# Patient Record
Sex: FEMALE | Race: WHITE | Employment: OTHER | URBAN - METROPOLITAN AREA
[De-identification: names, ages, dates, MRNs, and addresses within clinical notes are randomized per-mention and may not be internally consistent; named-entity substitution may affect disease eponyms.]

---

## 2021-08-09 ENCOUNTER — HOSPITAL ENCOUNTER (OUTPATIENT)
Dept: WOUND CARE | Age: 76
Discharge: HOME | End: 2021-08-09
Payer: MEDICARE

## 2021-08-09 DIAGNOSIS — I87.2 VENOUS STASIS DERMATITIS OF BOTH LOWER EXTREMITIES: ICD-10-CM

## 2021-08-09 DIAGNOSIS — I89.0 LYMPHEDEMA OF BOTH LOWER EXTREMITIES: ICD-10-CM

## 2021-08-09 PROCEDURE — 99203 OFFICE O/P NEW LOW 30 MIN: CPT

## 2021-08-09 PROCEDURE — 99203 OFFICE O/P NEW LOW 30 MIN: CPT | Performed by: NURSE PRACTITIONER

## 2021-08-09 PROCEDURE — 11719 TRIM NAIL(S) ANY NUMBER: CPT

## 2021-08-09 PROCEDURE — 29581 APPL MULTLAYER CMPRN SYS LEG: CPT

## 2021-08-09 RX ORDER — OXYCODONE HYDROCHLORIDE 5 MG/1
5 TABLET ORAL 2 TIMES DAILY PRN
Status: ON HOLD | COMMUNITY
End: 2022-03-17 | Stop reason: HOSPADM

## 2021-08-09 RX ORDER — MORPHINE SULFATE 30 MG/1
30 TABLET ORAL 2 TIMES DAILY PRN
COMMUNITY
End: 2022-02-05

## 2021-08-09 RX ORDER — CLOBETASOL PROPIONATE 0.5 MG/G
OINTMENT TOPICAL
Qty: 60 G | Refills: 1 | Status: ON HOLD
Start: 2021-08-09 | End: 2022-03-17 | Stop reason: HOSPADM

## 2021-08-09 RX ORDER — LOSARTAN POTASSIUM 50 MG/1
50 TABLET ORAL DAILY
Status: ON HOLD | COMMUNITY
End: 2022-03-17 | Stop reason: HOSPADM

## 2021-08-09 NOTE — PROGRESS NOTES
Multilayer Compression Wrap   (Not Unna) Below the Knee    NAME:  Adilene Sánchez  YOB: 1945  MEDICAL RECORD NUMBER:  [id number]  DATE:  8/9/2021    Multilayer compression wrap: Removed old Multilayer wrap if indicated and wash leg with mild soap/water. Applied moisturizing agent to dry skin as needed. Applied primary and secondary dressing as ordered. Applied multilayered dressing below the knee to right lower leg. Applied multilayered dressing below the knee to left lower leg. Instructed patient/caregiver not to remove dressing and to keep it clean and dry. Instructed patient/caregiver on complications to report to provider, such as pain, numbness in toes, heavy drainage, and slippage of dressing. Instructed patient on purpose of compression dressing and on activity and exercise recommendations.            Electronically signed by Avery Coleman LPN on 3/9/6474 at 4:32 PM

## 2021-08-10 PROBLEM — I89.0 LYMPHEDEMA OF BOTH LOWER EXTREMITIES: Status: ACTIVE | Noted: 2021-08-10

## 2021-08-10 PROBLEM — I87.2 VENOUS STASIS DERMATITIS OF BOTH LOWER EXTREMITIES: Status: ACTIVE | Noted: 2021-08-10

## 2021-08-10 RX ORDER — GINSENG 100 MG
CAPSULE ORAL ONCE
Status: CANCELLED | OUTPATIENT
Start: 2021-08-10 | End: 2021-08-10

## 2021-08-10 RX ORDER — BETAMETHASONE DIPROPIONATE 0.05 %
OINTMENT (GRAM) TOPICAL ONCE
Status: CANCELLED | OUTPATIENT
Start: 2021-08-10 | End: 2021-08-10

## 2021-08-10 RX ORDER — BACITRACIN, NEOMYCIN, POLYMYXIN B 400; 3.5; 5 [USP'U]/G; MG/G; [USP'U]/G
OINTMENT TOPICAL ONCE
Status: CANCELLED | OUTPATIENT
Start: 2021-08-10 | End: 2021-08-10

## 2021-08-10 RX ORDER — CLOBETASOL PROPIONATE 0.5 MG/G
OINTMENT TOPICAL ONCE
Status: CANCELLED | OUTPATIENT
Start: 2021-08-10 | End: 2021-08-10

## 2021-08-10 RX ORDER — GENTAMICIN SULFATE 1 MG/G
OINTMENT TOPICAL ONCE
Status: CANCELLED | OUTPATIENT
Start: 2021-08-10 | End: 2021-08-10

## 2021-08-10 RX ORDER — LIDOCAINE 50 MG/G
OINTMENT TOPICAL ONCE
Status: CANCELLED | OUTPATIENT
Start: 2021-08-10 | End: 2021-08-10

## 2021-08-10 RX ORDER — LIDOCAINE 40 MG/G
CREAM TOPICAL ONCE
Status: CANCELLED | OUTPATIENT
Start: 2021-08-10 | End: 2021-08-10

## 2021-08-10 RX ORDER — LIDOCAINE HYDROCHLORIDE 40 MG/ML
SOLUTION TOPICAL ONCE
Status: CANCELLED | OUTPATIENT
Start: 2021-08-10 | End: 2021-08-10

## 2021-08-10 RX ORDER — BACITRACIN ZINC AND POLYMYXIN B SULFATE 500; 1000 [USP'U]/G; [USP'U]/G
OINTMENT TOPICAL ONCE
Status: CANCELLED | OUTPATIENT
Start: 2021-08-10 | End: 2021-08-10

## 2021-08-10 NOTE — PROGRESS NOTES
[facility] Initial Visit      Gustavo Vides  AGE: 76 y.o. GENDER: female  : 1945  EPISODE DATE:  2021   Referred by: Indiana Wasserman    Subjective:     CHIEF COMPLAINT VENOUS DERMATITIS AND LYMPHEDEMA     HISTORY of PRESENT ILLNESS      Gustavo Vides is a 76 y.o. female who presents to the [address] [place] [address] for an initial visit for evaluation and treatment of Chronic venous dermatits and lymphedema of bilateral lower extremity. The condition is of moderate severity. The patient has chronic venous dermatitis and lymphedema that she has had for the last 1-2 years in varying degrees of severity and open wound. The underlying cause is thought to be venous and lymphedema. The patients care to date has included several months of compression wraps per Home Health. The patient has significant underlying medical conditions as below. Wound Pain Timing/Severity: intermittent, mild  Quality of pain: aching, tender  Severity of pain:  2 / 10   Modifying Factors: edema, venous stasis and decreased mobility  Associated Signs/Symptoms: edema, drainage and pain    Diabetes: No  Smoking: No  Obesity: No  Anticoagulant therapy: No  Immunosuppression: No    Patient educated on the 6 essential components necessary for wound healing: Circulation, Debridements, Proper Dressings and Topical Wound Products, Infection Control, Edema Control and Offloading. Patient educated on those factors that negatively effect or impact wound healing: smoking, obesity, uncontrolled diabetes, anticoagulant and immunosuppressive regimens, inadequate nutrition, untreated arterial and venous disease if applicable and measures to manage edema. PAST MEDICAL HISTORY    History reviewed. No pertinent past medical history. PAST SURGICAL HISTORY    Past Surgical History:   Procedure Laterality Date    BACK SURGERY      FRACTURE SURGERY         FAMILY HISTORY    History reviewed. No pertinent family history.     SOCIAL Visual inspection of the periwound reveals the skin to be edematous  Wound exam: see wound description below in procedure note      Assessment:       Felisha Daugherty  appears to have venous dermatitis and lymphedema bilateral lower legs. The etiology of the wound is felt to be venous and lymphedema. There are multiple complicating factors including edema, venous stasis and decreased mobility. A comprehensive wound management program would be helpful to heal this wound. Assessments completed include fall risk and nutritional, functional,and psychological status. At this time appropriate care would include: periodic debridement and wound care as below. Problem List Items Addressed This Visit     WD-Venous stasis dermatitis of both lower extremities    WD-Lymphedema of both lower extremities        Discussed in length (greater than 30 minutes) venous disease and lymphedema along with edema management and treatment options. The patient and family feel it need to have air to heal. We discussed the need for compression and that [address] [place] [address] R[place] Herring will not to be able to come and wrap her legs long term and that she will need to use compression stockings once we get her legs compressed down. Discussed referral for venous evaluation to limit her symptoms. The patient and her daughter did not seem interested in further venous evaluation and she states she can't be seen weekly related to how difficult it is to get ot. She agreed to return in one month. Edema Control Instructions:  -Whenever you are resting, raise your legs up. Try to keep the swollen area higher than the level of your heart.  -Take breaks from standing or sitting in one position.  -Walk around to increase the blood flow in your lower legs. -Move your feet and ankles often while you stand, or tighten and relax your leg muscles.  -Wear support stockings. Put them on in the morning, before swelling gets worse.  -Eat a balanced diet.  Lose weight if you need to.  -Limit the amount of salt (sodium) in your diet. Salt holds fluid in the body and may increase swelling.  -Apply compression stocking(s) every morning as soon as you get up. Remove at bedtime unless instructed to wear day and night. Hand wash and line dry to prevent loss of elasticity. Replace every 3-4 months to ensure proper fit. The patient's toe nails were debrided in length and thickness,  totaling 10 nails using rongeurs. Toenail Brittleness, crumbling upon debridement, discoloration, elongation, fungus and thickening. Medical necessity includes advanced atrophic changes to include decrease hair growth lower legs, thickening, discolored toenails and rubor L60.2. hER primary care provider [facility] at [facility], she couldn't recall his name, seen within the last 1-2 months. Plan:     Discharge Instructions       PHYSICIAN ORDERS AND DISCHARGE INSTRUCTIONS    NOTE: Upon discharge from the [address], you will receive a patient experience survey. We would be grateful if you would take the time to fill this survey out. Wound care order history:     LOYDA's   Right       Left    Date:   Cultures:     Grafts:     Antibiotics:        Continuing wound care orders and information:                Residence:                Continue home health care with:    Your wound-care supplies will be provided by: Wound cleansing:     Do not scrub or use excessive force. Wash hands with soap and water before and after dressing changes. Prior to applying a clean dressing, cleanse wound with normal saline, wound cleanser, or mild soap and water. Ask the physician or nurse before getting the wound(s) wet in a shower    Daily Wound management:   Keep weight off wounds and reposition every 2 hours. Avoid standing for long periods of time. Apply wraps/stockings in AM and remove at bedtime.    If swelling is present, elevate legs to the level of the heart or above for 30 minutes 4-5 times a day and/or when sitting. When taking antibiotics take entire prescription as ordered by physician do not stop taking until medicine is all gone. Orders for this week:  08/09/21     Bilateral Lower Legs-- Clean with soap and water, pat dry. Apply A&D ointment to Lower legs. Apply Ca Alginate to Right Medial Ankle. Wrap with Coban 2 Lite. Home care to change on Monday and Thursday. Follow up with Molly RUDD  In 4 weeks in the wound care center  Call [phone] for any questions or concerns.   Date__________   Time____________        Treatment Note      Written Patient Dismissal Instructions Given          Electronically signed by SANDER Portillo CNP on 8/10/2021 at 11:06 AM

## 2021-09-13 ENCOUNTER — HOSPITAL ENCOUNTER (OUTPATIENT)
Dept: WOUND CARE | Age: 76
Discharge: HOME | End: 2021-09-13
Payer: MEDICARE

## 2021-09-13 VITALS
SYSTOLIC BLOOD PRESSURE: 137 MMHG | TEMPERATURE: 98.5 F | DIASTOLIC BLOOD PRESSURE: 63 MMHG | RESPIRATION RATE: 16 BRPM | HEART RATE: 72 BPM

## 2021-09-13 DIAGNOSIS — I83.022 VENOUS STASIS ULCER OF LEFT CALF WITH FAT LAYER EXPOSED WITH VARICOSE VEINS (HCC): ICD-10-CM

## 2021-09-13 DIAGNOSIS — L97.222 VENOUS STASIS ULCER OF LEFT CALF WITH FAT LAYER EXPOSED WITH VARICOSE VEINS (HCC): ICD-10-CM

## 2021-09-13 DIAGNOSIS — I89.0 LYMPHEDEMA OF BOTH LOWER EXTREMITIES: ICD-10-CM

## 2021-09-13 DIAGNOSIS — I87.2 VENOUS STASIS DERMATITIS OF BOTH LOWER EXTREMITIES: Primary | ICD-10-CM

## 2021-09-13 DIAGNOSIS — L97.212 VENOUS STASIS ULCER OF RIGHT CALF WITH FAT LAYER EXPOSED WITH VARICOSE VEINS (HCC): ICD-10-CM

## 2021-09-13 DIAGNOSIS — I83.012 VENOUS STASIS ULCER OF RIGHT CALF WITH FAT LAYER EXPOSED WITH VARICOSE VEINS (HCC): ICD-10-CM

## 2021-09-13 PROCEDURE — 87186 SC STD MICRODIL/AGAR DIL: CPT

## 2021-09-13 PROCEDURE — 11042 DBRDMT SUBQ TIS 1ST 20SQCM/<: CPT | Performed by: NURSE PRACTITIONER

## 2021-09-13 PROCEDURE — 11042 DBRDMT SUBQ TIS 1ST 20SQCM/<: CPT

## 2021-09-13 PROCEDURE — 87075 CULTR BACTERIA EXCEPT BLOOD: CPT

## 2021-09-13 PROCEDURE — 87077 CULTURE AEROBIC IDENTIFY: CPT

## 2021-09-13 PROCEDURE — 87070 CULTURE OTHR SPECIMN AEROBIC: CPT

## 2021-09-13 RX ORDER — GENTAMICIN SULFATE 1 MG/G
OINTMENT TOPICAL ONCE
Status: DISCONTINUED | OUTPATIENT
Start: 2021-09-13 | End: 2021-09-14 | Stop reason: HOSPADM

## 2021-09-13 RX ORDER — BETAMETHASONE DIPROPIONATE 0.05 %
OINTMENT (GRAM) TOPICAL ONCE
Status: CANCELLED | OUTPATIENT
Start: 2021-09-13 | End: 2021-09-13

## 2021-09-13 RX ORDER — BACITRACIN ZINC AND POLYMYXIN B SULFATE 500; 1000 [USP'U]/G; [USP'U]/G
OINTMENT TOPICAL ONCE
Status: CANCELLED | OUTPATIENT
Start: 2021-09-13 | End: 2021-09-13

## 2021-09-13 RX ORDER — LIDOCAINE HYDROCHLORIDE 40 MG/ML
SOLUTION TOPICAL ONCE
Status: DISCONTINUED | OUTPATIENT
Start: 2021-09-13 | End: 2021-09-14 | Stop reason: HOSPADM

## 2021-09-13 RX ORDER — CLOBETASOL PROPIONATE 0.5 MG/G
OINTMENT TOPICAL ONCE
Status: CANCELLED | OUTPATIENT
Start: 2021-09-13 | End: 2021-09-13

## 2021-09-13 RX ORDER — LIDOCAINE 50 MG/G
OINTMENT TOPICAL ONCE
Status: CANCELLED | OUTPATIENT
Start: 2021-09-13 | End: 2021-09-13

## 2021-09-13 RX ORDER — LIDOCAINE 40 MG/G
CREAM TOPICAL ONCE
Status: CANCELLED | OUTPATIENT
Start: 2021-09-13 | End: 2021-09-13

## 2021-09-13 RX ORDER — BACITRACIN, NEOMYCIN, POLYMYXIN B 400; 3.5; 5 [USP'U]/G; MG/G; [USP'U]/G
OINTMENT TOPICAL ONCE
Status: CANCELLED | OUTPATIENT
Start: 2021-09-13 | End: 2021-09-13

## 2021-09-13 RX ORDER — GENTAMICIN SULFATE 1 MG/G
OINTMENT TOPICAL
Qty: 30 G | Refills: 1 | Status: SHIPPED | OUTPATIENT
Start: 2021-09-13 | End: 2021-12-06 | Stop reason: SDUPTHER

## 2021-09-13 RX ORDER — GINSENG 100 MG
CAPSULE ORAL ONCE
Status: CANCELLED | OUTPATIENT
Start: 2021-09-13 | End: 2021-09-13

## 2021-09-13 RX ORDER — GENTAMICIN SULFATE 1 MG/G
OINTMENT TOPICAL ONCE
Status: CANCELLED | OUTPATIENT
Start: 2021-09-13 | End: 2021-09-13

## 2021-09-13 RX ORDER — LIDOCAINE HYDROCHLORIDE 40 MG/ML
SOLUTION TOPICAL ONCE
Status: CANCELLED | OUTPATIENT
Start: 2021-09-13 | End: 2021-09-13

## 2021-09-13 NOTE — PROGRESS NOTES
Multilayer Compression Wrap   (Not Unna) Below the Knee    NAME:  Leanna Willis  YOB: 1945  MEDICAL RECORD NUMBER:  [id number]  DATE:  9/13/2021    Multilayer compression wrap: Removed old Multilayer wrap if indicated and wash leg with mild soap/water. Applied moisturizing agent to dry skin as needed. Applied primary and secondary dressing as ordered. Applied multilayered dressing below the knee to right lower leg. Applied multilayered dressing below the knee to left lower leg. Instructed patient/caregiver not to remove dressing and to keep it clean and dry. Instructed patient/caregiver on complications to report to provider, such as pain, numbness in toes, heavy drainage, and slippage of dressing. Instructed patient on purpose of compression dressing and on activity and exercise recommendations.       Electronically signed by Kayce Deshpande LPN on 4/30/5296 at 1:14 PM

## 2021-09-13 NOTE — PROGRESS NOTES
Wound Care Center Progress Note With Procedure    Kala Durbin  AGE: 68 y.o. GENDER: female  : 1945  EPISODE DATE:  2021     Subjective:     Chief Complaint   Patient presents with    Wound Check     FLOWER LEGS         HISTORY of PRESENT ILLNESS      Kala Durbin is a 76 y.o. female who presents to the [address] [place] [address] for evaluation and treatment of Chronic venous dermatits and lymphedema of bilateral lower extremity. Today she has open areas of bilateral lower legs from blistering. The condition is of moderate severity. The patient has chronic venous dermatitis and lymphedema that she has had for the last 1-2 years in varying degrees of severity and open wound. The underlying cause is thought to be venous and lymphedema. The patients care to date has included several months of compression wraps per Home Health. The patient has significant underlying medical conditions as below.      Wound Pain Timing/Severity: intermittent, mild  Quality of pain: aching, tender  Severity of pain:  2 / 10   Modifying Factors: edema, venous stasis and decreased mobility  Associated Signs/Symptoms: edema, drainage and pain     Diabetes: No  Smoking: No  Obesity: No  Anticoagulant therapy: No  Immunosuppression: No     Patient educated on the 6 essential components necessary for wound healing: Circulation, Debridements, Proper Dressings and Topical Wound Products, Infection Control, Edema Control and Offloading.     Patient educated on those factors that negatively effect or impact wound healing: smoking, obesity, uncontrolled diabetes, anticoagulant and immunosuppressive regimens, inadequate nutrition, untreated arterial and venous disease if applicable and measures to manage edema. PAST MEDICAL HISTORY    History reviewed. No pertinent past medical history.     PAST SURGICAL HISTORY    Past Surgical History:   Procedure Laterality Date    BACK SURGERY      FRACTURE SURGERY         FAMILY HISTORY    History reviewed. No pertinent family history. SOCIAL HISTORY    Social History     Tobacco Use    Smoking status: Never Smoker    Smokeless tobacco: Never Used   Vaping Use    Vaping Use: Never assessed   Substance Use Topics    Alcohol use: Never    Drug use: Never       ALLERGIES    No Known Allergies    MEDICATIONS    Current Outpatient Medications on File Prior to Encounter   Medication Sig Dispense Refill    morphine (MSIR) 30 MG tablet Take 30 mg by mouth 2 times daily as needed for Pain.  losartan (COZAAR) 50 MG tablet Take 50 mg by mouth daily      oxyCODONE (ROXICODONE) 5 MG immediate release tablet Take 5 mg by mouth 2 times daily as needed for Pain.  clobetasol (TEMOVATE) 0.05 % ointment Apply topically 2 times weekly to lower legs with compression wraps. 60 g 1     No current facility-administered medications on file prior to encounter. REVIEW OF SYSTEMS    Pertinent items are noted in HPI. Constitutional: Negative for systemic symptoms including fever, chills and malaise. Objective:      /63   Pulse 72   Temp 98.5 °F (36.9 °C) (Temporal)   Resp 16     PHYSICAL EXAM    General: The patient is in no acute distress. Mental status:  Patient is appropriate, is  oriented to place and plan of care.   Dermatologic exam: Visual inspection of the periwound reveals the skin to be edematous  Wound exam: see wound description below in procedure note      Assessment:     Problem List Items Addressed This Visit     WD-Venous stasis dermatitis of both lower extremities - Primary    Relevant Medications    lidocaine (XYLOCAINE) 4 % external solution    gentamicin (GARAMYCIN) 0.1 % ointment    Other Relevant Orders    Initiate Outpatient Wound Care Protocol    WD-Lymphedema of both lower extremities    Relevant Medications    lidocaine (XYLOCAINE) 4 % external solution    gentamicin (GARAMYCIN) 0.1 % ointment    Other Relevant Orders    Initiate Outpatient Wound Care Protocol WD-Venous stasis ulcer of right calf with fat layer exposed with varicose veins (HCC)    WD-Venous stasis ulcer of left calf with fat layer exposed with varicose veins ([address]        Procedure Note    Indications:  Based on my examination of this patient's wound(s) today, sharp excision into necrotic subcutaneous tissue is required to promote healing and evaluate the extent of previous healing. Performed by: SANDER Castaneda CNP    Consent obtained: Yes    Time out taken:  Yes    Pain Control: Anesthetic  Anesthetic: 4% Lidocaine Liquid Topical        Debridement:Excisional Debridement    Using curette the wound(s) was/were sharply debrided down through and including the removal of subcutaneous tissue.         Devitalized Tissue Debrided:  slough and exudate    Pre Debridement Measurements:  Are located in the Wound Documentation Flow Sheet    All active wounds listed below with today's date are evaluated  Wound(s)    debrided this date include # : 1 and 2     Post  Debridement Measurements:  Wound 09/13/21 Pretibial Right;Proximal #1 (Active)   Wound Image   09/13/21 1437   Wound Etiology Venous 09/13/21 1504   Wound Cleansed Soap and water 09/13/21 1437   Wound Length (cm) 0.2 cm 09/13/21 1437   Wound Width (cm) 0.2 cm 09/13/21 1437   Wound Depth (cm) 0.1 cm 09/13/21 1437   Wound Surface Area (cm^2) 0.04 cm^2 09/13/21 1437   Wound Volume (cm^3) 0.004 cm^3 09/13/21 1437   Post-Procedure Length (cm) 0.2 cm 09/13/21 1504   Post-Procedure Width (cm) 0.2 cm 09/13/21 1504   Post-Procedure Depth (cm) 0.1 cm 09/13/21 1504   Post-Procedure Surface Area (cm^2) 0.04 cm^2 09/13/21 1504   Post-Procedure Volume (cm^3) 0.004 cm^3 09/13/21 1504   Distance Tunneling (cm) 0 cm 09/13/21 1437   Tunneling Position ___ O'Clock 0 09/13/21 1437   Undermining Starts ___ O'Clock 00 09/13/21 1437   Undermining Ends___ O'Clock 0 09/13/21 1437   Undermining Maxium Distance (cm) 0 09/13/21 1437   Wound Assessment Pink/red 09/13/21 4426 Drainage Amount Moderate 09/13/21 1437   Drainage Description Serosanguinous 09/13/21 1437   Odor None 09/13/21 1437   Lashawn-wound Assessment Intact 09/13/21 1437   Margins Defined edges 09/13/21 1437   Wound Thickness Description not for Pressure Injury Full thickness 09/13/21 1437   Number of days: 0       Wound 09/13/21 Pretibial Left;Lateral #2 (Active)   Wound Image   09/13/21 1437   Wound Etiology Venous 09/13/21 1504   Wound Cleansed Wound cleanser 09/13/21 1437   Wound Length (cm) 0.8 cm 09/13/21 1437   Wound Width (cm) 1.4 cm 09/13/21 1437   Wound Depth (cm) 0.1 cm 09/13/21 1437   Wound Surface Area (cm^2) 1.12 cm^2 09/13/21 1437   Wound Volume (cm^3) 0.112 cm^3 09/13/21 1437   Post-Procedure Length (cm) 0.8 cm 09/13/21 1504   Post-Procedure Width (cm) 1.4 cm 09/13/21 1504   Post-Procedure Depth (cm) 0.1 cm 09/13/21 1504   Post-Procedure Surface Area (cm^2) 1.12 cm^2 09/13/21 1504   Post-Procedure Volume (cm^3) 0.112 cm^3 09/13/21 1504   Distance Tunneling (cm) 0 cm 09/13/21 1437   Tunneling Position ___ O'Clock 0 09/13/21 1437   Undermining Starts ___ O'Clock 0 09/13/21 1437   Undermining Ends___ O'Clock 0 09/13/21 1437   Undermining Maxium Distance (cm) 0 09/13/21 1437   Wound Assessment Other (Comment) 09/13/21 1437   Drainage Amount None 09/13/21 1437   Odor None 09/13/21 1437   Lashawn-wound Assessment Intact 09/13/21 1437   Margins Attached edges 09/13/21 1437   Number of days: 0       Percent of Wound(s) Debrided: approximately 100%    Total  Area  Debrided:  1.16 sq cm     Bleeding:  Minimal    Hemostasis Achieved:  by pressure    Procedural Pain:  0  / 10     Post Procedural Pain:  0 / 10     Response to treatment:  Well tolerated by patient. Status of wound progress and description from last visit: The edema has improved with compression, she does have some mild scattered blistering.  The wound was cultured on the left lateral leg, will use Gent topically and stimulin powder to the wounds and order history:                 LOYDA's   Right       Left               Date:              Cultures:                Grafts:                Antibiotics:           Continuing wound care orders and information:                 Residence:  Home              Continue home health care with: [address]              Your wound-care supplies will be provided by: [facility]     Wound cleansing:               Do not scrub or use excessive force. Wash hands with soap and water before and after dressing changes. Prior to applying a clean dressing, cleanse wound with normal saline, wound cleanser, or mild soap and water. Ask the physician or nurse before getting the wound(s) wet in a shower     Daily Wound management:              Keep weight off wounds and reposition every 2 hours. Avoid standing for long periods of time. Apply wraps/stockings in AM and remove at bedtime. If swelling is present, elevate legs to the level of the heart or above for 30 minutes 4-5 times a day and/or when sitting. When taking antibiotics take entire prescription as ordered by physician do not stop taking until medicine is all gone.                                                           Orders for this week:  09/13/21                Bilateral Lower Legs-- Clean with soap and water, pat dry. Apply Gentamicin and Stimulin powder to wound beds Cover with Ca Alginate. Wrap with Coban 2 (Compression is built in, no need to stretch, just roll on.) from base of Toes, not including toes, to just below knees. Home care to change on Monday and Thursday.        Follow up with Molly RUDD  In 4 weeks in the wound care center  Call [phone] for any questions or concerns.   Date__________   Time____________        Treatment Note      Written Patient Dismissal Instructions Given            Electronically signed by Ventura Carrel, APRN - CNP on 9/13/2021 at 3:59 PM

## 2021-09-19 LAB
CULTURE: ABNORMAL
CULTURE: ABNORMAL
Lab: ABNORMAL
SPECIMEN: ABNORMAL

## 2021-10-11 ENCOUNTER — HOSPITAL ENCOUNTER (OUTPATIENT)
Dept: WOUND CARE | Age: 76
Discharge: HOME | End: 2021-10-11
Payer: MEDICARE

## 2021-10-11 VITALS
DIASTOLIC BLOOD PRESSURE: 58 MMHG | SYSTOLIC BLOOD PRESSURE: 111 MMHG | RESPIRATION RATE: 18 BRPM | HEART RATE: 74 BPM | TEMPERATURE: 98.6 F

## 2021-10-11 DIAGNOSIS — L97.222 VENOUS STASIS ULCER OF LEFT CALF WITH FAT LAYER EXPOSED WITH VARICOSE VEINS (HCC): ICD-10-CM

## 2021-10-11 DIAGNOSIS — I89.0 LYMPHEDEMA OF BOTH LOWER EXTREMITIES: ICD-10-CM

## 2021-10-11 DIAGNOSIS — I83.022 VENOUS STASIS ULCER OF LEFT CALF WITH FAT LAYER EXPOSED WITH VARICOSE VEINS (HCC): ICD-10-CM

## 2021-10-11 DIAGNOSIS — L97.212 VENOUS STASIS ULCER OF RIGHT CALF WITH FAT LAYER EXPOSED WITH VARICOSE VEINS (HCC): ICD-10-CM

## 2021-10-11 DIAGNOSIS — I87.2 VENOUS STASIS DERMATITIS OF BOTH LOWER EXTREMITIES: Primary | ICD-10-CM

## 2021-10-11 DIAGNOSIS — I83.012 VENOUS STASIS ULCER OF RIGHT CALF WITH FAT LAYER EXPOSED WITH VARICOSE VEINS (HCC): ICD-10-CM

## 2021-10-11 PROCEDURE — 11719 TRIM NAIL(S) ANY NUMBER: CPT | Performed by: NURSE PRACTITIONER

## 2021-10-11 PROCEDURE — 29581 APPL MULTLAYER CMPRN SYS LEG: CPT

## 2021-10-11 PROCEDURE — 99213 OFFICE O/P EST LOW 20 MIN: CPT | Performed by: NURSE PRACTITIONER

## 2021-10-11 PROCEDURE — 11719 TRIM NAIL(S) ANY NUMBER: CPT

## 2021-10-11 RX ORDER — BACITRACIN ZINC AND POLYMYXIN B SULFATE 500; 1000 [USP'U]/G; [USP'U]/G
OINTMENT TOPICAL ONCE
Status: CANCELLED | OUTPATIENT
Start: 2021-10-11 | End: 2021-10-11

## 2021-10-11 RX ORDER — LIDOCAINE 50 MG/G
OINTMENT TOPICAL ONCE
Status: CANCELLED | OUTPATIENT
Start: 2021-10-11 | End: 2021-10-11

## 2021-10-11 RX ORDER — BETAMETHASONE DIPROPIONATE 0.05 %
OINTMENT (GRAM) TOPICAL ONCE
Status: CANCELLED | OUTPATIENT
Start: 2021-10-11 | End: 2021-10-11

## 2021-10-11 RX ORDER — CLOBETASOL PROPIONATE 0.5 MG/G
OINTMENT TOPICAL ONCE
Status: CANCELLED | OUTPATIENT
Start: 2021-10-11 | End: 2021-10-11

## 2021-10-11 RX ORDER — LIDOCAINE HYDROCHLORIDE 40 MG/ML
SOLUTION TOPICAL ONCE
Status: CANCELLED | OUTPATIENT
Start: 2021-10-11 | End: 2021-10-11

## 2021-10-11 RX ORDER — BACITRACIN, NEOMYCIN, POLYMYXIN B 400; 3.5; 5 [USP'U]/G; MG/G; [USP'U]/G
OINTMENT TOPICAL ONCE
Status: CANCELLED | OUTPATIENT
Start: 2021-10-11 | End: 2021-10-11

## 2021-10-11 RX ORDER — GINSENG 100 MG
CAPSULE ORAL ONCE
Status: CANCELLED | OUTPATIENT
Start: 2021-10-11 | End: 2021-10-11

## 2021-10-11 RX ORDER — GENTAMICIN SULFATE 1 MG/G
OINTMENT TOPICAL ONCE
Status: CANCELLED | OUTPATIENT
Start: 2021-10-11 | End: 2021-10-11

## 2021-10-11 RX ORDER — LIDOCAINE 40 MG/G
CREAM TOPICAL ONCE
Status: CANCELLED | OUTPATIENT
Start: 2021-10-11 | End: 2021-10-11

## 2021-10-11 NOTE — PROGRESS NOTES
Wound Care Center Progress Note       Hazel Gardner  AGE: 68 y.o. GENDER: female  : 1945  TODAY'S DATE:  10/11/2021        Subjective:     Chief Complaint   Patient presents with    Wound Check     BLE         HISTORY of Nasrin Chowdhury Emily is a 76 y. o. female who presents to the Wound Clinic for evaluation and treatment of Chronic venous dermatits and lymphedema of bilateral lower extremity. Today she has open areas of bilateral lower legs from blistering. The condition is of moderate severity. The patient has chronic venous dermatitis and lymphedema that she has had for the last 1-2 years in varying degrees of severity and open wound.  The underlying cause is thought to be venous and lymphedema.  The patients care to date has included several months of compression wraps per Home Health. The patient has significant underlying medical conditions as below. Patient is here for 4 week follow up. Her wounds are mostly healed, but they are having some issues with home health following our orders. They did not place A&D ointment and have not been wrapping all the way to toes, and her right foot has increased edema. Most lesions are healed. PAST MEDICAL HISTORY        Diagnosis Date    MRSA (methicillin resistant staph aureus) culture positive 2021    Wound       PAST SURGICAL HISTORY    Past Surgical History:   Procedure Laterality Date    BACK SURGERY      FRACTURE SURGERY         FAMILY HISTORY    History reviewed. No pertinent family history.     SOCIAL HISTORY    Social History     Tobacco Use    Smoking status: Never Smoker    Smokeless tobacco: Never Used   Vaping Use    Vaping Use: Never assessed   Substance Use Topics    Alcohol use: Never    Drug use: Never       ALLERGIES    No Known Allergies    MEDICATIONS    Current Outpatient Medications on File Prior to Encounter   Medication Sig Dispense Refill    oxyCODONE (ROXICODONE) 5 MG immediate release tablet Take 5 mg by mouth 2 times daily as needed for Pain.  morphine (MSIR) 30 MG tablet Take 30 mg by mouth 2 times daily as needed for Pain.  losartan (COZAAR) 50 MG tablet Take 50 mg by mouth daily      clobetasol (TEMOVATE) 0.05 % ointment Apply topically 2 times weekly to lower legs with compression wraps. 60 g 1     No current facility-administered medications on file prior to encounter. REVIEW OF SYSTEMS    Pertinent items are noted in HPI. Constitutional: Negative for systemic symptoms including fever, chills and malaise. Objective:      BP (!) 111/58   Pulse 74   Temp 98.6 °F (37 °C) (Temporal)   Resp 18     PHYSICAL EXAM      General: The patient is in no acute distress. Mental status:  Patient is appropriate, is  oriented to place and plan of care. Dermatologic exam: Visual inspection of the periwound reveals the skin to be normal in turgor and texture, dry and edematous.   Wound exam:  see wound description below     All active wounds listed below with today's date are evaluated      Wound 09/13/21 Pretibial Right;Proximal #1 (Active)   Wound Image   10/11/21 1457   Wound Etiology Venous 09/13/21 1504   Dressing Status New dressing applied 09/13/21 1621   Wound Cleansed Soap and water 10/11/21 1457   Offloading for Diabetic Foot Ulcers No offloading required 09/13/21 1621   Wound Length (cm) 0 cm 10/11/21 1457   Wound Width (cm) 0 cm 10/11/21 1457   Wound Depth (cm) 0 cm 10/11/21 1457   Wound Surface Area (cm^2) 0 cm^2 10/11/21 1457   Change in Wound Size % (l*w) 100 10/11/21 1457   Wound Volume (cm^3) 0 cm^3 10/11/21 1457   Wound Healing % 100 10/11/21 1457   Post-Procedure Length (cm) 0.2 cm 09/13/21 1504   Post-Procedure Width (cm) 0.2 cm 09/13/21 1504   Post-Procedure Depth (cm) 0.1 cm 09/13/21 1504   Post-Procedure Surface Area (cm^2) 0.04 cm^2 09/13/21 1504   Post-Procedure Volume (cm^3) 0.004 cm^3 09/13/21 1504   Distance Tunneling (cm) 0 cm 09/13/21 1437   Tunneling Position ___ O'Clock 0 09/13/21 1437   Undermining Starts ___ O'Clock 00 09/13/21 1437   Undermining Ends___ O'Clock 0 09/13/21 1437   Undermining Maxium Distance (cm) 0 09/13/21 1437   Wound Assessment Pink/red 09/13/21 1437   Drainage Amount Moderate 09/13/21 1437   Drainage Description Serosanguinous 09/13/21 1437   Odor None 09/13/21 1437   Lashawn-wound Assessment Intact 09/13/21 1437   Margins Defined edges 09/13/21 1437   Wound Thickness Description not for Pressure Injury Full thickness 09/13/21 1437   Number of days: 28       Wound 09/13/21 Pretibial Left;Lateral #2 (Active)   Wound Image   10/11/21 1457   Wound Etiology Venous 09/13/21 1504   Dressing Status New dressing applied 09/13/21 1621   Wound Cleansed Soap and water 10/11/21 1457   Offloading for Diabetic Foot Ulcers No offloading required 09/13/21 1621   Wound Length (cm) 0 cm 10/11/21 1457   Wound Width (cm) 0 cm 10/11/21 1457   Wound Depth (cm) 0 cm 10/11/21 1457   Wound Surface Area (cm^2) 0 cm^2 10/11/21 1457   Change in Wound Size % (l*w) 100 10/11/21 1457   Wound Volume (cm^3) 0 cm^3 10/11/21 1457   Wound Healing % 100 10/11/21 1457   Post-Procedure Length (cm) 0.8 cm 09/13/21 1504   Post-Procedure Width (cm) 1.4 cm 09/13/21 1504   Post-Procedure Depth (cm) 0.1 cm 09/13/21 1504   Post-Procedure Surface Area (cm^2) 1.12 cm^2 09/13/21 1504   Post-Procedure Volume (cm^3) 0.112 cm^3 09/13/21 1504   Distance Tunneling (cm) 0 cm 09/13/21 1437   Tunneling Position ___ O'Clock 0 09/13/21 1437   Undermining Starts ___ O'Clock 0 09/13/21 1437   Undermining Ends___ O'Clock 0 09/13/21 1437   Undermining Maxium Distance (cm) 0 09/13/21 1437   Wound Assessment Other (Comment) 09/13/21 1437   Drainage Amount None 09/13/21 1437   Odor None 09/13/21 1437   Lashawn-wound Assessment Intact 09/13/21 1437   Margins Attached edges 09/13/21 1437   Number of days: 28       Assessment:       Problem List Items Addressed This Visit     WD-Venous stasis dermatitis of both lower extremities - Primary    Relevant Orders    Initiate Outpatient Wound Care Protocol    WD-Lymphedema of both lower extremities    Relevant Orders    Initiate Outpatient Wound Care Protocol    WD-Venous stasis ulcer of right calf with fat layer exposed with varicose veins (HCC)    Relevant Orders    Initiate Outpatient Wound Care Protocol    WD-Venous stasis ulcer of left calf with fat layer exposed with varicose veins (HCC)    Relevant Orders    Initiate Outpatient Wound Care Protocol          Status of wound progress and description from last visit:   Mostly healed. We will have her return in her normal 4 week follow up and hope to discharge at that time. Discussed need for compression wraps when out of our direct care. Modified orders to remove gent. Stimulin nad A&D to legs along with wraps changed twice a week. Toenails Brittleness, crumbling upon debridement, discoloration, elongation, fungus and thickening. The patient's toe nails were trimmed in length, totaling 10 nails using rongeurs. Medical necessity includes advanced atrophic changes to include decrease hair growth lower legs, thickening, discolored toenails and rubor L60.2. The primary care provider is Primary care at the [place] in [facility], but patient does not have 1 specific provider. last seen earlier this month, per patient daughter. Plan:     Discharge Instructions       PHYSICIAN ORDERS AND DISCHARGE INSTRUCTIONS     NOTE: Upon discharge from the [address], you will receive a patient experience survey.  We would be grateful if you would take the time to fill this survey out.     Wound care order history:                 LOYDA's   Right       Left               Date:              Cultures:                Grafts:                Antibiotics:           Continuing wound care orders and information:                 Residence:  Home              Continue home health care with: [address] [facility]              Your wound-care supplies will be provided by: [facility]     Wound cleansing:               UF not scrub or use excessive force.              Wash hands with soap and water before and after dressing changes.             WOXME to applying a clean dressing, cleanse wound with normal saline, wound cleanser, or mild soap and water.               Ask the physician or nurse before getting the wound(s) wet in a shower     Daily Wound management:              Keep weight off wounds and reposition every 2 hours.              Avoid standing for long periods of time.              Apply wraps/stockings in AM and remove at bedtime.              If swelling is present, elevate legs to the level of the heart or above for 30 minutes 4-5 times a day and/or when sitting.                                      When taking antibiotics take entire prescription as ordered by physician do not stop taking until medicine is all gone.                                                           Orders for this week:  10/11/21                Bilateral Lower Legs-- Clean with soap and water, pat dry. A&D to lower legs. Apply Stimulin powder to wound beds Cover with Ca Alginate.  Wrap with Coban 2 (Compression is built in, no need to stretch, just roll on.) from base of Toes, not including toes, to just below knees.  Home care to change on Monday and Thursday.        Follow up with Molly RUDD  In 4 weeks in the wound care center  Call [phone] for any questions or concerns.   Date__________   Time____________        Treatment Note      Written Patient Dismissal Instructions Given            Electronically signed by SANDER Vick CNP on 10/11/2021 at 3:52 PM

## 2021-10-11 NOTE — PROGRESS NOTES
Multilayer Compression Wrap   (Not Unna) Below the Knee    NAME:  Geovanny Casanova  YOB: 1945  MEDICAL RECORD NUMBER:  [id number]  DATE:  10/11/2021    Multilayer compression wrap: Removed old Multilayer wrap if indicated and wash leg with mild soap/water. Applied moisturizing agent to dry skin as needed. Applied primary and secondary dressing as ordered. Applied multilayered dressing below the knee to right lower leg. Applied multilayered dressing below the knee to left lower leg. Instructed patient/caregiver not to remove dressing and to keep it clean and dry. Instructed patient/caregiver on complications to report to provider, such as pain, numbness in toes, heavy drainage, and slippage of dressing. Instructed patient on purpose of compression dressing and on activity and exercise recommendations.       Electronically signed by Jazmin Huynh LPN on 74/62/6289 at 4:08 PM

## 2021-11-08 ENCOUNTER — HOSPITAL ENCOUNTER (OUTPATIENT)
Dept: WOUND CARE | Age: 76
Discharge: HOME | End: 2021-11-08
Payer: MEDICARE

## 2021-11-08 VITALS — DIASTOLIC BLOOD PRESSURE: 59 MMHG | HEART RATE: 76 BPM | RESPIRATION RATE: 20 BRPM | SYSTOLIC BLOOD PRESSURE: 131 MMHG

## 2021-11-08 DIAGNOSIS — I83.012 VENOUS STASIS ULCER OF RIGHT CALF WITH FAT LAYER EXPOSED WITH VARICOSE VEINS (HCC): ICD-10-CM

## 2021-11-08 DIAGNOSIS — I87.2 VENOUS STASIS DERMATITIS OF BOTH LOWER EXTREMITIES: Primary | ICD-10-CM

## 2021-11-08 DIAGNOSIS — L97.212 VENOUS STASIS ULCER OF RIGHT CALF WITH FAT LAYER EXPOSED WITH VARICOSE VEINS (HCC): ICD-10-CM

## 2021-11-08 DIAGNOSIS — L97.222 VENOUS STASIS ULCER OF LEFT CALF WITH FAT LAYER EXPOSED WITH VARICOSE VEINS (HCC): ICD-10-CM

## 2021-11-08 DIAGNOSIS — I83.022 VENOUS STASIS ULCER OF LEFT CALF WITH FAT LAYER EXPOSED WITH VARICOSE VEINS (HCC): ICD-10-CM

## 2021-11-08 DIAGNOSIS — I89.0 LYMPHEDEMA OF BOTH LOWER EXTREMITIES: ICD-10-CM

## 2021-11-08 PROCEDURE — 11042 DBRDMT SUBQ TIS 1ST 20SQCM/<: CPT

## 2021-11-08 PROCEDURE — 11042 DBRDMT SUBQ TIS 1ST 20SQCM/<: CPT | Performed by: NURSE PRACTITIONER

## 2021-11-08 RX ORDER — CLOBETASOL PROPIONATE 0.5 MG/G
OINTMENT TOPICAL ONCE
Status: CANCELLED | OUTPATIENT
Start: 2021-11-08 | End: 2021-11-08

## 2021-11-08 RX ORDER — GENTAMICIN SULFATE 1 MG/G
OINTMENT TOPICAL ONCE
Status: CANCELLED | OUTPATIENT
Start: 2021-11-08 | End: 2021-11-08

## 2021-11-08 RX ORDER — LIDOCAINE HYDROCHLORIDE 40 MG/ML
SOLUTION TOPICAL ONCE
Status: CANCELLED | OUTPATIENT
Start: 2021-11-08 | End: 2021-11-08

## 2021-11-08 RX ORDER — BETAMETHASONE DIPROPIONATE 0.05 %
OINTMENT (GRAM) TOPICAL ONCE
Status: CANCELLED | OUTPATIENT
Start: 2021-11-08 | End: 2021-11-08

## 2021-11-08 RX ORDER — BACITRACIN ZINC AND POLYMYXIN B SULFATE 500; 1000 [USP'U]/G; [USP'U]/G
OINTMENT TOPICAL ONCE
Status: CANCELLED | OUTPATIENT
Start: 2021-11-08 | End: 2021-11-08

## 2021-11-08 RX ORDER — BACITRACIN, NEOMYCIN, POLYMYXIN B 400; 3.5; 5 [USP'U]/G; MG/G; [USP'U]/G
OINTMENT TOPICAL ONCE
Status: CANCELLED | OUTPATIENT
Start: 2021-11-08 | End: 2021-11-08

## 2021-11-08 RX ORDER — LIDOCAINE 40 MG/G
CREAM TOPICAL ONCE
Status: CANCELLED | OUTPATIENT
Start: 2021-11-08 | End: 2021-11-08

## 2021-11-08 RX ORDER — GENTAMICIN SULFATE 1 MG/G
OINTMENT TOPICAL ONCE
Status: DISCONTINUED | OUTPATIENT
Start: 2021-11-08 | End: 2021-11-09 | Stop reason: HOSPADM

## 2021-11-08 RX ORDER — LIDOCAINE 50 MG/G
OINTMENT TOPICAL ONCE
Status: CANCELLED | OUTPATIENT
Start: 2021-11-08 | End: 2021-11-08

## 2021-11-08 RX ORDER — GINSENG 100 MG
CAPSULE ORAL ONCE
Status: CANCELLED | OUTPATIENT
Start: 2021-11-08 | End: 2021-11-08

## 2021-11-08 RX ORDER — LIDOCAINE HYDROCHLORIDE 40 MG/ML
SOLUTION TOPICAL ONCE
Status: DISCONTINUED | OUTPATIENT
Start: 2021-11-08 | End: 2021-11-09 | Stop reason: HOSPADM

## 2021-11-08 ASSESSMENT — PAIN - FUNCTIONAL ASSESSMENT: PAIN_FUNCTIONAL_ASSESSMENT: PREVENTS OR INTERFERES SOME ACTIVE ACTIVITIES AND ADLS

## 2021-11-08 ASSESSMENT — PAIN DESCRIPTION - FREQUENCY: FREQUENCY: INTERMITTENT

## 2021-11-08 ASSESSMENT — PAIN DESCRIPTION - PAIN TYPE: TYPE: ACUTE PAIN

## 2021-11-08 ASSESSMENT — PAIN DESCRIPTION - ONSET: ONSET: ON-GOING

## 2021-11-08 ASSESSMENT — PAIN SCALES - GENERAL: PAINLEVEL_OUTOF10: 7

## 2021-11-08 ASSESSMENT — PAIN DESCRIPTION - DESCRIPTORS: DESCRIPTORS: TINGLING

## 2021-11-08 ASSESSMENT — PAIN DESCRIPTION - ORIENTATION: ORIENTATION: RIGHT;LEFT

## 2021-11-08 ASSESSMENT — PAIN DESCRIPTION - LOCATION: LOCATION: FOOT

## 2021-11-08 NOTE — PROGRESS NOTES
Wound Care Center Progress Note With Procedure    Frank Lawson  AGE: 68 y.o. GENDER: female  : 1945  EPISODE DATE:  2021     Subjective:     Chief Complaint   Patient presents with    Wound Check     FLOWER LEGS         HISTORY of PRESENT ILLNESS     Dilip Bishop a 76 y. o. female who presents to the Wound Clinic for evaluation and treatment of Chronic venous dermatits and lymphedema of bilateral lower extremity. Today she has open areas of bilateral lower legs from blistering. The condition is of moderate severity. The patient has chronic venous dermatitis and lymphedema that she has had for the last 1-2 years in varying degrees of severity and open wound.  The underlying cause is thought to be venous and lymphedema.  The patients care to date has included several months of compression wraps per Home Health.  The patient has significant underlying medical conditions as below.      Wound Pain Timing/Severity: intermittent, mild  Quality of pain: aching, tender  Severity of pain:  2 / 10   Modifying Factors: edema, venous stasis and decreased mobility  Associated Signs/Symptoms: edema, drainage and pain     Diabetes: No  Smoking: No  Obesity: No  Anticoagulant therapy: No  Immunosuppression: No     Patient educated on the 6 essential components necessary for wound healing: Circulation, Debridements, Proper Dressings and Topical Wound Products, Infection Control, Edema Control and Offloading.     Patient educated on those factors that negatively effect or impact wound healing: smoking, obesity, uncontrolled diabetes, anticoagulant and immunosuppressive regimens, inadequate nutrition, untreated arterial and venous disease if applicable and measures to manage edema.         PAST MEDICAL HISTORY        Diagnosis Date    MRSA (methicillin resistant staph aureus) culture positive 2021    Wound       PAST SURGICAL HISTORY    Past Surgical History:   Procedure Laterality Date    BACK SURGERY      Protocol    WD-Venous stasis ulcer of right calf with fat layer exposed with varicose veins (HCC)    Relevant Medications    lidocaine (XYLOCAINE) 4 % external solution    gentamicin (GARAMYCIN) 0.1 % ointment    Other Relevant Orders    Initiate Outpatient Wound Care Protocol    WD-Venous stasis ulcer of left calf with fat layer exposed with varicose veins (HCC)    Relevant Medications    lidocaine (XYLOCAINE) 4 % external solution    gentamicin (GARAMYCIN) 0.1 % ointment    Other Relevant Orders    Initiate Outpatient Wound Care Protocol        Procedure Note    Indications:  Based on my examination of this patient's wound(s) today, sharp excision into necrotic subcutaneous tissue is required to promote healing and evaluate the extent of previous healing. Performed by: SANDER Mosley CNP    Consent obtained: Yes    Time out taken:  Yes    Pain Control: Anesthetic  Anesthetic: 4% Lidocaine Liquid Topical     Debridement:Excisional Debridement    Using curette the wound(s) was/were sharply debrided down through and including the removal of subcutaneous tissue.         Devitalized Tissue Debrided:  slough and exudate    Pre Debridement Measurements:  Are located in the Wound Documentation Flow Sheet    All active wounds listed below with today's date are evaluated  Wound(s)    debrided this date include # : 1,2, 3 and 4     Post  Debridement Measurements:  Wound 09/13/21 Pretibial Right;Proximal #1 (Active)   Wound Image   10/11/21 1457   Wound Etiology Venous 11/08/21 1503   Dressing Status New dressing applied 11/08/21 1557   Wound Cleansed Soap and water 11/08/21 1503   Offloading for Diabetic Foot Ulcers No offloading required 11/08/21 1557   Wound Length (cm) 0.3 cm 11/08/21 1503   Wound Width (cm) 0.5 cm 11/08/21 1503   Wound Depth (cm) 0.1 cm 11/08/21 1503   Wound Surface Area (cm^2) 0.15 cm^2 11/08/21 1503   Change in Wound Size % (l*w) -275 11/08/21 1503   Wound Volume (cm^3) 0.015 cm^3 11/08/21 1503   Wound Healing % -275 11/08/21 1503   Post-Procedure Length (cm) 0.3 cm 11/08/21 1534   Post-Procedure Width (cm) 0.5 cm 11/08/21 1534   Post-Procedure Depth (cm) 0.1 cm 11/08/21 1534   Post-Procedure Surface Area (cm^2) 0.15 cm^2 11/08/21 1534   Post-Procedure Volume (cm^3) 0.015 cm^3 11/08/21 1534   Distance Tunneling (cm) 0 cm 11/08/21 1503   Tunneling Position ___ O'Clock 0 11/08/21 1503   Undermining Starts ___ O'Clock 0 11/08/21 1503   Undermining Ends___ O'Clock 0 11/08/21 1503   Undermining Maxium Distance (cm) 00 11/08/21 1503   Wound Assessment Slough 11/08/21 1503   Drainage Amount Moderate 09/13/21 1437   Drainage Description Serosanguinous 09/13/21 1437   Odor None 09/13/21 1437   Lashawn-wound Assessment Intact 09/13/21 1437   Margins Attached edges 11/08/21 1503   Wound Thickness Description not for Pressure Injury Full thickness 09/13/21 1437   Number of days: 56       Wound 09/13/21 Pretibial Left;Lateral #2 (Active)   Wound Image   10/11/21 1457   Wound Etiology Venous 11/08/21 1503   Dressing Status New dressing applied 11/08/21 1557   Wound Cleansed Soap and water 10/11/21 1457   Offloading for Diabetic Foot Ulcers No offloading required 11/08/21 1557   Wound Length (cm) 0.2 cm 11/08/21 1503   Wound Width (cm) 0.2 cm 11/08/21 1503   Wound Depth (cm) 0.1 cm 11/08/21 1503   Wound Surface Area (cm^2) 0.04 cm^2 11/08/21 1503   Change in Wound Size % (l*w) 96.43 11/08/21 1503   Wound Volume (cm^3) 0.004 cm^3 11/08/21 1503   Wound Healing % 96 11/08/21 1503   Post-Procedure Length (cm) 0.2 cm 11/08/21 1534   Post-Procedure Width (cm) 0.2 cm 11/08/21 1534   Post-Procedure Depth (cm) 0.1 cm 11/08/21 1534   Post-Procedure Surface Area (cm^2) 0.04 cm^2 11/08/21 1534   Post-Procedure Volume (cm^3) 0.004 cm^3 11/08/21 1534   Distance Tunneling (cm) 0 cm 11/08/21 1503   Tunneling Position ___ O'Clock 0 11/08/21 1503   Undermining Starts ___ O'Clock 00 11/08/21 1503   Undermining Ends___ O'Clock 0 11/08/21 [address] (cm) 0 11/08/21 1503   Wound Assessment [facility] 11/08/21 1503   Drainage Amount None 11/08/21 1503   Odor None 09/13/21 1437   Lashawn-wound Assessment Intact 11/08/21 1503   Margins Attached edges 11/08/21 1503   Number of days: 56       Wound 11/08/21 Pretibial Left; Medial #3 (Active)   Wound Image   11/08/21 1503   Wound Etiology Venous 11/08/21 1534   Dressing Status New dressing applied 11/08/21 1557   Wound Cleansed Soap and water 11/08/21 1503   Offloading for Diabetic Foot Ulcers No offloading required 11/08/21 1557   Wound Length (cm) 1.2 cm 11/08/21 1503   Wound Width (cm) 1 cm 11/08/21 1503   Wound Depth (cm) 0.1 cm 11/08/21 1503   Wound Surface Area (cm^2) 1.2 cm^2 11/08/21 1503   Wound Volume (cm^3) 0.12 cm^3 11/08/21 1503   Post-Procedure Length (cm) 1.2 cm 11/08/21 1534   Post-Procedure Width (cm) 1 cm 11/08/21 1534   Post-Procedure Depth (cm) 0.1 cm 11/08/21 1534   Post-Procedure Surface Area (cm^2) 1.2 cm^2 11/08/21 1534   Post-Procedure Volume (cm^3) 0.12 cm^3 11/08/21 1534   Distance Tunneling (cm) 0 cm 11/08/21 1503   Tunneling Position ___ O'Clock 0 11/08/21 1503   Undermining Starts ___ O'Clock 00 11/08/21 1503   Undermining Ends___ O'Clock 0 11/08/21 1503   Undermining Maxium Distance (cm) 0 11/08/21 1503   Wound Assessment [place]/red; [facility] 11/08/21 1503   Drainage Amount None 11/08/21 1503   Odor None 11/08/21 1503   Lashawn-wound Assessment Intact 11/08/21 1503   Margins Attached edges 11/08/21 1503   Number of days: 0       Wound 11/08/21 Pretibial Left; Medial; Proximal #4 (Active)   Wound Image   11/08/21 1503   Wound Etiology Venous 11/08/21 1534   Dressing Status New dressing applied 11/08/21 1557   Wound Cleansed Soap and water 11/08/21 1503   Offloading for Diabetic Foot Ulcers No offloading required 11/08/21 1557   Wound Length (cm) 0.2 cm 11/08/21 1457   Wound Width (cm) 0.2 cm 11/08/21 1457   Wound Depth (cm) 0.1 cm 11/08/21 1457   Wound Surface Area (cm^2) 0.04 cm^2 11/08/21 1457   Wound Volume (cm^3) 0.004 cm^3 11/08/21 1457   Post-Procedure Length (cm) 0.2 cm 11/08/21 1534   Post-Procedure Width (cm) 0.2 cm 11/08/21 1534   Post-Procedure Depth (cm) 0.1 cm 11/08/21 1534   Post-Procedure Surface Area (cm^2) 0.04 cm^2 11/08/21 1534   Post-Procedure Volume (cm^3) 0.004 cm^3 11/08/21 1534   Distance Tunneling (cm) 0 cm 11/08/21 1457   Tunneling Position ___ O'Clock 0 11/08/21 1457   Undermining Starts ___ O'Clock 00 11/08/21 1457   Undermining Ends___ O'Clock 0 11/08/21 1457   Undermining Maxium Distance (cm) 0 11/08/21 1457   Wound Assessment Pink/red 11/08/21 1457   Lashawn-wound Assessment Intact 11/08/21 1457   Margins Attached edges 11/08/21 1457   Number of days: 0       Percent of Wound(s) Debrided: approximately 100%    Total  Area  Debrided:  1.43 sq cm     Bleeding:  Minimal    Hemostasis Achieved:  by pressure    Procedural Pain:  0  / 10     Post Procedural Pain:  0 / 10     Response to treatment:  Well tolerated by patient. Status of wound progress and description from last visit: Stable, wounds look better than the last time I saw her. She has intermittent mild blistering that resolve fairly quickly with current regimen. Last culture had moderate growth MRSA that is sensitive to Melissa, which is part of her current regimen. Plan:       Discharge Instructions       PHYSICIAN ORDERS AND DISCHARGE INSTRUCTIONS     NOTE: Upon discharge from the [address], you will receive a patient experience survey.  We would be grateful if you would take the time to fill this survey out.     Wound care order history:                 LOYDA's   Right       Left               Date:              Cultures:                Grafts:                Antibiotics:           Continuing wound care orders and information:                 Residence:  Home              Continue home health care with: [address] [facility]              Your wound-care supplies will be provided by: [facility]     Wound cleansing:               MW not scrub or use excessive force.              Wash hands with soap and water before and after dressing changes.             VIHDD to applying a clean dressing, cleanse wound with normal saline, wound cleanser, or mild soap and water.               Ask the physician or nurse before getting the wound(s) wet in a shower     Daily Wound management:              Keep weight off wounds and reposition every 2 hours.              Avoid standing for long periods of time.              Apply wraps/stockings in AM and remove at bedtime.              If swelling is present, elevate legs to the level of the heart or above for 30 minutes 4-5 times a day and/or when sitting.                                      When taking antibiotics take entire prescription as ordered by physician do not stop taking until medicine is all gone.                                                           Orders for this week:  11/08/21                Bilateral Lower Legs-- Clean with soap and water, pat dry. A&D to lower legs. Apply Gentamicin and Stimulin powder to wound beds Cover with Ca Alginate.  Wrap with Coban 2  from base of Toes, not including toes, to just below knees.  Home care to change on Monday and Thursday.        Follow up with Molly RUDD  In 4 weeks in the wound care center  Call [phone] for any questions or concerns.   Date__________   Time____________        Treatment Note Wound 11/08/21 Pretibial Left; Medial #3-Dressing/Treatment:  (A&D, gentamicin, stimulin ca alg coban 2 )  Wound 11/08/21 Pretibial Left; Medial; Proximal #4-Dressing/Treatment:  (A&D, gentamicin, stimulin ca alg coban 2 )  Wound 09/13/21 Pretibial Right;Proximal #1-Dressing/Treatment:  (A&D, gentamicin, stimulin ca alg coban 2 )  Wound 09/13/21 Pretibial Left;Lateral #2-Dressing/Treatment:  (A&D, gentamicin, stimulin ca alg coban 2 )    Written Patient Dismissal Instructions Given            Electronically signed by Jerad Delarosa Ruchi Millan - CNP on 11/8/2021 at 4:53 PM

## 2021-11-08 NOTE — PROGRESS NOTES
Multilayer Compression Wrap   (Not Unna) Below the Knee    NAME:  Radha Nelson  YOB: 1945  MEDICAL RECORD NUMBER:  [id number]  DATE:  11/8/2021    Multilayer compression wrap: Removed old Multilayer wrap if indicated and wash leg with mild soap/water. Applied moisturizing agent to dry skin as needed. Applied primary and secondary dressing as ordered. Applied multilayered dressing below the knee to right lower leg. Applied multilayered dressing below the knee to left lower leg. Instructed patient/caregiver not to remove dressing and to keep it clean and dry. Instructed patient/caregiver on complications to report to provider, such as pain, numbness in toes, heavy drainage, and slippage of dressing. Instructed patient on purpose of compression dressing and on activity and exercise recommendations.       Electronically signed by Lizzeth Lazaro LPN on 63/4/2546 at 2:95 PM

## 2021-12-06 ENCOUNTER — HOSPITAL ENCOUNTER (OUTPATIENT)
Dept: WOUND CARE | Age: 76
Discharge: HOME | End: 2021-12-06
Payer: MEDICARE

## 2021-12-06 VITALS
RESPIRATION RATE: 19 BRPM | DIASTOLIC BLOOD PRESSURE: 78 MMHG | SYSTOLIC BLOOD PRESSURE: 127 MMHG | HEART RATE: 93 BPM | TEMPERATURE: 99.1 F

## 2021-12-06 DIAGNOSIS — I89.0 LYMPHEDEMA OF BOTH LOWER EXTREMITIES: ICD-10-CM

## 2021-12-06 DIAGNOSIS — I87.2 VENOUS STASIS DERMATITIS OF BOTH LOWER EXTREMITIES: Primary | ICD-10-CM

## 2021-12-06 DIAGNOSIS — I83.012 VENOUS STASIS ULCER OF RIGHT CALF WITH FAT LAYER EXPOSED WITH VARICOSE VEINS (HCC): ICD-10-CM

## 2021-12-06 DIAGNOSIS — L97.212 VENOUS STASIS ULCER OF RIGHT CALF WITH FAT LAYER EXPOSED WITH VARICOSE VEINS (HCC): ICD-10-CM

## 2021-12-06 DIAGNOSIS — I83.022 VENOUS STASIS ULCER OF LEFT CALF WITH FAT LAYER EXPOSED WITH VARICOSE VEINS (HCC): ICD-10-CM

## 2021-12-06 DIAGNOSIS — L97.222 VENOUS STASIS ULCER OF LEFT CALF WITH FAT LAYER EXPOSED WITH VARICOSE VEINS (HCC): ICD-10-CM

## 2021-12-06 PROCEDURE — 11042 DBRDMT SUBQ TIS 1ST 20SQCM/<: CPT

## 2021-12-06 PROCEDURE — 29581 APPL MULTLAYER CMPRN SYS LEG: CPT

## 2021-12-06 PROCEDURE — 11042 DBRDMT SUBQ TIS 1ST 20SQCM/<: CPT | Performed by: NURSE PRACTITIONER

## 2021-12-06 RX ORDER — BACITRACIN ZINC AND POLYMYXIN B SULFATE 500; 1000 [USP'U]/G; [USP'U]/G
OINTMENT TOPICAL ONCE
Status: CANCELLED | OUTPATIENT
Start: 2021-12-06 | End: 2021-12-06

## 2021-12-06 RX ORDER — GENTAMICIN SULFATE 1 MG/G
OINTMENT TOPICAL
Qty: 30 G | Refills: 1 | Status: SHIPPED | OUTPATIENT
Start: 2021-12-06 | End: 2021-12-13

## 2021-12-06 RX ORDER — CLOBETASOL PROPIONATE 0.5 MG/G
OINTMENT TOPICAL ONCE
Status: CANCELLED | OUTPATIENT
Start: 2021-12-06 | End: 2021-12-06

## 2021-12-06 RX ORDER — LIDOCAINE 50 MG/G
OINTMENT TOPICAL ONCE
Status: CANCELLED | OUTPATIENT
Start: 2021-12-06 | End: 2021-12-06

## 2021-12-06 RX ORDER — BETAMETHASONE DIPROPIONATE 0.05 %
OINTMENT (GRAM) TOPICAL ONCE
Status: CANCELLED | OUTPATIENT
Start: 2021-12-06 | End: 2021-12-06

## 2021-12-06 RX ORDER — BACITRACIN, NEOMYCIN, POLYMYXIN B 400; 3.5; 5 [USP'U]/G; MG/G; [USP'U]/G
OINTMENT TOPICAL ONCE
Status: CANCELLED | OUTPATIENT
Start: 2021-12-06 | End: 2021-12-06

## 2021-12-06 RX ORDER — LIDOCAINE 40 MG/G
CREAM TOPICAL ONCE
Status: CANCELLED | OUTPATIENT
Start: 2021-12-06 | End: 2021-12-06

## 2021-12-06 RX ORDER — GINSENG 100 MG
CAPSULE ORAL ONCE
Status: CANCELLED | OUTPATIENT
Start: 2021-12-06 | End: 2021-12-06

## 2021-12-06 RX ORDER — GENTAMICIN SULFATE 1 MG/G
OINTMENT TOPICAL ONCE
Status: DISCONTINUED | OUTPATIENT
Start: 2021-12-06 | End: 2021-12-07 | Stop reason: HOSPADM

## 2021-12-06 RX ORDER — GENTAMICIN SULFATE 1 MG/G
OINTMENT TOPICAL ONCE
Status: CANCELLED | OUTPATIENT
Start: 2021-12-06 | End: 2021-12-06

## 2021-12-06 RX ORDER — LIDOCAINE HYDROCHLORIDE 40 MG/ML
SOLUTION TOPICAL ONCE
Status: CANCELLED | OUTPATIENT
Start: 2021-12-06 | End: 2021-12-06

## 2021-12-06 ASSESSMENT — PAIN DESCRIPTION - DESCRIPTORS: DESCRIPTORS: TINGLING;TENDER

## 2021-12-06 ASSESSMENT — PAIN SCALES - GENERAL: PAINLEVEL_OUTOF10: 4

## 2021-12-06 ASSESSMENT — PAIN DESCRIPTION - FREQUENCY: FREQUENCY: INTERMITTENT

## 2021-12-06 ASSESSMENT — PAIN DESCRIPTION - ONSET: ONSET: ON-GOING

## 2021-12-06 ASSESSMENT — PAIN DESCRIPTION - PROGRESSION: CLINICAL_PROGRESSION: NOT CHANGED

## 2021-12-06 ASSESSMENT — PAIN DESCRIPTION - PAIN TYPE: TYPE: ACUTE PAIN

## 2021-12-06 ASSESSMENT — PAIN DESCRIPTION - LOCATION: LOCATION: FOOT

## 2021-12-06 ASSESSMENT — PAIN DESCRIPTION - ORIENTATION: ORIENTATION: RIGHT;LEFT

## 2021-12-06 NOTE — PROGRESS NOTES
Wound Care Center Progress Note With Procedure    Antione Rae  AGE: 68 y.o. GENDER: female  : 1945  EPISODE DATE:  2021     Subjective:     Chief Complaint   Patient presents with    Wound Check     BLE         HISTORY of PRESENT ILLNESS     Manpreet Diaz is a 76 y. o. female who presents to the Wound Clinic for evaluation and treatment of Chronic venous dermatits and lymphedema of bilateral lower extremity.  The condition is of mild severity. The patient has chronic venous dermatitis and lymphedema that she has had for the last 1-2 years in varying degrees of severity and open wound.  The underlying cause is thought to be venous and lymphedema.  The patients care to date has included several months of compression wraps per Home Health. The patient has significant underlying medical conditions as below.      Wound Pain Timing/Severity: intermittent, mild  Quality of pain: aching, tender  Severity of pain:  1 / 10   Modifying Factors: edema, venous stasis and decreased mobility  Associated Signs/Symptoms: edema, drainage and pain     Diabetes: No  Smoking: No  Obesity: No  Anticoagulant therapy: No  Immunosuppression: No     Patient educated on the 6 essential components necessary for wound healing: Circulation, Debridements, Proper Dressings and Topical Wound Products, Infection Control, Edema Control and Offloading.     Patient educated on those factors that negatively effect or impact wound healing: smoking, obesity, uncontrolled diabetes, anticoagulant and immunosuppressive regimens, inadequate nutrition, untreated arterial and venous disease if applicable and measures to manage edema.         PAST MEDICAL HISTORY        Diagnosis Date    MRSA (methicillin resistant staph aureus) culture positive 2021    Wound       PAST SURGICAL HISTORY    Past Surgical History:   Procedure Laterality Date    BACK SURGERY      FRACTURE SURGERY         FAMILY HISTORY    History reviewed.  No pertinent family history. SOCIAL HISTORY    Social History     Tobacco Use    Smoking status: Never Smoker    Smokeless tobacco: Never Used   Vaping Use    Vaping Use: Not on file   Substance Use Topics    Alcohol use: Never    Drug use: Never       ALLERGIES    No Known Allergies    MEDICATIONS    Current Outpatient Medications on File Prior to Encounter   Medication Sig Dispense Refill    oxyCODONE (ROXICODONE) 5 MG immediate release tablet Take 5 mg by mouth 2 times daily as needed for Pain.  morphine (MSIR) 30 MG tablet Take 30 mg by mouth 2 times daily as needed for Pain.  losartan (COZAAR) 50 MG tablet Take 50 mg by mouth daily      clobetasol (TEMOVATE) 0.05 % ointment Apply topically 2 times weekly to lower legs with compression wraps. 60 g 1     No current facility-administered medications on file prior to encounter. REVIEW OF SYSTEMS    Pertinent items are noted in HPI. Constitutional: Negative for systemic symptoms including fever, chills and malaise. Objective:      /78   Pulse 93   Temp 99.1 °F (37.3 °C) (Temporal)   Resp 19     PHYSICAL EXAM    General: The patient is in no acute distress. Mental status:  Patient is appropriate, is  oriented to place and plan of care.   Dermatologic exam: Visual inspection of the periwound reveals the skin to be edematous  Wound exam: see wound description below in procedure note      Assessment:     Problem List Items Addressed This Visit     WD-Venous stasis dermatitis of both lower extremities - Primary    Relevant Medications    gentamicin (GARAMYCIN) 0.1 % ointment    Other Relevant Orders    Initiate Outpatient Wound Care Protocol    WD-Lymphedema of both lower extremities    Relevant Medications    gentamicin (GARAMYCIN) 0.1 % ointment    Other Relevant Orders    Initiate Outpatient Wound Care Protocol    WD-Venous stasis ulcer of right calf with fat layer exposed with varicose veins (HCC)    Relevant Medications    gentamicin (GARAMYCIN) 0.1 % ointment    Other Relevant Orders    Initiate Outpatient Wound Care Protocol    WD-Venous stasis ulcer of left calf with fat layer exposed with varicose veins (HCC)    Relevant Medications    gentamicin (GARAMYCIN) 0.1 % ointment    Other Relevant Orders    Initiate Outpatient Wound Care Protocol        Procedure Note    Indications:  Based on my examination of this patient's wound(s) today, sharp excision into necrotic subcutaneous tissue is required to promote healing and evaluate the extent of previous healing. Performed by: SANDER Cabello CNP    Consent obtained: Yes    Time out taken:  Yes    Pain Control: Anesthetic  Anesthetic: 4% Lidocaine Liquid Topical        Debridement:Excisional Debridement    Using curette the wound(s) was/were sharply debrided down through and including the removal of subcutaneous tissue.         Devitalized Tissue Debrided:  slough and exudate    Pre Debridement Measurements:  Are located in the Wound Documentation Flow Sheet    All active wounds listed below with today's date are evaluated  Wound(s)    debrided this date include # : 3     Post  Debridement Measurements:  Wound 11/08/21 Pretibial Left; Medial #3 (Active)   Wound Image   11/08/21 1503   Wound Etiology Venous 12/06/21 1505   Dressing Status New dressing applied 12/06/21 1545   Wound Cleansed Soap and water 12/06/21 1505   Offloading for Diabetic Foot Ulcers No offloading required 12/06/21 1505   Wound Length (cm) 0.5 cm 12/06/21 1505   Wound Width (cm) 0.5 cm 12/06/21 1505   Wound Depth (cm) 0.1 cm 12/06/21 1505   Wound Surface Area (cm^2) 0.25 cm^2 12/06/21 1505   Change in Wound Size % (l*w) 79.17 12/06/21 1505   Wound Volume (cm^3) 0.025 cm^3 12/06/21 1505   Wound Healing % 79 12/06/21 1505   Post-Procedure Length (cm) 0.5 cm 12/06/21 1521   Post-Procedure Width (cm) 0.5 cm 12/06/21 1521   Post-Procedure Depth (cm) 0.1 cm 12/06/21 1521   Post-Procedure Surface Area (cm^2) 0.25 cm^2 12/06/21 1521   Post-Procedure Volume (cm^3) 0.025 cm^3 12/06/21 1521   Distance Tunneling (cm) 0 cm 12/06/21 1505   Tunneling Position ___ O'Clock 0 12/06/21 1505   Undermining Starts ___ O'Clock 0 12/06/21 1505   Undermining Ends___ O'Clock 0 12/06/21 1505   Undermining Maxium Distance (cm) 0 12/06/21 1505   Wound Assessment Pink/red 12/06/21 1505   Drainage Amount None 12/06/21 1505   Odor None 12/06/21 1505   Lashawn-wound Assessment Intact 12/06/21 1505   Margins Attached edges 12/06/21 1505   Wound Thickness Description not for Pressure Injury Full thickness 12/06/21 1505   Number of days: 28       Percent of Wound(s) Debrided: approximately 100%    Total  Area  Debrided:  0.25 sq cm     Bleeding:  Minimal    Hemostasis Achieved:  by pressure    Procedural Pain:  0  / 10     Post Procedural Pain:  0 / 10     Response to treatment:  Well tolerated by patient. Status of wound progress and description from last visit: The edema is much better controlled, she only has one open wound at this time. Continue regimen. Plan:       Discharge Instructions       PHYSICIAN ORDERS AND DISCHARGE INSTRUCTIONS     NOTE: Upon discharge from the [address], you will receive a patient experience survey. We would be grateful if you would take the time to fill this survey out.     Wound care order history:                 LOYDA's   Right       Left               Date:              Cultures:                Grafts:                Antibiotics:           Continuing wound care orders and information:                 Residence:  Home              Continue [facility] health care with: [facility] [place]              Your wound-care supplies will be provided by: [facility]     Wound cleansing:               JR not scrub or use excessive force.              Wash hands with soap and water before and after dressing changes.               ZEKGW to applying a clean dressing, cleanse wound with normal saline, wound cleanser, or mild soap and water.   Suzy Kingston the physician or nurse before getting the wound(s) wet in a shower     Daily Wound management:              Keep weight off wounds and reposition every 2 hours.              Avoid standing for long periods of time.              Apply wraps/stockings in AM and remove at bedtime.              If swelling is present, elevate legs to the level of the heart or above for 30 minutes 4-5 times a day and/or when sitting.                                      When taking antibiotics take entire prescription as ordered by physician do not stop taking until medicine is all gone.                                                           Orders for this week:  12/6/21                Bilateral Lower Legs-- Clean with soap and water, pat dry. A&D to lower legs. Apply Gentamicin and Stimulin powder to wound beds Cover with Ca Alginate.  Wrap with Coban 2  from base of Toes, not including toes, to just below knees.  Home care to change on Monday and Thursday.        Follow up with Molly RUDD  In 4 weeks in the wound care center  Call [phone] for any questions or concerns.   Date__________   Time____________        Treatment Note Wound 11/08/21 Pretibial Left; Medial #3-Dressing/Treatment:  (A+D, Gent, stimulin, ca alg, coban 2 )    Written Patient Dismissal Instructions Given            Electronically signed by SANDER Callejas CNP on 12/6/2021 at 5:44 PM

## 2021-12-06 NOTE — PROGRESS NOTES
Multilayer Compression Wrap   (Not Unna) Below the Knee    NAME:  Tiffany Samayoa  YOB: 1945  MEDICAL RECORD NUMBER:  [id number]  DATE:  12/6/2021    Multilayer compression wrap: Removed old Multilayer wrap if indicated and wash leg with mild soap/water. Applied moisturizing agent to dry skin as needed. Applied primary and secondary dressing as ordered. Applied multilayered dressing below the knee to right lower leg. Applied multilayered dressing below the knee to left lower leg. Instructed patient/caregiver not to remove dressing and to keep it clean and dry. Instructed patient/caregiver on complications to report to provider, such as pain, numbness in toes, heavy drainage, and slippage of dressing. Instructed patient on purpose of compression dressing and on activity and exercise recommendations.       Electronically signed by Elli Trujillo RN on 12/6/2021 at 3:46 PM

## 2022-01-10 ENCOUNTER — HOSPITAL ENCOUNTER (OUTPATIENT)
Dept: WOUND CARE | Age: 77
Discharge: HOME | End: 2022-01-10
Payer: MEDICARE

## 2022-01-10 VITALS
HEART RATE: 77 BPM | RESPIRATION RATE: 18 BRPM | SYSTOLIC BLOOD PRESSURE: 146 MMHG | TEMPERATURE: 98.3 F | DIASTOLIC BLOOD PRESSURE: 75 MMHG

## 2022-01-10 DIAGNOSIS — I87.2 VENOUS STASIS DERMATITIS OF BOTH LOWER EXTREMITIES: Primary | ICD-10-CM

## 2022-01-10 DIAGNOSIS — I89.0 LYMPHEDEMA OF BOTH LOWER EXTREMITIES: ICD-10-CM

## 2022-01-10 DIAGNOSIS — L97.222 VENOUS STASIS ULCER OF LEFT CALF WITH FAT LAYER EXPOSED WITH VARICOSE VEINS (HCC): ICD-10-CM

## 2022-01-10 DIAGNOSIS — L97.212 VENOUS STASIS ULCER OF RIGHT CALF WITH FAT LAYER EXPOSED WITH VARICOSE VEINS (HCC): ICD-10-CM

## 2022-01-10 DIAGNOSIS — I83.022 VENOUS STASIS ULCER OF LEFT CALF WITH FAT LAYER EXPOSED WITH VARICOSE VEINS (HCC): ICD-10-CM

## 2022-01-10 DIAGNOSIS — I83.012 VENOUS STASIS ULCER OF RIGHT CALF WITH FAT LAYER EXPOSED WITH VARICOSE VEINS (HCC): ICD-10-CM

## 2022-01-10 PROCEDURE — 99213 OFFICE O/P EST LOW 20 MIN: CPT | Performed by: NURSE PRACTITIONER

## 2022-01-10 PROCEDURE — 29581 APPL MULTLAYER CMPRN SYS LEG: CPT

## 2022-01-10 PROCEDURE — 11719 TRIM NAIL(S) ANY NUMBER: CPT

## 2022-01-10 RX ORDER — BETAMETHASONE DIPROPIONATE 0.05 %
OINTMENT (GRAM) TOPICAL ONCE
OUTPATIENT
Start: 2022-01-10 | End: 2022-01-10

## 2022-01-10 RX ORDER — LIDOCAINE 40 MG/G
CREAM TOPICAL ONCE
OUTPATIENT
Start: 2022-01-10 | End: 2022-01-10

## 2022-01-10 RX ORDER — BACITRACIN ZINC AND POLYMYXIN B SULFATE 500; 1000 [USP'U]/G; [USP'U]/G
OINTMENT TOPICAL ONCE
OUTPATIENT
Start: 2022-01-10 | End: 2022-01-10

## 2022-01-10 RX ORDER — GINSENG 100 MG
CAPSULE ORAL ONCE
OUTPATIENT
Start: 2022-01-10 | End: 2022-01-10

## 2022-01-10 RX ORDER — LIDOCAINE HYDROCHLORIDE 40 MG/ML
SOLUTION TOPICAL ONCE
OUTPATIENT
Start: 2022-01-10 | End: 2022-01-10

## 2022-01-10 RX ORDER — GENTAMICIN SULFATE 1 MG/G
OINTMENT TOPICAL ONCE
OUTPATIENT
Start: 2022-01-10 | End: 2022-01-10

## 2022-01-10 RX ORDER — BACITRACIN, NEOMYCIN, POLYMYXIN B 400; 3.5; 5 [USP'U]/G; MG/G; [USP'U]/G
OINTMENT TOPICAL ONCE
OUTPATIENT
Start: 2022-01-10 | End: 2022-01-10

## 2022-01-10 RX ORDER — CLOBETASOL PROPIONATE 0.5 MG/G
OINTMENT TOPICAL ONCE
OUTPATIENT
Start: 2022-01-10 | End: 2022-01-10

## 2022-01-10 RX ORDER — LIDOCAINE 50 MG/G
OINTMENT TOPICAL ONCE
OUTPATIENT
Start: 2022-01-10 | End: 2022-01-10

## 2022-01-10 ASSESSMENT — PAIN DESCRIPTION - PROGRESSION: CLINICAL_PROGRESSION: NOT CHANGED

## 2022-01-10 ASSESSMENT — PAIN SCALES - GENERAL: PAINLEVEL_OUTOF10: 8

## 2022-01-10 ASSESSMENT — PAIN DESCRIPTION - FREQUENCY: FREQUENCY: INTERMITTENT

## 2022-01-10 ASSESSMENT — PAIN DESCRIPTION - ONSET: ONSET: ON-GOING

## 2022-01-10 ASSESSMENT — PAIN DESCRIPTION - DESCRIPTORS: DESCRIPTORS: SORE;TINGLING

## 2022-01-10 ASSESSMENT — PAIN - FUNCTIONAL ASSESSMENT: PAIN_FUNCTIONAL_ASSESSMENT: PREVENTS OR INTERFERES SOME ACTIVE ACTIVITIES AND ADLS

## 2022-01-10 ASSESSMENT — PAIN DESCRIPTION - ORIENTATION: ORIENTATION: LEFT;RIGHT

## 2022-01-10 ASSESSMENT — PAIN DESCRIPTION - PAIN TYPE: TYPE: ACUTE PAIN

## 2022-01-10 ASSESSMENT — PAIN DESCRIPTION - LOCATION: LOCATION: FOOT;LEG

## 2022-01-10 NOTE — PROGRESS NOTES
Wound Care Center Progress Note With Procedure    Shekhar Qiu  AGE: 68 y.o. GENDER: female  : 1945  EPISODE DATE:  1/10/2022     Subjective:     Chief Complaint   Patient presents with    Wound Check         HISTORY of  E  Laxmi Diaz is a 76 y. o. female who presents to the Wound Clinic for evaluation and treatment of Chronic venous dermatits and lymphedema of bilateral lower extremity.  The condition is of mild severity. The patient has chronic venous dermatitis and lymphedema that she has had for the last 1-2 years in varying degrees of severity and open wound.  The underlying cause is thought to be venous and lymphedema.  The patients care to date has included several months of compression wraps per Home Health. The patient has significant underlying medical conditions as below.  No open area at this time.     Wound Pain Timing/Severity: intermittent, mild  Quality of pain: aching, tender  Severity of pain:  1 / 10   Modifying Factors: edema, venous stasis and decreased mobility  Associated Signs/Symptoms: edema, drainage and pain     Diabetes: No  Smoking: No  Obesity: No  Anticoagulant therapy: No  Immunosuppression: No     Patient educated on the 6 essential components necessary for wound healing: Circulation, Debridements, Proper Dressings and Topical Wound Products, Infection Control, Edema Control and Offloading.     Patient educated on those factors that negatively effect or impact wound healing: smoking, obesity, uncontrolled diabetes, anticoagulant and immunosuppressive regimens, inadequate nutrition, untreated arterial and venous disease if applicable and measures to manage edema.         PAST MEDICAL HISTORY        Diagnosis Date    MRSA (methicillin resistant staph aureus) culture positive 2021    Wound       PAST SURGICAL HISTORY    Past Surgical History:   Procedure Laterality Date    BACK SURGERY      FRACTURE SURGERY         FAMILY HISTORY    History reviewed. No pertinent family history. SOCIAL HISTORY    Social History     Tobacco Use    Smoking status: Never Smoker    Smokeless tobacco: Never Used   Vaping Use    Vaping Use: Not on file   Substance Use Topics    Alcohol use: Never    Drug use: Never       ALLERGIES    No Known Allergies    MEDICATIONS    Current Outpatient Medications on File Prior to Encounter   Medication Sig Dispense Refill    oxyCODONE (ROXICODONE) 5 MG immediate release tablet Take 5 mg by mouth 2 times daily as needed for Pain.  morphine (MSIR) 30 MG tablet Take 30 mg by mouth 2 times daily as needed for Pain.  losartan (COZAAR) 50 MG tablet Take 50 mg by mouth daily      clobetasol (TEMOVATE) 0.05 % ointment Apply topically 2 times weekly to lower legs with compression wraps. 60 g 1     No current facility-administered medications on file prior to encounter. REVIEW OF SYSTEMS    Pertinent items are noted in HPI. Constitutional: Negative for systemic symptoms including fever, chills and malaise. Objective:      BP (!) 146/75   Pulse 77   Temp 98.3 °F (36.8 °C) (Temporal)   Resp 18     PHYSICAL EXAM    General: The patient is in no acute distress. Mental status:  Patient is appropriate, is  oriented to place and plan of care.   Dermatologic exam: Visual inspection of the periwound reveals the skin to be normal in turgor and texture  Wound exam: see wound description below in procedure note      Assessment:     Problem List Items Addressed This Visit     WD-Venous stasis dermatitis of both lower extremities - Primary    Relevant Orders    Initiate Outpatient Wound Care Protocol    WD-Lymphedema of both lower extremities    Relevant Orders    Initiate Outpatient Wound Care Protocol    WD-Venous stasis ulcer of right calf with fat layer exposed with varicose veins (HCC)    Relevant Orders    Initiate Outpatient Wound Care Protocol    WD-Venous stasis ulcer of left calf with fat layer exposed with varicose veins ([facility] [address]    Relevant Orders    Initiate Outpatient Wound Care Protocol          Status of wound progress and description from last visit: Healed. Patient states she does have compression socks at home. Will wrap with compression wraps today. Home Health can remove wraps on Thursday, moisturize and apply home compression socks. The patient's toe nails were trimmed in length, totaling 10 nails using tissue nippers. Medical necessity includes advanced atrophic changes to include decrease hair growth lower legs, thickening, discolored toenails,rubor, burning and edema L60.2. The primary care provider is Aubrey Navarro last seen 7-19-21. Plan:       Discharge Instructions       PHYSICIAN ORDERS AND DISCHARGE INSTRUCTIONS     NOTE: Upon discharge from the [address], you will receive a patient experience survey. We would be grateful if you would take the time to fill this survey out.     Wound care order history:                 LOYDA's   Right       Left               Date:              Cultures:                Grafts:                Antibiotics:           Continuing wound care orders and information:                 Residence:  Home              [facility]              Your wound-care supplies will be provided by: [facility]     Wound cleansing:               SM not scrub or use excessive force.              Wash hands with soap and water before and after dressing changes.               UZPUM to applying a clean dressing, cleanse wound with normal saline, wound cleanser, or mild soap and water.               Ask the physician or nurse before getting the wound(s) wet in a shower     Daily Wound management:              Keep weight off wounds and reposition every 2 hours.              Avoid standing for long periods of time.              Apply wraps/stockings in AM and remove at bedtime.              If swelling is present, elevate legs to the level of the heart or above for 30 minutes 4-5 times a day and/or when sitting.                                      VKPB taking antibiotics take entire prescription as ordered by physician do not stop taking until medicine is all gone.                                                           Orders for this week:  1/10/22                Bilateral Lower Legs-- A&D Ointment to bilateral lower legs. Wrap with Coban 2. Leave on until Homecare comes on Thursday. Remove wraps and start Home Compression.     Discharged  Call  for any questions or concerns.   Date__________   Time____________        Treatment Note      Written Patient Dismissal Instructions Given            Electronically signed by SANDER Green CNP on 1/10/2022 at 3:22 PM

## 2022-01-10 NOTE — PROGRESS NOTES
Multilayer Compression Wrap   (Not Unna) Below the Knee    NAME:  Gayle Jones  YOB: 1945  MEDICAL RECORD NUMBER:  [id number]  DATE:  1/10/2022    Multilayer compression wrap: Removed old Multilayer wrap if indicated and wash leg with mild soap/water. Applied moisturizing agent to dry skin as needed. Applied primary and secondary dressing as ordered. Applied multilayered dressing below the knee to right lower leg. Applied multilayered dressing below the knee to left lower leg. Instructed patient/caregiver not to remove dressing and to keep it clean and dry. Instructed patient/caregiver on complications to report to provider, such as pain, numbness in toes, heavy drainage, and slippage of dressing. Instructed patient on purpose of compression dressing and on activity and exercise recommendations.       Electronically signed by Debi Arredondo RN on 1/10/2022 at 3:38 PM

## 2022-02-05 ENCOUNTER — APPOINTMENT (OUTPATIENT)
Dept: ULTRASOUND IMAGING | Age: 77
DRG: 871 | End: 2022-02-05
Payer: MEDICARE

## 2022-02-05 ENCOUNTER — HOSPITAL ENCOUNTER (INPATIENT)
Age: 77
LOS: 40 days | Discharge: NURSING, REHAB OR LONG-TERM CARE | DRG: 871 | End: 2022-03-17
Attending: EMERGENCY MEDICINE | Admitting: INTERNAL MEDICINE
Payer: MEDICARE

## 2022-02-05 ENCOUNTER — APPOINTMENT (OUTPATIENT)
Dept: GENERAL RADIOLOGY | Age: 77
DRG: 871 | End: 2022-02-05
Payer: MEDICARE

## 2022-02-05 DIAGNOSIS — U07.1 COVID-19: ICD-10-CM

## 2022-02-05 DIAGNOSIS — R09.02 HYPOXIA: ICD-10-CM

## 2022-02-05 DIAGNOSIS — Z79.891 CHRONIC PRESCRIPTION OPIATE USE: Primary | ICD-10-CM

## 2022-02-05 DIAGNOSIS — J18.9 PNEUMONIA OF BOTH LUNGS DUE TO INFECTIOUS ORGANISM, UNSPECIFIED PART OF LUNG: ICD-10-CM

## 2022-02-05 LAB
ALBUMIN SERPL-MCNC: 3.4 GM/DL (ref 3.4–5)
ALP BLD-CCNC: 90 IU/L (ref 40–129)
ALT SERPL-CCNC: 19 U/L (ref 10–40)
ANION GAP SERPL CALCULATED.3IONS-SCNC: 11 MMOL/L (ref 4–16)
AST SERPL-CCNC: 43 IU/L (ref 15–37)
BACTERIA: NEGATIVE /HPF
BASOPHILS ABSOLUTE: 0 K/CU MM
BASOPHILS RELATIVE PERCENT: 0.2 % (ref 0–1)
BILIRUB SERPL-MCNC: 0.6 MG/DL (ref 0–1)
BILIRUBIN URINE: NEGATIVE MG/DL
BLOOD, URINE: NEGATIVE
BUN BLDV-MCNC: 10 MG/DL (ref 6–23)
CALCIUM SERPL-MCNC: 8.1 MG/DL (ref 8.3–10.6)
CHLORIDE BLD-SCNC: 99 MMOL/L (ref 99–110)
CLARITY: CLEAR
CO2: 28 MMOL/L (ref 21–32)
COLOR: YELLOW
CREAT SERPL-MCNC: 0.4 MG/DL (ref 0.6–1.1)
D DIMER: >5250 NG/ML(DDU)
D DIMER: >5250 NG/ML(DDU)
DIFFERENTIAL TYPE: ABNORMAL
EOSINOPHILS ABSOLUTE: 0 K/CU MM
EOSINOPHILS RELATIVE PERCENT: 0 % (ref 0–3)
FERRITIN: 325 NG/ML (ref 15–150)
GFR AFRICAN AMERICAN: >60 ML/MIN/1.73M2
GFR NON-AFRICAN AMERICAN: >60 ML/MIN/1.73M2
GLUCOSE BLD-MCNC: 102 MG/DL (ref 70–99)
GLUCOSE, URINE: NEGATIVE MG/DL
HCT VFR BLD CALC: 41.8 % (ref 37–47)
HEMOGLOBIN: 12.5 GM/DL (ref 12.5–16)
HIGH SENSITIVE C-REACTIVE PROTEIN: 91.3 MG/L
IMMATURE NEUTROPHIL %: 1 % (ref 0–0.43)
KETONES, URINE: 40 MG/DL
LACTATE DEHYDROGENASE: 651 IU/L (ref 120–246)
LACTIC ACID, SEPSIS: 1.3 MMOL/L (ref 0.5–1.9)
LACTIC ACID, SEPSIS: 1.3 MMOL/L (ref 0.5–1.9)
LEUKOCYTE ESTERASE, URINE: NEGATIVE
LIPASE: 76 IU/L (ref 13–60)
LYMPHOCYTES ABSOLUTE: 0.5 K/CU MM
LYMPHOCYTES RELATIVE PERCENT: 7.8 % (ref 24–44)
MCH RBC QN AUTO: 24.9 PG (ref 27–31)
MCHC RBC AUTO-ENTMCNC: 29.9 % (ref 32–36)
MCV RBC AUTO: 83.1 FL (ref 78–100)
MONOCYTES ABSOLUTE: 0.7 K/CU MM
MONOCYTES RELATIVE PERCENT: 11.5 % (ref 0–4)
MUCUS: ABNORMAL HPF
NITRITE URINE, QUANTITATIVE: NEGATIVE
NUCLEATED RBC %: 0 %
PDW BLD-RTO: 15.2 % (ref 11.7–14.9)
PH, URINE: 8.5 (ref 5–8)
PLATELET # BLD: 159 K/CU MM (ref 140–440)
PMV BLD AUTO: 10.4 FL (ref 7.5–11.1)
POTASSIUM SERPL-SCNC: 3.8 MMOL/L (ref 3.5–5.1)
PROCALCITONIN: 0.1
PROTEIN UA: 100 MG/DL
RBC # BLD: 5.03 M/CU MM (ref 4.2–5.4)
RBC URINE: 5 /HPF (ref 0–6)
SARS-COV-2, NAAT: DETECTED
SEGMENTED NEUTROPHILS ABSOLUTE COUNT: 4.7 K/CU MM
SEGMENTED NEUTROPHILS RELATIVE PERCENT: 79.5 % (ref 36–66)
SODIUM BLD-SCNC: 138 MMOL/L (ref 135–145)
SOURCE: ABNORMAL
SPECIFIC GRAVITY UA: 1.02 (ref 1–1.03)
SQUAMOUS EPITHELIAL: 2 /HPF
TOTAL IMMATURE NEUTOROPHIL: 0.06 K/CU MM
TOTAL NUCLEATED RBC: 0 K/CU MM
TOTAL PROTEIN: 6.1 GM/DL (ref 6.4–8.2)
TROPONIN T: <0.01 NG/ML
UROBILINOGEN, URINE: 1 MG/DL (ref 0.2–1)
WBC # BLD: 5.9 K/CU MM (ref 4–10.5)
WBC UA: 1 /HPF (ref 0–5)

## 2022-02-05 PROCEDURE — 2580000003 HC RX 258: Performed by: EMERGENCY MEDICINE

## 2022-02-05 PROCEDURE — 6360000002 HC RX W HCPCS: Performed by: EMERGENCY MEDICINE

## 2022-02-05 PROCEDURE — 83615 LACTATE (LD) (LDH) ENZYME: CPT

## 2022-02-05 PROCEDURE — 87449 NOS EACH ORGANISM AG IA: CPT

## 2022-02-05 PROCEDURE — 84484 ASSAY OF TROPONIN QUANT: CPT

## 2022-02-05 PROCEDURE — 6370000000 HC RX 637 (ALT 250 FOR IP): Performed by: EMERGENCY MEDICINE

## 2022-02-05 PROCEDURE — 87899 AGENT NOS ASSAY W/OPTIC: CPT

## 2022-02-05 PROCEDURE — 80053 COMPREHEN METABOLIC PANEL: CPT

## 2022-02-05 PROCEDURE — 82728 ASSAY OF FERRITIN: CPT

## 2022-02-05 PROCEDURE — 85025 COMPLETE CBC W/AUTO DIFF WBC: CPT

## 2022-02-05 PROCEDURE — 85379 FIBRIN DEGRADATION QUANT: CPT

## 2022-02-05 PROCEDURE — 86141 C-REACTIVE PROTEIN HS: CPT

## 2022-02-05 PROCEDURE — 87635 SARS-COV-2 COVID-19 AMP PRB: CPT

## 2022-02-05 PROCEDURE — 93005 ELECTROCARDIOGRAM TRACING: CPT | Performed by: EMERGENCY MEDICINE

## 2022-02-05 PROCEDURE — 99285 EMERGENCY DEPT VISIT HI MDM: CPT

## 2022-02-05 PROCEDURE — 83605 ASSAY OF LACTIC ACID: CPT

## 2022-02-05 PROCEDURE — 93970 EXTREMITY STUDY: CPT

## 2022-02-05 PROCEDURE — 2060000000 HC ICU INTERMEDIATE R&B

## 2022-02-05 PROCEDURE — 87040 BLOOD CULTURE FOR BACTERIA: CPT

## 2022-02-05 PROCEDURE — 84145 PROCALCITONIN (PCT): CPT

## 2022-02-05 PROCEDURE — 2700000000 HC OXYGEN THERAPY PER DAY

## 2022-02-05 PROCEDURE — 81001 URINALYSIS AUTO W/SCOPE: CPT

## 2022-02-05 PROCEDURE — 6370000000 HC RX 637 (ALT 250 FOR IP): Performed by: NURSE PRACTITIONER

## 2022-02-05 PROCEDURE — 6360000002 HC RX W HCPCS: Performed by: NURSE PRACTITIONER

## 2022-02-05 PROCEDURE — 83690 ASSAY OF LIPASE: CPT

## 2022-02-05 PROCEDURE — 71045 X-RAY EXAM CHEST 1 VIEW: CPT

## 2022-02-05 PROCEDURE — 96375 TX/PRO/DX INJ NEW DRUG ADDON: CPT

## 2022-02-05 PROCEDURE — 96365 THER/PROPH/DIAG IV INF INIT: CPT

## 2022-02-05 RX ORDER — MORPHINE SULFATE 30 MG/1
1 TABLET, FILM COATED, EXTENDED RELEASE ORAL 2 TIMES DAILY
Status: ON HOLD | COMMUNITY
Start: 2021-12-27 | End: 2022-03-17 | Stop reason: SDUPTHER

## 2022-02-05 RX ORDER — VITAMIN B COMPLEX
2000 TABLET ORAL DAILY
Status: DISCONTINUED | OUTPATIENT
Start: 2022-02-05 | End: 2022-03-17 | Stop reason: HOSPADM

## 2022-02-05 RX ORDER — FAMOTIDINE 20 MG/1
20 TABLET, FILM COATED ORAL 2 TIMES DAILY
Status: DISCONTINUED | OUTPATIENT
Start: 2022-02-05 | End: 2022-03-17 | Stop reason: HOSPADM

## 2022-02-05 RX ORDER — POLYETHYLENE GLYCOL 3350 17 G/17G
17 POWDER, FOR SOLUTION ORAL DAILY PRN
Status: DISCONTINUED | OUTPATIENT
Start: 2022-02-05 | End: 2022-02-26

## 2022-02-05 RX ORDER — OXYCODONE HYDROCHLORIDE AND ACETAMINOPHEN 5; 325 MG/1; MG/1
1 TABLET ORAL EVERY 6 HOURS PRN
Status: DISCONTINUED | OUTPATIENT
Start: 2022-02-05 | End: 2022-03-17 | Stop reason: HOSPADM

## 2022-02-05 RX ORDER — FENTANYL CITRATE 50 UG/ML
25 INJECTION, SOLUTION INTRAMUSCULAR; INTRAVENOUS ONCE
Status: COMPLETED | OUTPATIENT
Start: 2022-02-05 | End: 2022-02-05

## 2022-02-05 RX ORDER — ONDANSETRON 2 MG/ML
4 INJECTION INTRAMUSCULAR; INTRAVENOUS EVERY 6 HOURS PRN
Status: DISCONTINUED | OUTPATIENT
Start: 2022-02-05 | End: 2022-03-17 | Stop reason: HOSPADM

## 2022-02-05 RX ORDER — ASPIRIN 81 MG/1
324 TABLET, CHEWABLE ORAL ONCE
Status: COMPLETED | OUTPATIENT
Start: 2022-02-05 | End: 2022-02-05

## 2022-02-05 RX ORDER — ONDANSETRON 2 MG/ML
4 INJECTION INTRAMUSCULAR; INTRAVENOUS ONCE
Status: COMPLETED | OUTPATIENT
Start: 2022-02-05 | End: 2022-02-05

## 2022-02-05 RX ORDER — OXYCODONE AND ACETAMINOPHEN 10; 325 MG/1; MG/1
1 TABLET ORAL EVERY 6 HOURS PRN
Status: DISCONTINUED | OUTPATIENT
Start: 2022-02-05 | End: 2022-02-05 | Stop reason: SDUPTHER

## 2022-02-05 RX ORDER — SODIUM CHLORIDE 0.9 % (FLUSH) 0.9 %
5-40 SYRINGE (ML) INJECTION EVERY 12 HOURS SCHEDULED
Status: DISCONTINUED | OUTPATIENT
Start: 2022-02-05 | End: 2022-03-17 | Stop reason: HOSPADM

## 2022-02-05 RX ORDER — SODIUM CHLORIDE 9 MG/ML
25 INJECTION, SOLUTION INTRAVENOUS PRN
Status: DISCONTINUED | OUTPATIENT
Start: 2022-02-05 | End: 2022-03-17 | Stop reason: HOSPADM

## 2022-02-05 RX ORDER — OXYCODONE AND ACETAMINOPHEN 10; 325 MG/1; MG/1
1 TABLET ORAL 2 TIMES DAILY
Status: ON HOLD | COMMUNITY
End: 2022-03-17 | Stop reason: HOSPADM

## 2022-02-05 RX ORDER — SENNA AND DOCUSATE SODIUM 50; 8.6 MG/1; MG/1
1 TABLET, FILM COATED ORAL 2 TIMES DAILY
Status: DISCONTINUED | OUTPATIENT
Start: 2022-02-05 | End: 2022-03-17 | Stop reason: HOSPADM

## 2022-02-05 RX ORDER — ACETAMINOPHEN 650 MG/1
650 SUPPOSITORY RECTAL EVERY 6 HOURS PRN
Status: DISCONTINUED | OUTPATIENT
Start: 2022-02-05 | End: 2022-03-17 | Stop reason: HOSPADM

## 2022-02-05 RX ORDER — ACETAMINOPHEN 325 MG/1
650 TABLET ORAL EVERY 6 HOURS PRN
Status: DISCONTINUED | OUTPATIENT
Start: 2022-02-05 | End: 2022-03-17 | Stop reason: HOSPADM

## 2022-02-05 RX ORDER — DEXAMETHASONE SODIUM PHOSPHATE 10 MG/ML
10 INJECTION, SOLUTION INTRAMUSCULAR; INTRAVENOUS ONCE
Status: DISCONTINUED | OUTPATIENT
Start: 2022-02-05 | End: 2022-02-15

## 2022-02-05 RX ORDER — GUAIFENESIN/DEXTROMETHORPHAN 100-10MG/5
5 SYRUP ORAL EVERY 4 HOURS PRN
Status: DISCONTINUED | OUTPATIENT
Start: 2022-02-05 | End: 2022-02-17

## 2022-02-05 RX ORDER — DEXAMETHASONE SODIUM PHOSPHATE 10 MG/ML
10 INJECTION, SOLUTION INTRAMUSCULAR; INTRAVENOUS EVERY 24 HOURS
Status: DISCONTINUED | OUTPATIENT
Start: 2022-02-10 | End: 2022-02-21

## 2022-02-05 RX ORDER — DICLOFENAC POTASSIUM 50 MG/1
1 TABLET, FILM COATED ORAL 2 TIMES DAILY
Status: ON HOLD | COMMUNITY
Start: 2021-12-27 | End: 2022-03-17 | Stop reason: HOSPADM

## 2022-02-05 RX ORDER — MORPHINE SULFATE 2 MG/ML
2 INJECTION, SOLUTION INTRAMUSCULAR; INTRAVENOUS ONCE
Status: COMPLETED | OUTPATIENT
Start: 2022-02-05 | End: 2022-02-05

## 2022-02-05 RX ORDER — MORPHINE SULFATE 15 MG/1
30 TABLET, FILM COATED, EXTENDED RELEASE ORAL DAILY
Status: DISCONTINUED | OUTPATIENT
Start: 2022-02-06 | End: 2022-02-06

## 2022-02-05 RX ORDER — CETIRIZINE HYDROCHLORIDE 10 MG/1
10 TABLET ORAL DAILY
Status: DISCONTINUED | OUTPATIENT
Start: 2022-02-06 | End: 2022-03-17 | Stop reason: HOSPADM

## 2022-02-05 RX ORDER — CETIRIZINE HYDROCHLORIDE 10 MG/1
1 TABLET ORAL DAILY
COMMUNITY
Start: 2021-02-12

## 2022-02-05 RX ORDER — OXYCODONE HYDROCHLORIDE 5 MG/1
5 TABLET ORAL EVERY 6 HOURS PRN
Status: DISCONTINUED | OUTPATIENT
Start: 2022-02-05 | End: 2022-02-07

## 2022-02-05 RX ORDER — DEXAMETHASONE SODIUM PHOSPHATE 10 MG/ML
10 INJECTION, SOLUTION INTRAMUSCULAR; INTRAVENOUS ONCE
Status: COMPLETED | OUTPATIENT
Start: 2022-02-05 | End: 2022-02-05

## 2022-02-05 RX ORDER — ONDANSETRON 4 MG/1
4 TABLET, ORALLY DISINTEGRATING ORAL EVERY 8 HOURS PRN
Status: DISCONTINUED | OUTPATIENT
Start: 2022-02-05 | End: 2022-02-21 | Stop reason: SDUPTHER

## 2022-02-05 RX ORDER — SODIUM CHLORIDE 0.9 % (FLUSH) 0.9 %
5-40 SYRINGE (ML) INJECTION PRN
Status: DISCONTINUED | OUTPATIENT
Start: 2022-02-05 | End: 2022-03-17 | Stop reason: HOSPADM

## 2022-02-05 RX ORDER — LOSARTAN POTASSIUM 50 MG/1
50 TABLET ORAL DAILY
Status: DISCONTINUED | OUTPATIENT
Start: 2022-02-05 | End: 2022-03-16

## 2022-02-05 RX ADMIN — LOSARTAN POTASSIUM 50 MG: 50 TABLET, FILM COATED ORAL at 21:31

## 2022-02-05 RX ADMIN — FAMOTIDINE 20 MG: 20 TABLET ORAL at 21:31

## 2022-02-05 RX ADMIN — FENTANYL CITRATE 25 MCG: 50 INJECTION INTRAMUSCULAR; INTRAVENOUS at 17:39

## 2022-02-05 RX ADMIN — MORPHINE SULFATE 2 MG: 2 INJECTION, SOLUTION INTRAMUSCULAR; INTRAVENOUS at 21:38

## 2022-02-05 RX ADMIN — AZITHROMYCIN MONOHYDRATE 500 MG: 500 INJECTION, POWDER, LYOPHILIZED, FOR SOLUTION INTRAVENOUS at 21:43

## 2022-02-05 RX ADMIN — DEXAMETHASONE SODIUM PHOSPHATE 10 MG: 10 INJECTION INTRAMUSCULAR; INTRAVENOUS at 21:31

## 2022-02-05 RX ADMIN — OXYCODONE HYDROCHLORIDE 5 MG: 5 TABLET ORAL at 20:35

## 2022-02-05 RX ADMIN — ASPIRIN 324 MG: 81 TABLET, CHEWABLE ORAL at 17:46

## 2022-02-05 RX ADMIN — ONDANSETRON 4 MG: 2 INJECTION INTRAMUSCULAR; INTRAVENOUS at 17:45

## 2022-02-05 RX ADMIN — CEFTRIAXONE SODIUM 1000 MG: 1 INJECTION, POWDER, FOR SOLUTION INTRAMUSCULAR; INTRAVENOUS at 18:23

## 2022-02-05 RX ADMIN — ENOXAPARIN SODIUM 90 MG: 100 INJECTION SUBCUTANEOUS at 21:32

## 2022-02-05 RX ADMIN — Medication 2000 UNITS: at 21:31

## 2022-02-05 ASSESSMENT — ENCOUNTER SYMPTOMS
RHINORRHEA: 1
CONSTIPATION: 0
ABDOMINAL PAIN: 0
DIARRHEA: 1
EYES NEGATIVE: 1
SORE THROAT: 0
BACK PAIN: 1
SHORTNESS OF BREATH: 1
NAUSEA: 1
WHEEZING: 0
COUGH: 1
VOMITING: 0

## 2022-02-05 ASSESSMENT — PAIN SCALES - GENERAL
PAINLEVEL_OUTOF10: 8
PAINLEVEL_OUTOF10: 9

## 2022-02-05 ASSESSMENT — PAIN DESCRIPTION - PROGRESSION: CLINICAL_PROGRESSION: GRADUALLY WORSENING

## 2022-02-05 ASSESSMENT — PAIN DESCRIPTION - LOCATION: LOCATION: BACK

## 2022-02-05 ASSESSMENT — PAIN DESCRIPTION - ORIENTATION: ORIENTATION: LOWER

## 2022-02-05 ASSESSMENT — PAIN DESCRIPTION - PAIN TYPE: TYPE: CHRONIC PAIN

## 2022-02-05 ASSESSMENT — PAIN DESCRIPTION - FREQUENCY: FREQUENCY: CONTINUOUS

## 2022-02-05 ASSESSMENT — PAIN DESCRIPTION - ONSET: ONSET: PROGRESSIVE

## 2022-02-05 NOTE — ED PROVIDER NOTES
Emergency [address] EMERGENCY DEPARTMENT    Patient: Taryn Wood  MRN: [id number]  : 1945  Date of Evaluation: 2022  ED Provider: Quinten Silva MD    Chief Complaint       Chief Complaint   Patient presents with    Shortness of Breath    Cough     [place]     Taryn Wood is a 68 y.o. female who presents to the emergency department for evaluation of shortness of breath. Patient reports being in usual state of health until approximately 6 days ago when symptoms restarted. No reported sick contacts no recent travel no changes of diet or medications. Patient says she has been having fevers chills and cough and shortness of breath. No chest pain no no swelling of legs or feet no history of DVTs or PEs. EMS was contacted. Upon arrival they do report patient's oxygen saturation was 74% on room air she is not on supplemental oxygen at baseline. Patient reported to have a fever of 102 by EMS. ROS:     At least 10 systems reviewed and otherwise acutely negative except as in the [address] Past History     Past Medical History:   Diagnosis Date    Chronic lower back pain     Hx of spinal fusion     Hypertension     MRSA (methicillin resistant staph aureus) culture positive 2021    Wound    Venous disease      Past Surgical History:   Procedure Laterality Date    BACK SURGERY      FRACTURE SURGERY       Social History     Socioeconomic History    Marital status:       Spouse name: Not on file    Number of children: Not on file    Years of education: Not on file    Highest education level: Not on file   Occupational History    Not on file   Tobacco Use    Smoking status: Never Smoker    Smokeless tobacco: Never Used   Vaping Use    Vaping Use: Not on file   Substance and Sexual Activity    Alcohol use: Never    Drug use: Never    Sexual activity: Not Currently   Other Topics Concern    Not on file   Social History Narrative    Not on file     Social Determinants of Health     Financial Resource Strain:     Difficulty of Paying Living Expenses: Not on file   Food Insecurity:     Worried About Running Out of Food in the Last Year: Not on file    Madi of Food in the Last Year: Not on file   Transportation Needs:     Lack of Transportation (Medical): Not on file    Lack of Transportation (Non-Medical): Not on file   Physical Activity:     Days of Exercise per Week: Not on file    Minutes of Exercise per Session: Not on file   Stress:     Feeling of Stress : Not on file   Social Connections:     Frequency of Communication with Friends and Family: Not on file    Frequency of Social Gatherings with Friends and Family: Not on file    Attends Protestant Services: Not on file    Active Member of [address] [place] [address] [email or web address] or Organizations: Not on file    Attends Club or Organization Meetings: Not on file    Marital Status: Not on file   Intimate Partner Violence:     Fear of Current or Ex-Partner: Not on file    Emotionally Abused: Not on file    Physically Abused: Not on file    Sexually Abused: Not on file   Housing Stability:     Unable to Pay for Housing in the Last Year: Not on file    Number of [place] in the Last Year: Not on file    Unstable Housing in the Last Year: Not on file       Medications/Allergies     Previous Medications    CETIRIZINE (ZYRTEC) 10 MG TABLET    Take 1 tablet by mouth Daily    CLOBETASOL (TEMOVATE) 0.05 % OINTMENT    Apply topically 2 times weekly to lower legs with compression wraps. DICLOFENAC (CATAFLAM) 50 MG TABLET    Take 1 tablet by mouth 2 times daily    LOSARTAN (COZAAR) 50 MG TABLET    Take 50 mg by mouth daily    MORPHINE (MS CONTIN) 30 MG EXTENDED RELEASE TABLET    Take 1 tablet by mouth Daily. OXYCODONE (ROXICODONE) 5 MG IMMEDIATE RELEASE TABLET    Take 5 mg by mouth 2 times daily as needed for Pain.     OXYCODONE-ACETAMINOPHEN (PERCOCET)  MG PER TABLET    Take 1 tablet by mouth 2 times daily. No Known Allergies     Physical Exam       ED Triage Vitals [02/05/22 1652]   BP Temp Temp Source Pulse Resp SpO2 Height Weight   117/76 98.5 °F (36.9 °C) Oral 79 24 (!) 89 % 5' 2\" (1.575 m) 189 lb (85.7 kg)     GENERAL APPEARANCE: Awake and alert. Cooperative. No acute distress. HEAD: Normocephalic. Atraumatic. EYES: Sclera anicteric. Pupils equal round reactive to light extraocular movements are intact  ENT: Tolerates saliva. No trismus. Moist mucous membranes  NECK: Supple. Trachea midline. No meningismus  CARDIO: RRR. Radial pulse 2+. No murmurs rubs or gallops appreciated  LUNGS: Respirations unlabored. CTAB. No accessory muscle usage noted. No wheezes rales rhonchi or stridor. ABDOMEN: Soft. Non-distended. Non-tender. No tenderness in right upper quadrant or right lower quadrant to deep palpation  EXTREMITIES: No acute deformities. No unilateral leg swelling or tenderness behind either one of calves  SKIN: Warm and dry. No erythema edema or rashes appreciated  NEUROLOGICAL:  Cranial nerves II through XII grossly intact. No gross facial drooping. Moves all 4 extremities spontaneously. PSYCHIATRIC: Normal mood. Alert and oriented x3. No reported active suicidality or homicidality.     Diagnostics   Labs:  Results for orders placed or performed during the hospital encounter of 02/05/22   COVID-19, Rapid    Specimen: Nasopharyngeal   Result Value Ref Range    Source UNKNOWN     SARS-CoV-2, NAAT DETECTED (A) NOT DETECTED   CBC Auto Differential   Result Value Ref Range    WBC 5.9 4.0 - 10.5 K/CU MM    RBC 5.03 4.2 - 5.4 M/CU MM    Hemoglobin 12.5 12.5 - 16.0 GM/DL    Hematocrit 41.8 37 - 47 %    MCV 83.1 78 - 100 FL    MCH 24.9 (L) 27 - 31 PG    MCHC 29.9 (L) 32.0 - 36.0 %    RDW 15.2 (H) 11.7 - 14.9 %    Platelets 683 227 - 497 K/CU MM    MPV 10.4 7.5 - 11.1 FL    Differential Type AUTOMATED DIFFERENTIAL     Segs Relative 79.5 (H) 36 - 66 %    Lymphocytes % 7.8 (L) 24 - 44 %    Monocytes % 11.5 (H) 0 - 4 %    Eosinophils % 0.0 0 - 3 %    Basophils % 0.2 0 - 1 %    Segs Absolute 4.7 K/CU MM    Lymphocytes Absolute 0.5 K/CU MM    Monocytes Absolute 0.7 K/CU MM    Eosinophils Absolute 0.0 K/CU MM    Basophils Absolute 0.0 K/CU MM    Nucleated RBC % 0.0 %    Total Nucleated RBC 0.0 K/CU MM    Total Immature Neutrophil 0.06 K/CU MM    Immature Neutrophil % 1.0 (H) 0 - 0.43 %   Comprehensive Metabolic Panel w/ Reflex to MG   Result Value Ref Range    Sodium 138 135 - 145 MMOL/L    Potassium 3.8 3.5 - 5.1 MMOL/L    Chloride 99 99 - 110 mMol/L    CO2 28 21 - 32 MMOL/L    BUN 10 6 - 23 MG/DL    CREATININE 0.4 (L) 0.6 - 1.1 MG/DL    Glucose 102 (H) 70 - 99 MG/DL    Calcium 8.1 (L) 8.3 - 10.6 MG/DL    Albumin 3.4 3.4 - 5.0 GM/DL    Total Protein 6.1 (L) 6.4 - 8.2 GM/DL    Total Bilirubin 0.6 0.0 - 1.0 MG/DL    ALT 19 10 - 40 U/L    AST 43 (H) 15 - 37 IU/L    Alkaline Phosphatase 90 40 - 129 IU/L    GFR Non-African American >60 >60 mL/min/1.73m2    GFR African American >60 >60 mL/min/1.73m2    Anion Gap 11 4 - 16   Lipase   Result Value Ref Range    Lipase 76 (H) 13 - 60 IU/L   Troponin   Result Value Ref Range    Troponin T <0.010 <0.01 NG/ML   Lactate, Sepsis   Result Value Ref Range    Lactic Acid, Sepsis 1.3 0.5 - 1.9 mMOL/L   D-dimer, Quantitative   Result Value Ref Range    D-Dimer, Quant >5250 (H) <230 NG/mL(DDU)   EKG 12 Lead   Result Value Ref Range    Ventricular Rate 77 BPM    Atrial Rate 77 BPM    P-R Interval 178 ms    QRS Duration 74 ms    Q-T Interval 388 ms    QTc Calculation (Bazett) 439 ms    P Axis 41 degrees    R Axis 6 degrees    T Axis 6 degrees    Diagnosis       Normal sinus rhythm  Septal infarct , age undetermined  Abnormal ECG  No previous ECGs available       Radiographs:  XR CHEST PORTABLE    Result Date: 2/5/2022  EXAMINATION: ONE XRAY VIEW OF THE CHEST 2/5/2022 5:51 pm COMPARISON: None.  HISTORY: ORDERING SYSTEM PROVIDED HISTORY: dyspnea TECHNOLOGIST PROVIDED HISTORY: Reason for exam:->dyspnea Reason for Exam: dyspnea, Resp distress, SOB, cough FINDINGS: Cardiomediastinal silhouette is obscured. No definite pleural effusion or pneumothorax. Extensive bilateral airspace opacities, right greater than left. Some preserved aeration is noted at the left lung apex. Severe degenerative changes of the bilateral shoulders. Surgical clips in the right upper quadrant. Bilateral airspace opacities, right greater than left, concerning for extensive multifocal pneumonia       Procedures/EKG:   Patient's EKG is interpreted by me sinus rhythm rate 77   no ST elevation no ST depression I appreciate no acute ischemia or infarction on this EKG no old EKGs with which to compare    ED Course and MDM   In brief, Prisca Larsen is a 68 y.o. female who presented to the emergency department for evaluation of fever cough shortness of breath found to be hypoxemic with oxygen saturation of only 74%. Based on patient's history physical would be concern for possible Covid pneumonia the possibilities do include DVTs or pulmonary emboli. Low clinical suspicion for acute coronary syndrome or congestive heart failure at this time. Did review patient's imaging studies and laboratory work as noted above. Patient does have evidence of multifocal pneumonia. She has not been hospitalized in the past 3 months. She was started on ceftriaxone and azithromycin for presumed community acquired pneumonia. Patient was found to be Covid positive. She was given dexamethasone. She is maintained on high flow oxygen at this time. She will be referred to hospitalist for continuation of care and treatment. I did discuss the case with hospitalist service. Reviewed imaging studies and laboratory work with RN. She will admit the patient for further evaluation and treatment.       ED Medication Orders (From admission, onward)    Start Ordered     Status Ordering Provider 02/10/22 0800 02/05/22 2005  dexamethasone (PF) (DECADRON) injection 10 mg  EVERY 24 HOURS        \"Followed by\" Linked Group Details    Acknowledged Jone Dry I    02/06/22 0800 02/05/22 2005  dexamethasone (DECADRON) 20 mg in sodium chloride 0.9 % IVPB  EVERY 24 HOURS        \"Followed by\" Linked Group Details    Acknowledged Jone Dry I    02/06/22 0700 02/05/22 2005  cetirizine (ZYRTEC) tablet 10 mg  DAILY         Acknowledged GAGE SPENCER I    02/06/22 0700 02/05/22 2005  morphine (MS CONTIN) extended release tablet 30 mg  DAILY         Acknowledged [facility]    02/05/22 2100 02/05/22 2005  sodium chloride flush 0.9 % injection 5-40 mL  2 times per day         Acknowledged [facility]    02/05/22 2100 02/05/22 2005  sennosides-docusate sodium (SENOKOT-S) 8.6-50 MG tablet 1 tablet  2 TIMES DAILY         Acknowledged [facility]    02/05/22 2100 02/05/22 2005  famotidine (PEPCID) tablet 20 mg  2 TIMES DAILY         Acknowledged [facility]    02/05/22 2100 02/05/22 2023  enoxaparin (LOVENOX) injection 90 mg  2 TIMES DAILY         Acknowledged Jone Dry I    02/05/22 2019 02/05/22 2019  oxyCODONE (ROXICODONE) immediate release tablet 5 mg  EVERY 6 HOURS PRN        \"And\" Linked Group Details    Last MAR action: Given - by Carmen Zavala on 02/05/22 at 2035 [facility]    02/05/22 2019 02/05/22 2019  oxyCODONE-acetaminophen (PERCOCET) 5-325 MG per tablet 1 tablet  EVERY 6 HOURS PRN        \"And\" Linked Group Details    Acknowledged Jone Dry I    02/05/22 2015 02/05/22 2005  losartan (COZAAR) tablet 50 mg  DAILY         Acknowledged GAGE SPENCER I    02/05/22 2015 02/05/22 2005  dexamethasone (PF) (DECADRON) injection 10 mg  ONCE        Note to Pharmacy: Additional 10 md per ards protocol    Acknowledged Jone Dry I    02/05/22 2015 02/05/22 2005  Vitamin D (CHOLECALCIFEROL) tablet 2,000 Units DAILY         Acknowledged Jael Hinton I    02/05/22 2005 02/05/22 2005  ondansetron (ZOFRAN-ODT) disintegrating tablet 4 mg  EVERY 8 HOURS PRN        \"Or\" Linked Group Details    Acknowledged Jael Hinton I    02/05/22 2005 02/05/22 2005  ondansetron (ZOFRAN) injection 4 mg  EVERY 6 HOURS PRN        \"Or\" Linked Group Details    Acknowledged Jael Hinton I    02/05/22 2005 02/05/22 2005  acetaminophen (TYLENOL) tablet 650 mg  EVERY 6 HOURS PRN        \"Or\" Linked Group Details    Acknowledged Jael Hinton I    02/05/22 2005 02/05/22 2005  acetaminophen (TYLENOL) suppository 650 mg  EVERY 6 HOURS PRN        \"Or\" Linked Group Details    Acknowledged Jael Hinton I    02/05/22 2004 02/05/22 2005  sodium chloride flush 0.9 % injection 5-40 mL  PRN         Acknowledged GAGE SPENCER    02/05/22 2004 02/05/22 2005  polyethylene glycol (GLYCOLAX) packet 17 g  DAILY PRN         Acknowledged Jael Hinton I    02/05/22 2004 02/05/22 2005  0.9 % sodium chloride infusion  PRN         Acknowledged GAGE SPENCER I    02/05/22 2004 02/05/22 2005  guaiFENesin-dextromethorphan (ROBITUSSIN DM) 100-10 MG/5ML syrup 5 mL  EVERY 4 HOURS PRN         Acknowledged Jael Hinton I    02/05/22 2000 02/05/22 1957  morphine (PF) injection 2 mg  ONCE         Acknowledged GAGE SPENCER    02/05/22 1930 02/05/22 1916  dexamethasone (PF) (DECADRON) injection 10 mg  ONCE         Acknowledged PADMINI KEARNEY    02/05/22 1815 02/05/22 1812  cefTRIAXone (ROCEPHIN) 1000 mg IVPB in 50 mL D5W minibag  ONCE        Question:  Antimicrobial Indications  Answer:  Pneumonia (CAP)    Last MAR action: Stopped - by Oksana Carvajal on 02/05/22 at [address] [place] [address], [address] [place] [address] [place]    02/05/22 1815 02/05/22 1812  azithromycin (ZITHROMAX) 500 mg in  ml vialmate  ONCE        Question:  Antimicrobial Indications  Answer:  Pneumonia (CAP)    Acknowledged PADMINI KEARNEY    02/05/22 1730 02/05/22 1721  fentaNYL (SUBLIMAZE) injection 25 mcg  ONCE         Last MAR action: Given - by Ruiz Cristy on 02/05/22 at [address]    02/05/22 1730 02/05/22 1722  ondansetron (ZOFRAN) injection 4 mg  ONCE         Last MAR action: Given - by Ruiz Gala on 02/05/22 at 2711 PADMINI Arias    02/05/22 1700 02/05/22 1651  aspirin chewable tablet 324 mg  ONCE         Last MAR action: Given - by Ruiz Gaa on 02/05/22 at 1746 PADMINI KEARNEY        Total critical care time today provided was at least 42 minutes. This excludes seperately billable procedure. Critical care time provided for acute respiratory failure with hypoxemia that required close evaluation and/or intervention with concern for patient decompensation. Final Impression      1. Hypoxia    2. COVID-19    3. Pneumonia of both lungs due to infectious organism, unspecified part of lung      DISPOSITION           This patient was cared for in the setting of the COVID-19 pandemic, with nationwide stress on resources and staffing.     (Please note that portions of this note may have been completed with a voice recognition program. Efforts were made to edit the dictations but occasionally words are mis-transcribed.)    Bryant Castillo MD  [address] [facility]       Bryant Castillo MD  02/05/22 [id number]

## 2022-02-05 NOTE — LETTER
[facility] ICU Stepdown  [address] [address]  Phone: [phone]  Fax: [phone]        February 8, 2022           Patient: Harriett Ashley   :    :    Date of Visit: 2/5/2022       Dear To whom it may concern,     Harriett Ashley is currently an inpatient at [facility] since 2/5/2022. Sincerely,      Tonia SEN RN

## 2022-02-05 NOTE — ED NOTES
Patient on non-rebreather and changed to NC. Patient oxygen saturation went to 86 percent. Spoke with Dr Ness Ospina to see asbout getting high flow NC. Called RT about high flow NC. Changed patient to high flow NC at 15L/min. Patient oxygen now at 93-94 percent.       Irene Mackey RN  02/05/22 8990

## 2022-02-05 NOTE — LETTER
[facility] ICU Stepdown  [address] [address]  Phone: [phone]  Fax: [phone]    No name on file. February 8, 2022     No primary care provider on file. No primary provider on file. Patient: Stephanie Lambert   MR Number: [id number]   YOB: 1945   Date of Visit: 2/5/2022       Dear No primary care provider on file.:    Thank you for referring Stephanie Lambert to me for evaluation/treatment. Below are the relevant portions of my assessment and plan of care. If you have questions, please do not hesitate to call me. I look forward to following Juanjo Malloy along with you. Sincerely,      No name on file.

## 2022-02-05 NOTE — ED TRIAGE NOTES
EMS brings pt from home, accompanied by son Vinny Curry. Pt AOx4. Having SOB starting today, EMS takes 74% and brings her up to 91% on 15L NR. Pt currently at 91% on 15L High flow NC. Pt states she has felt too sick to take her usual medication which include her chronic back pain medication and BP medications. EMS reports BP of 179/81 though pt is now at 117/76 BP on monitor. Pt 75 HR in NSR. Pt walks with crutches at home feels weak, SOB,  States cough with some phlegm production, fatigue, sore throat. States she hasn't been around anyone known positive for COVID 19 and has not been vaccinated or been diagnosed positive herself with COVID 19.

## 2022-02-05 NOTE — LETTER
[facility] ICU Stepdown  [address] [address]  Phone: [phone]  Fax: [phone]    No name on file. February 8, 2022     No primary care provider on file. No primary provider on file. Patient: Robin Sommers   MR Number: [id number]   YOB: 1945   Date of Visit: 2/5/2022       Dear No primary care provider on file.:    Thank you for referring Robin Sommers to me for evaluation/treatment. Below are the relevant portions of my assessment and plan of care. If you have questions, please do not hesitate to call me. I look forward to following Valdo Meza along with you. Sincerely,      No name on file.

## 2022-02-06 ENCOUNTER — APPOINTMENT (OUTPATIENT)
Dept: CT IMAGING | Age: 77
DRG: 871 | End: 2022-02-06
Payer: MEDICARE

## 2022-02-06 LAB
CREATININE URINE: 184.7 MG/DL (ref 28–217)
LACTATE: 1 MMOL/L (ref 0.4–2)
LEGIONELLA URINARY AG: NEGATIVE
PROT/CREAT RATIO, UR: 0.9
STREP PNEUMONIAE ANTIGEN: NORMAL
URINE TOTAL PROTEIN: 169.1 MG/DL

## 2022-02-06 PROCEDURE — 6360000002 HC RX W HCPCS: Performed by: NURSE PRACTITIONER

## 2022-02-06 PROCEDURE — 6370000000 HC RX 637 (ALT 250 FOR IP): Performed by: HOSPITALIST

## 2022-02-06 PROCEDURE — 94761 N-INVAS EAR/PLS OXIMETRY MLT: CPT

## 2022-02-06 PROCEDURE — 80053 COMPREHEN METABOLIC PANEL: CPT

## 2022-02-06 PROCEDURE — 36415 COLL VENOUS BLD VENIPUNCTURE: CPT

## 2022-02-06 PROCEDURE — XW0DXM6 INTRODUCTION OF BARICITINIB INTO MOUTH AND PHARYNX, EXTERNAL APPROACH, NEW TECHNOLOGY GROUP 6: ICD-10-PCS | Performed by: INTERNAL MEDICINE

## 2022-02-06 PROCEDURE — 82570 ASSAY OF URINE CREATININE: CPT

## 2022-02-06 PROCEDURE — 2700000000 HC OXYGEN THERAPY PER DAY

## 2022-02-06 PROCEDURE — 6370000000 HC RX 637 (ALT 250 FOR IP): Performed by: NURSE PRACTITIONER

## 2022-02-06 PROCEDURE — 84156 ASSAY OF PROTEIN URINE: CPT

## 2022-02-06 PROCEDURE — 6360000004 HC RX CONTRAST MEDICATION: Performed by: NURSE PRACTITIONER

## 2022-02-06 PROCEDURE — 2580000003 HC RX 258: Performed by: NURSE PRACTITIONER

## 2022-02-06 PROCEDURE — 2060000000 HC ICU INTERMEDIATE R&B

## 2022-02-06 PROCEDURE — 85610 PROTHROMBIN TIME: CPT

## 2022-02-06 PROCEDURE — 71260 CT THORAX DX C+: CPT

## 2022-02-06 PROCEDURE — 83605 ASSAY OF LACTIC ACID: CPT

## 2022-02-06 RX ORDER — MORPHINE SULFATE 15 MG/1
30 TABLET, FILM COATED, EXTENDED RELEASE ORAL EVERY 12 HOURS SCHEDULED
Status: DISCONTINUED | OUTPATIENT
Start: 2022-02-06 | End: 2022-03-17 | Stop reason: HOSPADM

## 2022-02-06 RX ADMIN — OXYCODONE AND ACETAMINOPHEN 1 TABLET: 5; 325 TABLET ORAL at 23:47

## 2022-02-06 RX ADMIN — DEXAMETHASONE SODIUM PHOSPHATE 20 MG: 4 INJECTION, SOLUTION INTRA-ARTICULAR; INTRALESIONAL; INTRAMUSCULAR; INTRAVENOUS; SOFT TISSUE at 08:12

## 2022-02-06 RX ADMIN — OXYCODONE AND ACETAMINOPHEN 1 TABLET: 5; 325 TABLET ORAL at 15:03

## 2022-02-06 RX ADMIN — IOPAMIDOL 90 ML: 755 INJECTION, SOLUTION INTRAVENOUS at 01:09

## 2022-02-06 RX ADMIN — Medication 2000 UNITS: at 08:13

## 2022-02-06 RX ADMIN — MORPHINE SULFATE 30 MG: 15 TABLET, FILM COATED, EXTENDED RELEASE ORAL at 06:37

## 2022-02-06 RX ADMIN — ENOXAPARIN SODIUM 90 MG: 100 INJECTION SUBCUTANEOUS at 08:14

## 2022-02-06 RX ADMIN — BARICITINIB 4 MG: 2 TABLET, FILM COATED ORAL at 06:36

## 2022-02-06 RX ADMIN — ENOXAPARIN SODIUM 90 MG: 100 INJECTION SUBCUTANEOUS at 21:22

## 2022-02-06 RX ADMIN — FAMOTIDINE 20 MG: 20 TABLET ORAL at 21:22

## 2022-02-06 RX ADMIN — SODIUM CHLORIDE, PRESERVATIVE FREE 10 ML: 5 INJECTION INTRAVENOUS at 21:23

## 2022-02-06 RX ADMIN — FAMOTIDINE 20 MG: 20 TABLET ORAL at 08:13

## 2022-02-06 RX ADMIN — GUAIFENESIN AND DEXTROMETHORPHAN 5 ML: 100; 10 SYRUP ORAL at 23:53

## 2022-02-06 RX ADMIN — SENNOSIDES AND DOCUSATE SODIUM 1 TABLET: 50; 8.6 TABLET ORAL at 08:13

## 2022-02-06 RX ADMIN — CETIRIZINE HYDROCHLORIDE 10 MG: 10 TABLET, FILM COATED ORAL at 06:37

## 2022-02-06 RX ADMIN — MORPHINE SULFATE 30 MG: 15 TABLET, FILM COATED, EXTENDED RELEASE ORAL at 21:22

## 2022-02-06 RX ADMIN — SODIUM CHLORIDE, PRESERVATIVE FREE 10 ML: 5 INJECTION INTRAVENOUS at 08:25

## 2022-02-06 RX ADMIN — LOSARTAN POTASSIUM 50 MG: 50 TABLET, FILM COATED ORAL at 08:14

## 2022-02-06 RX ADMIN — SENNOSIDES AND DOCUSATE SODIUM 1 TABLET: 50; 8.6 TABLET ORAL at 21:22

## 2022-02-06 RX ADMIN — OXYCODONE HYDROCHLORIDE 5 MG: 5 TABLET ORAL at 02:36

## 2022-02-06 ASSESSMENT — PAIN SCALES - GENERAL
PAINLEVEL_OUTOF10: 8
PAINLEVEL_OUTOF10: 9
PAINLEVEL_OUTOF10: 0
PAINLEVEL_OUTOF10: 0
PAINLEVEL_OUTOF10: 8
PAINLEVEL_OUTOF10: 0

## 2022-02-06 ASSESSMENT — PAIN - FUNCTIONAL ASSESSMENT: PAIN_FUNCTIONAL_ASSESSMENT: PREVENTS OR INTERFERES SOME ACTIVE ACTIVITIES AND ADLS

## 2022-02-06 ASSESSMENT — PAIN DESCRIPTION - FREQUENCY: FREQUENCY: CONTINUOUS

## 2022-02-06 ASSESSMENT — PAIN DESCRIPTION - PAIN TYPE
TYPE: CHRONIC PAIN
TYPE: CHRONIC PAIN

## 2022-02-06 ASSESSMENT — PAIN DESCRIPTION - DESCRIPTORS: DESCRIPTORS: SHARP;PRESSURE

## 2022-02-06 ASSESSMENT — PAIN DESCRIPTION - LOCATION
LOCATION: HIP
LOCATION: BACK;HIP

## 2022-02-06 ASSESSMENT — PAIN DESCRIPTION - ONSET: ONSET: PROGRESSIVE

## 2022-02-06 ASSESSMENT — PAIN DESCRIPTION - PROGRESSION: CLINICAL_PROGRESSION: GRADUALLY WORSENING

## 2022-02-06 NOTE — PROGRESS NOTES
Pt and family requesting trapeze for over pt's bed. Checked with charge nurse, was informed trapeze will not fit on ICU's bed.

## 2022-02-06 NOTE — H&P
V2.0  History and Physical      Name:  Valerie Jensen /Age/Sex: 1945  (68 y.o. female)   MRN & CSN:  [id number] & [id number] Encounter Date/Time: 2022 8:05 PM EST   Location:  [place] [address] PCP: No primary care provider on file. Hospital Day: 1    Assessment and Plan:   Valerie Jensen is a 68 y.o. female with a pmh of chronic pain who presents with COVID-19 pneumonia    COVID 19 Pneumonia  Elevated D-dimer  Acute respiratory failure with hypoxia    Admit to inpatient , COVID 19 unit    -Chest x-ray with bilateral airspace opacities right greater than left concerning for extensive multifocal pneumonia   -Blood cultures x2 check urine for Legionella antigen, Strep PNA    -Start high-dose Decadron per ARDS protocol. Received 10 mg in ED. Will give additional 10 mg and on high flow nasal cannula   -Wean oxygen as tolerated   -Albuterol as needed for wheezing   Check D-dimer, add on procalcitonin, patient already received Rocephin and azithromycin from ED provider for originally suspected COPD exacerbation prior to confirmed Covid diagnosis. Hold off on additional antibiotics pending procalcitonin results as low suspicion for underlying bacterial pneumonia. Likely viral etiology   Consult pharmacy for initiation of baricitinib    Telemetry and continuous pulse ox   Droplet plus precaution   -Check LDH, D-dimer, ferritin, CRP . Trend labs daily   Send UA and urine culture   D-dimer significantly elevated.   Start Lovenox 1 mg/kg twice daily   Obtain State CT PE Study and Venous duplex BLE    Will need cardiology consult and echocardiogram if positive results   Patient denies history of blood clots    Hypertension  Continue home losartan    Chronic pain  Lumbar spondylolysis  Osteoarthritis  Congenital dislocation of hip bilaterally   Continue home narcotics including extended release morphine, oxycodone   Follows with pain clinic in [address] [place] [address] with crutches at home per son   Consult to PT OT, utilizes trapeze at home above bed for assistance with ambulation. OAARRS review completed and verified . Resuscitation/Code Status Note on Prisca Larsen (YOB: 1945)    At 2/5/2022 on February 5, 2022, resuscitation/code status decision was based on a thorough discussion with the patient, patient's healthcare power of  -   and patient's adult child - Viv Spaulding . The code status was made [facility] . Son verified the patient has a DNR paperwork at home and he will bring in and provide a copy to the hospital.  Patient and son verified that they want everything possible done up to the point of arrest but they do not want patient intubated or chest compressions done. Electronically signed by SANDER Lemus CNP on 2/5/22 at 8:07 PM EST        Chronic Conditions: continue home medication as ordered      All testing  and results reviewed with patient . All questions answered. Patient and family voiced understanding    This patient was seen and examined in conjunction with Dr. Ambar Banuelos. He/She was agreeable with the plan and management as dictated above. Disposition:   Current Living situation: Home  Expected Disposition: Home  Estimated D/C: > 2 days    Diet ADULT DIET; Regular   GI Prophylaxis  [] PPI,  [x] H2 Blocker,  [] Carafate,  [] Diet/Tube Feeds   DVT Prophylaxis [x] Lovenox, []  Heparin, [] SCDs, [] Ambulation,  [] NOAC   Code Status DNR-CCA   Surrogate Decision Maker/ POA Prabhjot Alfaro          History from:     patient    History of Present Illness:     Chief Complaint: <principal problem not specified>  Prisca Larsen is a 68 y.o. female with pmh of chronic pain secondary to congenital hip defect who presents with vomiting, nausea, malaise, fatigue x4 to 6 days. Patient denies any sick contacts or recent travel. No changes to diet or medications. Patient endorses fever, chills, cough, shortness of breath. Denies chest pain.   Denies any prior history of blood clots.  EMS reports oxygen saturation of 74% on room air upon arrival at patient's house. She is not on home oxygen at baseline. Patient was also reported to have a fever by EMS     Review of Systems: Need 10 Elements   Review of Systems   Constitutional: Positive for activity change, appetite change, chills, fatigue and fever. HENT: Positive for congestion and rhinorrhea. Negative for sore throat. Eyes: Negative. Respiratory: Positive for cough and shortness of breath. Negative for wheezing. Cardiovascular: Negative for chest pain and leg swelling. Gastrointestinal: Positive for diarrhea and nausea. Negative for abdominal pain, constipation and vomiting. Endocrine: Negative. Genitourinary: Negative for dysuria and hematuria. Musculoskeletal: Positive for back pain and myalgias. Negative for neck pain. Skin: Negative for wound. Neurological: Negative for dizziness, light-headedness and headaches. All other systems reviewed and are negative. Objective:   No intake or output data in the 24 hours ending 02/05/22 2017   Vitals:   Vitals:    02/05/22 1652 02/05/22 1825   BP: 117/76    Pulse: 79    Resp: 24 24   Temp: 98.5 °F (36.9 °C)    TempSrc: Oral    SpO2: (!) 89% 92%   Weight: 189 lb (85.7 kg)    Height: 5' 2\" (1.575 m)        Medications Prior to Admission     Prior to Admission medications    Medication Sig Start Date End Date Taking? Authorizing Provider   oxyCODONE-acetaminophen (PERCOCET)  MG per tablet Take 1 tablet by mouth 2 times daily. Yes Historical Provider, MD   cetirizine (ZYRTEC) 10 MG tablet Take 1 tablet by mouth Daily 2/12/21  Yes Historical Provider, MD   diclofenac (CATAFLAM) 50 MG tablet Take 1 tablet by mouth 2 times daily 12/27/21  Yes Historical Provider, MD   morphine (MS CONTIN) 30 MG extended release tablet Take 1 tablet by mouth Daily.  12/27/21  Yes Historical Provider, MD   losartan (COZAAR) 50 MG tablet Take 50 mg by mouth daily   Yes Historical Provider, MD   oxyCODONE (ROXICODONE) 5 MG immediate release tablet Take 5 mg by mouth 2 times daily as needed for Pain. Historical Provider, MD   clobetasol (TEMOVATE) 0.05 % ointment Apply topically 2 times weekly to lower legs with compression wraps. 8/9/21   Allegra Stern, APRN - CNP       Physical Exam: Need 8 Elements   Physical Exam  Vitals and nursing note reviewed. Constitutional:       General: She is not in acute distress. Appearance: She is ill-appearing. She is not toxic-appearing. HENT:      Head: Normocephalic. Nose: Congestion present. Mouth/Throat:      Pharynx: Oropharyngeal exudate present. Cardiovascular:      Rate and Rhythm: Normal rate and regular rhythm. Pulses: Normal pulses. Pulmonary:      Effort: Pulmonary effort is normal. No respiratory distress. Breath sounds: Rhonchi and rales present. No wheezing. Abdominal:      General: Abdomen is flat. Bowel sounds are normal. There is no distension. Musculoskeletal:         General: Normal range of motion. Skin:     General: Skin is warm and dry. Capillary Refill: Capillary refill takes 2 to 3 seconds. Neurological:      General: No focal deficit present. Mental Status: She is alert and oriented to person, place, and time. Past Medical History:   PMHx   Past Medical History:   Diagnosis Date    Chronic lower back pain     Hx of spinal fusion     Hypertension     MRSA (methicillin resistant staph aureus) culture positive 09/13/2021    Wound    Venous disease      PSHX:  has a past surgical history that includes fracture surgery and back surgery. Allergies: No Known Allergies  Fam HX:  family history is not on file. Soc HX:   Social History     Socioeconomic History    Marital status:       Spouse name: Not on file    Number of children: Not on file    Years of education: Not on file    Highest education level: Not on file   Occupational History    Not on file   Tobacco Use    Smoking status: Never Smoker    Smokeless tobacco: Never Used   Vaping Use    Vaping Use: Not on file   Substance and Sexual Activity    Alcohol use: Never    Drug use: Never    Sexual activity: Not Currently   Other Topics Concern    Not on file   Social History Narrative    Not on file     Social Determinants of Health     Financial Resource Strain:     Difficulty of Paying Living Expenses: Not on file   Food Insecurity:     Worried About Running Out of Food in the Last Year: Not on file    Madi of Food in the Last Year: Not on file   Transportation Needs:     Lack of Transportation (Medical): Not on file    Lack of Transportation (Non-Medical):  Not on file   Physical Activity:     Days of Exercise per Week: Not on file    Minutes of Exercise per Session: Not on file   Stress:     Feeling of Stress : Not on file   Social Connections:     Frequency of Communication with Friends and Family: Not on file    Frequency of Social Gatherings with Friends and Family: Not on file    Attends [employer] Services: Not on file    Active Member of [address] [place] [address] or Organizations: Not on file    Attends Club or Organization Meetings: Not on file    Marital Status: Not on file   Intimate Partner Violence:     Fear of Current or Ex-Partner: Not on file    Emotionally Abused: Not on file    Physically Abused: Not on file    Sexually Abused: Not on file   Housing Stability:     Unable to Pay for Housing in the Last Year: Not on file    Number of [place] in the Last Year: Not on file    Unstable Housing in the Last Year: Not on file       Medications:   Medications:    azithromycin  500 mg IntraVENous Once    dexamethasone  10 mg IntraVENous Once    morphine  2 mg IntraVENous Once    [START ON 2/6/2022] cetirizine  10 mg Oral Daily    losartan  50 mg Oral Daily    [START ON 2/6/2022] morphine  30 mg Oral Daily    sodium chloride flush  5-40 mL IntraVENous 2 times per day    enoxaparin  30 mg SubCUTAneous BID    sennosides-docusate sodium  1 tablet Oral BID    famotidine  20 mg Oral BID    [START ON 2/6/2022] dexamethasone  20 mg IntraVENous Q24H    Followed by   Bonnie Laureano ON 2/10/2022] dexamethasone  10 mg IntraVENous Q24H    dexamethasone  10 mg IntraVENous Once    Vitamin D  2,000 Units Oral Daily      Infusions:    sodium chloride       PRN Meds: oxyCODONE-acetaminophen, 1 tablet, Q6H PRN  sodium chloride flush, 5-40 mL, PRN  sodium chloride, 25 mL, PRN  ondansetron, 4 mg, Q8H PRN   Or  ondansetron, 4 mg, Q6H PRN  polyethylene glycol, 17 g, Daily PRN  acetaminophen, 650 mg, Q6H PRN   Or  acetaminophen, 650 mg, Q6H PRN  guaiFENesin-dextromethorphan, 5 mL, Q4H PRN        Labs      CBC:   Recent Labs     02/05/22  1748   WBC 5.9   HGB 12.5        BMP:    Recent Labs     02/05/22  1748      K 3.8   CL 99   CO2 28   BUN 10   CREATININE 0.4*   GLUCOSE 102*     Hepatic:   Recent Labs     02/05/22  1748   AST 43*   ALT 19   BILITOT 0.6   ALKPHOS 90     Lipids: No results found for: CHOL, HDL, TRIG  Hemoglobin A1C: No results found for: LABA1C  TSH: No results found for: TSH  Troponin:   Lab Results   Component Value Date    TROPONINT <0.010 02/05/2022     Lactic Acid: No results for input(s): LACTA in the last 72 hours. BNP: No results for input(s): PROBNP in the last 72 hours. UA:No results found for: Ariadne Olga, PHUR, LABCAST, WBCUA, RBCUA, MUCUS, TRICHOMONAS, YEAST, BACTERIA, CLARITYU, SPECGRAV, LEUKOCYTESUR, UROBILINOGEN, BILIRUBINUR, BLOODU, GLUCOSEU, KETUA, AMORPHOUS  Urine Cultures: No results found for: LABURIN  Blood Cultures: No results found for: BC  No results found for: BLOODCULT2  Organism: No results found for: ORG    Imaging/Diagnostics Last 24 Hours   XR CHEST PORTABLE    Result Date: 2/5/2022  EXAMINATION: ONE XRAY VIEW OF THE CHEST 2/5/2022 5:51 pm COMPARISON: None.  HISTORY: ORDERING SYSTEM PROVIDED HISTORY: dyspnea TECHNOLOGIST PROVIDED HISTORY: Reason for exam:->dyspnea Reason for Exam: dyspnea, Resp distress, SOB, cough FINDINGS: Cardiomediastinal silhouette is obscured. No definite pleural effusion or pneumothorax. Extensive bilateral airspace opacities, right greater than left. Some preserved aeration is noted at the left lung apex. Severe degenerative changes of the bilateral shoulders. Surgical clips in the right upper quadrant. Bilateral airspace opacities, right greater than left, concerning for extensive multifocal pneumonia             Electronically signed by SANDER Ordoñez CNP on 2/5/2022 at 8:17 PM          This dictation was created with voice recognition software. While attempts have been made to review the dictation as it is transcribed, on occasion the spoken word can be misinterpreted by the technology leading to omissions or inappropriate words, phrases or sentences.      Electronically signed by SANDER Ordoñez CNP on 2/5/2022 at 8:17 PM

## 2022-02-06 NOTE — PROGRESS NOTES
Pharmacy Consult for Baricitinib Initiation per JASON Olivo    Criteria per [facility] P&T Committee  **All criteria need to be met to receive   Baricitinib for COVID-19 patients**   Age    >5years old     Yes   Laboratory Results    Confirmed positive COVID-19     PLUS    ANY elevation in biomarkers   (CRP, D-dimer, LDH, ferritin)       Yes     Concomitant Therapy    Receiving systemic steroids for treatment of COVID 19     Yes   Oxygen Status        Requiring supplemental oxygen   (>1 L NC, HFNC, Heated Vapotherm, biPAP, mechanical ventilation)        Yes   Special Considerations    Pregnancy  Severe Hepatic Impairment  Chronic/Recurrent Infections   Hemoglobin <8         Clarify risk vs. benefit with provider if criteria met     Contraindications    1. Invasive active mycobacterial or fungal infection  2. Lymphocyte count <200  3. Neutrophil count, ANC <500   4. Significant immunosuppression    ?     None     Dosing Recommendations:    Baricitinib 4 mg PO daily x 14 days (or until hospital discharge)    Renal dose adjustment:  -eGFR > 60: no adjustment necessary     Thank you for the consult,  Dillon Thompson RPh  2/06/22 2:59 AM

## 2022-02-06 NOTE — ED NOTES
Attempted to call report, no answer on unit.       [facility] [place]  02/05/22 2671

## 2022-02-06 NOTE — PROGRESS NOTES
HOSPITALIST PROGRESS NOTE  Date: 2/6/2022   Name: Taryn Wood   MRN: [id number]   YOB: 1945  Chief Complaint   Patient presents with    Shortness of Breath    Cough        Subjective/Interval Hx:     Patient states that around 2/2, she started feeling short of breath. She was coughing up brown substance. She does not smoke. She does not wear oxygen. She has never had covid before, nor has she had the vaccine. ROS: negative unless mentioned above  Objective:   Physical Exam:   /68   Pulse 58   Temp 98.4 °F (36.9 °C) (Axillary)   Resp 25   Ht 5' 2\" (1.575 m)   Wt 186 lb 8.2 oz (84.6 kg)   SpO2 (!) 88%   BMI 34.11 kg/m²   CONSTITUTIONAL:  No acute distress  EYES:  No discharge  HENT:  NCAT  LUNGS:  May have faint rales in LLL  CARDIOVASCULAR:  s1s2 rrr  ABDOMEN:  Soft tn nd  MUSCULOSKELETAL/Extremities:  No edema, nontender  NEUROLOGIC:  No gross deficits, aao  SKIN:  No gross lesions    Assessment/Plan:     1. Acute hypoxic respiratory failure  on oxygen. Consult pulmonology. Bronchodilators. CT chest: Patchy bilateral groundglass opacities. 2. COVID-19 pneumonia  on Decadron. On baricitinib. Check vitamin D level. On oral vitamin D. On Lovenox therapeutic dose due to elevated D-dimer. Procalcitonin 0.098. CRP 91. Stop antibiotics. 3. Hypertension  placed parameters on Cozaar. 4. Proteinuria  check urine protein creatinine ratio    5. Chronic pain  resume home dose of morphine. 6. Lumbar spondylosis  7. Osteoarthritis  8. Congenital dislocation of hip bilaterally      DVT Prophylaxis: lovenox  Diet: ADULT DIET; Regular  Home O2: none  Code Status: DNR-CCA      Reason for continued admission: Need to wean oxygen further.     Lorenzo Madison MD  2/6/2022    Meds:   Meds:    baricitinib  4 mg Oral Daily    morphine  30 mg Oral 2 times per day    cetirizine  10 mg Oral Daily    losartan  50 mg Oral Daily    sodium chloride flush  5-40 mL IntraVENous 2 times per day    sennosides-docusate sodium  1 tablet Oral BID    famotidine  20 mg Oral BID    dexamethasone  20 mg IntraVENous Q24H    Followed by   Codie Alanis ON 2/10/2022] dexamethasone  10 mg IntraVENous Q24H    dexamethasone  10 mg IntraVENous Once    Vitamin D  2,000 Units Oral Daily    enoxaparin  1 mg/kg SubCUTAneous BID      Infusions:    sodium chloride       PRN Meds: sodium chloride flush, 5-40 mL, PRN  sodium chloride, 25 mL, PRN  ondansetron, 4 mg, Q8H PRN   Or  ondansetron, 4 mg, Q6H PRN  polyethylene glycol, 17 g, Daily PRN  acetaminophen, 650 mg, Q6H PRN   Or  acetaminophen, 650 mg, Q6H PRN  guaiFENesin-dextromethorphan, 5 mL, Q4H PRN  oxyCODONE-acetaminophen, 1 tablet, Q6H PRN   And  oxyCODONE, 5 mg, Q6H PRN        Data/Labs:     Recent Labs     02/05/22 1748   WBC 5.9   HGB 12.5   HCT 41.8         Recent Labs     02/05/22 1748      K 3.8   CL 99   CO2 28   BUN 10   CREATININE 0.4*     Recent Labs     02/05/22 1748   AST 43*   ALT 19   BILITOT 0.6   ALKPHOS 90     No results for input(s): INR in the last 72 hours.   Recent Labs     02/05/22 1748   TROPONINT <0.010     No results found for: LABA1C  CALCIUM:  8.1/28 (02/05 1748)  No results found for: MG

## 2022-02-06 NOTE — PROGRESS NOTES
Patient arrived to unit from er per stretcher. Alert and orient times 4. Bilateral heels soft and coccyx excoriated. Oriented to room, call light, staff, meals, lights, tv and phone and no questions at this time.

## 2022-02-07 LAB
ALBUMIN SERPL-MCNC: 3.2 GM/DL (ref 3.4–5)
ANION GAP SERPL CALCULATED.3IONS-SCNC: 10 MMOL/L (ref 4–16)
APTT: 32.6 SECONDS (ref 25.1–37.1)
BASOPHILS ABSOLUTE: 0 K/CU MM
BASOPHILS RELATIVE PERCENT: 0.1 % (ref 0–1)
BUN BLDV-MCNC: 23 MG/DL (ref 6–23)
CALCIUM IONIZED: 4.72 MG/DL (ref 4.48–5.28)
CALCIUM SERPL-MCNC: 8.4 MG/DL (ref 8.3–10.6)
CHLORIDE BLD-SCNC: 100 MMOL/L (ref 99–110)
CO2: 28 MMOL/L (ref 21–32)
CREAT SERPL-MCNC: 0.5 MG/DL (ref 0.6–1.1)
D DIMER: 2336 NG/ML(DDU)
DIFFERENTIAL TYPE: ABNORMAL
EKG ATRIAL RATE: 77 BPM
EKG DIAGNOSIS: NORMAL
EKG P AXIS: 41 DEGREES
EKG P-R INTERVAL: 178 MS
EKG Q-T INTERVAL: 388 MS
EKG QRS DURATION: 74 MS
EKG QTC CALCULATION (BAZETT): 439 MS
EKG R AXIS: 6 DEGREES
EKG T AXIS: 6 DEGREES
EKG VENTRICULAR RATE: 77 BPM
EOSINOPHILS ABSOLUTE: 0 K/CU MM
EOSINOPHILS RELATIVE PERCENT: 0 % (ref 0–3)
FERRITIN: 394 NG/ML (ref 15–150)
GFR AFRICAN AMERICAN: >60 ML/MIN/1.73M2
GFR NON-AFRICAN AMERICAN: >60 ML/MIN/1.73M2
GLUCOSE BLD-MCNC: 122 MG/DL (ref 70–99)
HCT VFR BLD CALC: 41.1 % (ref 37–47)
HEMOGLOBIN: 12.3 GM/DL (ref 12.5–16)
HIGH SENSITIVE C-REACTIVE PROTEIN: 32.8 MG/L
IMMATURE NEUTROPHIL %: 0.7 % (ref 0–0.43)
INR BLD: 1 INDEX
IONIZED CA: 1.18 MMOL/L (ref 1.12–1.32)
LACTATE DEHYDROGENASE: 626 IU/L (ref 120–246)
LYMPHOCYTES ABSOLUTE: 0.6 K/CU MM
LYMPHOCYTES RELATIVE PERCENT: 7.3 % (ref 24–44)
MCH RBC QN AUTO: 24.7 PG (ref 27–31)
MCHC RBC AUTO-ENTMCNC: 29.9 % (ref 32–36)
MCV RBC AUTO: 82.7 FL (ref 78–100)
MONOCYTES ABSOLUTE: 0.7 K/CU MM
MONOCYTES RELATIVE PERCENT: 8.2 % (ref 0–4)
NUCLEATED RBC %: 0 %
PDW BLD-RTO: 15 % (ref 11.7–14.9)
PHOSPHORUS: 3.1 MG/DL (ref 2.5–4.9)
PLATELET # BLD: 243 K/CU MM (ref 140–440)
PMV BLD AUTO: 10.7 FL (ref 7.5–11.1)
POTASSIUM SERPL-SCNC: 4.1 MMOL/L (ref 3.5–5.1)
PROCALCITONIN: 0.06
PROTHROMBIN TIME: 12.9 SECONDS (ref 11.7–14.5)
RBC # BLD: 4.97 M/CU MM (ref 4.2–5.4)
SEGMENTED NEUTROPHILS ABSOLUTE COUNT: 7.1 K/CU MM
SEGMENTED NEUTROPHILS RELATIVE PERCENT: 83.7 % (ref 36–66)
SODIUM BLD-SCNC: 138 MMOL/L (ref 135–145)
TOTAL CK: 30 IU/L (ref 26–140)
TOTAL IMMATURE NEUTOROPHIL: 0.06 K/CU MM
TOTAL NUCLEATED RBC: 0 K/CU MM
TROPONIN T: <0.01 NG/ML
WBC # BLD: 8.4 K/CU MM (ref 4–10.5)

## 2022-02-07 PROCEDURE — 6370000000 HC RX 637 (ALT 250 FOR IP): Performed by: NURSE PRACTITIONER

## 2022-02-07 PROCEDURE — 6360000002 HC RX W HCPCS: Performed by: NURSE PRACTITIONER

## 2022-02-07 PROCEDURE — 84484 ASSAY OF TROPONIN QUANT: CPT

## 2022-02-07 PROCEDURE — 82550 ASSAY OF CK (CPK): CPT

## 2022-02-07 PROCEDURE — 36415 COLL VENOUS BLD VENIPUNCTURE: CPT

## 2022-02-07 PROCEDURE — 6370000000 HC RX 637 (ALT 250 FOR IP): Performed by: HOSPITALIST

## 2022-02-07 PROCEDURE — 85730 THROMBOPLASTIN TIME PARTIAL: CPT

## 2022-02-07 PROCEDURE — 84100 ASSAY OF PHOSPHORUS: CPT

## 2022-02-07 PROCEDURE — 6370000000 HC RX 637 (ALT 250 FOR IP): Performed by: INTERNAL MEDICINE

## 2022-02-07 PROCEDURE — 97530 THERAPEUTIC ACTIVITIES: CPT

## 2022-02-07 PROCEDURE — 85025 COMPLETE CBC W/AUTO DIFF WBC: CPT

## 2022-02-07 PROCEDURE — 84145 PROCALCITONIN (PCT): CPT

## 2022-02-07 PROCEDURE — 80069 RENAL FUNCTION PANEL: CPT

## 2022-02-07 PROCEDURE — 85379 FIBRIN DEGRADATION QUANT: CPT

## 2022-02-07 PROCEDURE — 82330 ASSAY OF CALCIUM: CPT

## 2022-02-07 PROCEDURE — 2580000003 HC RX 258: Performed by: NURSE PRACTITIONER

## 2022-02-07 PROCEDURE — 2700000000 HC OXYGEN THERAPY PER DAY

## 2022-02-07 PROCEDURE — 85610 PROTHROMBIN TIME: CPT

## 2022-02-07 PROCEDURE — 82040 ASSAY OF SERUM ALBUMIN: CPT

## 2022-02-07 PROCEDURE — 86141 C-REACTIVE PROTEIN HS: CPT

## 2022-02-07 PROCEDURE — 97162 PT EVAL MOD COMPLEX 30 MIN: CPT

## 2022-02-07 PROCEDURE — 80048 BASIC METABOLIC PNL TOTAL CA: CPT

## 2022-02-07 PROCEDURE — 2060000000 HC ICU INTERMEDIATE R&B

## 2022-02-07 PROCEDURE — 82728 ASSAY OF FERRITIN: CPT

## 2022-02-07 PROCEDURE — 94640 AIRWAY INHALATION TREATMENT: CPT

## 2022-02-07 PROCEDURE — 93010 ELECTROCARDIOGRAM REPORT: CPT | Performed by: INTERNAL MEDICINE

## 2022-02-07 PROCEDURE — 94761 N-INVAS EAR/PLS OXIMETRY MLT: CPT

## 2022-02-07 PROCEDURE — 83615 LACTATE (LD) (LDH) ENZYME: CPT

## 2022-02-07 RX ORDER — ALBUTEROL SULFATE 90 UG/1
2 AEROSOL, METERED RESPIRATORY (INHALATION) 4 TIMES DAILY
Status: DISCONTINUED | OUTPATIENT
Start: 2022-02-07 | End: 2022-02-08

## 2022-02-07 RX ADMIN — SODIUM CHLORIDE, PRESERVATIVE FREE 10 ML: 5 INJECTION INTRAVENOUS at 09:24

## 2022-02-07 RX ADMIN — FAMOTIDINE 20 MG: 20 TABLET ORAL at 20:40

## 2022-02-07 RX ADMIN — CETIRIZINE HYDROCHLORIDE 10 MG: 10 TABLET, FILM COATED ORAL at 06:15

## 2022-02-07 RX ADMIN — FAMOTIDINE 20 MG: 20 TABLET ORAL at 09:17

## 2022-02-07 RX ADMIN — MORPHINE SULFATE 30 MG: 15 TABLET, FILM COATED, EXTENDED RELEASE ORAL at 09:16

## 2022-02-07 RX ADMIN — OXYCODONE AND ACETAMINOPHEN 1 TABLET: 5; 325 TABLET ORAL at 20:40

## 2022-02-07 RX ADMIN — MORPHINE SULFATE 30 MG: 15 TABLET, FILM COATED, EXTENDED RELEASE ORAL at 22:19

## 2022-02-07 RX ADMIN — GUAIFENESIN AND DEXTROMETHORPHAN 5 ML: 100; 10 SYRUP ORAL at 14:25

## 2022-02-07 RX ADMIN — OXYCODONE AND ACETAMINOPHEN 1 TABLET: 5; 325 TABLET ORAL at 14:19

## 2022-02-07 RX ADMIN — ALBUTEROL SULFATE 2 PUFF: 90 AEROSOL, METERED RESPIRATORY (INHALATION) at 20:06

## 2022-02-07 RX ADMIN — DEXAMETHASONE SODIUM PHOSPHATE 20 MG: 4 INJECTION, SOLUTION INTRA-ARTICULAR; INTRALESIONAL; INTRAMUSCULAR; INTRAVENOUS; SOFT TISSUE at 09:18

## 2022-02-07 RX ADMIN — LOSARTAN POTASSIUM 50 MG: 50 TABLET, FILM COATED ORAL at 09:17

## 2022-02-07 RX ADMIN — SODIUM CHLORIDE, PRESERVATIVE FREE 10 ML: 5 INJECTION INTRAVENOUS at 20:41

## 2022-02-07 RX ADMIN — OXYCODONE HYDROCHLORIDE 5 MG: 5 TABLET ORAL at 06:15

## 2022-02-07 RX ADMIN — SENNOSIDES AND DOCUSATE SODIUM 1 TABLET: 50; 8.6 TABLET ORAL at 20:40

## 2022-02-07 RX ADMIN — ENOXAPARIN SODIUM 90 MG: 100 INJECTION SUBCUTANEOUS at 09:17

## 2022-02-07 RX ADMIN — ENOXAPARIN SODIUM 90 MG: 100 INJECTION SUBCUTANEOUS at 20:41

## 2022-02-07 RX ADMIN — BARICITINIB 4 MG: 2 TABLET, FILM COATED ORAL at 09:16

## 2022-02-07 RX ADMIN — Medication 2000 UNITS: at 09:15

## 2022-02-07 RX ADMIN — SENNOSIDES AND DOCUSATE SODIUM 1 TABLET: 50; 8.6 TABLET ORAL at 09:16

## 2022-02-07 ASSESSMENT — PAIN DESCRIPTION - LOCATION
LOCATION: ABDOMEN
LOCATION: ABDOMEN;HIP

## 2022-02-07 ASSESSMENT — PAIN DESCRIPTION - ORIENTATION: ORIENTATION: RIGHT;LEFT;ANTERIOR

## 2022-02-07 ASSESSMENT — PAIN SCALES - GENERAL
PAINLEVEL_OUTOF10: 4
PAINLEVEL_OUTOF10: 7
PAINLEVEL_OUTOF10: 6
PAINLEVEL_OUTOF10: 4
PAINLEVEL_OUTOF10: 7
PAINLEVEL_OUTOF10: 7
PAINLEVEL_OUTOF10: 9

## 2022-02-07 ASSESSMENT — PAIN DESCRIPTION - PAIN TYPE: TYPE: ACUTE PAIN

## 2022-02-07 NOTE — PROGRESS NOTES
Physical Therapy    Facility/Department: [facility] ICU STEPDOWN  Initial Assessment    NAME: Rickey Rodriguez  : 1945  MRN: [id number]    Date of Service: 2022    Discharge Recommendations:  Subacute/Skilled Nursing Facility   PT Equipment Recommendations  Equipment Needed:  (none anticipated)    Assessment   Body structures, Functions, Activity limitations: Decreased functional mobility ; Decreased ADL status; Decreased endurance;Decreased ROM; Decreased sensation;Decreased strength;Decreased balance; Increased pain  Assessment: Pt presents 2 days s/p admission w/ SOB; 74% on RA upon EMS arrival; COVID s/s. Pt presents from home, where she lives by herself, uses crutches for functional mobility at baseline, in a ramped entry, 1 story home, adjustable height bed, assist from family w/ ADL's/home mgmt. Medical hx includes chronic low back pain, hx of spinal fusion, HTN. Pt presents w/ the above listed deficits, however, pt is primarily limited by dec activity tolerance and high O2 needs. Pt requires physical assist for all functional mobility, dec endurance, baseline functional mobility deficits. Will cont to benefit from skilled subacute therapy services to promote safe return to PLOF. Recommending SNF at this time. Prognosis:  (fair +)  Decision Making: Medium Complexity  PT Education: Goals;Transfer Training;Disease Specific Education;PT Role;Energy Conservation; Functional Mobility Training;Plan of Care;General Safety; Injury Prevention;Gait Training; Adaptive Device Training  REQUIRES PT FOLLOW UP: Yes  Activity Tolerance  Activity Tolerance: Patient limited by endurance      Treatment:  Therapeutic Activity Training:   Therapeutic activity training was instructed today. Cues were given for safety, sequence, UE/LE placement, awareness, and balance. Activities performed today included bed mobility training, sup-sit, sit-stand, SPT.   Extensive functional mobility training and inc time/effort to attend to [facility] Patient Diagnosis(es): The primary encounter diagnosis was Hypoxia. Diagnoses of COVID-19 and Pneumonia of both lungs due to infectious organism, unspecified part of lung were also pertinent to this visit. has a past medical history of Chronic lower back pain, Hx of spinal fusion, Hypertension, MRSA (methicillin resistant staph aureus) culture positive, and Venous disease. has a past surgical history that includes fracture surgery and back surgery. Restrictions  Restrictions/Precautions  Restrictions/Precautions: Fall Risk,General Precautions,Contact Precautions (droplet +)  Vision/Hearing  Vision: Impaired  Vision Exceptions: Wears glasses at all times (contacts when she feels better)  Hearing: Exceptions to [facility]  Hearing Exceptions: Hard of hearing/hearing concerns     Subjective  General  Chart Reviewed: Yes  Patient assessed for rehabilitation services?: Yes  Family / Caregiver Present: No  Follows Commands: Within Functional Limits  Subjective  Subjective: I'm not usually ever this short of breath  Pain Screening  Patient Currently in Pain: Yes  Pain Assessment  Pain Level: 7  Pain Type: Acute pain  Pain Location: Abdomen; Hip  Pain Orientation: Right;Left; Anterior  Vital Signs  Patient Currently in Pain: Yes       Orientation  Orientation  Overall Orientation Status: Within Functional Limits  Social/Functional History  Social/Functional History  Lives With: Alone  Type of Home: House  Home Layout: One level  Home Access: Ramped entrance (railing on BL sides)  Bathroom Shower/Tub: Shower chair without back (dtr spongebathes)  Bathroom Toilet: Handicap height  Bathroom Equipment: Grab bars around toilet  Home Equipment: Crutches,Wheelchair-manual,Grab bars  Receives Help From: Family  ADL Assistance: Needs assistance (dtr assists w/ bathing, dressing, ; dtr comes M/W/F)  Homemaking Assistance: Needs assistance (dtr does shopping; pt does not cook, has meals on wheels; son does outside [place]; grand Training,Endurance Training,ROM,ADL/Self-care Training,Balance Training,Gait Training,Functional Mobility Training,Safety Education & Training,Positioning,Equipment Evaluation, Education, & procurement,Pain Management,Patient/Caregiver Education & Training,Modalities  Safety Devices  Type of devices: Bed alarm in place,Call light within reach,Gait belt,Patient at risk for falls,Left in bed,Nurse notified  Restraints  Initially in place: No    AM-PAC Score  AM-PAC Inpatient Mobility Raw Score : 13 (02/07/22 1630)  AM-PAC Inpatient T-Scale Score : 36.74 (02/07/22 1630)  Mobility Inpatient CMS 0-100% Score: 64.91 (02/07/22 1630)  Mobility Inpatient CMS G-Code Modifier : CL (02/07/22 1630)          Goals  Short term goals  Time Frame for Short term goals: 1 week  Short term goal 1: pt will complete bed mobility at min A  Short term goal 2: pt will complete transfers at SBA  Short term goal 3: pt will ambulate 15 ft using crutches at SBA  Short term goal 4: pt will demonstrate near gross >3/5 BL LE strength  Patient Goals   Patient goals : return home       Therapy Time   Individual Concurrent Group Co-treatment   Time In 1322         Time Out 1421         Minutes 59         Timed Code Treatment Minutes: 49 Minutes (TA x 3)       Vincenzo Kurtz, PT, DPT

## 2022-02-07 NOTE — CARE COORDINATION
Received VM from patient's son Belkis Bartlett requesting return call from . Phoned and spoke with Belkis Bartlett . He was requesting assistance with having patient sign a release form for patient's insurance  so Belkis Bartlett can speak with them. He stated that patient is aware and willing to sign.  Belkis Dhruv to fax form to  to assist.

## 2022-02-07 NOTE — PROGRESS NOTES
Hospitalist Progress Note  Sara Aguirre MD      Name:  Robin Sommers /Age/Sex: 1945  (68 y.o. female)   MRN & CSN:  [id number] & [id number] Admission Date/Time: 2022  4:46 PM   Location:  -A PCP: No primary care provider on file. Hospital Day: 3    Assessment and Plan:   Robin Sommers is a 68 y.o.  female  who presents with     1. Acute hypoxic respiratory failure. Remains on high flow oxygen. Today patient is on 15 L. Oxygen. Awaiting pulmonary recommendations. Further management per pulmonology    2. COVID-19 pneumonia. -on Decadron. On baricitinib. Check vitamin D level. On oral vitamin D. On Lovenox therapeutic dose due to elevated D-dimer. Procalcitonin 0.098. CRP 91. Antibiotics stopped    3. Hypertension. Continue current medications. 4. Proteinuria  -check urine protein creatinine ratio     5. Chronic pain. continue home dose of morphine. 6. Lumbar spondylosis  7. Osteoarthritis  8. Congenital dislocation of hip bilaterally     Body mass index is 34.11 kg/m². Diet ADULT DIET; Regular   DVT Prophylaxis Lovenox    Code Status DNR-CCA   Disposition To ECF in 4-5 days     Subjective:     Patient was lying in the bed. Patient remains on high flow oxygen. Today the patient was on 15 L    Ten point ROS reviewed negative, unless as noted above    Objective: Intake/Output Summary (Last 24 hours) at 2022 1014  Last data filed at 2022 1530  Gross per 24 hour   Intake 920 ml   Output 500 ml   Net 420 ml        Vitals: BP (!) 152/72   Pulse 55   Temp 97.6 °F (36.4 °C) (Oral)   Resp 17   Ht 5' 2\" (1.575 m)   Wt 186 lb 8.2 oz (84.6 kg)   SpO2 94%   BMI 34.11 kg/m²     Physical Exam:     GEN mild distress. HEENT moist mucous membranes, no icterus  RESP tachypnea  CVS: Bradycardic  GI/ S/NT/ND BS+   MSK No gross joint deformities. SKIN Normal coloration, warm, dry. NEURO no focal deficit  PSYCH Awake, alert, oriented x3.     Recent Results (from the past 24 hour(s))   Protein / creatinine ratio, urine    Collection Time: 02/06/22  5:45 PM   Result Value Ref Range    Urine Total Protein 169.1 (H) <12 MG/DL    Creatinine, Ur 184.7 28 - 217 MG/DL    Prot/Creat Ratio, Ur 0.9 (H) <0.2       Medications:   Medications:    baricitinib  4 mg Oral Daily    morphine  30 mg Oral 2 times per day    cetirizine  10 mg Oral Daily    losartan  50 mg Oral Daily    sodium chloride flush  5-40 mL IntraVENous 2 times per day    sennosides-docusate sodium  1 tablet Oral BID    famotidine  20 mg Oral BID    dexamethasone  20 mg IntraVENous Q24H    Followed by   Tameka Hansen ON 2/10/2022] dexamethasone  10 mg IntraVENous Q24H    dexamethasone  10 mg IntraVENous Once    Vitamin D  2,000 Units Oral Daily    enoxaparin  1 mg/kg SubCUTAneous BID      Infusions:    sodium chloride       PRN Meds: sodium chloride flush, 5-40 mL, PRN  sodium chloride, 25 mL, PRN  ondansetron, 4 mg, Q8H PRN   Or  ondansetron, 4 mg, Q6H PRN  polyethylene glycol, 17 g, Daily PRN  acetaminophen, 650 mg, Q6H PRN   Or  acetaminophen, 650 mg, Q6H PRN  guaiFENesin-dextromethorphan, 5 mL, Q4H PRN  oxyCODONE-acetaminophen, 1 tablet, Q6H PRN          Electronically signed by Izzy Martines MD, MD on 2/7/2022 at 10:14 AM

## 2022-02-07 NOTE — CONSULTS
[address], [facility]                                  CONSULTATION    PATIENT NAME: Dianne Montiel                    :        1945  MED REC NO:   [id number]                          ROOM:         ACCOUNT NO:   [de-identified]                           ADMIT DATE: 2022  PROVIDER:     Sherri Sharpe MD    CONSULT DATE:  2022    HISTORY OF PRESENT ILLNESS:  The patient is a 20-year-old lady with  multiple medical problems including chronic back pain, hypertension, and  history of congenital hip defect, who was admitted through the emergency  room with nausea and vomiting, generalized fatigue and malaise of 4-6  days' duration. The patient tested positive for COVID-19. She is also  complaining of fever, chills, nonproductive cough, and shortness of  breath on exertion. She denied any chest pains. She denied any  hemoptysis or hematemesis. In the emergency room, she was hypoxic in  the 70s on room air and required high-flow nasal cannula. She had a  chest x-ray which showed bilateral infiltrates consistent with COVID-19. She was subsequently admitted to the hospital.    PAST MEDICAL HISTORY:  Significant for chronic back pain, history of  spinal fusion, hypertension. PAST SURGICAL HISTORY:  Remarkable for back surgery. FAMILY HISTORY:  Noncontributory. SOCIAL HISTORY:  Reveals that she is a nonsmoker. No history of alcohol  or drug abuse. MEDICATIONS:  Reviewed. ALLERGIES:  She has no known allergies. REVIEW OF SYSTEMS:  The 10-to 14-point review of systems were reviewed  and are negative except for what is mentioned in the history of present  illness. PHYSICAL EXAMINATION:  GENERAL:  The patient is awake and responsive, in no acute respiratory  distress. VITAL SIGNS:  Her blood pressure is 152/72 mmHg, pulse of 55 per minute,  and respiratory rate of 17 per minute.   She is afebrile. Her saturation  is 94% on high-flow nasal cannula. HEENT:  Exam is essentially unremarkable. There is no JVD, no  lymphadenopathy. NECK:  The neck is supple. LUNGS:  Exam revealed diminished breath sounds and basilar crackles. HEART:  Exam showed normal S1, S2. There is no S3 or S4 noted. ABDOMEN:  Exam is benign. There is no evidence of any organomegaly. The bowel sounds are present. NEUROLOGIC:  Exam reveals that she is awake and responsive and moving  her extremities. LABORATORY DATA:  Her chest x-ray showed bilateral infiltrate consistent  with COVID-19. Her electrolytes showed a sodium 138, potassium 3.8,  chloride 99, carbon dioxide 28, BUN 10, creatinine 0.4. Her CBC showed  a white count of 5.9, hemoglobin 12.5, hematocrit 41.8. Her rapid COVID  test was positive. IMPRESSION:  1. Acute respiratory failure secondary to bilateral pneumonia secondary  to COVID-19.  2.  Bilateral pneumonia secondary to COVID-19. PLAN:  1. The patient has been started on Decadron. 2.  The patient has been started on baricitinib. 3.  We will start albuterol four times a day. 4.  Sputum for culture and sensitivity. 5.  Check mag and phos. 6.  The patient has been started on Lovenox. 7.  As per orders.         Ayde Yanez MD    D: 02/07/2022 10:49:06       T: 02/07/2022 10:53:00     [email or web address]  Job#: [id number]     Doc#: [id number]    CC:

## 2022-02-08 LAB
ALBUMIN SERPL-MCNC: 3.1 GM/DL (ref 3.4–5)
ALP BLD-CCNC: 125 IU/L (ref 40–129)
ALT SERPL-CCNC: 18 U/L (ref 10–40)
ANION GAP SERPL CALCULATED.3IONS-SCNC: 9 MMOL/L (ref 4–16)
AST SERPL-CCNC: 34 IU/L (ref 15–37)
BASOPHILS ABSOLUTE: 0 K/CU MM
BASOPHILS RELATIVE PERCENT: 0.1 % (ref 0–1)
BILIRUB SERPL-MCNC: 0.4 MG/DL (ref 0–1)
BUN BLDV-MCNC: 21 MG/DL (ref 6–23)
CALCIUM SERPL-MCNC: 8.4 MG/DL (ref 8.3–10.6)
CHLORIDE BLD-SCNC: 101 MMOL/L (ref 99–110)
CO2: 25 MMOL/L (ref 21–32)
CREAT SERPL-MCNC: 0.5 MG/DL (ref 0.6–1.1)
DIFFERENTIAL TYPE: ABNORMAL
EOSINOPHILS ABSOLUTE: 0 K/CU MM
EOSINOPHILS RELATIVE PERCENT: 0 % (ref 0–3)
FERRITIN: 422 NG/ML (ref 15–150)
GFR AFRICAN AMERICAN: >60 ML/MIN/1.73M2
GFR NON-AFRICAN AMERICAN: >60 ML/MIN/1.73M2
GLUCOSE BLD-MCNC: 130 MG/DL (ref 70–99)
HCT VFR BLD CALC: 39.5 % (ref 37–47)
HEMOGLOBIN: 12.2 GM/DL (ref 12.5–16)
IMMATURE NEUTROPHIL %: 0.6 % (ref 0–0.43)
LYMPHOCYTES ABSOLUTE: 0.5 K/CU MM
LYMPHOCYTES RELATIVE PERCENT: 5.1 % (ref 24–44)
MAGNESIUM: 2.2 MG/DL (ref 1.8–2.4)
MCH RBC QN AUTO: 25.2 PG (ref 27–31)
MCHC RBC AUTO-ENTMCNC: 30.9 % (ref 32–36)
MCV RBC AUTO: 81.4 FL (ref 78–100)
MONOCYTES ABSOLUTE: 0.6 K/CU MM
MONOCYTES RELATIVE PERCENT: 7 % (ref 0–4)
NUCLEATED RBC %: 0 %
PDW BLD-RTO: 14.9 % (ref 11.7–14.9)
PHOSPHORUS: 3.4 MG/DL (ref 2.5–4.9)
PLATELET # BLD: 256 K/CU MM (ref 140–440)
PMV BLD AUTO: 10.5 FL (ref 7.5–11.1)
POTASSIUM SERPL-SCNC: 4.1 MMOL/L (ref 3.5–5.1)
PRO-BNP: 1360 PG/ML
PROCALCITONIN: 0.08
RBC # BLD: 4.85 M/CU MM (ref 4.2–5.4)
SEGMENTED NEUTROPHILS ABSOLUTE COUNT: 7.9 K/CU MM
SEGMENTED NEUTROPHILS RELATIVE PERCENT: 87.2 % (ref 36–66)
SODIUM BLD-SCNC: 135 MMOL/L (ref 135–145)
TOTAL CK: 27 IU/L (ref 26–140)
TOTAL IMMATURE NEUTOROPHIL: 0.05 K/CU MM
TOTAL NUCLEATED RBC: 0 K/CU MM
TOTAL PROTEIN: 5.9 GM/DL (ref 6.4–8.2)
WBC # BLD: 9 K/CU MM (ref 4–10.5)

## 2022-02-08 PROCEDURE — 94761 N-INVAS EAR/PLS OXIMETRY MLT: CPT

## 2022-02-08 PROCEDURE — 6370000000 HC RX 637 (ALT 250 FOR IP): Performed by: HOSPITALIST

## 2022-02-08 PROCEDURE — 6370000000 HC RX 637 (ALT 250 FOR IP): Performed by: NURSE PRACTITIONER

## 2022-02-08 PROCEDURE — 83735 ASSAY OF MAGNESIUM: CPT

## 2022-02-08 PROCEDURE — 82728 ASSAY OF FERRITIN: CPT

## 2022-02-08 PROCEDURE — 82550 ASSAY OF CK (CPK): CPT

## 2022-02-08 PROCEDURE — 83880 ASSAY OF NATRIURETIC PEPTIDE: CPT

## 2022-02-08 PROCEDURE — 80053 COMPREHEN METABOLIC PANEL: CPT

## 2022-02-08 PROCEDURE — 2060000000 HC ICU INTERMEDIATE R&B

## 2022-02-08 PROCEDURE — 2700000000 HC OXYGEN THERAPY PER DAY

## 2022-02-08 PROCEDURE — 6370000000 HC RX 637 (ALT 250 FOR IP): Performed by: INTERNAL MEDICINE

## 2022-02-08 PROCEDURE — 84100 ASSAY OF PHOSPHORUS: CPT

## 2022-02-08 PROCEDURE — 6360000002 HC RX W HCPCS: Performed by: NURSE PRACTITIONER

## 2022-02-08 PROCEDURE — 36415 COLL VENOUS BLD VENIPUNCTURE: CPT

## 2022-02-08 PROCEDURE — 94640 AIRWAY INHALATION TREATMENT: CPT

## 2022-02-08 PROCEDURE — 2580000003 HC RX 258: Performed by: NURSE PRACTITIONER

## 2022-02-08 PROCEDURE — 84145 PROCALCITONIN (PCT): CPT

## 2022-02-08 PROCEDURE — 85025 COMPLETE CBC W/AUTO DIFF WBC: CPT

## 2022-02-08 RX ORDER — ALBUTEROL SULFATE 90 UG/1
2 AEROSOL, METERED RESPIRATORY (INHALATION) EVERY 4 HOURS PRN
Status: DISCONTINUED | OUTPATIENT
Start: 2022-02-08 | End: 2022-03-17 | Stop reason: HOSPADM

## 2022-02-08 RX ADMIN — SENNOSIDES AND DOCUSATE SODIUM 1 TABLET: 50; 8.6 TABLET ORAL at 21:07

## 2022-02-08 RX ADMIN — SODIUM CHLORIDE, PRESERVATIVE FREE 10 ML: 5 INJECTION INTRAVENOUS at 08:23

## 2022-02-08 RX ADMIN — FAMOTIDINE 20 MG: 20 TABLET ORAL at 08:22

## 2022-02-08 RX ADMIN — SENNOSIDES AND DOCUSATE SODIUM 1 TABLET: 50; 8.6 TABLET ORAL at 08:22

## 2022-02-08 RX ADMIN — ALBUTEROL SULFATE 2 PUFF: 90 AEROSOL, METERED RESPIRATORY (INHALATION) at 09:04

## 2022-02-08 RX ADMIN — OXYCODONE AND ACETAMINOPHEN 1 TABLET: 5; 325 TABLET ORAL at 17:13

## 2022-02-08 RX ADMIN — CETIRIZINE HYDROCHLORIDE 10 MG: 10 TABLET, FILM COATED ORAL at 08:22

## 2022-02-08 RX ADMIN — ENOXAPARIN SODIUM 90 MG: 100 INJECTION SUBCUTANEOUS at 21:06

## 2022-02-08 RX ADMIN — BENZOCAINE AND MENTHOL 1 LOZENGE: 15; 3.6 LOZENGE ORAL at 22:56

## 2022-02-08 RX ADMIN — SODIUM CHLORIDE, PRESERVATIVE FREE 10 ML: 5 INJECTION INTRAVENOUS at 21:07

## 2022-02-08 RX ADMIN — BARICITINIB 4 MG: 2 TABLET, FILM COATED ORAL at 08:22

## 2022-02-08 RX ADMIN — Medication 2000 UNITS: at 08:22

## 2022-02-08 RX ADMIN — MORPHINE SULFATE 30 MG: 15 TABLET, FILM COATED, EXTENDED RELEASE ORAL at 08:22

## 2022-02-08 RX ADMIN — MORPHINE SULFATE 30 MG: 15 TABLET, FILM COATED, EXTENDED RELEASE ORAL at 21:06

## 2022-02-08 RX ADMIN — ENOXAPARIN SODIUM 90 MG: 100 INJECTION SUBCUTANEOUS at 08:22

## 2022-02-08 RX ADMIN — DEXAMETHASONE SODIUM PHOSPHATE 20 MG: 4 INJECTION, SOLUTION INTRA-ARTICULAR; INTRALESIONAL; INTRAMUSCULAR; INTRAVENOUS; SOFT TISSUE at 08:25

## 2022-02-08 RX ADMIN — LOSARTAN POTASSIUM 50 MG: 50 TABLET, FILM COATED ORAL at 08:22

## 2022-02-08 RX ADMIN — OXYCODONE AND ACETAMINOPHEN 1 TABLET: 5; 325 TABLET ORAL at 03:07

## 2022-02-08 RX ADMIN — FAMOTIDINE 20 MG: 20 TABLET ORAL at 21:07

## 2022-02-08 ASSESSMENT — PAIN DESCRIPTION - ONSET: ONSET: ON-GOING

## 2022-02-08 ASSESSMENT — PAIN - FUNCTIONAL ASSESSMENT: PAIN_FUNCTIONAL_ASSESSMENT: PREVENTS OR INTERFERES SOME ACTIVE ACTIVITIES AND ADLS

## 2022-02-08 ASSESSMENT — PAIN DESCRIPTION - DESCRIPTORS: DESCRIPTORS: ACHING;DISCOMFORT;PRESSURE

## 2022-02-08 ASSESSMENT — PAIN SCALES - GENERAL
PAINLEVEL_OUTOF10: 0
PAINLEVEL_OUTOF10: 0
PAINLEVEL_OUTOF10: 2
PAINLEVEL_OUTOF10: 0
PAINLEVEL_OUTOF10: 5
PAINLEVEL_OUTOF10: 6
PAINLEVEL_OUTOF10: 7
PAINLEVEL_OUTOF10: 9

## 2022-02-08 ASSESSMENT — PAIN DESCRIPTION - LOCATION: LOCATION: BACK;HIP

## 2022-02-08 ASSESSMENT — PAIN DESCRIPTION - PROGRESSION: CLINICAL_PROGRESSION: NOT CHANGED

## 2022-02-08 ASSESSMENT — PAIN DESCRIPTION - ORIENTATION: ORIENTATION: RIGHT;LEFT;LOWER

## 2022-02-08 ASSESSMENT — PAIN DESCRIPTION - PAIN TYPE: TYPE: ACUTE PAIN

## 2022-02-08 ASSESSMENT — PAIN DESCRIPTION - FREQUENCY: FREQUENCY: CONTINUOUS

## 2022-02-08 NOTE — CARE COORDINATION
Spoke with patient's son Ashly Turner . He confirmed that patient is from home alone and normally uses crutches or a wheel chair to get around due to being born with out hip sockets and has had multiple surgeries over the years. Patient also has a trapeze to help with bed mobility. He stated that patient has 4 children that assist patient at home as needed. Patient follows a [facility] for PCP and Ashly Turner is going to find out PCP name/clinic and return call to CM. Patient has Medicare/ to assist with RX coverage. Patient does not wear home O2. Case Management to follow to continue to assist with discharge plan/needs.

## 2022-02-08 NOTE — PROGRESS NOTES
Patient's son Nam Lam called asking again for trapeze bed for patient. I stated that, I had been unable to obtain the trapeze apparatus as well as bed after inquiring with EVS, supply and the orthopedic floor. This nurse was told that there are no beds available. Patient's son extremely rude, with terse tone. Interrupts after asking questions and is extremely condescending.

## 2022-02-08 NOTE — RT PROTOCOL NOTE
RT Inhaler-Nebulizer Bronchodilator Protocol Note    There is a bronchodilator order in the chart from a provider indicating to follow the RT Bronchodilator Protocol and there is an Initiate RT Inhaler-Nebulizer Bronchodilator Protocol order as well (see protocol at bottom of note). CXR Findings:  No results found. The findings from the last RT Protocol Assessment were as follows:   History Pulmonary Disease: None or smoker <15 pack years  Respiratory Pattern: Dyspnea on exertion or RR 21-25 bpm  Breath Sounds: Slightly diminished and/or crackles  Cough: Strong, spontaneous, non-productive  Indication for Bronchodilator Therapy:    Bronchodilator Assessment Score: 4    Aerosolized bronchodilator medication orders have been revised according to the RT Inhaler-Nebulizer Bronchodilator Protocol below. Respiratory Therapist to perform RT Therapy Protocol Assessment initially then follow the protocol. Repeat RT Therapy Protocol Assessment PRN for score 0-3 or on second treatment, BID, and PRN for scores above 3. No Indications  adjust the frequency to every 6 hours PRN wheezing or bronchospasm, if no treatments needed after 48 hours then discontinue using Per Protocol order mode. If indication present, adjust the RT bronchodilator orders based on the Bronchodilator Assessment Score as indicated below. Use Inhaler orders unless patient has one or more of the following: on home nebulizer, not able to hold breath for 10 seconds, is not alert and oriented, cannot activate and use MDI correctly, or respiratory rate 25 breaths per minute or more, then use the equivalent nebulizer order(s) with same Frequency and PRN reasons based on the score. If a patient is on this medication at home then do not decrease Frequency below that used at home.     0-3  enter or revise RT bronchodilator order(s) to equivalent RT Bronchodilator order with Frequency of every 4 hours PRN for wheezing or increased work of breathing using Per Protocol order mode. 4-6  enter or revise RT Bronchodilator order(s) to two equivalent RT bronchodilator orders with one order with BID Frequency and one order with Frequency of every 4 hours PRN wheezing or increased work of breathing using Per Protocol order mode. 7-10  enter or revise RT Bronchodilator order(s) to two equivalent RT bronchodilator orders with one order with TID Frequency and one order with Frequency of every 4 hours PRN wheezing or increased work of breathing using Per Protocol order mode. 11-13  enter or revise RT Bronchodilator order(s) to one equivalent RT bronchodilator order with QID Frequency and an Albuterol order with Frequency of every 4 hours PRN wheezing or increased work of breathing using Per Protocol order mode. Greater than 13  enter or revise RT Bronchodilator order(s) to one equivalent RT bronchodilator order with every 4 hours Frequency and an Albuterol order with Frequency of every 2 hours PRN wheezing or increased work of breathing using Per Protocol order mode. RT to enter RT Home Evaluation for COPD & MDI Assessment order using Per Protocol order mode.     Electronically signed by Trung Beal RCP on 2/8/2022 at 2:53 PM

## 2022-02-08 NOTE — FLOWSHEET NOTE
PT son called he wanted to talk to the charge nurse and was upset that he was transferred to charge phone voice mail and they ask for a pin. this tech told him I didn't have a pin to give him and it did that because the charge nurse was on a call with someone else. He goes on to tell me its stupid to ask a customer for a pin he doesn't have and also said he would;ld call back every few minutes until he was able to talk to the charge nurse.

## 2022-02-08 NOTE — PROGRESS NOTES
Time approx:  Patient's son called asking for Trapeze apparatus for patient's bed. Caller being very terse with condescending tone  Patient asking questions then repeating this nurse's name before question could be addressed. This nurse stated she would do best to obtain trapeze for this patient. S/w Housekeeping mgr who brought trapeze to this unit, then stated patient had wrong type of bed and no extra bed available.

## 2022-02-08 NOTE — PROGRESS NOTES
Spoke with engineering to see if we can get an amplifier or a different phone that is louder for patient to hear, there are no other types of phones available or amplifier system to attach.

## 2022-02-08 NOTE — PLAN OF CARE
Problem: Falls - Risk of:  Goal: Will remain free from falls  Description: Will remain free from falls  Outcome: Ongoing  Goal: Absence of physical injury  Description: Absence of physical injury  Outcome: Ongoing     Problem: Airway Clearance - Ineffective  Goal: Achieve or maintain patent airway  Outcome: Ongoing     Problem: Gas Exchange - Impaired  Goal: Absence of hypoxia  Outcome: Ongoing  Goal: Promote optimal lung function  Outcome: Ongoing     Problem: Breathing Pattern - Ineffective  Goal: Ability to achieve and maintain a regular respiratory rate  Outcome: Ongoing     Problem:  Body Temperature -  Risk of, Imbalanced  Goal: Ability to maintain a body temperature within defined limits  Outcome: Ongoing  Goal: Will regain or maintain usual level of consciousness  Outcome: Ongoing  Goal: Complications related to the disease process, condition or treatment will be avoided or minimized  Outcome: Ongoing     Problem: Isolation Precautions - Risk of Spread of Infection  Goal: Prevent transmission of infection  Outcome: Ongoing     Problem: Risk for Fluid Volume Deficit  Goal: Maintain normal heart rhythm  Outcome: Ongoing  Goal: Maintain absence of muscle cramping  Outcome: Ongoing  Goal: Maintain normal serum potassium, sodium, calcium, phosphorus, and pH  Outcome: Ongoing     Problem: Nutrition Deficits  Goal: Optimize nutritional status  Outcome: Ongoing     Problem: Loneliness or Risk for Loneliness  Goal: Demonstrate positive use of time alone when socialization is not possible  Outcome: Ongoing     Problem: Fatigue  Goal: Verbalize increase energy and improved vitality  Outcome: Ongoing     Problem: Patient Education: Go to Patient Education Activity  Goal: Patient/Family Education  Outcome: Ongoing     Problem: Pain:  Goal: Pain level will decrease  Description: Pain level will decrease  Outcome: Ongoing  Goal: Control of acute pain  Description: Control of acute pain  Outcome: Ongoing  Goal: Control of chronic pain  Description: Control of chronic pain  Outcome: Ongoing     Problem: Skin Integrity:  Goal: Will show no infection signs and symptoms  Description: Will show no infection signs and symptoms  Outcome: Ongoing  Goal: Absence of new skin breakdown  Description: Absence of new skin breakdown  Outcome: Ongoing

## 2022-02-08 NOTE — PROGRESS NOTES
Hospitalist Progress Note      Name:  Shekhar Qiu /Age/Sex: 1945  (68 y.o. female)   MRN & CSN:  [id number] & [id number] Admission Date/Time: 2022  4:46 PM   Location:  -A PCP: No primary care provider on file. Hospital Day: 4    Assessment and Plan:   Shekhar Qiu is a 68 y.o.  female      1. Acute respiratory failure with hypoxia: Patient remains on high flow oxygen, currently on 15 L.  2. COVID-19 pneumonia: Continue Decadron 10 mg daily, baricitinib 4 mg daily. .  Patient seen by pulmonary, no further recommendations. Continue to wean down oxygen as tolerated. 3. Essential hypertension: Stable. Continue losartan 50 mg daily. 4. Degenerative joint disease: Pain is controlled. Patient is on MS Contin 30 mg every 12 as well as Percocet 5/325 every 6 as needed. 5. Obesity with BMI of 34.11          Interval History     Patient was seen and examined at the bedside, she appeared comfortable, CODE STATUS was discussed, patient wants to be full code. She is currently on 15 L of oxygen. Patient is hard of hearing. No acute events overnight. Objective: Intake/Output Summary (Last 24 hours) at 2022 1202  Last data filed at 2022 3762  Gross per 24 hour   Intake    Output 500 ml   Net -500 ml      Vitals:   Vitals:    22 0908   BP:    Pulse:    Resp:    Temp:    SpO2: 90%     Physical Exam:   GEN Awake female, sitting upright in bed in no apparent distress. Appears given age. EYES Pupils are equally round. No scleral erythema, discharge, or conjunctivitis. HENT Mucous membranes are moist. Oral pharynx without exudates, no evidence of thrush. NECK Supple, no apparent thyromegaly or masses. RESP decreased breath sounds bilaterally, few bibasilarly Rales    CARD: S1/S2 auscultated. Regular rate without appreciable murmurs, rubs, or gallops. No JVD or carotid bruits. Peripheral pulses equal bilaterally and palpable.  No peripheral edema.    GI Abdomen is soft without significant tenderness, masses, or guarding. Bowel sounds are normoactive. Rectal exam deferred.  No costovertebral angle tenderness. Normal appearing external genitalia. Waller catheter is not present. HEME/LYMP: No palpable cervical lymphadenopathy and no hepatosplenomegaly. No petechiae or ecchymoses. MSK No gross joint deformities. SKIN Normal coloration, warm, dry. NEURO Cranial nerves appear grossly intact, normal speech, no lateralizing weakness. PSYCH Awake, alert, oriented x 4. Affect appropriate.     Medications:   Medications:    albuterol sulfate HFA  2 puff Inhalation 4x daily    baricitinib  4 mg Oral Daily    morphine  30 mg Oral 2 times per day    cetirizine  10 mg Oral Daily    losartan  50 mg Oral Daily    sodium chloride flush  5-40 mL IntraVENous 2 times per day    sennosides-docusate sodium  1 tablet Oral BID    famotidine  20 mg Oral BID    dexamethasone  20 mg IntraVENous Q24H    Followed by   Brisa Madrigal ON 2/10/2022] dexamethasone  10 mg IntraVENous Q24H    dexamethasone  10 mg IntraVENous Once    Vitamin D  2,000 Units Oral Daily    enoxaparin  1 mg/kg SubCUTAneous BID      Infusions:    sodium chloride       PRN Meds: sodium chloride flush, 5-40 mL, PRN  sodium chloride, 25 mL, PRN  ondansetron, 4 mg, Q8H PRN   Or  ondansetron, 4 mg, Q6H PRN  polyethylene glycol, 17 g, Daily PRN  acetaminophen, 650 mg, Q6H PRN   Or  acetaminophen, 650 mg, Q6H PRN  guaiFENesin-dextromethorphan, 5 mL, Q4H PRN  oxyCODONE-acetaminophen, 1 tablet, Q6H PRN          Electronically signed by Alberto Bhandari MD on 2/8/2022 at 12:02 PM

## 2022-02-08 NOTE — FLOWSHEET NOTE
This said nurse and Love Whit LPN gave PT complete bed bath and linen change. PT [place] and can  hear better out of L ear. PT hair was washed and combed as well as put up in a pony tail. Finger nails trimmed and body lotion applied to entire body. PT was max assist with care. PT thanked this said nurse for the care and stated she felt like a new woman. PT resting in bed with call light in reach will continue to monitor.

## 2022-02-08 NOTE — PROGRESS NOTES
Pulmonary and Critical Care  Progress Note    Subjective: The patient is comfortable in bed. On HFNC. Shortness of breath is unchanged  Chest pain none  Addressing respiratory complaints Patient is negative for  hemoptysis and cyanosis  CONSTITUTIONAL:  negative for fevers and chills      Past Medical History:     has a past medical history of Chronic lower back pain, Hx of spinal fusion, Hypertension, MRSA (methicillin resistant staph aureus) culture positive, and Venous disease. has a past surgical history that includes fracture surgery and back surgery. reports that she has never smoked. She has never used smokeless tobacco. She reports that she does not drink alcohol and does not use drugs. Family history:  family history is not on file. No Known Allergies  Social History:    Reviewed; no changes    Objective:   PHYSICAL EXAM:        VITALS:  BP (!) 153/69   Pulse 70   Temp 97.6 °F (36.4 °C) (Oral)   Resp 20   Ht 5' 2\" (1.575 m)   Wt 186 lb 8.2 oz (84.6 kg)   SpO2 90%   BMI 34.11 kg/m²     24HR INTAKE/OUTPUT:    Intake/Output Summary (Last 24 hours) at 2/8/2022 1056  Last data filed at 2/8/2022 1489  Gross per 24 hour   Intake    Output 500 ml   Net -500 ml       CONSTITUTIONAL:  awake  LUNGS:  decreased breath sounds, basilar crackles. CARDIOVASCULAR:  normal S1 and S2 and negative JVD  ABD:Abdomen soft, non-tender. BS normal. No masses,  No organomegaly  NEURO:Alert. DATA:    CBC:  Recent Labs     02/05/22  1748 02/07/22  1254   WBC 5.9 8.4   RBC 5.03 4.97   HGB 12.5 12.3*   HCT 41.8 41.1    243   MCV 83.1 82.7   MCH 24.9* 24.7*   MCHC 29.9* 29.9*   RDW 15.2* 15.0*   SEGSPCT 79.5* 83.7*      BMP:  Recent Labs     02/05/22  1748 02/07/22  1254    138   K 3.8 4.1   CL 99 100   CO2 28 28   BUN 10 23   CREATININE 0.4* 0.5*   CALCIUM 8.1* 8.4   GLUCOSE 102* 122*      ABG:  No results for input(s): PH, PO2ART, JVK4GUI, HCO3, BEART, O2SAT in the last 72 hours.   No results found for:

## 2022-02-09 LAB
APTT: 33.7 SECONDS (ref 25.1–37.1)
BASOPHILS ABSOLUTE: 0 K/CU MM
BASOPHILS RELATIVE PERCENT: 0.1 % (ref 0–1)
DIFFERENTIAL TYPE: ABNORMAL
EOSINOPHILS ABSOLUTE: 0.1 K/CU MM
EOSINOPHILS RELATIVE PERCENT: 0.5 % (ref 0–3)
FERRITIN: 458 NG/ML (ref 15–150)
HCT VFR BLD CALC: 41.8 % (ref 37–47)
HEMOGLOBIN: 12.6 GM/DL (ref 12.5–16)
IMMATURE NEUTROPHIL %: 0.8 % (ref 0–0.43)
INR BLD: 1 INDEX
LYMPHOCYTES ABSOLUTE: 0.5 K/CU MM
LYMPHOCYTES RELATIVE PERCENT: 4.7 % (ref 24–44)
MCH RBC QN AUTO: 25 PG (ref 27–31)
MCHC RBC AUTO-ENTMCNC: 30.1 % (ref 32–36)
MCV RBC AUTO: 82.8 FL (ref 78–100)
MONOCYTES ABSOLUTE: 1 K/CU MM
MONOCYTES RELATIVE PERCENT: 10 % (ref 0–4)
NUCLEATED RBC %: 0 %
PDW BLD-RTO: 14.8 % (ref 11.7–14.9)
PLATELET # BLD: 257 K/CU MM (ref 140–440)
PMV BLD AUTO: 10.6 FL (ref 7.5–11.1)
PROTHROMBIN TIME: 12.9 SECONDS (ref 11.7–14.5)
RBC # BLD: 5.05 M/CU MM (ref 4.2–5.4)
SEGMENTED NEUTROPHILS ABSOLUTE COUNT: 8.2 K/CU MM
SEGMENTED NEUTROPHILS RELATIVE PERCENT: 83.9 % (ref 36–66)
TOTAL CK: 20 IU/L (ref 26–140)
TOTAL IMMATURE NEUTOROPHIL: 0.08 K/CU MM
TOTAL NUCLEATED RBC: 0 K/CU MM
WBC # BLD: 9.7 K/CU MM (ref 4–10.5)

## 2022-02-09 PROCEDURE — 6360000002 HC RX W HCPCS: Performed by: INTERNAL MEDICINE

## 2022-02-09 PROCEDURE — 2060000000 HC ICU INTERMEDIATE R&B

## 2022-02-09 PROCEDURE — 6370000000 HC RX 637 (ALT 250 FOR IP): Performed by: HOSPITALIST

## 2022-02-09 PROCEDURE — 82728 ASSAY OF FERRITIN: CPT

## 2022-02-09 PROCEDURE — 94761 N-INVAS EAR/PLS OXIMETRY MLT: CPT

## 2022-02-09 PROCEDURE — 2580000003 HC RX 258: Performed by: NURSE PRACTITIONER

## 2022-02-09 PROCEDURE — 85610 PROTHROMBIN TIME: CPT

## 2022-02-09 PROCEDURE — 6370000000 HC RX 637 (ALT 250 FOR IP): Performed by: NURSE PRACTITIONER

## 2022-02-09 PROCEDURE — 6360000002 HC RX W HCPCS: Performed by: NURSE PRACTITIONER

## 2022-02-09 PROCEDURE — 2700000000 HC OXYGEN THERAPY PER DAY

## 2022-02-09 PROCEDURE — 85025 COMPLETE CBC W/AUTO DIFF WBC: CPT

## 2022-02-09 PROCEDURE — 85730 THROMBOPLASTIN TIME PARTIAL: CPT

## 2022-02-09 PROCEDURE — 36415 COLL VENOUS BLD VENIPUNCTURE: CPT

## 2022-02-09 PROCEDURE — 82550 ASSAY OF CK (CPK): CPT

## 2022-02-09 RX ORDER — FUROSEMIDE 10 MG/ML
20 INJECTION INTRAMUSCULAR; INTRAVENOUS ONCE
Status: COMPLETED | OUTPATIENT
Start: 2022-02-09 | End: 2022-02-09

## 2022-02-09 RX ADMIN — OXYCODONE AND ACETAMINOPHEN 1 TABLET: 5; 325 TABLET ORAL at 01:36

## 2022-02-09 RX ADMIN — MORPHINE SULFATE 30 MG: 15 TABLET, FILM COATED, EXTENDED RELEASE ORAL at 20:07

## 2022-02-09 RX ADMIN — GUAIFENESIN AND DEXTROMETHORPHAN 5 ML: 100; 10 SYRUP ORAL at 01:36

## 2022-02-09 RX ADMIN — CETIRIZINE HYDROCHLORIDE 10 MG: 10 TABLET, FILM COATED ORAL at 08:59

## 2022-02-09 RX ADMIN — ENOXAPARIN SODIUM 90 MG: 100 INJECTION SUBCUTANEOUS at 20:07

## 2022-02-09 RX ADMIN — SODIUM CHLORIDE, PRESERVATIVE FREE 10 ML: 5 INJECTION INTRAVENOUS at 20:08

## 2022-02-09 RX ADMIN — SENNOSIDES AND DOCUSATE SODIUM 1 TABLET: 50; 8.6 TABLET ORAL at 20:07

## 2022-02-09 RX ADMIN — SODIUM CHLORIDE 25 ML: 9 INJECTION, SOLUTION INTRAVENOUS at 09:17

## 2022-02-09 RX ADMIN — DEXAMETHASONE SODIUM PHOSPHATE 20 MG: 4 INJECTION, SOLUTION INTRA-ARTICULAR; INTRALESIONAL; INTRAMUSCULAR; INTRAVENOUS; SOFT TISSUE at 09:19

## 2022-02-09 RX ADMIN — ENOXAPARIN SODIUM 90 MG: 100 INJECTION SUBCUTANEOUS at 09:03

## 2022-02-09 RX ADMIN — MORPHINE SULFATE 30 MG: 15 TABLET, FILM COATED, EXTENDED RELEASE ORAL at 09:01

## 2022-02-09 RX ADMIN — SODIUM CHLORIDE, PRESERVATIVE FREE 30 ML: 5 INJECTION INTRAVENOUS at 09:16

## 2022-02-09 RX ADMIN — FUROSEMIDE 20 MG: 10 INJECTION, SOLUTION INTRAMUSCULAR; INTRAVENOUS at 11:59

## 2022-02-09 RX ADMIN — FAMOTIDINE 20 MG: 20 TABLET ORAL at 09:01

## 2022-02-09 RX ADMIN — BARICITINIB 4 MG: 2 TABLET, FILM COATED ORAL at 09:03

## 2022-02-09 RX ADMIN — SENNOSIDES AND DOCUSATE SODIUM 1 TABLET: 50; 8.6 TABLET ORAL at 09:06

## 2022-02-09 RX ADMIN — FAMOTIDINE 20 MG: 20 TABLET ORAL at 20:07

## 2022-02-09 RX ADMIN — LOSARTAN POTASSIUM 50 MG: 50 TABLET, FILM COATED ORAL at 09:01

## 2022-02-09 RX ADMIN — SODIUM CHLORIDE, PRESERVATIVE FREE 10 ML: 5 INJECTION INTRAVENOUS at 09:04

## 2022-02-09 RX ADMIN — OXYCODONE AND ACETAMINOPHEN 1 TABLET: 5; 325 TABLET ORAL at 13:51

## 2022-02-09 RX ADMIN — Medication 2000 UNITS: at 09:06

## 2022-02-09 ASSESSMENT — PAIN SCALES - GENERAL
PAINLEVEL_OUTOF10: 6
PAINLEVEL_OUTOF10: 7
PAINLEVEL_OUTOF10: 9
PAINLEVEL_OUTOF10: 7
PAINLEVEL_OUTOF10: 9
PAINLEVEL_OUTOF10: 8
PAINLEVEL_OUTOF10: 9

## 2022-02-09 ASSESSMENT — PAIN DESCRIPTION - LOCATION: LOCATION: BACK;HIP

## 2022-02-09 ASSESSMENT — PAIN DESCRIPTION - PAIN TYPE
TYPE: ACUTE PAIN
TYPE: ACUTE PAIN

## 2022-02-09 ASSESSMENT — PAIN DESCRIPTION - ORIENTATION: ORIENTATION: RIGHT;LEFT;LOWER

## 2022-02-09 ASSESSMENT — PAIN - FUNCTIONAL ASSESSMENT: PAIN_FUNCTIONAL_ASSESSMENT: PREVENTS OR INTERFERES SOME ACTIVE ACTIVITIES AND ADLS

## 2022-02-09 ASSESSMENT — PAIN DESCRIPTION - FREQUENCY: FREQUENCY: CONTINUOUS

## 2022-02-09 ASSESSMENT — PAIN DESCRIPTION - ONSET: ONSET: ON-GOING

## 2022-02-09 ASSESSMENT — PAIN DESCRIPTION - PROGRESSION
CLINICAL_PROGRESSION: NOT CHANGED
CLINICAL_PROGRESSION: NOT CHANGED

## 2022-02-09 NOTE — PLAN OF CARE
Problem: Falls - Risk of:  Goal: Will remain free from falls  Description: Will remain free from falls  2/8/2022 2242 by Reina Gonzalez RN  Outcome: Ongoing  2/8/2022 1646 by Darien Cazares LPN  Outcome: Ongoing  Goal: Absence of physical injury  Description: Absence of physical injury  2/8/2022 2242 by Reina Gonzalez RN  Outcome: Ongoing  2/8/2022 1646 by Darien Cazares LPN  Outcome: Ongoing     Problem: Airway Clearance - Ineffective  Goal: Achieve or maintain patent airway  2/8/2022 2242 by Reina Gonzalez RN  Outcome: Ongoing  2/8/2022 1646 by Darien Cazares LPN  Outcome: Ongoing     Problem: Gas Exchange - Impaired  Goal: Absence of hypoxia  2/8/2022 2242 by Reina Gonzalez RN  Outcome: Ongoing  2/8/2022 1646 by Darien Cazarse LPN  Outcome: Ongoing  Goal: Promote optimal lung function  2/8/2022 2242 by Reina Gonzalez RN  Outcome: Ongoing  2/8/2022 1646 by Darien Cazares LPN  Outcome: Ongoing     Problem: Breathing Pattern - Ineffective  Goal: Ability to achieve and maintain a regular respiratory rate  2/8/2022 2242 by Reina Gonzalez RN  Outcome: Ongoing  2/8/2022 1646 by Darien Cazares LPN  Outcome: Ongoing     Problem:  Body Temperature -  Risk of, Imbalanced  Goal: Ability to maintain a body temperature within defined limits  2/8/2022 2242 by Reina Gonzalez RN  Outcome: Ongoing  2/8/2022 1646 by Darien Cazares LPN  Outcome: Ongoing  Goal: Will regain or maintain usual level of consciousness  2/8/2022 2242 by Reina Gonzalez RN  Outcome: Ongoing  2/8/2022 1646 by Darien Cazares LPN  Outcome: Ongoing  Goal: Complications related to the disease process, condition or treatment will be avoided or minimized  2/8/2022 2242 by Reina Gonzalez RN  Outcome: Ongoing  2/8/2022 1646 by Darien Cazares LPN  Outcome: Ongoing     Problem: Isolation Precautions - Risk of Spread of Infection  Goal: Prevent transmission of infection  2/8/2022 2242 by Reina Gonzalez RN  Outcome: Ongoing  2/8/2022 1646 by Darien Cazares LPN  Outcome: Ongoing Problem: Nutrition Deficits  Goal: Optimize nutritional status  2/8/2022 2242 by Asmita Gilmore RN  Outcome: Ongoing  2/8/2022 1646 by Dioni Waite LPN  Outcome: Ongoing     Problem: Risk for Fluid Volume Deficit  Goal: Maintain normal heart rhythm  2/8/2022 2242 by Asmita Gilmore RN  Outcome: Ongoing  2/8/2022 1646 by Dioni Waite LPN  Outcome: Ongoing  Goal: Maintain absence of muscle cramping  2/8/2022 2242 by Asmita Gilmore RN  Outcome: Ongoing  2/8/2022 1646 by Dioni Waite LPN  Outcome: Ongoing  Goal: Maintain normal serum potassium, sodium, calcium, phosphorus, and pH  2/8/2022 2242 by Asmita Gilmore RN  Outcome: Ongoing  2/8/2022 1646 by Dioni Waite LPN  Outcome: Ongoing     Problem: Loneliness or Risk for Loneliness  Goal: Demonstrate positive use of time alone when socialization is not possible  2/8/2022 2242 by Asmita Gilmore RN  Outcome: Ongoing  2/8/2022 1646 by Dioni Waite LPN  Outcome: Ongoing     Problem: Fatigue  Goal: Verbalize increase energy and improved vitality  2/8/2022 2242 by Asmita Gilmore RN  Outcome: Ongoing  2/8/2022 1646 by Dioni Waite LPN  Outcome: Ongoing     Problem: Patient Education: Go to Patient Education Activity  Goal: Patient/Family Education  2/8/2022 2242 by Asmita Gilmore RN  Outcome: Ongoing  2/8/2022 1646 by Dioni Waite LPN  Outcome: Ongoing     Problem: Pain:  Goal: Pain level will decrease  Description: Pain level will decrease  2/8/2022 2242 by Asmita Gilmore RN  Outcome: Ongoing  2/8/2022 1646 by Dioni Waite LPN  Outcome: Ongoing  Goal: Control of acute pain  Description: Control of acute pain  2/8/2022 2242 by Asmita Gilmore RN  Outcome: Ongoing  2/8/2022 1646 by Dioni Waite LPN  Outcome: Ongoing  Goal: Control of chronic pain  Description: Control of chronic pain  2/8/2022 2242 by Asmita Gilmore RN  Outcome: Ongoing  2/8/2022 1646 by Dioni Books, LPN  Outcome: Ongoing     Problem: Skin Integrity:  Goal: Will show no infection signs and symptoms  Description: Will show no infection signs and symptoms  2/8/2022 2242 by Jim Kovacs RN  Outcome: Ongoing  2/8/2022 1646 by Marlene Arce LPN  Outcome: Ongoing  Goal: Absence of new skin breakdown  Description: Absence of new skin breakdown  2/8/2022 2242 by Jim Kovacs RN  Outcome: Ongoing  2/8/2022 1646 by Marlene Arce LPN  Outcome: Ongoing

## 2022-02-09 NOTE — PROGRESS NOTES
Pulmonary and Critical Care  Progress Note    Subjective: The patient is sl better. On HFNC. Shortness of breath is sl better. Chest pain none  Addressing respiratory complaints Patient is negative for  hemoptysis and cyanosis  CONSTITUTIONAL:  negative for fevers and chills      Past Medical History:     has a past medical history of Chronic lower back pain, Hx of spinal fusion, Hypertension, MRSA (methicillin resistant staph aureus) culture positive, and Venous disease. has a past surgical history that includes fracture surgery and back surgery. reports that she has never smoked. She has never used smokeless tobacco. She reports that she does not drink alcohol and does not use drugs. Family history:  family history is not on file. No Known Allergies  Social History:    Reviewed; no changes    Objective:   PHYSICAL EXAM:        VITALS:  BP (!) 151/70   Pulse 73   Temp 98.3 °F (36.8 °C) (Axillary)   Resp 24   Ht 5' 2\" (1.575 m)   Wt 186 lb 8.2 oz (84.6 kg)   SpO2 96%   BMI 34.11 kg/m²     24HR INTAKE/OUTPUT:      Intake/Output Summary (Last 24 hours) at 2/9/2022 1046  Last data filed at 2/9/2022 0916  Gross per 24 hour   Intake 30 ml   Output 2700 ml   Net -2670 ml       CONSTITUTIONAL:  Awake. LUNGS:  decreased breath sounds, basilar crackles. CARDIOVASCULAR:  normal S1 and S2 and negative JVD  ABD:Abdomen soft, non-tender. BS normal. No masses,  No organomegaly  NEURO:Alert. DATA:    CBC:  Recent Labs     02/07/22  1254 02/08/22  1252   WBC 8.4 9.0   RBC 4.97 4.85   HGB 12.3* 12.2*   HCT 41.1 39.5    256   MCV 82.7 81.4   MCH 24.7* 25.2*   MCHC 29.9* 30.9*   RDW 15.0* 14.9   SEGSPCT 83.7* 87.2*      BMP:  Recent Labs     02/07/22  1254 02/08/22  1252    135   K 4.1 4.1    101   CO2 28 25   BUN 23 21   CREATININE 0.5* 0.5*   CALCIUM 8.4 8.4   GLUCOSE 122* 130*      ABG:  No results for input(s): PH, PO2ART, EFM5HUZ, HCO3, BEART, O2SAT in the last 72 hours.   Lab Results Component Value Date    PROBNP 1,360 (H) 02/08/2022     No results found for: [facility]    Radiology Review:  Pertinent images / reports were reviewed as a part of this visit. Assessment:     Patient Active Problem List   Diagnosis    WD-Venous stasis dermatitis of both lower extremities    WD-Lymphedema of both lower extremities    WD-Venous stasis ulcer of right calf with fat layer exposed with varicose veins (HCC)    WD-Venous stasis ulcer of left calf with fat layer exposed with varicose veins ([facility] [address]    COVID-19       Plan:   1. Overall the patient is sl better. 2. [address] Activity.     Simpson Kanner, MD  2/9/2022  10:46 AM

## 2022-02-09 NOTE — PROGRESS NOTES
Hospitalist Progress Note      Name:  Maria C Tao /Age/Sex: 1945  (68 y.o. female)   MRN & CSN:  [id number] & [id number] Admission Date/Time: 2022  4:46 PM   Location:  -A PCP: No primary care provider on file. Hospital Day: 5    Assessment and Plan:   Maria C Tao is a 68 y.o.  female      1. Acute respiratory failure with hypoxia: Patient remains on high flow oxygen, currently on 15 L.  2. COVID-19 pneumonia: Continue Decadron 10 mg daily, baricitinib 4 mg daily. No change in respiratory status. Patient seen by pulmonary, no further recommendations. Continue to wean down oxygen as tolerated. 3. Essential hypertension: Stable. Continue losartan 50 mg daily. 4. Degenerative joint disease: Pain is controlled. Patient is on MS Contin 30 mg every 12 as well as Percocet 5/325 every 6 as needed. 5. Obesity with BMI of 34.11  6. Advanced care planning: Patient is DNR-CCA          Interval History     Patient seen and examined at the bedside, no acute events overnight. Hospital Day 4. Complaining of cough. Objective: Intake/Output Summary (Last 24 hours) at 2022 1426  Last data filed at 2022 0956  Gross per 24 hour   Intake 390 ml   Output 2700 ml   Net -2310 ml      Vitals:   Vitals:    22 1347   BP:    Pulse:    Resp:    Temp: 99 °F (37.2 °C)   SpO2:      Physical Exam:   GEN Awake female, sitting upright in bed in no apparent distress. Appears given age. EYES Pupils are equally round. No scleral erythema, discharge, or conjunctivitis. HENT Mucous membranes are moist. Oral pharynx without exudates, no evidence of thrush. NECK Supple, no apparent thyromegaly or masses. RESP Bilateral rales. CARD: S1/S2 auscultated. Regular rate without appreciable murmurs, rubs, or gallops. No JVD or carotid bruits. Peripheral pulses equal bilaterally and palpable. No peripheral edema.     GI Abdomen is soft without significant tenderness, masses, or guarding. Bowel sounds are normoactive. Rectal exam deferred.  No costovertebral angle tenderness. Normal appearing external genitalia. Waller catheter is not present. HEME/LYMP: No palpable cervical lymphadenopathy and no hepatosplenomegaly. No petechiae or ecchymoses. MSK No gross joint deformities. SKIN Normal coloration, warm, dry. NEURO Cranial nerves appear grossly intact, normal speech, no lateralizing weakness. PSYCH Awake, alert, oriented x 4. Affect appropriate.     Medications:   Medications:    baricitinib  4 mg Oral Daily    morphine  30 mg Oral 2 times per day    cetirizine  10 mg Oral Daily    losartan  50 mg Oral Daily    sodium chloride flush  5-40 mL IntraVENous 2 times per day    sennosides-docusate sodium  1 tablet Oral BID    famotidine  20 mg Oral BID    [START ON 2/10/2022] dexamethasone  10 mg IntraVENous Q24H    dexamethasone  10 mg IntraVENous Once    Vitamin D  2,000 Units Oral Daily    enoxaparin  1 mg/kg SubCUTAneous BID      Infusions:    sodium chloride 25 mL (02/09/22 0917)     PRN Meds: albuterol sulfate HFA, 2 puff, Q4H PRN  benzocaine-menthol, 1 lozenge, Q2H PRN  sodium chloride flush, 5-40 mL, PRN  sodium chloride, 25 mL, PRN  ondansetron, 4 mg, Q8H PRN   Or  ondansetron, 4 mg, Q6H PRN  polyethylene glycol, 17 g, Daily PRN  acetaminophen, 650 mg, Q6H PRN   Or  acetaminophen, 650 mg, Q6H PRN  guaiFENesin-dextromethorphan, 5 mL, Q4H PRN  oxyCODONE-acetaminophen, 1 tablet, Q6H PRN          Electronically signed by Keyshawn Ann MD on 2/9/2022 at 2:26 PM

## 2022-02-10 LAB
BASOPHILS ABSOLUTE: 0 K/CU MM
BASOPHILS RELATIVE PERCENT: 0.1 % (ref 0–1)
CULTURE: NORMAL
CULTURE: NORMAL
DIFFERENTIAL TYPE: ABNORMAL
EOSINOPHILS ABSOLUTE: 0.1 K/CU MM
EOSINOPHILS RELATIVE PERCENT: 1.3 % (ref 0–3)
FERRITIN: 473 NG/ML (ref 15–150)
HCT VFR BLD CALC: 40.9 % (ref 37–47)
HEMOGLOBIN: 12.1 GM/DL (ref 12.5–16)
IMMATURE NEUTROPHIL %: 1 % (ref 0–0.43)
LYMPHOCYTES ABSOLUTE: 0.8 K/CU MM
LYMPHOCYTES RELATIVE PERCENT: 11.5 % (ref 24–44)
Lab: NORMAL
Lab: NORMAL
MCH RBC QN AUTO: 24.5 PG (ref 27–31)
MCHC RBC AUTO-ENTMCNC: 29.6 % (ref 32–36)
MCV RBC AUTO: 83 FL (ref 78–100)
MONOCYTES ABSOLUTE: 0.9 K/CU MM
MONOCYTES RELATIVE PERCENT: 13.7 % (ref 0–4)
NUCLEATED RBC %: 0 %
PDW BLD-RTO: 14.7 % (ref 11.7–14.9)
PLATELET # BLD: 257 K/CU MM (ref 140–440)
PMV BLD AUTO: 10.8 FL (ref 7.5–11.1)
RBC # BLD: 4.93 M/CU MM (ref 4.2–5.4)
SEGMENTED NEUTROPHILS ABSOLUTE COUNT: 4.9 K/CU MM
SEGMENTED NEUTROPHILS RELATIVE PERCENT: 72.4 % (ref 36–66)
SPECIMEN: NORMAL
SPECIMEN: NORMAL
TOTAL CK: 18 IU/L (ref 26–140)
TOTAL IMMATURE NEUTOROPHIL: 0.07 K/CU MM
TOTAL NUCLEATED RBC: 0 K/CU MM
WBC # BLD: 6.7 K/CU MM (ref 4–10.5)

## 2022-02-10 PROCEDURE — 6370000000 HC RX 637 (ALT 250 FOR IP): Performed by: NURSE PRACTITIONER

## 2022-02-10 PROCEDURE — 97530 THERAPEUTIC ACTIVITIES: CPT

## 2022-02-10 PROCEDURE — 6370000000 HC RX 637 (ALT 250 FOR IP): Performed by: HOSPITALIST

## 2022-02-10 PROCEDURE — 82728 ASSAY OF FERRITIN: CPT

## 2022-02-10 PROCEDURE — 94761 N-INVAS EAR/PLS OXIMETRY MLT: CPT

## 2022-02-10 PROCEDURE — 2060000000 HC ICU INTERMEDIATE R&B

## 2022-02-10 PROCEDURE — 6360000002 HC RX W HCPCS: Performed by: NURSE PRACTITIONER

## 2022-02-10 PROCEDURE — 82550 ASSAY OF CK (CPK): CPT

## 2022-02-10 PROCEDURE — 85025 COMPLETE CBC W/AUTO DIFF WBC: CPT

## 2022-02-10 PROCEDURE — 97166 OT EVAL MOD COMPLEX 45 MIN: CPT

## 2022-02-10 PROCEDURE — 36415 COLL VENOUS BLD VENIPUNCTURE: CPT

## 2022-02-10 PROCEDURE — 2580000003 HC RX 258: Performed by: NURSE PRACTITIONER

## 2022-02-10 PROCEDURE — 97535 SELF CARE MNGMENT TRAINING: CPT

## 2022-02-10 PROCEDURE — 97110 THERAPEUTIC EXERCISES: CPT

## 2022-02-10 PROCEDURE — 2700000000 HC OXYGEN THERAPY PER DAY

## 2022-02-10 RX ADMIN — SODIUM CHLORIDE, PRESERVATIVE FREE 10 ML: 5 INJECTION INTRAVENOUS at 21:45

## 2022-02-10 RX ADMIN — FAMOTIDINE 20 MG: 20 TABLET ORAL at 09:46

## 2022-02-10 RX ADMIN — MORPHINE SULFATE 30 MG: 15 TABLET, FILM COATED, EXTENDED RELEASE ORAL at 09:47

## 2022-02-10 RX ADMIN — OXYCODONE AND ACETAMINOPHEN 1 TABLET: 5; 325 TABLET ORAL at 00:18

## 2022-02-10 RX ADMIN — OXYCODONE AND ACETAMINOPHEN 1 TABLET: 5; 325 TABLET ORAL at 14:56

## 2022-02-10 RX ADMIN — DEXAMETHASONE SODIUM PHOSPHATE 10 MG: 10 INJECTION, SOLUTION INTRAMUSCULAR; INTRAVENOUS at 09:46

## 2022-02-10 RX ADMIN — SODIUM CHLORIDE, PRESERVATIVE FREE 10 ML: 5 INJECTION INTRAVENOUS at 09:57

## 2022-02-10 RX ADMIN — BARICITINIB 4 MG: 2 TABLET, FILM COATED ORAL at 09:47

## 2022-02-10 RX ADMIN — MORPHINE SULFATE 30 MG: 15 TABLET, FILM COATED, EXTENDED RELEASE ORAL at 21:44

## 2022-02-10 RX ADMIN — OXYCODONE AND ACETAMINOPHEN 1 TABLET: 5; 325 TABLET ORAL at 06:34

## 2022-02-10 RX ADMIN — Medication 2000 UNITS: at 09:46

## 2022-02-10 RX ADMIN — BENZOCAINE AND MENTHOL 1 LOZENGE: 15; 3.6 LOZENGE ORAL at 04:52

## 2022-02-10 RX ADMIN — FAMOTIDINE 20 MG: 20 TABLET ORAL at 21:44

## 2022-02-10 RX ADMIN — CETIRIZINE HYDROCHLORIDE 10 MG: 10 TABLET, FILM COATED ORAL at 06:34

## 2022-02-10 RX ADMIN — ENOXAPARIN SODIUM 90 MG: 100 INJECTION SUBCUTANEOUS at 21:44

## 2022-02-10 RX ADMIN — SENNOSIDES AND DOCUSATE SODIUM 1 TABLET: 50; 8.6 TABLET ORAL at 09:47

## 2022-02-10 RX ADMIN — LOSARTAN POTASSIUM 50 MG: 50 TABLET, FILM COATED ORAL at 09:47

## 2022-02-10 RX ADMIN — GUAIFENESIN AND DEXTROMETHORPHAN 5 ML: 100; 10 SYRUP ORAL at 00:18

## 2022-02-10 RX ADMIN — ENOXAPARIN SODIUM 90 MG: 100 INJECTION SUBCUTANEOUS at 09:47

## 2022-02-10 RX ADMIN — SENNOSIDES AND DOCUSATE SODIUM 1 TABLET: 50; 8.6 TABLET ORAL at 21:44

## 2022-02-10 ASSESSMENT — PAIN SCALES - GENERAL
PAINLEVEL_OUTOF10: 0
PAINLEVEL_OUTOF10: 8
PAINLEVEL_OUTOF10: 8
PAINLEVEL_OUTOF10: 0
PAINLEVEL_OUTOF10: 0
PAINLEVEL_OUTOF10: 8

## 2022-02-10 ASSESSMENT — PAIN - FUNCTIONAL ASSESSMENT: PAIN_FUNCTIONAL_ASSESSMENT: PREVENTS OR INTERFERES SOME ACTIVE ACTIVITIES AND ADLS

## 2022-02-10 ASSESSMENT — PAIN DESCRIPTION - PROGRESSION
CLINICAL_PROGRESSION: NOT CHANGED

## 2022-02-10 ASSESSMENT — PAIN DESCRIPTION - DESCRIPTORS: DESCRIPTORS: ACHING;DISCOMFORT

## 2022-02-10 ASSESSMENT — PAIN DESCRIPTION - FREQUENCY: FREQUENCY: CONTINUOUS

## 2022-02-10 ASSESSMENT — PAIN DESCRIPTION - ORIENTATION: ORIENTATION: RIGHT;LEFT;LOWER

## 2022-02-10 ASSESSMENT — PAIN DESCRIPTION - ONSET: ONSET: ON-GOING

## 2022-02-10 ASSESSMENT — PAIN DESCRIPTION - PAIN TYPE: TYPE: ACUTE PAIN

## 2022-02-10 ASSESSMENT — PAIN DESCRIPTION - LOCATION: LOCATION: BACK;HIP

## 2022-02-10 NOTE — PROGRESS NOTES
Hospitalist Progress Note      Name:  Stephanie Lambert /Age/Sex: 1945  (68 y.o. female)   MRN & CSN:  [id number] & [id number] Admission Date/Time: 2022  4:46 PM   Location:  -A PCP: No primary care provider on file. Hospital Day: 6    Assessment and Plan:   Stephanie Lambert is a 68 y.o.  female      1. Acute respiratory failure with hypoxia: Patient remains on high flow oxygen, currently on 15 L. No change. Encouraged on incentive spirometer  2. COVID-19 pneumonia: Continue Decadron 10 mg daily, baricitinib 4 mg daily. No change in respiratory status. Patient seen by pulmonary, no further recommendations. Continue to wean down oxygen as tolerated. 3. Essential hypertension: Stable. Continue losartan 50 mg daily. 4. Degenerative joint disease: Pain is controlled. Patient is on MS Contin 30 mg every 12 as well as Percocet 5/325 every 6 as needed. 5. Obesity with BMI of 34.11  6. Advanced care planning: Patient is DNR-CCA          Interval History     Patient seen and examined at the bedside, no acute events overnight. Hospital Day 4. Complaining of cough. Objective: Intake/Output Summary (Last 24 hours) at 2/10/2022 1523  Last data filed at 2/10/2022 1000  Gross per 24 hour   Intake 360 ml   Output 1100 ml   Net -740 ml      Vitals:   Vitals:    02/10/22 1200   BP: 135/61   Pulse: 71   Resp: 21   Temp: 97.9 °F (36.6 °C)   SpO2: 91%     Physical Exam:   GEN Awake female, sitting upright in bed in no apparent distress. Appears given age. EYES Pupils are equally round. No scleral erythema, discharge, or conjunctivitis. HENT Mucous membranes are moist. Oral pharynx without exudates, no evidence of thrush. NECK Supple, no apparent thyromegaly or masses. RESP Bilateral rales. CARD: S1/S2 auscultated. Regular rate without appreciable murmurs, rubs, or gallops. No JVD or carotid bruits. Peripheral pulses equal bilaterally and palpable.  No peripheral

## 2022-02-10 NOTE — PROGRESS NOTES
Physical Therapy  Name: Devang Bazzi MRN: [id number] :   1945   Date:  2/10/2022   Admission Date: 2022 Room:  -A   Restrictions/Precautions:  Restrictions/Precautions  Restrictions/Precautions: Fall Risk,General Precautions,Contact Precautions (droplet +)       Communication with other providers:  Elroy Chu RN attempts to place humidifier on O2 but it was unsuccessful. Cotx with TIFFANY Holman for patient safety and tolerance level. Subjective:  Patient states:  \"I can't hear very well\"  Pain:   Location, Type, Intensity (0/10 to 10/10): Does not c/o pain  Objective:    Observation:  Patient is supine in semi-roberts bed. NC O2 15L. RLE is more impaired than LLE   Treatment, including education/measures:  Supine Exercises: Ankle pumps x 10    Transfers with line management of BP Cuff, IV, Pulse Ox, Tele Monitor  Rolling: Mod A to ea side, patient is able to initate  Supine to sit : Max A   Sit to supine :Max A  Scooting : Max A x2 supine, Seated scooting Mod A x2  Seated Activity: Light dynamic activity, Fair + balance ~15 of oral care, BUE reaching and during seated exercise. BUE intermittent support with no LOB    Sitting Exercises: Ankle pumps x 10  Glute sets x 10  LAQ's x 10  Marching x Unable to d/t PMH of hip dislocation/surgery/replacement/structural abnormalilty   IR/ER x10 ea  Hip Abd x 10    Therapeutic Exercise:  Therapeutic exercises were instructed today. Cues were given for technique, safety, recruitment, and rationale. Cues were verbal and/or tactile. Safety  Patient left safely in the bed, with call light/phone in reach with alarm applied. Gait belt and mask were used for transfers and gait. Assessment / Impression:   She tolerated ~15' of seated activity before becoming dizzy and requiring a supine rest break. O2 saturation drops to low 80's with activity but, with cues for PLB and energy conservation, she improves to low 90's.    Patient's tolerance of treatment: Fair   Adverse Reaction: none  Significant change in status and impact:  none  Barriers to improvement:  Activity tolerance  Plan for Next Session:    Will cont to work towards pt's goals per patient tolerance  Time in:  1506  Time out:  1550  Timed treatment minutes:  44  Total treatment time:  40    Previously filed items:  Social/Functional History  Lives With: Alone  Type of Home: [address]: One level  Home Access: Ramped entrance (railing on BL sides)  Bathroom Shower/Tub: Shower chair without back (dtr spongebathes)  Bathroom Toilet: Handicap height  Bathroom Equipment: Grab bars around toilet  Home Equipment: Crutches,Wheelchair-manual,Grab bars  Receives Help From: Family  ADL Assistance: Needs assistance (dtr assists w/ bathing, dressing, ; dtr comes M/W/F)  Homemaking Assistance: Needs assistance (dtr does shopping; pt does not cook, has meals on wheels; son does outside [place]; grand dtrs clean inside)  Homemaking Responsibilities: No (family performs most; she assists w/ what she can, mostly done seated)  Ambulation Assistance: Independent (mod ind using crutches; only short distances)  Transfer Assistance: Independent  Active : No (family)  Occupation: Retired,On disability  Additional Comments: plays cards, crochette, puzzle books, socializes w/ family; sleeps in adjustable bed w/ trapeze over for improved pt mobility  Short term goals  Time Frame for Short term goals: 1 week  Short term goal 1: pt will complete bed mobility at min A  Short term goal 2: pt will complete transfers at [facility] term goal 3: pt will ambulate 15 ft using crutches at [facility] term goal 4: pt will demonstrate near gross >3/5 BL LE strength       Electronically signed by:     Sotero Moss, VIVIANA  2/10/2022, 5:06 PM

## 2022-02-10 NOTE — PROGRESS NOTES
Pulmonary and Critical Care  Progress Note    Subjective: The patient is comfortable in bed. On 15L HFNC. Shortness of breath is unchanged. Chest pain none  Addressing respiratory complaints Patient is negative for  hemoptysis and cyanosis  CONSTITUTIONAL:  negative for fevers and chills      Past Medical History:     has a past medical history of Chronic lower back pain, Hx of spinal fusion, Hypertension, MRSA (methicillin resistant staph aureus) culture positive, and Venous disease. has a past surgical history that includes fracture surgery and back surgery. reports that she has never smoked. She has never used smokeless tobacco. She reports that she does not drink alcohol and does not use drugs. Family history:  family history is not on file. No Known Allergies  Social History:    Reviewed; no changes    Objective:   PHYSICAL EXAM:        VITALS:  /61   Pulse 71   Temp 97.9 °F (36.6 °C) (Oral)   Resp 21   Ht 5' 2\" (1.575 m)   Wt 186 lb 8.2 oz (84.6 kg)   SpO2 91%   BMI 34.11 kg/m²     24HR INTAKE/OUTPUT:      Intake/Output Summary (Last 24 hours) at 2/10/2022 1746  Last data filed at 2/10/2022 1000  Gross per 24 hour   Intake 360 ml   Output 1100 ml   Net -740 ml       CONSTITUTIONAL:  Awake. LUNGS:  decreased breath sounds, basilar crackles. CARDIOVASCULAR:  normal S1 and S2 and negative JVD  ABD:Abdomen soft, non-tender. BS normal. No masses,  No organomegaly  NEURO:Alert.   DATA:    CBC:  Recent Labs     02/08/22  1252 02/09/22  1100 02/10/22  0833   WBC 9.0 9.7 6.7   RBC 4.85 5.05 4.93   HGB 12.2* 12.6 12.1*   HCT 39.5 41.8 40.9    257 257   MCV 81.4 82.8 83.0   MCH 25.2* 25.0* 24.5*   MCHC 30.9* 30.1* 29.6*   RDW 14.9 14.8 14.7   SEGSPCT 87.2* 83.9* 72.4*      BMP:  Recent Labs     02/08/22  1252      K 4.1      CO2 25   BUN 21   CREATININE 0.5*   CALCIUM 8.4   GLUCOSE 130*      ABG:  No results for input(s): PH, PO2ART, WRG7FNS, HCO3, BEART, O2SAT in the last 72 hours. Lab Results   Component Value Date    PROBNP 1,360 (H) 02/08/2022     No results found for: [facility]    Radiology Review:  Pertinent images / reports were reviewed as a part of this visit. Assessment:     Patient Active Problem List   Diagnosis    WD-Venous stasis dermatitis of both lower extremities    WD-Lymphedema of both lower extremities    WD-Venous stasis ulcer of right calf with fat layer exposed with varicose veins (HCC)    WD-Venous stasis ulcer of left calf with fat layer exposed with varicose veins ([facility] [address]    COVID-19       Plan:   1. Overall the patient is unchanged. 2. [address] Activity.     Gómez Cerna MD  2/10/2022  5:46 PM

## 2022-02-10 NOTE — PROGRESS NOTES
[address] [facility] ACUTE CARE OCCUPATIONAL THERAPY EVALUATION  Wesley Rodriguez, 1945, 2010/2010-A, 2/10/2022    History  [place]:  The primary encounter diagnosis was Hypoxia. Diagnoses of COVID-19 and Pneumonia of both lungs due to infectious organism, unspecified part of lung were also pertinent to this visit. Patient  has a past medical history of Chronic lower back pain, Hx of spinal fusion, Hypertension, MRSA (methicillin resistant staph aureus) culture positive, and Venous disease. Patient  has a past surgical history that includes fracture surgery and back surgery. Subjective:  Patient states:  \"I would love to brush my teeth\".     Pain:  No.    Communication with other providers:  Handoff to RN, co-eval with PTA  Restrictions: Droplet Plus, MRSA, General Precautions, Fall Risk    Home Setup/Prior level of function  Social/Functional History  Lives With: Alone  Type of Home: House  Home Layout: One level  Home Access: Ramped entrance (railing on BL sides)  Bathroom Shower/Tub: Shower chair without back (dtr spongebathes)  Bathroom Toilet: Handicap height  Bathroom Equipment: Grab bars around toilet  Home Equipment: Crutches,Wheelchair-manual,Grab bars  Receives Help From: Family  ADL Assistance: Needs assistance (dtr assists w/ bathing, dressing, ; dtr comes M/W/F)  Homemaking Assistance: Needs assistance (dtr does shopping; pt does not cook, has meals on wheels; son does outside [place]; grand dtrs clean inside)  Homemaking Responsibilities: No (family performs most; she assists w/ what she can, mostly done seated)  Ambulation Assistance: Independent (mod ind using crutches; only short distances)  Transfer Assistance: Independent  Active : No (family)  Occupation: Retired,On disability  Additional Comments: plays cards, crochette, puzzle books, socializes w/ family; sleeps in adjustable bed w/ trapeze over for improved pt mobility    Examination of body systems (includes body structures/functions, activity/participation limitations):  · Observation:  Supine in bed upon arrival, agreeable to therapy  · Vision:  Glasses  · Hearing:  [place]  · Cardiopulmonary:  15L of High Flow; 02 saturation decreased ~82% during all increased mobility, 02 saturation increased ~92% w/ seated rest break and instruction in pursed lip breathing      Body Systems and functions:  · ROM R/L:  ~90 degrees bilateral shoulder flexion, distal WFL  · Strength R/L:  4-/5,   · Sensation: Denies numbness  · Tone: Normal  · Coordination: WFL  · Perception: WNL    Activities of Daily Living (ADLs):  · Feeding: Ruby  · Grooming: CGA (washing hands/face w/ warm washcloth, oral care sitting EOB)  · UB bathing: SBA (washing abdomen, trunk, BUE arms)  · LB bathing: maxA (pt able to wash upper legs, assistance to wash lower legs and francisca area/buttocks rolling L/R)  · UB dressing: SBA (doffing/donning gown)  · LB dressing: maxA (doffing/donning depends, donning/doffing socks)  · Toileting: maxA (See LB bathing/dressing for details)    Cognitive and Psychosocial Functioning:  · Overall cognitive status: WFL  · Affect: Normal        Mobility:  · Supine to sit:  maxA  · Transfers: DNT due to SOB sitting EOB  · Sitting balance:  SBA due to SOB (See Cardiopulmonary section for details). · Standing balance:  DNT.   · Toilet/Shower Transfers: DNT  · Sit to supine: maxA  · Scooting to HOB: maxA x 2  · Rolling L/R: maxA             AM-PAC Daily Activity Inpatient   How much help for putting on and taking off regular lower body clothing?: Total  How much help for Bathing?: A Lot  How much help for Toileting?: Total  How much help for putting on and taking off regular upper body clothing?: A Little  How much help for taking care of personal grooming?: A Little  How much help for eating meals?: None  AM-PAC Inpatient Daily Activity Raw Score: 14  AM-PAC Inpatient ADL T-Scale Score : 33.39  ADL Inpatient CMS 0-100% Score: 59.67  ADL Inpatient CMS G-Code Modifier : CK    Treatment:  Self Care Training:   Cues were given for safety, sequence, UE/LE placement, visual cues, and balance. Activities performed today included UB/LB bathing/dressing, grooming, toileting    Therapeutic Activity Training:   Therapeutic activity training was instructed today. Cues were given for safety, sequence, UE/LE placement, awareness, and balance. Activities performed today included bed mobility training, sup-sit, sitting balance, sit to supine, scooting to HOB, rolling L/R      Safety: patient left in bed with bed alarm, call light within reach, RN notified, gait belt used. Assessment:  Pt is a 69 yo female admitted from home for COVID-19. Pt at baseline needs assistance for ADLs needs assistance for high level IADLs and is Independent for functional transfers/mobility w/ AD for short distances only. Pt currently presents w/ deficits in ADL and high level IADL independence, functional activity tolerance, dynamic sitting and standing balance and tolerance and functional transfers, BUE strength, SOB and OOB activity. Pt would benefit from continued acute care OT services w/ discharge to SNF  Complexity: Moderate  Prognosis: Good, no significant barriers to participation at this time.    Plan  Times per week: 3x+  Times per day: Daily  Current Treatment Recommendations: Strengthening,Endurance Training,Patient/Caregiver Education & Training,Equipment Evaluation, Education, & procurement,Self-Care / Mannie Salt Management Training,ROM,Functional Mobility Training,Safety Education & Training,Positioning     Equipment: defer    Goals:  Pt goal: go home  Time Frame for STGs: discharge  Goal 1: Pt will perform UE ADLs Independent  Goal 2: Pt will perform LE ADLs iFto w/ AD  Goal 3: Pt will perform toileting Fito  Goal 4: Pt will perform functional transfer w/ AD CGA  Goal 5: Pt will perform functional mobility w/ AD CGA  Goal 6: Pt will perform therex/theract in order to increase functional activity tolerance and dynamic standing balance    Treatment plan:  Pt will perform functional task in sit reaching in all 3 planes in order to increase dynamic sitting balance and functional activity tolerance    Recommendations for NURSING activity: Andrea EOB    Time:   Time in: 1506  Time out: 1552  Timed treatment minutes: 41 minutes  Total time: 46 mintues    Electronically signed by:    Henrique SEGOVIA/L [id number]  4:22 PM,2/10/2022

## 2022-02-11 LAB
APTT: 35 SECONDS (ref 25.1–37.1)
BASOPHILS ABSOLUTE: 0 K/CU MM
BASOPHILS RELATIVE PERCENT: 0.1 % (ref 0–1)
DIFFERENTIAL TYPE: ABNORMAL
EOSINOPHILS ABSOLUTE: 0.2 K/CU MM
EOSINOPHILS RELATIVE PERCENT: 2.5 % (ref 0–3)
FERRITIN: 402 NG/ML (ref 15–150)
HCT VFR BLD CALC: 40.9 % (ref 37–47)
HEMOGLOBIN: 12 GM/DL (ref 12.5–16)
IMMATURE NEUTROPHIL %: 1.6 % (ref 0–0.43)
INR BLD: 1.01 INDEX
LYMPHOCYTES ABSOLUTE: 1 K/CU MM
LYMPHOCYTES RELATIVE PERCENT: 11.7 % (ref 24–44)
MCH RBC QN AUTO: 24.4 PG (ref 27–31)
MCHC RBC AUTO-ENTMCNC: 29.3 % (ref 32–36)
MCV RBC AUTO: 83.1 FL (ref 78–100)
MONOCYTES ABSOLUTE: 1.1 K/CU MM
MONOCYTES RELATIVE PERCENT: 12.7 % (ref 0–4)
NUCLEATED RBC %: 0 %
PDW BLD-RTO: 14.6 % (ref 11.7–14.9)
PLATELET # BLD: 277 K/CU MM (ref 140–440)
PMV BLD AUTO: 10.7 FL (ref 7.5–11.1)
PROTHROMBIN TIME: 13 SECONDS (ref 11.7–14.5)
RBC # BLD: 4.92 M/CU MM (ref 4.2–5.4)
SEGMENTED NEUTROPHILS ABSOLUTE COUNT: 6 K/CU MM
SEGMENTED NEUTROPHILS RELATIVE PERCENT: 71.4 % (ref 36–66)
TOTAL CK: 15 IU/L (ref 26–140)
TOTAL IMMATURE NEUTOROPHIL: 0.13 K/CU MM
TOTAL NUCLEATED RBC: 0 K/CU MM
WBC # BLD: 8.4 K/CU MM (ref 4–10.5)

## 2022-02-11 PROCEDURE — 6370000000 HC RX 637 (ALT 250 FOR IP): Performed by: NURSE PRACTITIONER

## 2022-02-11 PROCEDURE — 2580000003 HC RX 258: Performed by: NURSE PRACTITIONER

## 2022-02-11 PROCEDURE — 6370000000 HC RX 637 (ALT 250 FOR IP): Performed by: HOSPITALIST

## 2022-02-11 PROCEDURE — 85730 THROMBOPLASTIN TIME PARTIAL: CPT

## 2022-02-11 PROCEDURE — 36415 COLL VENOUS BLD VENIPUNCTURE: CPT

## 2022-02-11 PROCEDURE — 82550 ASSAY OF CK (CPK): CPT

## 2022-02-11 PROCEDURE — 85025 COMPLETE CBC W/AUTO DIFF WBC: CPT

## 2022-02-11 PROCEDURE — 2060000000 HC ICU INTERMEDIATE R&B

## 2022-02-11 PROCEDURE — 82728 ASSAY OF FERRITIN: CPT

## 2022-02-11 PROCEDURE — 2700000000 HC OXYGEN THERAPY PER DAY

## 2022-02-11 PROCEDURE — 6360000002 HC RX W HCPCS: Performed by: NURSE PRACTITIONER

## 2022-02-11 PROCEDURE — 94761 N-INVAS EAR/PLS OXIMETRY MLT: CPT

## 2022-02-11 PROCEDURE — 85610 PROTHROMBIN TIME: CPT

## 2022-02-11 RX ADMIN — ENOXAPARIN SODIUM 90 MG: 100 INJECTION SUBCUTANEOUS at 11:26

## 2022-02-11 RX ADMIN — LOSARTAN POTASSIUM 50 MG: 50 TABLET, FILM COATED ORAL at 11:26

## 2022-02-11 RX ADMIN — MORPHINE SULFATE 30 MG: 15 TABLET, FILM COATED, EXTENDED RELEASE ORAL at 21:34

## 2022-02-11 RX ADMIN — SENNOSIDES AND DOCUSATE SODIUM 1 TABLET: 50; 8.6 TABLET ORAL at 11:26

## 2022-02-11 RX ADMIN — MORPHINE SULFATE 30 MG: 15 TABLET, FILM COATED, EXTENDED RELEASE ORAL at 11:26

## 2022-02-11 RX ADMIN — DEXAMETHASONE SODIUM PHOSPHATE 10 MG: 10 INJECTION, SOLUTION INTRAMUSCULAR; INTRAVENOUS at 11:26

## 2022-02-11 RX ADMIN — FAMOTIDINE 20 MG: 20 TABLET ORAL at 21:34

## 2022-02-11 RX ADMIN — SODIUM CHLORIDE, PRESERVATIVE FREE 10 ML: 5 INJECTION INTRAVENOUS at 10:45

## 2022-02-11 RX ADMIN — FAMOTIDINE 20 MG: 20 TABLET ORAL at 11:26

## 2022-02-11 RX ADMIN — Medication 2000 UNITS: at 11:26

## 2022-02-11 RX ADMIN — CETIRIZINE HYDROCHLORIDE 10 MG: 10 TABLET, FILM COATED ORAL at 05:09

## 2022-02-11 RX ADMIN — BARICITINIB 4 MG: 2 TABLET, FILM COATED ORAL at 11:26

## 2022-02-11 RX ADMIN — SENNOSIDES AND DOCUSATE SODIUM 1 TABLET: 50; 8.6 TABLET ORAL at 21:34

## 2022-02-11 RX ADMIN — OXYCODONE AND ACETAMINOPHEN 1 TABLET: 5; 325 TABLET ORAL at 03:08

## 2022-02-11 RX ADMIN — SODIUM CHLORIDE, PRESERVATIVE FREE 10 ML: 5 INJECTION INTRAVENOUS at 21:36

## 2022-02-11 RX ADMIN — ENOXAPARIN SODIUM 90 MG: 100 INJECTION SUBCUTANEOUS at 21:35

## 2022-02-11 ASSESSMENT — PAIN DESCRIPTION - PROGRESSION: CLINICAL_PROGRESSION: NOT CHANGED

## 2022-02-11 ASSESSMENT — PAIN DESCRIPTION - DESCRIPTORS: DESCRIPTORS: ACHING;SORE;RADIATING

## 2022-02-11 ASSESSMENT — PAIN SCALES - GENERAL
PAINLEVEL_OUTOF10: 9
PAINLEVEL_OUTOF10: 4
PAINLEVEL_OUTOF10: 7
PAINLEVEL_OUTOF10: 7

## 2022-02-11 ASSESSMENT — PAIN DESCRIPTION - LOCATION: LOCATION: BACK;HIP

## 2022-02-11 ASSESSMENT — PAIN DESCRIPTION - ORIENTATION: ORIENTATION: LOWER;MID;LEFT;RIGHT

## 2022-02-11 ASSESSMENT — PAIN DESCRIPTION - FREQUENCY: FREQUENCY: CONTINUOUS

## 2022-02-11 ASSESSMENT — PAIN DESCRIPTION - ONSET: ONSET: ON-GOING

## 2022-02-11 ASSESSMENT — PAIN DESCRIPTION - PAIN TYPE: TYPE: CHRONIC PAIN

## 2022-02-11 ASSESSMENT — PAIN - FUNCTIONAL ASSESSMENT: PAIN_FUNCTIONAL_ASSESSMENT: PREVENTS OR INTERFERES SOME ACTIVE ACTIVITIES AND ADLS

## 2022-02-11 NOTE — PROGRESS NOTES
Hospitalist Progress Note      Name:  Roshan Allred /Age/Sex: 1945  (68 y.o. female)   MRN & CSN:  [id number] & [id number] Admission Date/Time: 2022  4:46 PM   Location:  -A PCP: No primary care provider on file. Hospital Day: 7    Assessment and Plan:   Roshan Allred is a 68 y.o.  female      1. Acute respiratory failure with hypoxia: Patient remains on high flow oxygen, currently on 15 L. No change. Encouraged on incentive spirometer  2. COVID-19 pneumonia: Continue Decadron 10 mg daily, baricitinib 4 mg daily. No change in respiratory status. Patient seen by pulmonary, no further recommendations. Continue to wean down oxygen as tolerated. 3. Essential hypertension: Stable. Continue losartan 50 mg daily. 4. Degenerative joint disease: Pain is controlled. Patient is on MS Contin 30 mg every 12 as well as Percocet 5/325 every 6 as needed. 5. Obesity with BMI of 34.11  6. Advanced care planning: Patient is DNR-CCA    No change in medical plans. Patient remains the same      Interval History     No acute events overnight. Seen at the bedside. Objective: Intake/Output Summary (Last 24 hours) at 2022 0847  Last data filed at 2022 0300  Gross per 24 hour   Intake 360 ml   Output 3900 ml   Net -3540 ml      Vitals:   Vitals:    22 0743   BP:    Pulse:    Resp: 14   Temp:    SpO2: (!) 89%     Physical Exam:   GEN Awake female, sitting upright in bed in no apparent distress. Appears given age. EYES Pupils are equally round. No scleral erythema, discharge, or conjunctivitis. HENT Mucous membranes are moist. Oral pharynx without exudates, no evidence of thrush. NECK Supple, no apparent thyromegaly or masses. RESP Bilateral rales. CARD: S1/S2 auscultated. Regular rate without appreciable murmurs, rubs, or gallops. No JVD or carotid bruits. Peripheral pulses equal bilaterally and palpable. No peripheral edema.     GI Abdomen is soft without significant tenderness, masses, or guarding. Bowel sounds are normoactive. Rectal exam deferred.  No costovertebral angle tenderness. Normal appearing external genitalia. Waller catheter is not present. HEME/LYMP: No palpable cervical lymphadenopathy and no hepatosplenomegaly. No petechiae or ecchymoses. MSK No gross joint deformities. SKIN Normal coloration, warm, dry. NEURO Cranial nerves appear grossly intact, normal speech, no lateralizing weakness. PSYCH Awake, alert, oriented x 4. Affect appropriate.     Medications:   Medications:    baricitinib  4 mg Oral Daily    morphine  30 mg Oral 2 times per day    cetirizine  10 mg Oral Daily    losartan  50 mg Oral Daily    sodium chloride flush  5-40 mL IntraVENous 2 times per day    sennosides-docusate sodium  1 tablet Oral BID    famotidine  20 mg Oral BID    dexamethasone  10 mg IntraVENous Q24H    dexamethasone  10 mg IntraVENous Once    Vitamin D  2,000 Units Oral Daily    enoxaparin  1 mg/kg SubCUTAneous BID      Infusions:    sodium chloride 25 mL (02/09/22 0917)     PRN Meds: albuterol sulfate HFA, 2 puff, Q4H PRN  benzocaine-menthol, 1 lozenge, Q2H PRN  sodium chloride flush, 5-40 mL, PRN  sodium chloride, 25 mL, PRN  ondansetron, 4 mg, Q8H PRN   Or  ondansetron, 4 mg, Q6H PRN  polyethylene glycol, 17 g, Daily PRN  acetaminophen, 650 mg, Q6H PRN   Or  acetaminophen, 650 mg, Q6H PRN  guaiFENesin-dextromethorphan, 5 mL, Q4H PRN  oxyCODONE-acetaminophen, 1 tablet, Q6H PRN          Electronically signed by Flavio Morley MD on 2/11/2022 at 8:47 AM

## 2022-02-11 NOTE — PROGRESS NOTES
Comprehensive Nutrition Assessment    Type and Reason for Visit:  Initial    Nutrition Recommendations/Plan:   Continue current diet as ordered  Add standard oral nutrition supplements  Please document all po intake  Will closely monitor po intake, weight trends, poc    Nutrition Assessment:  Pt admitted for hypoxia, pneumonia of both lungs due to infectious organism, Covid-19, PMH: HTN, pt currently on 15 L HFNC, pt currently on regular diet with varied po intake (0-100%), will follow at moderate nutrition risk    Malnutrition Assessment:  Malnutrition Status:  Insufficient data    Context:  Acute Illness       Estimated Daily Nutrient Needs:  Energy (kcal):  0501-0117 (Agataa Kussmaul with stress factor 1.0-1.2); Weight Used for Energy Requirements:  Current     Protein (g):  55-65 (1.1-1.3 g/kg); Weight Used for Protein Requirements:  Ideal        Fluid (ml/day):  1500; Method Used for Fluid Requirements:  1 ml/kcal      Wounds:  None       Current Nutrition Therapies:    ADULT DIET; Regular    Anthropometric Measures:  · Height: 5' 2.01\" (157.5 cm)  · Current Body Weight: 186 lb 8.2 oz (84.6 kg)   ·   Usual Body Weight:  (no weight hx available)     · Ideal Body Weight: 110 lbs; % Ideal Body Weight 169.6 %   · BMI: 34.1    · BMI Categories: Obese Class 1 (BMI 30.0-34. 9)       Nutrition Diagnosis:   · Inadequate oral intake related to acute injury/trauma as evidenced by  (varied po intake per documentation)    Nutrition Interventions:   Food and/or Nutrient Delivery:  Continue Current Diet  Nutrition Education/Counseling:  No recommendation at this time   Coordination of Nutrition Care:  Continue to monitor while inpatient    Goals:  Pt will meet greater than 50-75% of meals and supplements       Nutrition Monitoring and Evaluation:   Behavioral-Environmental Outcomes:  None Identified   Food/Nutrient Intake Outcomes:  Food and Nutrient Intake,Supplement Intake  Physical Signs/Symptoms Outcomes:  Biochemical

## 2022-02-11 NOTE — PLAN OF CARE
Problem: Falls - Risk of:  Goal: Will remain free from falls  Description: Will remain free from falls  Outcome: Ongoing  Goal: Absence of physical injury  Description: Absence of physical injury  Outcome: Ongoing     Problem: Airway Clearance - Ineffective  Goal: Achieve or maintain patent airway  Outcome: Ongoing     Problem: Gas Exchange - Impaired  Goal: Absence of hypoxia  Outcome: Ongoing  Goal: Promote optimal lung function  Outcome: Ongoing     Problem: Breathing Pattern - Ineffective  Goal: Ability to achieve and maintain a regular respiratory rate  Outcome: Ongoing     Problem:  Body Temperature -  Risk of, Imbalanced  Goal: Ability to maintain a body temperature within defined limits  Outcome: Ongoing  Goal: Will regain or maintain usual level of consciousness  Outcome: Ongoing  Goal: Complications related to the disease process, condition or treatment will be avoided or minimized  Outcome: Ongoing     Problem: Isolation Precautions - Risk of Spread of Infection  Goal: Prevent transmission of infection  Outcome: Ongoing     Problem: Isolation Precautions - Risk of Spread of Infection  Goal: Prevent transmission of infection  Outcome: Ongoing     Problem: Nutrition Deficits  Goal: Optimize nutritional status  Outcome: Ongoing     Problem: Risk for Fluid Volume Deficit  Goal: Maintain normal heart rhythm  Outcome: Ongoing  Goal: Maintain absence of muscle cramping  Outcome: Ongoing  Goal: Maintain normal serum potassium, sodium, calcium, phosphorus, and pH  Outcome: Ongoing     Problem: Loneliness or Risk for Loneliness  Goal: Demonstrate positive use of time alone when socialization is not possible  Outcome: Ongoing     Problem: Fatigue  Goal: Verbalize increase energy and improved vitality  Outcome: Ongoing     Problem: Patient Education: Go to Patient Education Activity  Goal: Patient/Family Education  Outcome: Ongoing     Problem: Pain:  Goal: Pain level will decrease  Description: Pain level will decrease  Outcome: Ongoing  Goal: Control of acute pain  Description: Control of acute pain  Outcome: Ongoing  Goal: Control of chronic pain  Description: Control of chronic pain  Outcome: Ongoing     Problem: Skin Integrity:  Goal: Will show no infection signs and symptoms  Description: Will show no infection signs and symptoms  Outcome: Ongoing  Goal: Absence of new skin breakdown  Description: Absence of new skin breakdown  Outcome: Ongoing     Problem: Nutrition  Goal: Optimal nutrition therapy  Outcome: Ongoing

## 2022-02-11 NOTE — PLAN OF CARE
Problem: Falls - Risk of:  Goal: Will remain free from falls  Description: Will remain free from falls  Outcome: Ongoing  Goal: Absence of physical injury  Description: Absence of physical injury  Outcome: Ongoing     Problem: Airway Clearance - Ineffective  Goal: Achieve or maintain patent airway  Outcome: Ongoing     Problem: Gas Exchange - Impaired  Goal: Absence of hypoxia  Outcome: Ongoing  Goal: Promote optimal lung function  Outcome: Ongoing     Problem: Breathing Pattern - Ineffective  Goal: Ability to achieve and maintain a regular respiratory rate  Outcome: Ongoing     Problem:  Body Temperature -  Risk of, Imbalanced  Goal: Ability to maintain a body temperature within defined limits  Outcome: Ongoing  Goal: Will regain or maintain usual level of consciousness  Outcome: Ongoing  Goal: Complications related to the disease process, condition or treatment will be avoided or minimized  Outcome: Ongoing     Problem: Isolation Precautions - Risk of Spread of Infection  Goal: Prevent transmission of infection  Outcome: Ongoing     Problem: Nutrition Deficits  Goal: Optimize nutritional status  Outcome: Ongoing     Problem: Risk for Fluid Volume Deficit  Goal: Maintain normal heart rhythm  Outcome: Ongoing  Goal: Maintain absence of muscle cramping  Outcome: Ongoing  Goal: Maintain normal serum potassium, sodium, calcium, phosphorus, and pH  Outcome: Ongoing     Problem: Loneliness or Risk for Loneliness  Goal: Demonstrate positive use of time alone when socialization is not possible  Outcome: Ongoing     Problem: Fatigue  Goal: Verbalize increase energy and improved vitality  Outcome: Ongoing     Problem: Patient Education: Go to Patient Education Activity  Goal: Patient/Family Education  Outcome: Ongoing     Problem: Pain:  Goal: Pain level will decrease  Description: Pain level will decrease  Outcome: Ongoing  Goal: Control of acute pain  Description: Control of acute pain  Outcome: Ongoing  Goal: Control of chronic pain  Description: Control of chronic pain  Outcome: Ongoing     Problem: Skin Integrity:  Goal: Will show no infection signs and symptoms  Description: Will show no infection signs and symptoms  Outcome: Ongoing  Goal: Absence of new skin breakdown  Description: Absence of new skin breakdown  Outcome: Ongoing

## 2022-02-11 NOTE — PLAN OF CARE
Nutrition Problem #1: Inadequate oral intake  Intervention: Food and/or Nutrient Delivery: Continue Current Diet  Nutritional Goals: Pt will meet greater than 50-75% of meals and supplements

## 2022-02-11 NOTE — PROGRESS NOTES
Physical Therapy  Attempt Note    PT tx attempted this PM, states she got up to [facility] w/ nsg to have BM, strained a lot at [facility] and feels fatigued from inc activity this am. Satting at 87% on 15 L of HF nc at rest/supine in bed; not agreeable to therapy today. Will re-attempt as needed when pt is more agreeable.     Jaycob Gooden PT, DPT

## 2022-02-11 NOTE — PROGRESS NOTES
Pulmonary and Critical Care  Progress Note    Subjective: The patient is unchanged. On 15L HFNC. Shortness of breath is unchanged. Chest pain none  Addressing respiratory complaints Patient is negative for  hemoptysis and cyanosis  CONSTITUTIONAL:  negative for fevers and chills      Past Medical History:     has a past medical history of Chronic lower back pain, Hx of spinal fusion, Hypertension, MRSA (methicillin resistant staph aureus) culture positive, and Venous disease. has a past surgical history that includes fracture surgery and back surgery. reports that she has never smoked. She has never used smokeless tobacco. She reports that she does not drink alcohol and does not use drugs. Family history:  family history is not on file. No Known Allergies  Social History:    Reviewed; no changes    Objective:   PHYSICAL EXAM:        VITALS:  BP (!) 148/67   Pulse 55   Temp 97.9 °F (36.6 °C) (Oral)   Resp 14   Ht 5' 2\" (1.575 m)   Wt 186 lb 8.2 oz (84.6 kg)   SpO2 (!) 89%   BMI 34.11 kg/m²     24HR INTAKE/OUTPUT:      Intake/Output Summary (Last 24 hours) at 2/11/2022 1205  Last data filed at 2/11/2022 0300  Gross per 24 hour   Intake    Output 1700 ml   Net -1700 ml       CONSTITUTIONAL:  Awake. LUNGS:  decreased breath sounds, basilar crackles. CARDIOVASCULAR:  normal S1 and S2 and negative JVD  ABD:Abdomen soft, non-tender. BS normal. No masses,  No organomegaly  NEURO:Alert.   DATA:    CBC:  Recent Labs     02/09/22  1100 02/10/22  0833 02/11/22  0840   WBC 9.7 6.7 8.4   RBC 5.05 4.93 4.92   HGB 12.6 12.1* 12.0*   HCT 41.8 40.9 40.9    257 277   MCV 82.8 83.0 83.1   MCH 25.0* 24.5* 24.4*   MCHC 30.1* 29.6* 29.3*   RDW 14.8 14.7 14.6   SEGSPCT 83.9* 72.4* 71.4*      BMP:  Recent Labs     02/08/22  1252      K 4.1      CO2 25   BUN 21   CREATININE 0.5*   CALCIUM 8.4   GLUCOSE 130*      ABG:  No results for input(s): PH, PO2ART, LBV1SWT, HCO3, BEART, O2SAT in the last 72 hours. Lab Results   Component Value Date    PROBNP 1,360 (H) 02/08/2022     No results found for: [facility]    Radiology Review:  Pertinent images / reports were reviewed as a part of this visit. Assessment:     Patient Active Problem List   Diagnosis    WD-Venous stasis dermatitis of both lower extremities    WD-Lymphedema of both lower extremities    WD-Venous stasis ulcer of right calf with fat layer exposed with varicose veins ([facility])    WD-Venous stasis ulcer of left calf with fat layer exposed with varicose veins ([facility])    COVID-19       Plan:   1. Overall the patient is unchanged. 2. [place] [address] Activity.     Vamshi Figueroa MD  2/11/2022  12:05 PM

## 2022-02-12 PROCEDURE — 2060000000 HC ICU INTERMEDIATE R&B

## 2022-02-12 PROCEDURE — 6370000000 HC RX 637 (ALT 250 FOR IP): Performed by: HOSPITALIST

## 2022-02-12 PROCEDURE — 82728 ASSAY OF FERRITIN: CPT

## 2022-02-12 PROCEDURE — 2700000000 HC OXYGEN THERAPY PER DAY

## 2022-02-12 PROCEDURE — 6360000002 HC RX W HCPCS: Performed by: NURSE PRACTITIONER

## 2022-02-12 PROCEDURE — 94761 N-INVAS EAR/PLS OXIMETRY MLT: CPT

## 2022-02-12 PROCEDURE — 99232 SBSQ HOSP IP/OBS MODERATE 35: CPT | Performed by: INTERNAL MEDICINE

## 2022-02-12 PROCEDURE — 85025 COMPLETE CBC W/AUTO DIFF WBC: CPT

## 2022-02-12 PROCEDURE — 6370000000 HC RX 637 (ALT 250 FOR IP): Performed by: NURSE PRACTITIONER

## 2022-02-12 PROCEDURE — 2580000003 HC RX 258: Performed by: NURSE PRACTITIONER

## 2022-02-12 PROCEDURE — 82550 ASSAY OF CK (CPK): CPT

## 2022-02-12 RX ADMIN — OXYCODONE AND ACETAMINOPHEN 1 TABLET: 5; 325 TABLET ORAL at 06:26

## 2022-02-12 RX ADMIN — BARICITINIB 4 MG: 2 TABLET, FILM COATED ORAL at 10:57

## 2022-02-12 RX ADMIN — CETIRIZINE HYDROCHLORIDE 10 MG: 10 TABLET, FILM COATED ORAL at 06:22

## 2022-02-12 RX ADMIN — SODIUM CHLORIDE, PRESERVATIVE FREE 10 ML: 5 INJECTION INTRAVENOUS at 10:58

## 2022-02-12 RX ADMIN — SENNOSIDES AND DOCUSATE SODIUM 1 TABLET: 50; 8.6 TABLET ORAL at 10:57

## 2022-02-12 RX ADMIN — OXYCODONE AND ACETAMINOPHEN 1 TABLET: 5; 325 TABLET ORAL at 14:11

## 2022-02-12 RX ADMIN — SODIUM CHLORIDE, PRESERVATIVE FREE 10 ML: 5 INJECTION INTRAVENOUS at 20:53

## 2022-02-12 RX ADMIN — LOSARTAN POTASSIUM 50 MG: 50 TABLET, FILM COATED ORAL at 10:57

## 2022-02-12 RX ADMIN — MORPHINE SULFATE 30 MG: 15 TABLET, FILM COATED, EXTENDED RELEASE ORAL at 10:57

## 2022-02-12 RX ADMIN — FAMOTIDINE 20 MG: 20 TABLET ORAL at 10:57

## 2022-02-12 RX ADMIN — ENOXAPARIN SODIUM 90 MG: 100 INJECTION SUBCUTANEOUS at 10:58

## 2022-02-12 RX ADMIN — MORPHINE SULFATE 30 MG: 15 TABLET, FILM COATED, EXTENDED RELEASE ORAL at 20:53

## 2022-02-12 RX ADMIN — Medication 2000 UNITS: at 10:57

## 2022-02-12 RX ADMIN — FAMOTIDINE 20 MG: 20 TABLET ORAL at 20:53

## 2022-02-12 RX ADMIN — SENNOSIDES AND DOCUSATE SODIUM 1 TABLET: 50; 8.6 TABLET ORAL at 20:53

## 2022-02-12 RX ADMIN — ENOXAPARIN SODIUM 90 MG: 100 INJECTION SUBCUTANEOUS at 20:53

## 2022-02-12 RX ADMIN — DEXAMETHASONE SODIUM PHOSPHATE 10 MG: 10 INJECTION, SOLUTION INTRAMUSCULAR; INTRAVENOUS at 14:12

## 2022-02-12 ASSESSMENT — PAIN DESCRIPTION - PAIN TYPE
TYPE: CHRONIC PAIN

## 2022-02-12 ASSESSMENT — PAIN SCALES - GENERAL
PAINLEVEL_OUTOF10: 8
PAINLEVEL_OUTOF10: 8
PAINLEVEL_OUTOF10: 9
PAINLEVEL_OUTOF10: 8
PAINLEVEL_OUTOF10: 7
PAINLEVEL_OUTOF10: 9
PAINLEVEL_OUTOF10: 6
PAINLEVEL_OUTOF10: 9
PAINLEVEL_OUTOF10: 8
PAINLEVEL_OUTOF10: 9

## 2022-02-12 ASSESSMENT — PAIN DESCRIPTION - LOCATION
LOCATION: BACK;HIP;LEG
LOCATION: BACK;HIP
LOCATION: BACK;HIP;LEG

## 2022-02-12 ASSESSMENT — PAIN DESCRIPTION - DESCRIPTORS
DESCRIPTORS: ACHING
DESCRIPTORS: ACHING;SORE
DESCRIPTORS: ACHING
DESCRIPTORS: ACHING

## 2022-02-12 ASSESSMENT — PAIN - FUNCTIONAL ASSESSMENT: PAIN_FUNCTIONAL_ASSESSMENT: PREVENTS OR INTERFERES SOME ACTIVE ACTIVITIES AND ADLS

## 2022-02-12 ASSESSMENT — PAIN DESCRIPTION - ORIENTATION
ORIENTATION: RIGHT;LEFT
ORIENTATION: RIGHT;LEFT
ORIENTATION: LOWER;MID
ORIENTATION: LOWER;MID
ORIENTATION: RIGHT;LEFT
ORIENTATION: LOWER;MID

## 2022-02-12 ASSESSMENT — PAIN DESCRIPTION - FREQUENCY
FREQUENCY: CONTINUOUS

## 2022-02-12 ASSESSMENT — PAIN DESCRIPTION - PROGRESSION: CLINICAL_PROGRESSION: NOT CHANGED

## 2022-02-12 ASSESSMENT — PAIN DESCRIPTION - ONSET: ONSET: ON-GOING

## 2022-02-12 NOTE — PROGRESS NOTES
Pulmonary and Critical Care  Progress Note      VITALS:  /77   Pulse 81   Temp 98.5 °F (36.9 °C) (Oral)   Resp 18   Ht 5' 2.01\" (1.575 m)   Wt 186 lb 8.2 oz (84.6 kg)   SpO2 (!) 87%   BMI 34.10 kg/m²     Subjective:   CHIEF COMPLAINT :SOB     HPI:                The patient is lying in the bed. She is in mild resp distress. .      Objective:   PHYSICAL EXAM:    LUNGS:Occasional basal crackles  Abd-soft,BS+,NT  Ext- no pedal edema  CVS-s1s2, no  murmurs      DATA:    CBC:  Recent Labs     02/10/22  0833 02/11/22  0840   WBC 6.7 8.4   RBC 4.93 4.92   HGB 12.1* 12.0*   HCT 40.9 40.9    277   MCV 83.0 83.1   MCH 24.5* 24.4*   MCHC 29.6* 29.3*   RDW 14.7 14.6   SEGSPCT 72.4* 71.4*      BMP:  No results for input(s): NA, K, CL, CO2, BUN, CREATININE, CALCIUM, GLUCOSE in the last 72 hours. ABG:  No results for input(s): PH, PO2ART, GOE6NWQ, HCO3, BEART, O2SAT in the last 72 hours. BNP  No results found for: BNP   D-Dimer:  Lab Results   Component Value Date    DDIMER 2336 (H) 02/07/2022      1. Radiology: None      Assessment/Plan     Patient Active Problem List    Diagnosis Date Noted    COVID-19 02/05/2022    WD-Venous stasis ulcer of right calf with fat layer exposed with varicose veins ([address] 09/13/2021    WD-Venous stasis ulcer of left calf with fat layer exposed with varicose veins ([address] 09/13/2021    WD-Venous stasis dermatitis of both lower extremities 08/10/2021    WD-Lymphedema of both lower extremities 08/10/2021   1. Acute respiratory failure secondary to bilateral pneumonia secondary  to COVID-19.  2.  Bilateral pneumonia secondary to COVID-19.       1. Barcitinib  2. Decadron  3. Insulin  4. Inhalers  5. Keep sats > 88%  6. ICS  7. OOB  8.  C/w present management    Electronically signed by Jose Rafael Sherwood MD on 2/12/2022 at 11:20 AM

## 2022-02-12 NOTE — PLAN OF CARE
Problem: Falls - Risk of:  Goal: Will remain free from falls  Description: Will remain free from falls  Outcome: Met This Shift  Goal: Absence of physical injury  Description: Absence of physical injury  Outcome: Met This Shift     Problem: Airway Clearance - Ineffective  Goal: Achieve or maintain patent airway  Outcome: Met This Shift     Problem: Gas Exchange - Impaired  Goal: Absence of hypoxia  Outcome: Met This Shift  Goal: Promote optimal lung function  Outcome: Met This Shift     Problem: Breathing Pattern - Ineffective  Goal: Ability to achieve and maintain a regular respiratory rate  Outcome: Met This Shift     Problem:  Body Temperature -  Risk of, Imbalanced  Goal: Ability to maintain a body temperature within defined limits  Outcome: Met This Shift  Goal: Will regain or maintain usual level of consciousness  Outcome: Met This Shift  Goal: Complications related to the disease process, condition or treatment will be avoided or minimized  Outcome: Met This Shift     Problem: Isolation Precautions - Risk of Spread of Infection  Goal: Prevent transmission of infection  Outcome: Met This Shift     Problem: Nutrition Deficits  Goal: Optimize nutritional status  Outcome: Met This Shift     Problem: Risk for Fluid Volume Deficit  Goal: Maintain normal heart rhythm  Outcome: Met This Shift  Goal: Maintain absence of muscle cramping  Outcome: Met This Shift  Goal: Maintain normal serum potassium, sodium, calcium, phosphorus, and pH  Outcome: Met This Shift     Problem: Loneliness or Risk for Loneliness  Goal: Demonstrate positive use of time alone when socialization is not possible  Outcome: Met This Shift     Problem: Fatigue  Goal: Verbalize increase energy and improved vitality  Outcome: Met This Shift     Problem: Patient Education: Go to Patient Education Activity  Goal: Patient/Family Education  Outcome: Met This Shift     Problem: Pain:  Goal: Pain level will decrease  Description: Pain level will decrease  Outcome: Met This Shift  Goal: Control of acute pain  Description: Control of acute pain  Outcome: Met This Shift  Goal: Control of chronic pain  Description: Control of chronic pain  Outcome: Met This Shift     Problem: Skin Integrity:  Goal: Will show no infection signs and symptoms  Description: Will show no infection signs and symptoms  Outcome: Met This Shift  Goal: Absence of new skin breakdown  Description: Absence of new skin breakdown  Outcome: Met This Shift     Problem: Nutrition  Goal: Optimal nutrition therapy  Outcome: Met This Shift

## 2022-02-12 NOTE — PROGRESS NOTES
Hospitalist Progress Note      Name:  Hernán Pham /Age/Sex: 1945  (68 y.o. female)   MRN & CSN:  [id number] & [id number] Admission Date/Time: 2022  4:46 PM   Location:  -A PCP: No primary care provider on file. Hospital Day: 8    Assessment and Plan:   Hernán Pham is a 68 y.o.  female      1. Acute respiratory failure with hypoxia: Patient remains on 15 L of oxygen today. Has been advised on incentive spirometer, oxygen demand not improving. 2. Pneumonia secondary to COVID-19: Continue Decadron, baricitinib. Continue oxygen supplementation and wean down as tolerated. 3. Essential hypertension: Stable on losartan 50 mg daily  4. History of degenerative joint disease: Continue home dose of MS Contin 30 mg twice daily as well as Percocet 5/325 mg every 6 as needed  5. Obesity with BMI of 34.1  6. Advance care planning: Patient is DNR [facility]          Interval History     Patient was seen and examined at the bedside, with no new complaints. Objective:   No intake or output data in the 24 hours ending 22 0841   Vitals:   Vitals:    22 0406   BP:    Pulse:    Resp: 17   Temp:    SpO2: 91%     Physical Exam:   GEN Awake female, sitting upright in bed in no apparent distress. Appears given age. EYES Pupils are equally round. No scleral erythema, discharge, or conjunctivitis. HENT Mucous membranes are moist. Oral pharynx without exudates, no evidence of thrush. NECK Supple, no apparent thyromegaly or masses. RESP Breath sounds are diminished bibasarly. CARD: S1/S2 auscultated. Regular. No peripheral edema. GI Abdomen is soft without significant tenderness, masses, or guarding. Bowel sounds are normoactive. Rectal exam deferred.  No costovertebral angle tenderness. Normal appearing external genitalia. Waller catheter is not present. HEME/LYMP: No palpable cervical lymphadenopathy and no hepatosplenomegaly. No petechiae or ecchymoses.     MSK No gross joint deformities. SKIN Normal coloration, warm, dry. NEURO Cranial nerves appear grossly intact, normal speech, no lateralizing weakness. PSYCH Awake, alert, oriented x 4. Affect appropriate.     Medications:   Medications:    baricitinib  4 mg Oral Daily    morphine  30 mg Oral 2 times per day    cetirizine  10 mg Oral Daily    losartan  50 mg Oral Daily    sodium chloride flush  5-40 mL IntraVENous 2 times per day    sennosides-docusate sodium  1 tablet Oral BID    famotidine  20 mg Oral BID    dexamethasone  10 mg IntraVENous Q24H    dexamethasone  10 mg IntraVENous Once    Vitamin D  2,000 Units Oral Daily    enoxaparin  1 mg/kg SubCUTAneous BID      Infusions:    sodium chloride 25 mL (02/09/22 0917)     PRN Meds: albuterol sulfate HFA, 2 puff, Q4H PRN  benzocaine-menthol, 1 lozenge, Q2H PRN  sodium chloride flush, 5-40 mL, PRN  sodium chloride, 25 mL, PRN  ondansetron, 4 mg, Q8H PRN   Or  ondansetron, 4 mg, Q6H PRN  polyethylene glycol, 17 g, Daily PRN  acetaminophen, 650 mg, Q6H PRN   Or  acetaminophen, 650 mg, Q6H PRN  guaiFENesin-dextromethorphan, 5 mL, Q4H PRN  oxyCODONE-acetaminophen, 1 tablet, Q6H PRN          Electronically signed by Debbie Martinez MD on 2/12/2022 at 8:41 AM

## 2022-02-12 NOTE — PLAN OF CARE
Problem: Falls - Risk of:  Goal: Will remain free from falls  Description: Will remain free from falls  2/11/2022 2332 by Liliana Diallo RN  Outcome: Ongoing  2/11/2022 1425 by Lindsey Prieto RN  Outcome: Ongoing  Goal: Absence of physical injury  Description: Absence of physical injury  2/11/2022 2332 by Liliana Diallo RN  Outcome: Ongoing  2/11/2022 1425 by Lindsey Prieto RN  Outcome: Ongoing     Problem: Airway Clearance - Ineffective  Goal: Achieve or maintain patent airway  2/11/2022 2332 by Liliana Diallo RN  Outcome: Ongoing  2/11/2022 1425 by Lindsey Prieto RN  Outcome: Ongoing     Problem: Gas Exchange - Impaired  Goal: Absence of hypoxia  2/11/2022 2332 by Liliana Diallo RN  Outcome: Ongoing  2/11/2022 1425 by Lindsey Prieto RN  Outcome: Ongoing  Goal: Promote optimal lung function  2/11/2022 2332 by Liliana Diallo RN  Outcome: Ongoing  2/11/2022 1425 by Lindsey Prieto RN  Outcome: Ongoing     Problem: Breathing Pattern - Ineffective  Goal: Ability to achieve and maintain a regular respiratory rate  2/11/2022 2332 by Liliana Diallo RN  Outcome: Ongoing  2/11/2022 1425 by Lindsey Prieto RN  Outcome: Ongoing     Problem:  Body Temperature -  Risk of, Imbalanced  Goal: Ability to maintain a body temperature within defined limits  2/11/2022 2332 by Liliana Diallo RN  Outcome: Ongoing  2/11/2022 1425 by Lindsey Prieto RN  Outcome: Ongoing  Goal: Will regain or maintain usual level of consciousness  2/11/2022 2332 by Liliana Diallo RN  Outcome: Ongoing  2/11/2022 1425 by Lindsey Prieto RN  Outcome: Ongoing  Goal: Complications related to the disease process, condition or treatment will be avoided or minimized  2/11/2022 2332 by Liliana Diallo RN  Outcome: Ongoing  2/11/2022 1425 by Lindsey Prieto RN  Outcome: Ongoing     Problem: Isolation Precautions - Risk of Spread of Infection  Goal: Prevent transmission of infection  2/11/2022 2332 by Liliana Diallo RN  Outcome: Ongoing  2/11/2022 1425 by Lindsey Prieto RN  Outcome: Ongoing     Problem: Nutrition Deficits  Goal: Optimize nutritional status  2/11/2022 2332 by Chelsea Bhandari RN  Outcome: Ongoing  2/11/2022 1425 by Roslyn Mcgill RN  Outcome: Ongoing     Problem: Risk for Fluid Volume Deficit  Goal: Maintain normal heart rhythm  2/11/2022 2332 by Chelsea Bhandari RN  Outcome: Ongoing  2/11/2022 1425 by Roslyn Mcgill RN  Outcome: Ongoing  Goal: Maintain absence of muscle cramping  2/11/2022 2332 by Chelsea Bhandari RN  Outcome: Ongoing  2/11/2022 1425 by Roslyn Mcgill RN  Outcome: Ongoing  Goal: Maintain normal serum potassium, sodium, calcium, phosphorus, and pH  2/11/2022 2332 by Chelsea Bhandari RN  Outcome: Ongoing  2/11/2022 1425 by Roslyn Mcgill RN  Outcome: Ongoing     Problem: Loneliness or Risk for Loneliness  Goal: Demonstrate positive use of time alone when socialization is not possible  2/11/2022 2332 by Chelsea Bhandari RN  Outcome: Ongoing  2/11/2022 1425 by Roslyn Mcgill RN  Outcome: Ongoing     Problem: Fatigue  Goal: Verbalize increase energy and improved vitality  2/11/2022 2332 by Chelsea Bhandari RN  Outcome: Ongoing  2/11/2022 1425 by Roslyn Mcgill RN  Outcome: Ongoing     Problem: Patient Education: Go to Patient Education Activity  Goal: Patient/Family Education  2/11/2022 2332 by Chelsea Bhandari RN  Outcome: Ongoing  2/11/2022 1425 by Roslyn Mcgill RN  Outcome: Ongoing     Problem: Pain:  Goal: Pain level will decrease  Description: Pain level will decrease  2/11/2022 2332 by Chelsea Bhandari RN  Outcome: Ongoing  2/11/2022 1425 by Roslyn Mcgill RN  Outcome: Ongoing  Goal: Control of acute pain  Description: Control of acute pain  2/11/2022 2332 by Chelsea Bhandari RN  Outcome: Ongoing  2/11/2022 1425 by Roslyn Mcgill RN  Outcome: Ongoing  Goal: Control of chronic pain  Description: Control of chronic pain  2/11/2022 2332 by Chelsea Bhandari RN  Outcome: Ongoing  2/11/2022 1425 by Roslyn Mcgill RN  Outcome: Ongoing     Problem: Skin Integrity:  Goal: Will show no infection signs and symptoms  Description: Will show no infection signs and symptoms  2/11/2022 2332 by Ruthann Russell RN  Outcome: Ongoing  2/11/2022 1425 by Eliot Garcia RN  Outcome: Ongoing  Goal: Absence of new skin breakdown  Description: Absence of new skin breakdown  2/11/2022 2332 by Ruthann Russell RN  Outcome: Ongoing  2/11/2022 1425 by Eliot Garcia RN  Outcome: Ongoing     Problem: Nutrition  Goal: Optimal nutrition therapy  2/11/2022 2332 by Ruthann Russell RN  Outcome: Ongoing  2/11/2022 1425 by Eliot aGrcia RN  Outcome: Ongoing

## 2022-02-13 PROCEDURE — 94761 N-INVAS EAR/PLS OXIMETRY MLT: CPT

## 2022-02-13 PROCEDURE — 82728 ASSAY OF FERRITIN: CPT

## 2022-02-13 PROCEDURE — 97110 THERAPEUTIC EXERCISES: CPT

## 2022-02-13 PROCEDURE — 6370000000 HC RX 637 (ALT 250 FOR IP): Performed by: HOSPITALIST

## 2022-02-13 PROCEDURE — 2700000000 HC OXYGEN THERAPY PER DAY

## 2022-02-13 PROCEDURE — 6370000000 HC RX 637 (ALT 250 FOR IP): Performed by: NURSE PRACTITIONER

## 2022-02-13 PROCEDURE — 82550 ASSAY OF CK (CPK): CPT

## 2022-02-13 PROCEDURE — 97530 THERAPEUTIC ACTIVITIES: CPT

## 2022-02-13 PROCEDURE — 99232 SBSQ HOSP IP/OBS MODERATE 35: CPT | Performed by: INTERNAL MEDICINE

## 2022-02-13 PROCEDURE — 85025 COMPLETE CBC W/AUTO DIFF WBC: CPT

## 2022-02-13 PROCEDURE — 6360000002 HC RX W HCPCS: Performed by: NURSE PRACTITIONER

## 2022-02-13 PROCEDURE — 85730 THROMBOPLASTIN TIME PARTIAL: CPT

## 2022-02-13 PROCEDURE — 2580000003 HC RX 258: Performed by: NURSE PRACTITIONER

## 2022-02-13 PROCEDURE — 2060000000 HC ICU INTERMEDIATE R&B

## 2022-02-13 PROCEDURE — 85610 PROTHROMBIN TIME: CPT

## 2022-02-13 RX ORDER — KETOROLAC TROMETHAMINE 30 MG/ML
15 INJECTION, SOLUTION INTRAMUSCULAR; INTRAVENOUS ONCE
Status: DISCONTINUED | OUTPATIENT
Start: 2022-02-13 | End: 2022-02-13

## 2022-02-13 RX ADMIN — Medication 2000 UNITS: at 09:57

## 2022-02-13 RX ADMIN — ENOXAPARIN SODIUM 90 MG: 100 INJECTION SUBCUTANEOUS at 21:33

## 2022-02-13 RX ADMIN — OXYCODONE AND ACETAMINOPHEN 1 TABLET: 5; 325 TABLET ORAL at 15:38

## 2022-02-13 RX ADMIN — SODIUM CHLORIDE, PRESERVATIVE FREE 10 ML: 5 INJECTION INTRAVENOUS at 09:58

## 2022-02-13 RX ADMIN — SENNOSIDES AND DOCUSATE SODIUM 1 TABLET: 50; 8.6 TABLET ORAL at 21:33

## 2022-02-13 RX ADMIN — ENOXAPARIN SODIUM 90 MG: 100 INJECTION SUBCUTANEOUS at 09:58

## 2022-02-13 RX ADMIN — LOSARTAN POTASSIUM 50 MG: 50 TABLET, FILM COATED ORAL at 09:57

## 2022-02-13 RX ADMIN — MORPHINE SULFATE 30 MG: 15 TABLET, FILM COATED, EXTENDED RELEASE ORAL at 09:57

## 2022-02-13 RX ADMIN — FAMOTIDINE 20 MG: 20 TABLET ORAL at 09:57

## 2022-02-13 RX ADMIN — SENNOSIDES AND DOCUSATE SODIUM 1 TABLET: 50; 8.6 TABLET ORAL at 09:57

## 2022-02-13 RX ADMIN — DEXAMETHASONE SODIUM PHOSPHATE 10 MG: 10 INJECTION, SOLUTION INTRAMUSCULAR; INTRAVENOUS at 09:58

## 2022-02-13 RX ADMIN — SODIUM CHLORIDE, PRESERVATIVE FREE 10 ML: 5 INJECTION INTRAVENOUS at 21:33

## 2022-02-13 RX ADMIN — CETIRIZINE HYDROCHLORIDE 10 MG: 10 TABLET, FILM COATED ORAL at 06:43

## 2022-02-13 RX ADMIN — MORPHINE SULFATE 30 MG: 15 TABLET, FILM COATED, EXTENDED RELEASE ORAL at 21:32

## 2022-02-13 RX ADMIN — BARICITINIB 4 MG: 2 TABLET, FILM COATED ORAL at 09:57

## 2022-02-13 RX ADMIN — OXYCODONE AND ACETAMINOPHEN 1 TABLET: 5; 325 TABLET ORAL at 02:23

## 2022-02-13 RX ADMIN — FAMOTIDINE 20 MG: 20 TABLET ORAL at 21:33

## 2022-02-13 ASSESSMENT — PAIN SCALES - GENERAL
PAINLEVEL_OUTOF10: 7
PAINLEVEL_OUTOF10: 8
PAINLEVEL_OUTOF10: 9
PAINLEVEL_OUTOF10: 7
PAINLEVEL_OUTOF10: 8
PAINLEVEL_OUTOF10: 8
PAINLEVEL_OUTOF10: 7
PAINLEVEL_OUTOF10: 8
PAINLEVEL_OUTOF10: 8
PAINLEVEL_OUTOF10: 7
PAINLEVEL_OUTOF10: 8

## 2022-02-13 ASSESSMENT — PAIN - FUNCTIONAL ASSESSMENT: PAIN_FUNCTIONAL_ASSESSMENT: PREVENTS OR INTERFERES SOME ACTIVE ACTIVITIES AND ADLS

## 2022-02-13 ASSESSMENT — PAIN DESCRIPTION - LOCATION
LOCATION: BACK;HIP

## 2022-02-13 ASSESSMENT — PAIN DESCRIPTION - PAIN TYPE
TYPE: CHRONIC PAIN

## 2022-02-13 ASSESSMENT — PAIN DESCRIPTION - FREQUENCY
FREQUENCY: CONTINUOUS

## 2022-02-13 ASSESSMENT — PAIN DESCRIPTION - DESCRIPTORS
DESCRIPTORS: ACHING;DISCOMFORT
DESCRIPTORS: ACHING;DISCOMFORT
DESCRIPTORS: ACHING

## 2022-02-13 ASSESSMENT — PAIN DESCRIPTION - ORIENTATION
ORIENTATION: RIGHT;LEFT

## 2022-02-13 ASSESSMENT — PAIN SCALES - WONG BAKER
WONGBAKER_NUMERICALRESPONSE: 0

## 2022-02-13 ASSESSMENT — PAIN DESCRIPTION - ONSET
ONSET: ON-GOING
ONSET: ON-GOING

## 2022-02-13 ASSESSMENT — PAIN DESCRIPTION - PROGRESSION
CLINICAL_PROGRESSION: NOT CHANGED
CLINICAL_PROGRESSION: NOT CHANGED

## 2022-02-13 NOTE — PROGRESS NOTES
Hospitalist Progress Note      Name:  Al Case /Age/Sex: 1945  (68 y.o. female)   MRN & CSN:  [id number] & [id number] Admission Date/Time: 2022  4:46 PM   Location:  -A PCP: No primary care provider on file. Hospital Day: 9    Assessment and Plan:   Al Case is a 68 y.o.  female with past medical history of degenerative joint disease, hypertension, was admitted on 2020 when she presented with nausea, vomiting, fatigue, was found to be hypoxic, diagnosed with Covid. 1. Acute respiratory failure with hypoxia: Has remained stagnant over the last few days, still on 15 L of oxygen. Has been advised on incentive spirometer, she will mobilize out of bed to chair if possible. 2. Pneumonia secondary to COVID-19: Continue Decadron, baricitinib. Continue oxygen supplementation and wean down as tolerated. 3. Essential hypertension: Stable on losartan 50 mg daily  4. History of degenerative joint disease: Continue home dose of MS Contin 30 mg twice daily as well as Percocet 5/325 mg every 6 as needed  5. Obesity with BMI of 34.1  6. Advance care planning: Patient is DNR [facility]          Interval History     No acute events overnight, patient seen and examined at bedside. No new complaints. Objective: Intake/Output Summary (Last 24 hours) at 2022 1059  Last data filed at 2022 0600  Gross per 24 hour   Intake 360 ml   Output 900 ml   Net -540 ml      Vitals:   Vitals:    22 0957   BP: 134/63   Pulse: 84   Resp: 24   Temp: 97.6 °F (36.4 °C)   SpO2: 92%     Physical Exam:   GEN Awake female, sitting upright in bed in no apparent distress. Appears given age. EYES Pupils are equally round. No scleral erythema, discharge, or conjunctivitis. HENT Mucous membranes are moist. Oral pharynx without exudates, no evidence of thrush. NECK Supple, no apparent thyromegaly or masses. RESP Breath sounds are diminished bibasarly.     CARD: S1/S2 auscultated. Regular. No peripheral edema. GI Abdomen is soft without significant tenderness, masses, or guarding. Bowel sounds are normoactive. Rectal exam deferred.  No costovertebral angle tenderness. Normal appearing external genitalia. Waller catheter is not present. HEME/LYMP: No palpable cervical lymphadenopathy and no hepatosplenomegaly. No petechiae or ecchymoses. MSK No gross joint deformities. SKIN Normal coloration, warm, dry. NEURO Cranial nerves appear grossly intact, normal speech, no lateralizing weakness. PSYCH Awake, alert, oriented x 4. Affect appropriate.     Medications:   Medications:    baricitinib  4 mg Oral Daily    morphine  30 mg Oral 2 times per day    cetirizine  10 mg Oral Daily    losartan  50 mg Oral Daily    sodium chloride flush  5-40 mL IntraVENous 2 times per day    sennosides-docusate sodium  1 tablet Oral BID    famotidine  20 mg Oral BID    dexamethasone  10 mg IntraVENous Q24H    dexamethasone  10 mg IntraVENous Once    Vitamin D  2,000 Units Oral Daily    enoxaparin  1 mg/kg SubCUTAneous BID      Infusions:    sodium chloride 25 mL (02/09/22 0917)     PRN Meds: albuterol sulfate HFA, 2 puff, Q4H PRN  benzocaine-menthol, 1 lozenge, Q2H PRN  sodium chloride flush, 5-40 mL, PRN  sodium chloride, 25 mL, PRN  ondansetron, 4 mg, Q8H PRN   Or  ondansetron, 4 mg, Q6H PRN  polyethylene glycol, 17 g, Daily PRN  acetaminophen, 650 mg, Q6H PRN   Or  acetaminophen, 650 mg, Q6H PRN  guaiFENesin-dextromethorphan, 5 mL, Q4H PRN  oxyCODONE-acetaminophen, 1 tablet, Q6H PRN          Electronically signed by Julia Marrero MD on 2/13/2022 at 10:59 AM

## 2022-02-13 NOTE — PROGRESS NOTES
Pulmonary and Critical Care  Progress Note      VITALS:  /63   Pulse 88   Temp 97.6 °F (36.4 °C) (Oral)   Resp 24   Ht 5' 2.01\" (1.575 m)   Wt 186 lb 8.2 oz (84.6 kg)   SpO2 90%   BMI 34.10 kg/m²     Subjective:   CHIEF COMPLAINT :SOB     HPI:                The patient is sitting in the bed. She is in mild resp distress    Objective:   PHYSICAL EXAM:    LUNGS:Occasional basal crackles  Abd-soft,BS+,NT  Ext- no pedal edema  CVS-s1s2, no  murmurs      DATA:    CBC:  Recent Labs     02/11/22  0840   WBC 8.4   RBC 4.92   HGB 12.0*   HCT 40.9      MCV 83.1   MCH 24.4*   MCHC 29.3*   RDW 14.6   SEGSPCT 71.4*      BMP:  No results for input(s): NA, K, CL, CO2, BUN, CREATININE, CALCIUM, GLUCOSE in the last 72 hours. ABG:  No results for input(s): PH, PO2ART, PYO0XYA, HCO3, BEART, O2SAT in the last 72 hours. BNP  No results found for: BNP   D-Dimer:  Lab Results   Component Value Date    DDIMER 2336 (H) 02/07/2022      1. Radiology: None      Assessment/Plan     Patient Active Problem List    Diagnosis Date Noted    Hypoxia     COVID-19 02/05/2022    WD-Venous stasis ulcer of right calf with fat layer exposed with varicose veins ([facility] [address] 09/13/2021    WD-Venous stasis ulcer of left calf with fat layer exposed with varicose veins ([facility] [address] 09/13/2021    WD-Venous stasis dermatitis of both lower extremities 08/10/2021    WD-Lymphedema of both lower extremities 08/10/2021   1.  Acute respiratory failure secondary to bilateral pneumonia secondary  to COVID-19.  2.  Bilateral pneumonia secondary to COVID-19       1. Decadron  2. Insulin  3. Inhalers  4. Barcitinib  5. Keep sats > 88%  6. ICS  7. OOB  8.  C/w present management    Electronically signed by Balwinder Srivastava MD on 2/13/2022 at 12:35 PM

## 2022-02-13 NOTE — PROGRESS NOTES
02/13/22 0858   Oxygen Therapy/Pulse Ox   O2 Therapy Oxygen humidified   $Oxygen $Daily Charge   O2 Device High flow nasal cannula   O2 Flow Rate (L/min) 15 L/min   Resp 17   SpO2 92 %   Pulse Oximeter Device Mode Continuous   Pulse Oximeter Device Location Finger   $Pulse Oximeter $Spot check (multiple/continuous)   Blood Gas  Performed?  No

## 2022-02-13 NOTE — PROGRESS NOTES
activity    Assessment / Impression: Tolerating well, performing OOB mobility and functional strength activities this session. Primarily limited by O2 dependence, weakness at this time; cont to select interventions and positioning to optimize recovery.      Patient's tolerance of treatment:  Good  Barriers to improvement:  O2 dependence; congenital hip disorder affecting baseline mobility  Plan for Next Session:    Cont w/ est POC and DC plan  Progress OOB, ambulation, up in recliner, functional strengthening    Time in:  1628  Time out:  1658  Timed treatment minutes:  30  Total treatment time:  27 (TA, TE)    Previously filed items:  Social/Functional History  Lives With: Alone  Type of Home: House  Home Layout: One level  Home Access: Ramped entrance (railing on BL sides)  Bathroom Shower/Tub: Shower chair without back (dtr spongebathes)  Bathroom Toilet: Handicap height  Bathroom Equipment: Grab bars around toilet  Home Equipment: Crutches,Wheelchair-manual,Grab bars  Receives Help From: Family  ADL Assistance: Needs assistance (dtr assists w/ bathing, dressing, ; dtr comes M/W/F)  Homemaking Assistance: Needs assistance (dtr does shopping; pt does not cook, has meals on wheels; son does outside [place]; grand dtrs clean inside)  Homemaking Responsibilities: No (family performs most; she assists w/ what she can, mostly done seated)  Ambulation Assistance: Independent (mod ind using crutches; only short distances)  Transfer Assistance: Independent  Active : No (family)  Occupation: Retired,On disability  Additional Comments: plays cards, crochette, puzzle books, socializes w/ family; sleeps in adjustable bed w/ trapeze over for improved pt mobility  Short term goals  Time Frame for Short term goals: 1 week  Short term goal 1: pt will complete bed mobility at min A  Short term goal 2: pt will complete transfers at [facility] term goal 3: pt will ambulate 15 ft using crutches at [facility] term goal 4: pt will demonstrate near gross >3/5 BL LE strength       Electronically signed by:    Chan Gonzáles PT, DPT  2/13/2022, 5:06 PM

## 2022-02-14 PROCEDURE — 6360000002 HC RX W HCPCS: Performed by: NURSE PRACTITIONER

## 2022-02-14 PROCEDURE — 80048 BASIC METABOLIC PNL TOTAL CA: CPT

## 2022-02-14 PROCEDURE — 6370000000 HC RX 637 (ALT 250 FOR IP): Performed by: HOSPITALIST

## 2022-02-14 PROCEDURE — 2700000000 HC OXYGEN THERAPY PER DAY

## 2022-02-14 PROCEDURE — 94761 N-INVAS EAR/PLS OXIMETRY MLT: CPT

## 2022-02-14 PROCEDURE — 82728 ASSAY OF FERRITIN: CPT

## 2022-02-14 PROCEDURE — 83735 ASSAY OF MAGNESIUM: CPT

## 2022-02-14 PROCEDURE — 2060000000 HC ICU INTERMEDIATE R&B

## 2022-02-14 PROCEDURE — 6370000000 HC RX 637 (ALT 250 FOR IP): Performed by: NURSE PRACTITIONER

## 2022-02-14 PROCEDURE — 85027 COMPLETE CBC AUTOMATED: CPT

## 2022-02-14 PROCEDURE — 97530 THERAPEUTIC ACTIVITIES: CPT

## 2022-02-14 PROCEDURE — 2580000003 HC RX 258: Performed by: NURSE PRACTITIONER

## 2022-02-14 RX ADMIN — ENOXAPARIN SODIUM 90 MG: 100 INJECTION SUBCUTANEOUS at 10:13

## 2022-02-14 RX ADMIN — FAMOTIDINE 20 MG: 20 TABLET ORAL at 21:00

## 2022-02-14 RX ADMIN — SODIUM CHLORIDE, PRESERVATIVE FREE 10 ML: 5 INJECTION INTRAVENOUS at 10:12

## 2022-02-14 RX ADMIN — MORPHINE SULFATE 30 MG: 15 TABLET, FILM COATED, EXTENDED RELEASE ORAL at 21:00

## 2022-02-14 RX ADMIN — DEXAMETHASONE SODIUM PHOSPHATE 10 MG: 10 INJECTION, SOLUTION INTRAMUSCULAR; INTRAVENOUS at 10:13

## 2022-02-14 RX ADMIN — FAMOTIDINE 20 MG: 20 TABLET ORAL at 10:12

## 2022-02-14 RX ADMIN — CETIRIZINE HYDROCHLORIDE 10 MG: 10 TABLET, FILM COATED ORAL at 06:39

## 2022-02-14 RX ADMIN — MORPHINE SULFATE 30 MG: 15 TABLET, FILM COATED, EXTENDED RELEASE ORAL at 10:11

## 2022-02-14 RX ADMIN — BARICITINIB 4 MG: 2 TABLET, FILM COATED ORAL at 10:11

## 2022-02-14 RX ADMIN — OXYCODONE AND ACETAMINOPHEN 1 TABLET: 5; 325 TABLET ORAL at 12:35

## 2022-02-14 RX ADMIN — SENNOSIDES AND DOCUSATE SODIUM 1 TABLET: 50; 8.6 TABLET ORAL at 10:12

## 2022-02-14 RX ADMIN — OXYCODONE AND ACETAMINOPHEN 1 TABLET: 5; 325 TABLET ORAL at 06:39

## 2022-02-14 RX ADMIN — ENOXAPARIN SODIUM 90 MG: 100 INJECTION SUBCUTANEOUS at 21:00

## 2022-02-14 RX ADMIN — LOSARTAN POTASSIUM 50 MG: 50 TABLET, FILM COATED ORAL at 10:12

## 2022-02-14 RX ADMIN — OXYCODONE AND ACETAMINOPHEN 1 TABLET: 5; 325 TABLET ORAL at 00:08

## 2022-02-14 RX ADMIN — SENNOSIDES AND DOCUSATE SODIUM 1 TABLET: 50; 8.6 TABLET ORAL at 21:00

## 2022-02-14 RX ADMIN — SODIUM CHLORIDE, PRESERVATIVE FREE 10 ML: 5 INJECTION INTRAVENOUS at 21:01

## 2022-02-14 RX ADMIN — Medication 2000 UNITS: at 10:11

## 2022-02-14 ASSESSMENT — PAIN DESCRIPTION - FREQUENCY
FREQUENCY: CONTINUOUS

## 2022-02-14 ASSESSMENT — PAIN - FUNCTIONAL ASSESSMENT
PAIN_FUNCTIONAL_ASSESSMENT: PREVENTS OR INTERFERES SOME ACTIVE ACTIVITIES AND ADLS
PAIN_FUNCTIONAL_ASSESSMENT: PREVENTS OR INTERFERES SOME ACTIVE ACTIVITIES AND ADLS

## 2022-02-14 ASSESSMENT — PAIN DESCRIPTION - PROGRESSION
CLINICAL_PROGRESSION: NOT CHANGED

## 2022-02-14 ASSESSMENT — PAIN SCALES - GENERAL
PAINLEVEL_OUTOF10: 8

## 2022-02-14 ASSESSMENT — PAIN DESCRIPTION - DESCRIPTORS
DESCRIPTORS: ACHING;DISCOMFORT

## 2022-02-14 ASSESSMENT — PAIN DESCRIPTION - ONSET
ONSET: ON-GOING

## 2022-02-14 ASSESSMENT — PAIN DESCRIPTION - PAIN TYPE
TYPE: CHRONIC PAIN

## 2022-02-14 ASSESSMENT — PAIN DESCRIPTION - ORIENTATION
ORIENTATION: RIGHT;LEFT

## 2022-02-14 ASSESSMENT — PAIN DESCRIPTION - LOCATION
LOCATION: BACK;HIP

## 2022-02-14 ASSESSMENT — PAIN SCALES - WONG BAKER: WONGBAKER_NUMERICALRESPONSE: 0

## 2022-02-14 NOTE — PROGRESS NOTES
Pulmonary and Critical Care  Progress Note    Subjective: The patient is comfortable in bed. On 15L HFNC. Shortness of breath is unchanged. Chest pain none  Addressing respiratory complaints Patient is negative for  hemoptysis and cyanosis  CONSTITUTIONAL:  negative for fevers and chills      Past Medical History:     has a past medical history of Chronic lower back pain, Hx of spinal fusion, Hypertension, MRSA (methicillin resistant staph aureus) culture positive, and Venous disease. has a past surgical history that includes fracture surgery and back surgery. reports that she has never smoked. She has never used smokeless tobacco. She reports that she does not drink alcohol and does not use drugs. Family history:  family history is not on file. No Known Allergies  Social History:    Reviewed; no changes    Objective:   PHYSICAL EXAM:        VITALS:  /81   Pulse 56   Temp 97.6 °F (36.4 °C) (Oral)   Resp 19   Ht 5' 2.01\" (1.575 m)   Wt 186 lb 8.2 oz (84.6 kg)   SpO2 96%   BMI 34.10 kg/m²     24HR INTAKE/OUTPUT:      Intake/Output Summary (Last 24 hours) at 2/14/2022 1052  Last data filed at 2/14/2022 1011  Gross per 24 hour   Intake 730 ml   Output 600 ml   Net 130 ml       CONSTITUTIONAL:  Awake. LUNGS:  decreased breath sounds, basilar crackles. CARDIOVASCULAR:  normal S1 and S2 and negative JVD  ABD:Abdomen soft, non-tender. BS normal. No masses,  No organomegaly  NEURO:Alert. DATA:    CBC:  No results for input(s): WBC, RBC, HGB, HCT, PLT, MCV, MCH, MCHC, RDW, NRBC, SEGSPCT, BANDSPCT in the last 72 hours. BMP:  No results for input(s): NA, K, CL, CO2, BUN, CREATININE, CALCIUM, GLUCOSE in the last 72 hours. ABG:  No results for input(s): PH, PO2ART, EUU5NUU, HCO3, BEART, O2SAT in the last 72 hours.   Lab Results   Component Value Date    PROBNP 1,360 (H) 02/08/2022     No results found for: [facility]    Radiology Review:  Pertinent images / reports were reviewed as a part of this visit. Assessment:     Patient Active Problem List   Diagnosis    WD-Venous stasis dermatitis of both lower extremities    WD-Lymphedema of both lower extremities    WD-Venous stasis ulcer of right calf with fat layer exposed with varicose veins ([facility])    WD-Venous stasis ulcer of left calf with fat layer exposed with varicose veins ([address]    COVID-19    Hypoxia       Plan:   1. Overall the patient is unchanged. 2. [address] Activity.     Melody Landau, MD  2/14/2022  10:52 AM

## 2022-02-14 NOTE — PROGRESS NOTES
Hospitalist Progress Note      Name:  Prisca Larsen /Age/Sex: 1945  (68 y.o. female)   MRN & CSN:  [id number] & [id number] Admission Date/Time: 2022  4:46 PM   Location:  -A PCP: No primary care provider on file. Hospital Day: 10    Assessment and Plan:   Prisca Larsen is a 68 y.o.  female with past medical history of degenerative joint disease, hypertension, was admitted on 2020 when she presented with nausea, vomiting, fatigue, was found to be hypoxic, diagnosed with Covid. 1. Acute respiratory failure with hypoxia: Has remained stagnant over the last few days, still on 15 L of oxygen. Has been advised on incentive spirometer, she will mobilize out of bed to chair if possible. 2. Pneumonia secondary to COVID-19: Continue Decadron, baricitinib. Continue oxygen supplementation and wean down as tolerated. 3. Essential hypertension: Stable on losartan 50 mg daily  4. History of degenerative joint disease: Continue home dose of MS Contin 30 mg twice daily as well as Percocet 5/325 mg every 6 as needed  5. Obesity with BMI of 34.1  6. Advance care planning: Patient is DNR [facility]          Interval History   Patient examined at bedside. Comfortable in no distress she is about to start her breakfast.  Remains on 15 L of oxygen. Continue current measures overall prognosis remains guarded. Consultant notes reviewed. No acute events overnight, patient seen and examined at bedside. No new complaints. Objective: Intake/Output Summary (Last 24 hours) at 2022 0846  Last data filed at 2022 0640  Gross per 24 hour   Intake 1080 ml   Output 600 ml   Net 480 ml      Vitals:   Vitals:    22 0805   BP: 104/81   Pulse: 56   Resp: 19   Temp: 97.6 °F (36.4 °C)   SpO2: 96%     Physical Exam:   GEN Awake female, sitting upright in bed in no apparent distress. Appears given age. EYES Pupils are equally round.   No scleral erythema, discharge, or conjunctivitis. HENT Mucous membranes are moist. Oral pharynx without exudates, no evidence of thrush. NECK Supple, no apparent thyromegaly or masses. RESP Breath sounds are diminished bibasarly. CARD: S1/S2 auscultated. Regular. No peripheral edema. GI Abdomen is soft without significant tenderness, masses, or guarding. Bowel sounds are normoactive. Rectal exam deferred.  No costovertebral angle tenderness. Normal appearing external genitalia. Waller catheter is not present. HEME/LYMP: No palpable cervical lymphadenopathy and no hepatosplenomegaly. No petechiae or ecchymoses. MSK No gross joint deformities. SKIN Normal coloration, warm, dry. NEURO Cranial nerves appear grossly intact, normal speech, no lateralizing weakness. PSYCH Awake, alert, oriented x 4. Affect appropriate.     Medications:   Medications:    baricitinib  4 mg Oral Daily    morphine  30 mg Oral 2 times per day    cetirizine  10 mg Oral Daily    losartan  50 mg Oral Daily    sodium chloride flush  5-40 mL IntraVENous 2 times per day    sennosides-docusate sodium  1 tablet Oral BID    famotidine  20 mg Oral BID    dexamethasone  10 mg IntraVENous Q24H    dexamethasone  10 mg IntraVENous Once    Vitamin D  2,000 Units Oral Daily    enoxaparin  1 mg/kg SubCUTAneous BID      Infusions:    sodium chloride 25 mL (02/09/22 0917)     PRN Meds: albuterol sulfate HFA, 2 puff, Q4H PRN  benzocaine-menthol, 1 lozenge, Q2H PRN  sodium chloride flush, 5-40 mL, PRN  sodium chloride, 25 mL, PRN  ondansetron, 4 mg, Q8H PRN   Or  ondansetron, 4 mg, Q6H PRN  polyethylene glycol, 17 g, Daily PRN  acetaminophen, 650 mg, Q6H PRN   Or  acetaminophen, 650 mg, Q6H PRN  guaiFENesin-dextromethorphan, 5 mL, Q4H PRN  oxyCODONE-acetaminophen, 1 tablet, Q6H PRN          Electronically signed by Edgardo Park MD on 2/14/2022 at 8:46 AM

## 2022-02-14 NOTE — PROGRESS NOTES
Physical Therapy    Physical Therapy Treatment Note  Name: Margarita Calabrese MRN: [id number] :   1945   Date:  2022   Admission Date: 2022 Room:  -A   Restrictions/Precautions:  Restrictions/Precautions  Restrictions/Precautions: Fall Risk,General Precautions,Contact Precautions (droplet +)  Communication with other providers:  RN clears for tx  Subjective:  Patient states:  I'm pretty wiped out from doing that, ready to eat breakfast  Pain:   Location, Type, Intensity (0/10 to 10/10):  8/10 back, hips, general  Objective:    Observation:  Supine in bed, awake, agreeable w/ therapy. 15 L of nc O2, tele, pulse ox, BP cuff purwick in place. Purwick soiled and doffed; attempted to provide RN handoff, unable to contact. Treatment, including education/measures:  Therapeutic Activity Training:   Therapeutic activity training was instructed today. Cues were given for safety, sequence, UE/LE placement, awareness, and balance. Activities performed today included bed mobility training, sup-sit, sit-stand, SPT. Supine <-> sit: min A for completion and steady  Seated balance: good  Sit <-> stand: min A for completion and steady ; x 3 t/o session  Standing balance: fair ; BL UE support through crutches; this is her baseline  Pt ambulates 8 ft w/ use of BL crutches, CGA to steady ; inc time and effort; fwd flexed, dec endurance, mod cues for improved sequencing and safety  Stand <-> sit: min A for control of descent   Positioned for comfort and pressure relief ; left in recliner, call light in reach, chair alarm in place, gait belt, all needs met      Assessment / Impression: Tolerating well, ambulating inc distance and OOB to recliner. Dec endurance, weakness, high O2 demands at this time. Will cont to benefit from further skilled subacute placement to promote return to PLOF.      Patient's tolerance of treatment:  Good  Barriers to improvement:  O2 dependence; congenital hip disorder  Plan for Next Session:    Cont w/ est POC  OOB mobility, functional strength, up to recliner as O2 demands and activity tolerance allows    Time in:  0823  Time out:  0846  Timed treatment minutes:  23  Total treatment time:  23    Previously filed items:  Social/Functional History  Lives With: Alone  Type of Home: House  Home Layout: One level  Home Access: Ramped entrance (railing on BL sides)  Bathroom Shower/Tub: Shower chair without back (dtr spongebathes)  Bathroom Toilet: Handicap height  Bathroom Equipment: Grab bars around toilet  Home Equipment: Crutches,Wheelchair-manual,Grab bars  Receives Help From: Family  ADL Assistance: Needs assistance (dtr assists w/ bathing, dressing, ; dtr comes M/W/F)  Homemaking Assistance: Needs assistance (dtr does shopping; pt does not cook, has meals on wheels; son does outside [place]; grand dtrs clean inside)  Homemaking Responsibilities: No (family performs most; she assists w/ what she can, mostly done seated)  Ambulation Assistance: Independent (mod ind using crutches; only short distances)  Transfer Assistance: Independent  Active : No (family)  Occupation: Retired,On disability  Additional Comments: plays cards, crochette, puzzle books, socializes w/ family; sleeps in adjustable bed w/ trapeze over for improved pt mobility  Short term goals  Time Frame for Short term goals: 1 week  Short term goal 1: pt will complete bed mobility at min A  Short term goal 2: pt will complete transfers at [facility] term goal 3: pt will ambulate 15 ft using crutches at [facility] term goal 4: pt will demonstrate near gross >3/5 BL LE strength       Electronically signed by:    Lauren Garcia, PT, DPT  2/14/2022, 10:41 AM

## 2022-02-15 LAB
ANION GAP SERPL CALCULATED.3IONS-SCNC: 9 MMOL/L (ref 4–16)
BASOPHILS ABSOLUTE: 0 K/CU MM
BASOPHILS RELATIVE PERCENT: 0.1 % (ref 0–1)
BUN BLDV-MCNC: 28 MG/DL (ref 6–23)
CALCIUM SERPL-MCNC: 8.8 MG/DL (ref 8.3–10.6)
CHLORIDE BLD-SCNC: 98 MMOL/L (ref 99–110)
CO2: 27 MMOL/L (ref 21–32)
CREAT SERPL-MCNC: 0.5 MG/DL (ref 0.6–1.1)
DIFFERENTIAL TYPE: ABNORMAL
EOSINOPHILS ABSOLUTE: 0.1 K/CU MM
EOSINOPHILS RELATIVE PERCENT: 0.4 % (ref 0–3)
FERRITIN: 351 NG/ML (ref 15–150)
GFR AFRICAN AMERICAN: >60 ML/MIN/1.73M2
GFR NON-AFRICAN AMERICAN: >60 ML/MIN/1.73M2
GLUCOSE BLD-MCNC: 95 MG/DL (ref 70–99)
HCT VFR BLD CALC: 44.3 % (ref 37–47)
HEMOGLOBIN: 13.3 GM/DL (ref 12.5–16)
IMMATURE NEUTROPHIL %: 1.4 % (ref 0–0.43)
LYMPHOCYTES ABSOLUTE: 0.6 K/CU MM
LYMPHOCYTES RELATIVE PERCENT: 4.4 % (ref 24–44)
MCH RBC QN AUTO: 24.7 PG (ref 27–31)
MCHC RBC AUTO-ENTMCNC: 30 % (ref 32–36)
MCV RBC AUTO: 82.3 FL (ref 78–100)
MONOCYTES ABSOLUTE: 0.9 K/CU MM
MONOCYTES RELATIVE PERCENT: 6.8 % (ref 0–4)
NUCLEATED RBC %: 0 %
PDW BLD-RTO: 15.1 % (ref 11.7–14.9)
PLATELET # BLD: 380 K/CU MM (ref 140–440)
PMV BLD AUTO: 11.6 FL (ref 7.5–11.1)
POTASSIUM SERPL-SCNC: 4.7 MMOL/L (ref 3.5–5.1)
RBC # BLD: 5.38 M/CU MM (ref 4.2–5.4)
SEGMENTED NEUTROPHILS ABSOLUTE COUNT: 11.5 K/CU MM
SEGMENTED NEUTROPHILS RELATIVE PERCENT: 86.9 % (ref 36–66)
SODIUM BLD-SCNC: 134 MMOL/L (ref 135–145)
TOTAL CK: 22 IU/L (ref 26–140)
TOTAL IMMATURE NEUTOROPHIL: 0.18 K/CU MM
TOTAL NUCLEATED RBC: 0 K/CU MM
WBC # BLD: 13.2 K/CU MM (ref 4–10.5)

## 2022-02-15 PROCEDURE — 36415 COLL VENOUS BLD VENIPUNCTURE: CPT

## 2022-02-15 PROCEDURE — 82728 ASSAY OF FERRITIN: CPT

## 2022-02-15 PROCEDURE — 6370000000 HC RX 637 (ALT 250 FOR IP): Performed by: NURSE PRACTITIONER

## 2022-02-15 PROCEDURE — 80048 BASIC METABOLIC PNL TOTAL CA: CPT

## 2022-02-15 PROCEDURE — 6360000002 HC RX W HCPCS: Performed by: NURSE PRACTITIONER

## 2022-02-15 PROCEDURE — 85025 COMPLETE CBC W/AUTO DIFF WBC: CPT

## 2022-02-15 PROCEDURE — 2580000003 HC RX 258: Performed by: NURSE PRACTITIONER

## 2022-02-15 PROCEDURE — 2060000000 HC ICU INTERMEDIATE R&B

## 2022-02-15 PROCEDURE — 82550 ASSAY OF CK (CPK): CPT

## 2022-02-15 PROCEDURE — 6370000000 HC RX 637 (ALT 250 FOR IP): Performed by: HOSPITALIST

## 2022-02-15 PROCEDURE — 94761 N-INVAS EAR/PLS OXIMETRY MLT: CPT

## 2022-02-15 PROCEDURE — 2700000000 HC OXYGEN THERAPY PER DAY

## 2022-02-15 RX ADMIN — SENNOSIDES AND DOCUSATE SODIUM 1 TABLET: 50; 8.6 TABLET ORAL at 10:35

## 2022-02-15 RX ADMIN — LOSARTAN POTASSIUM 50 MG: 50 TABLET, FILM COATED ORAL at 10:35

## 2022-02-15 RX ADMIN — SODIUM CHLORIDE, PRESERVATIVE FREE 10 ML: 5 INJECTION INTRAVENOUS at 10:37

## 2022-02-15 RX ADMIN — OXYCODONE AND ACETAMINOPHEN 1 TABLET: 5; 325 TABLET ORAL at 15:15

## 2022-02-15 RX ADMIN — Medication 2000 UNITS: at 10:35

## 2022-02-15 RX ADMIN — SENNOSIDES AND DOCUSATE SODIUM 1 TABLET: 50; 8.6 TABLET ORAL at 20:39

## 2022-02-15 RX ADMIN — OXYCODONE AND ACETAMINOPHEN 1 TABLET: 5; 325 TABLET ORAL at 06:31

## 2022-02-15 RX ADMIN — DEXAMETHASONE SODIUM PHOSPHATE 10 MG: 10 INJECTION, SOLUTION INTRAMUSCULAR; INTRAVENOUS at 10:37

## 2022-02-15 RX ADMIN — MORPHINE SULFATE 30 MG: 15 TABLET, FILM COATED, EXTENDED RELEASE ORAL at 10:34

## 2022-02-15 RX ADMIN — MORPHINE SULFATE 30 MG: 15 TABLET, FILM COATED, EXTENDED RELEASE ORAL at 20:39

## 2022-02-15 RX ADMIN — ENOXAPARIN SODIUM 90 MG: 100 INJECTION SUBCUTANEOUS at 20:40

## 2022-02-15 RX ADMIN — FAMOTIDINE 20 MG: 20 TABLET ORAL at 10:35

## 2022-02-15 RX ADMIN — ENOXAPARIN SODIUM 90 MG: 100 INJECTION SUBCUTANEOUS at 10:36

## 2022-02-15 RX ADMIN — SODIUM CHLORIDE, PRESERVATIVE FREE 10 ML: 5 INJECTION INTRAVENOUS at 20:40

## 2022-02-15 RX ADMIN — FAMOTIDINE 20 MG: 20 TABLET ORAL at 20:39

## 2022-02-15 RX ADMIN — CETIRIZINE HYDROCHLORIDE 10 MG: 10 TABLET, FILM COATED ORAL at 06:17

## 2022-02-15 ASSESSMENT — PAIN SCALES - GENERAL
PAINLEVEL_OUTOF10: 7
PAINLEVEL_OUTOF10: 6
PAINLEVEL_OUTOF10: 8
PAINLEVEL_OUTOF10: 5
PAINLEVEL_OUTOF10: 7
PAINLEVEL_OUTOF10: 6
PAINLEVEL_OUTOF10: 8
PAINLEVEL_OUTOF10: 0

## 2022-02-15 ASSESSMENT — PAIN SCALES - WONG BAKER
WONGBAKER_NUMERICALRESPONSE: 0

## 2022-02-15 ASSESSMENT — PAIN DESCRIPTION - LOCATION
LOCATION: BACK;HIP

## 2022-02-15 ASSESSMENT — PAIN DESCRIPTION - PROGRESSION
CLINICAL_PROGRESSION: NOT CHANGED

## 2022-02-15 ASSESSMENT — PAIN DESCRIPTION - PAIN TYPE
TYPE: CHRONIC PAIN

## 2022-02-15 ASSESSMENT — PAIN DESCRIPTION - ONSET
ONSET: ON-GOING

## 2022-02-15 ASSESSMENT — PAIN DESCRIPTION - DESCRIPTORS
DESCRIPTORS: ACHING;DISCOMFORT

## 2022-02-15 ASSESSMENT — PAIN DESCRIPTION - FREQUENCY
FREQUENCY: CONTINUOUS

## 2022-02-15 ASSESSMENT — PAIN - FUNCTIONAL ASSESSMENT
PAIN_FUNCTIONAL_ASSESSMENT: PREVENTS OR INTERFERES SOME ACTIVE ACTIVITIES AND ADLS

## 2022-02-15 ASSESSMENT — PAIN DESCRIPTION - ORIENTATION
ORIENTATION: RIGHT;LEFT

## 2022-02-15 NOTE — PROGRESS NOTES
Hospitalist Progress Note      Name:  Arvin Councilman /Age/Sex: 1945  (68 y.o. female)   MRN & CSN:  [id number] & [id number] Admission Date/Time: 2022  4:46 PM   Location:  -A PCP: No primary care provider on file. Hospital Day: 11    Assessment and Plan:   Arvin Councilman is a 68 y.o.  female with past medical history of degenerative joint disease, hypertension, was admitted on 2020 when she presented with nausea, vomiting, fatigue, was found to be hypoxic, diagnosed with Covid. 1. Acute respiratory failure with hypoxia secondary to COVID-19 pneumonia: Today her oxygen requirement has decreased. She is on 12 L/min high flow. Oxygen saturation 91%. Continue incentive spirometry, OOB as tolerated. Continue baricitinib, dexamethasone. Inflammatory markers are trending down. CT pulmonary angiogram negative for PE. Shows patchy consolidative groundglass opacities bilaterally. 2. Essential hypertension: Stable on losartan 50 mg daily  3. History of degenerative joint disease: Continue home dose of MS Contin 30 mg twice daily as well as Percocet 5/325 mg every 6 as needed  4. Obesity with BMI of 34.1  5. Advance care planning: Patient is DNR [facility]    Interval History   Denies new complaints. Says that her breathing is slightly better. 10 point review of systems negative other than the above. Objective: Intake/Output Summary (Last 24 hours) at 2/15/2022 1531  Last data filed at 2022 1800  Gross per 24 hour   Intake 360 ml   Output    Net 360 ml      Vitals:   Vitals:    02/15/22 1300   BP:    Pulse: 75   Resp: 20   Temp:    SpO2: 91%     Physical Exam:   GEN Awake female, sitting upright in bed in no apparent distress. Appears given age. EYES Pupils are equally round. No scleral erythema, discharge, or conjunctivitis. HENT Mucous membranes are moist. Oral pharynx without exudates, no evidence of thrush.     NECK Supple, no apparent thyromegaly or masses. RESP Breath sounds are diminished bilateral bases. CARD: S1/S2 auscultated. Regular. No peripheral edema. GI Abdomen is soft without significant tenderness, masses, or guarding. Bowel sounds are normoactive. Rectal exam deferred.  No costovertebral angle tenderness. Normal appearing external genitalia. Waller catheter is not present. HEME/LYMP: No palpable cervical lymphadenopathy and no hepatosplenomegaly. No petechiae or ecchymoses. MSK No gross joint deformities. SKIN Normal coloration, warm, dry. NEURO Cranial nerves appear grossly intact, normal speech, no lateralizing weakness. PSYCH Awake, alert, oriented x 4. Affect appropriate.     Medications:   Medications:    baricitinib  4 mg Oral Daily    morphine  30 mg Oral 2 times per day    cetirizine  10 mg Oral Daily    losartan  50 mg Oral Daily    sodium chloride flush  5-40 mL IntraVENous 2 times per day    sennosides-docusate sodium  1 tablet Oral BID    famotidine  20 mg Oral BID    dexamethasone  10 mg IntraVENous Q24H    dexamethasone  10 mg IntraVENous Once    Vitamin D  2,000 Units Oral Daily    enoxaparin  1 mg/kg SubCUTAneous BID      Infusions:    sodium chloride 25 mL (02/09/22 0917)     PRN Meds: albuterol sulfate HFA, 2 puff, Q4H PRN  benzocaine-menthol, 1 lozenge, Q2H PRN  sodium chloride flush, 5-40 mL, PRN  sodium chloride, 25 mL, PRN  ondansetron, 4 mg, Q8H PRN   Or  ondansetron, 4 mg, Q6H PRN  polyethylene glycol, 17 g, Daily PRN  acetaminophen, 650 mg, Q6H PRN   Or  acetaminophen, 650 mg, Q6H PRN  guaiFENesin-dextromethorphan, 5 mL, Q4H PRN  oxyCODONE-acetaminophen, 1 tablet, Q6H PRN      Electronically signed by Vishnu Miguel MD on 2/15/2022 at 3:31 PM

## 2022-02-15 NOTE — PROGRESS NOTES
Pulmonary and Critical Care  Progress Note    Subjective: The patient is better. On 12 L HFNC. Shortness of breath is better. Chest pain none  Addressing respiratory complaints Patient is negative for  hemoptysis and cyanosis  CONSTITUTIONAL:  negative for fevers and chills      Past Medical History:     has a past medical history of Chronic lower back pain, Hx of spinal fusion, Hypertension, MRSA (methicillin resistant staph aureus) culture positive, and Venous disease. has a past surgical history that includes fracture surgery and back surgery. reports that she has never smoked. She has never used smokeless tobacco. She reports that she does not drink alcohol and does not use drugs. Family history:  family history is not on file. No Known Allergies  Social History:    Reviewed; no changes    Objective:   PHYSICAL EXAM:        VITALS:  /73   Pulse 63   Temp 97.7 °F (36.5 °C) (Oral)   Resp (!) 32   Ht 5' 2.01\" (1.575 m)   Wt 186 lb 8.2 oz (84.6 kg)   SpO2 100%   BMI 34.10 kg/m²     24HR INTAKE/OUTPUT:      Intake/Output Summary (Last 24 hours) at 2/15/2022 1106  Last data filed at 2/14/2022 1800  Gross per 24 hour   Intake 360 ml   Output    Net 360 ml       CONSTITUTIONAL:  Awake. LUNGS:  decreased breath sounds, basilar crackles. CARDIOVASCULAR:  normal S1 and S2 and negative JVD  ABD:Abdomen soft, non-tender. BS normal. No masses,  No organomegaly  NEURO:Alert. DATA:    CBC:  No results for input(s): WBC, RBC, HGB, HCT, PLT, MCV, MCH, MCHC, RDW, NRBC, SEGSPCT, BANDSPCT in the last 72 hours. BMP:  No results for input(s): NA, K, CL, CO2, BUN, CREATININE, CALCIUM, GLUCOSE in the last 72 hours. ABG:  No results for input(s): PH, PO2ART, QWI8JVB, HCO3, BEART, O2SAT in the last 72 hours.   Lab Results   Component Value Date    PROBNP 1,360 (H) 02/08/2022     No results found for: [facility]    Radiology Review:  Pertinent images / reports were reviewed as a part of this

## 2022-02-15 NOTE — PLAN OF CARE
Problem: Falls - Risk of:  Goal: Will remain free from falls  Description: Will remain free from falls  2/15/2022 0127 by Diana Rivers RN  Outcome: Ongoing  2/14/2022 1841 by Olivia Bautista RN  Outcome: Met This Shift  Goal: Absence of physical injury  Description: Absence of physical injury  2/15/2022 0127 by Diana Rivers RN  Outcome: Ongoing  2/14/2022 1841 by Olivia Bautista RN  Outcome: Met This Shift     Problem: Airway Clearance - Ineffective  Goal: Achieve or maintain patent airway  2/15/2022 0127 by Diana Rivers RN  Outcome: Ongoing  2/14/2022 1841 by Olivia Buatista RN  Outcome: Met This Shift     Problem: Gas Exchange - Impaired  Goal: Absence of hypoxia  2/15/2022 0127 by Diana Rivers RN  Outcome: Ongoing  2/14/2022 1841 by Olivia Bautista RN  Outcome: Met This Shift  Goal: Promote optimal lung function  2/15/2022 0127 by Diana Rivers RN  Outcome: Ongoing  2/14/2022 1841 by Olivia Bautista RN  Outcome: Met This Shift     Problem: Breathing Pattern - Ineffective  Goal: Ability to achieve and maintain a regular respiratory rate  2/15/2022 0127 by Diana Rivers RN  Outcome: Ongoing  2/14/2022 1841 by Olivia Bautista RN  Outcome: Met This Shift     Problem:  Body Temperature -  Risk of, Imbalanced  Goal: Ability to maintain a body temperature within defined limits  2/15/2022 0127 by Diana Rivers RN  Outcome: Ongoing  2/14/2022 1841 by Olivia Bautista RN  Outcome: Met This Shift  Goal: Will regain or maintain usual level of consciousness  2/15/2022 0127 by Diana Rivers RN  Outcome: Ongoing  2/14/2022 1841 by Olivia Bautista RN  Outcome: Met This Shift  Goal: Complications related to the disease process, condition or treatment will be avoided or minimized  2/15/2022 0127 by Diana Rivers RN  Outcome: Ongoing  2/14/2022 1841 by Olivia Bautista RN  Outcome: Met This Shift     Problem: Isolation Precautions - Risk of Spread of Infection  Goal: Prevent transmission of infection  2/15/2022 0127 by Angel Barillas RN  Outcome: Ongoing  2/14/2022 1841 by Gwendolyn Lemon RN  Outcome: Met This Shift     Problem: Nutrition Deficits  Goal: Optimize nutritional status  2/15/2022 0127 by Angel Barillas RN  Outcome: Ongoing  2/14/2022 1841 by Gwendolyn Lemon RN  Outcome: Met This Shift     Problem: Risk for Fluid Volume Deficit  Goal: Maintain normal heart rhythm  2/15/2022 0127 by Angel Barillas RN  Outcome: Ongoing  2/14/2022 1841 by Gwendolyn Lemon RN  Outcome: Met This Shift  Goal: Maintain absence of muscle cramping  2/15/2022 0127 by Angel Barillas RN  Outcome: Ongoing  2/14/2022 1841 by Gwendolyn Lemon RN  Outcome: Met This Shift  Goal: Maintain normal serum potassium, sodium, calcium, phosphorus, and pH  2/15/2022 0127 by Angel Barillas RN  Outcome: Ongoing  2/14/2022 1841 by Gwendolyn Lemon RN  Outcome: Met This Shift     Problem: Loneliness or Risk for Loneliness  Goal: Demonstrate positive use of time alone when socialization is not possible  2/15/2022 0127 by Angel Barillas RN  Outcome: Ongoing  2/14/2022 1841 by Gwendolyn eLmon RN  Outcome: Met This Shift     Problem: Fatigue  Goal: Verbalize increase energy and improved vitality  2/15/2022 0127 by Angel Barillas RN  Outcome: Ongoing  2/14/2022 1841 by Gwendolyn Lemon RN  Outcome: Met This Shift     Problem: Patient Education: Go to Patient Education Activity  Goal: Patient/Family Education  2/15/2022 0127 by Angel Barillsa RN  Outcome: Ongoing  2/14/2022 1841 by Gwendolyn Lemon RN  Outcome: Met This Shift     Problem: Pain:  Goal: Pain level will decrease  Description: Pain level will decrease  2/15/2022 0127 by Angel Barillas RN  Outcome: Ongoing  2/14/2022 1841 by Gwendolyn Lemon RN  Outcome: Met This Shift  Goal: Control of acute pain  Description: Control of acute pain  2/15/2022 0127 by Angel Barillas RN  Outcome: Ongoing  2/14/2022 1841 by Gwendolyn Lemon RN  Outcome: Met This Shift  Goal: Control of chronic pain  Description: Control of chronic pain  2/15/2022 0127 by Marcy Ga RN  Outcome: Ongoing  2/14/2022 1841 by King Blackmon RN  Outcome: Met This Shift     Problem: Skin Integrity:  Goal: Will show no infection signs and symptoms  Description: Will show no infection signs and symptoms  2/15/2022 0127 by Marcy Ga RN  Outcome: Ongoing  2/14/2022 1841 by King Blackmon RN  Outcome: Met This Shift  Goal: Absence of new skin breakdown  Description: Absence of new skin breakdown  2/15/2022 0127 by Marcy Ga RN  Outcome: Ongoing  2/14/2022 1841 by King Blackmon RN  Outcome: Met This Shift     Problem: Nutrition  Goal: Optimal nutrition therapy  2/15/2022 0127 by Marcy Ga RN  Outcome: Ongoing  2/14/2022 1841 by King Blackmon RN  Outcome: Met This Shift     Problem: Falls - Risk of:  Goal: Will remain free from falls  Description: Will remain free from falls  2/15/2022 0127 by Marcy Ga RN  Outcome: Ongoing  2/14/2022 1841 by King Blackmon RN  Outcome: Met This Shift  Goal: Absence of physical injury  Description: Absence of physical injury  2/15/2022 0127 by Marcy Ga RN  Outcome: Ongoing  2/14/2022 1841 by King Blackmon RN  Outcome: Met This Shift     Problem: Airway Clearance - Ineffective  Goal: Achieve or maintain patent airway  2/15/2022 0127 by Marcy Ga RN  Outcome: Ongoing  2/14/2022 1841 by King Blackmon RN  Outcome: Met This Shift     Problem: Gas Exchange - Impaired  Goal: Absence of hypoxia  2/15/2022 0127 by Marcy Ga RN  Outcome: Ongoing  2/14/2022 1841 by King Blackmon RN  Outcome: Met This Shift  Goal: Promote optimal lung function  2/15/2022 0127 by Marcy Ga RN  Outcome: Ongoing  2/14/2022 1841 by King Blackmon RN  Outcome: Met This Shift     Problem: Breathing Pattern - Ineffective  Goal: Ability to achieve and maintain a regular respiratory rate  2/15/2022 0127 by Marcy Ga RN  Outcome: Ongoing  2/14/2022 1841 by Yuvonne Bumpers, RN  Outcome: Met This Shift     Problem:  Body Temperature -  Risk of, Imbalanced  Goal: Ability to maintain a body temperature within defined limits  2/15/2022 0127 by Son Mckeon RN  Outcome: Ongoing  2/14/2022 1841 by Yuvonne Bumpers, RN  Outcome: Met This Shift  Goal: Will regain or maintain usual level of consciousness  2/15/2022 0127 by Son Mckeon RN  Outcome: Ongoing  2/14/2022 1841 by Yuvonne Bumpers, RN  Outcome: Met This Shift  Goal: Complications related to the disease process, condition or treatment will be avoided or minimized  2/15/2022 0127 by Son Mckeon RN  Outcome: Ongoing  2/14/2022 1841 by Yuvonne Bumpers, RN  Outcome: Met This Shift     Problem: Isolation Precautions - Risk of Spread of Infection  Goal: Prevent transmission of infection  2/15/2022 0127 by Son Mckeon RN  Outcome: Ongoing  2/14/2022 1841 by Yuvonne Bumpers, RN  Outcome: Met This Shift     Problem: Nutrition Deficits  Goal: Optimize nutritional status  2/15/2022 0127 by Son Mckeon RN  Outcome: Ongoing  2/14/2022 1841 by Yuvonne Bumpers, RN  Outcome: Met This Shift     Problem: Risk for Fluid Volume Deficit  Goal: Maintain normal heart rhythm  2/15/2022 0127 by Son Mckeon RN  Outcome: Ongoing  2/14/2022 1841 by Yuvonne Bumpers, RN  Outcome: Met This Shift  Goal: Maintain absence of muscle cramping  2/15/2022 0127 by Son Mckeon RN  Outcome: Ongoing  2/14/2022 1841 by Yuvonne Bumpers, RN  Outcome: Met This Shift  Goal: Maintain normal serum potassium, sodium, calcium, phosphorus, and pH  2/15/2022 0127 by Son Mckeon RN  Outcome: Ongoing  2/14/2022 1841 by Yuvonne Bumpers, RN  Outcome: Met This Shift     Problem: Loneliness or Risk for Loneliness  Goal: Demonstrate positive use of time alone when socialization is not possible  2/15/2022 0127 by Son Mckeon RN  Outcome: Ongoing  2/14/2022 1841 by Yuvonne Bumpers, RN  Outcome: Met This Shift     Problem: Fatigue  Goal: Verbalize increase energy and improved vitality  2/15/2022 0127 by Kevin Snider RN  Outcome: Ongoing  2/14/2022 1841 by Radha Lopez RN  Outcome: Met This Shift     Problem: Patient Education: Go to Patient Education Activity  Goal: Patient/Family Education  2/15/2022 0127 by Kevin Snider RN  Outcome: Ongoing  2/14/2022 1841 by Radha Lopez RN  Outcome: Met This Shift     Problem: Pain:  Goal: Pain level will decrease  Description: Pain level will decrease  2/15/2022 0127 by Kevin Snider RN  Outcome: Ongoing  2/14/2022 1841 by Radha Lopez RN  Outcome: Met This Shift  Goal: Control of acute pain  Description: Control of acute pain  2/15/2022 0127 by Kevin Snider RN  Outcome: Ongoing  2/14/2022 1841 by Radha Lopez RN  Outcome: Met This Shift  Goal: Control of chronic pain  Description: Control of chronic pain  2/15/2022 0127 by Kevin Snider RN  Outcome: Ongoing  2/14/2022 1841 by Radha Lopez RN  Outcome: Met This Shift     Problem: Skin Integrity:  Goal: Will show no infection signs and symptoms  Description: Will show no infection signs and symptoms  2/15/2022 0127 by Kevin Snider RN  Outcome: Ongoing  2/14/2022 1841 by Radha Lopez RN  Outcome: Met This Shift  Goal: Absence of new skin breakdown  Description: Absence of new skin breakdown  2/15/2022 0127 by Kevin Snider RN  Outcome: Ongoing  2/14/2022 1841 by Radha Lopez RN  Outcome: Met This Shift     Problem: Nutrition  Goal: Optimal nutrition therapy  2/15/2022 0127 by Kevin Snider RN  Outcome: Ongoing  2/14/2022 1841 by Radha Lopez RN  Outcome: Met This Shift

## 2022-02-16 PROCEDURE — 85025 COMPLETE CBC W/AUTO DIFF WBC: CPT

## 2022-02-16 PROCEDURE — 82550 ASSAY OF CK (CPK): CPT

## 2022-02-16 PROCEDURE — 80048 BASIC METABOLIC PNL TOTAL CA: CPT

## 2022-02-16 PROCEDURE — 6370000000 HC RX 637 (ALT 250 FOR IP): Performed by: HOSPITALIST

## 2022-02-16 PROCEDURE — 97530 THERAPEUTIC ACTIVITIES: CPT

## 2022-02-16 PROCEDURE — 6360000002 HC RX W HCPCS: Performed by: NURSE PRACTITIONER

## 2022-02-16 PROCEDURE — 2060000000 HC ICU INTERMEDIATE R&B

## 2022-02-16 PROCEDURE — 82728 ASSAY OF FERRITIN: CPT

## 2022-02-16 PROCEDURE — 6370000000 HC RX 637 (ALT 250 FOR IP): Performed by: NURSE PRACTITIONER

## 2022-02-16 PROCEDURE — 2580000003 HC RX 258: Performed by: NURSE PRACTITIONER

## 2022-02-16 RX ADMIN — SENNOSIDES AND DOCUSATE SODIUM 1 TABLET: 50; 8.6 TABLET ORAL at 08:26

## 2022-02-16 RX ADMIN — FAMOTIDINE 20 MG: 20 TABLET ORAL at 21:58

## 2022-02-16 RX ADMIN — Medication 2000 UNITS: at 08:26

## 2022-02-16 RX ADMIN — SODIUM CHLORIDE, PRESERVATIVE FREE 10 ML: 5 INJECTION INTRAVENOUS at 21:58

## 2022-02-16 RX ADMIN — OXYCODONE AND ACETAMINOPHEN 1 TABLET: 5; 325 TABLET ORAL at 12:20

## 2022-02-16 RX ADMIN — FAMOTIDINE 20 MG: 20 TABLET ORAL at 08:25

## 2022-02-16 RX ADMIN — BARICITINIB 4 MG: 2 TABLET, FILM COATED ORAL at 08:27

## 2022-02-16 RX ADMIN — DEXAMETHASONE SODIUM PHOSPHATE 10 MG: 10 INJECTION, SOLUTION INTRAMUSCULAR; INTRAVENOUS at 08:27

## 2022-02-16 RX ADMIN — MORPHINE SULFATE 30 MG: 15 TABLET, FILM COATED, EXTENDED RELEASE ORAL at 21:58

## 2022-02-16 RX ADMIN — ENOXAPARIN SODIUM 90 MG: 100 INJECTION SUBCUTANEOUS at 21:58

## 2022-02-16 RX ADMIN — LOSARTAN POTASSIUM 50 MG: 50 TABLET, FILM COATED ORAL at 08:26

## 2022-02-16 RX ADMIN — SENNOSIDES AND DOCUSATE SODIUM 1 TABLET: 50; 8.6 TABLET ORAL at 21:58

## 2022-02-16 RX ADMIN — OXYCODONE AND ACETAMINOPHEN 1 TABLET: 5; 325 TABLET ORAL at 01:29

## 2022-02-16 RX ADMIN — SODIUM CHLORIDE, PRESERVATIVE FREE 10 ML: 5 INJECTION INTRAVENOUS at 08:28

## 2022-02-16 RX ADMIN — MORPHINE SULFATE 30 MG: 15 TABLET, FILM COATED, EXTENDED RELEASE ORAL at 08:26

## 2022-02-16 RX ADMIN — CETIRIZINE HYDROCHLORIDE 10 MG: 10 TABLET, FILM COATED ORAL at 05:46

## 2022-02-16 RX ADMIN — ENOXAPARIN SODIUM 90 MG: 100 INJECTION SUBCUTANEOUS at 08:27

## 2022-02-16 ASSESSMENT — PAIN DESCRIPTION - DESCRIPTORS
DESCRIPTORS: ACHING;PRESSURE
DESCRIPTORS: ACHING;DISCOMFORT
DESCRIPTORS: ACHING;DISCOMFORT

## 2022-02-16 ASSESSMENT — PAIN DESCRIPTION - PAIN TYPE
TYPE: CHRONIC PAIN

## 2022-02-16 ASSESSMENT — PAIN SCALES - GENERAL
PAINLEVEL_OUTOF10: 9
PAINLEVEL_OUTOF10: 8
PAINLEVEL_OUTOF10: 8
PAINLEVEL_OUTOF10: 3
PAINLEVEL_OUTOF10: 8
PAINLEVEL_OUTOF10: 9

## 2022-02-16 ASSESSMENT — PAIN DESCRIPTION - PROGRESSION
CLINICAL_PROGRESSION: NOT CHANGED
CLINICAL_PROGRESSION: NOT CHANGED

## 2022-02-16 ASSESSMENT — PAIN DESCRIPTION - ONSET
ONSET: ON-GOING

## 2022-02-16 ASSESSMENT — PAIN DESCRIPTION - FREQUENCY
FREQUENCY: CONTINUOUS

## 2022-02-16 ASSESSMENT — PAIN DESCRIPTION - LOCATION
LOCATION: BACK;HIP
LOCATION: BACK
LOCATION: BACK

## 2022-02-16 ASSESSMENT — PAIN DESCRIPTION - ORIENTATION
ORIENTATION: LOWER;MID
ORIENTATION: LOWER;MID
ORIENTATION: RIGHT;LEFT;MID;LOWER

## 2022-02-16 ASSESSMENT — PAIN SCALES - WONG BAKER: WONGBAKER_NUMERICALRESPONSE: 0

## 2022-02-16 NOTE — PROGRESS NOTES
Physical Therapy    Physical Therapy Treatment Note  Name: Gayle Jones MRN: [id number] :   1945   Date:  2022   Admission Date: 2022 Room:  -A   Restrictions/Precautions:  Restrictions/Precautions  Restrictions/Precautions: Fall Risk,General Precautions,Contact Precautions (droplet +)  Communication with other providers:  RN clears for tx  Subjective:  Patient states: \"My chest hurts\" Patient states this is not normal. I alerted her RN Daisy Sheehan and she felt it wasn't urgent and that the patient feels this way with oncoming BMs. Rn states she will provide medication at the EOS. Pain:   Location, Type, Intensity (0/10 to 10/10):  8/10 back, hips, general  Objective:    Observation:  Supine in bed, awake, agreeable w/ therapy. 13 L of nc O2, tele, pulse ox, BP cuff and purwick in place. 13L of O2 at 85% saturation at JOZEF, O2 decrease to low 70's with activity and no good recovery with rest/PLB. O2 increased to 15L O2 at EOS with max recovery of 85%  Treatment, including education/measures:  Therapeutic Activity Training:   Therapeutic activity training was instructed today. Cues were given for safety, sequence, UE/LE placement, awareness, and balance. Activities performed today included bed mobility training, sup-sit, sit-stand, SPT. Supine <-> sit: min A for completion and steady  Seated balance: good  Sit <-> stand: min A for completion and steady ; x 3 t/o session  SPT: To [facility] [place] Mod A d/t patient unable to use crutches for transfer and elects to use bed rails and [facility] [place] arm rests  Standing balance: fair ; BUE support with bed rails during rectal cleaning  Stand <-> sit: min A for control of descent   Scooting: Unable to do seated scoot. She is able to supine shift her hips and shoulder in bed. Mod A with BLE positioning.  Max Ax1 with supine scooting up to [facility] with bed in trendelenbeg  Positioned for comfort and pressure relief ; left in recliner, call light in reach, chair alarm in place, gait belt, all needs me    Assessment / Impression:    Patient had poor tolerance today. She started off at 13L of O2 at 85% saturation. With activity her O2 would drop to low 70's with saturation. With moderate rest breaks, max cues for PLB and increasing her O2 to 15L, she only recovered back to 85% saturation. RN notified of patient's response today. Elected to forego gait training d/t her chest discomfort and poor O2 saturation today.       Patient's tolerance of treatment:  Poor  Barriers to improvement:  O2 dependence; congenital hip disorder  Plan for Next Session:    Cont w/ est POC  OOB mobility, functional strength, up to recliner as O2 demands and activity tolerance allows    Time in:  1135  Time out:  1216  Timed treatment minutes:  41  Total treatment time:  41    Previously filed items:  Social/Functional History  Lives With: Alone  Type of Home: House  Home Layout: One level  Home Access: Ramped entrance (railing on BL sides)  Bathroom Shower/Tub: Shower chair without back (dtr spongebathes)  Bathroom Toilet: Handicap height  Bathroom Equipment: Grab bars around toilet  Home Equipment: Crutches,Wheelchair-manual,Grab bars  Receives Help From: Family  ADL Assistance: Needs assistance (dtr assists w/ bathing, dressing, ; dtr comes M/W/F)  Homemaking Assistance: Needs assistance (dtr does shopping; pt does not cook, has meals on wheels; son does outside [place]; grand dtrs clean inside)  Homemaking Responsibilities: No (family performs most; she assists w/ what she can, mostly done seated)  Ambulation Assistance: Independent (mod ind using crutches; only short distances)  Transfer Assistance: Independent  Active : No (family)  Occupation: Retired,On disability  Additional Comments: plays cards, crochette, puzzle books, socializes w/ family; sleeps in adjustable bed w/ trapeze over for improved pt mobility  Short term goals  Time Frame for Short term goals: 1 week  Short term goal 1: pt will complete bed mobility at min A  Short term goal 2: pt will complete transfers at [facility]  Short term goal 3: pt will ambulate 15 ft using crutches at [facility]  Short term goal 4: pt will demonstrate near gross >3/5 BL LE strength       Electronically signed by:     Javan Kimball PTA, DPT  2/16/2022, 1:14 PM

## 2022-02-16 NOTE — PROGRESS NOTES
Pulmonary and Critical Care  Progress Note    Subjective: The patient is comfortable in bed. On 14L HFNC. Shortness of breath is unchanged. Chest pain none  Addressing respiratory complaints Patient is negative for  hemoptysis and cyanosis  CONSTITUTIONAL:  negative for fevers and chills      Past Medical History:     has a past medical history of Chronic lower back pain, Hx of spinal fusion, Hypertension, MRSA (methicillin resistant staph aureus) culture positive, and Venous disease. has a past surgical history that includes fracture surgery and back surgery. reports that she has never smoked. She has never used smokeless tobacco. She reports that she does not drink alcohol and does not use drugs. Family history:  family history is not on file. No Known Allergies  Social History:    Reviewed; no changes    Objective:   PHYSICAL EXAM:        VITALS:  BP (!) 106/56   Pulse 70   Temp 97.6 °F (36.4 °C) (Oral)   Resp 19   Ht 5' 2.01\" (1.575 m)   Wt 186 lb 8.2 oz (84.6 kg)   SpO2 96%   BMI 34.10 kg/m²     24HR INTAKE/OUTPUT:      Intake/Output Summary (Last 24 hours) at 2/16/2022 1056  Last data filed at 2/16/2022 0828  Gross per 24 hour   Intake 850 ml   Output 1375 ml   Net -525 ml       CONSTITUTIONAL:  Awake. LUNGS:  decreased breath sounds, basilar crackles. CARDIOVASCULAR:  normal S1 and S2 and negative JVD  ABD:Abdomen soft, non-tender. BS normal. No masses,  No organomegaly  NEURO:Alert. DATA:    CBC:  Recent Labs     02/15/22  1312   WBC 13.2*   RBC 5.38   HGB 13.3   HCT 44.3      MCV 82.3   MCH 24.7*   MCHC 30.0*   RDW 15.1*   SEGSPCT 86.9*      BMP:  Recent Labs     02/15/22  1312   *   K 4.7   CL 98*   CO2 27   BUN 28*   CREATININE 0.5*   CALCIUM 8.8   GLUCOSE 95      ABG:  No results for input(s): PH, PO2ART, GJJ9EQA, HCO3, BEART, O2SAT in the last 72 hours.   Lab Results   Component Value Date    PROBNP 1,360 (H) 02/08/2022     No results found for: [facility]    Radiology Review:  Pertinent images / reports were reviewed as a part of this visit. Assessment:     Patient Active Problem List   Diagnosis    WD-Venous stasis dermatitis of both lower extremities    WD-Lymphedema of both lower extremities    WD-Venous stasis ulcer of right calf with fat layer exposed with varicose veins (HCC)    WD-Venous stasis ulcer of left calf with fat layer exposed with varicose veins ([address]    COVID-19    Hypoxia       Plan:   1. Overall the patient is unchanged. 2. [address] Activity.     Ronda Dominguez MD  2/16/2022  10:56 AM

## 2022-02-16 NOTE — CARE COORDINATION
Patient on COVID unit and continues on HFNC.  Case Management to follow to continue to assist with discharge plan/needs

## 2022-02-16 NOTE — PROGRESS NOTES
Hospitalist Progress Note      Name:  Paul Landers /Age/Sex: 1945  (68 y.o. female)   MRN & CSN:  [id number] & [id number] Admission Date/Time: 2022  4:46 PM   Location:  -A PCP: No primary care provider on file. Hospital Day: 12    Assessment and Plan:   Paul Landers is a 68 y.o.  female with past medical history of degenerative joint disease, hypertension, was admitted on 2020 when she presented with nausea, vomiting, fatigue, was found to be hypoxic, diagnosed with Covid. 1. Acute respiratory failure with hypoxia secondary to COVID-19 pneumonia:  She is on 14L/min high flow in addition to a nonrebreather. Oxygen saturation 88%. Continue incentive spirometry, OOB as tolerated. Continue baricitinib, dexamethasone. Inflammatory markers are trending down. CT pulmonary angiogram negative for PE. Shows patchy consolidative groundglass opacities bilaterally. PCT 0.078. Low suspicion for superimposed bacterial pneumonia. 2. Essential hypertension: Stable on losartan 50 mg daily  3. Hyponatremia - likely due to COVID. Mild and asymptomatic. Will monitor  4. History of degenerative joint disease: Continue home dose of MS Contin 30 mg twice daily as well as Percocet 5/325 mg every 6 as needed  5. Obesity with BMI of 34.1  6. Advance care planning: Patient is DNR CCA    Interval History   Denies new complaints. Says that her breathing is improving slowly. 10 point review of systems negative other than the above. Objective: Intake/Output Summary (Last 24 hours) at 2022 1544  Last data filed at 2022 1300  Gross per 24 hour   Intake 850 ml   Output 1375 ml   Net -525 ml      Vitals:   Vitals:    22 1300   BP:    Pulse: 78   Resp: 19   Temp:    SpO2: 93%     Physical Exam:   GEN Awake female, sitting upright in bed in no apparent distress. Appears given age. EYES Pupils are equally round.   No scleral erythema, discharge, or conjunctivitis. HENT Mucous membranes are moist. Oral pharynx without exudates, no evidence of thrush. NECK Supple, no apparent thyromegaly or masses. RESP Breath sounds are diminished bilateral bases. CARD: S1/S2 auscultated. Regular. No peripheral edema. GI Abdomen is soft without significant tenderness, masses, or guarding. Bowel sounds are normoactive. Rectal exam deferred.  No costovertebral angle tenderness. Normal appearing external genitalia. Waller catheter is not present. HEME/LYMP: No palpable cervical lymphadenopathy and no hepatosplenomegaly. No petechiae or ecchymoses. MSK No gross joint deformities. SKIN Normal coloration, warm, dry. NEURO Cranial nerves appear grossly intact, normal speech, no lateralizing weakness. PSYCH Awake, alert, oriented x 4. Affect appropriate.     Medications:   Medications:    baricitinib  4 mg Oral Daily    morphine  30 mg Oral 2 times per day    cetirizine  10 mg Oral Daily    losartan  50 mg Oral Daily    sodium chloride flush  5-40 mL IntraVENous 2 times per day    sennosides-docusate sodium  1 tablet Oral BID    famotidine  20 mg Oral BID    dexamethasone  10 mg IntraVENous Q24H    Vitamin D  2,000 Units Oral Daily    enoxaparin  1 mg/kg SubCUTAneous BID      Infusions:    sodium chloride 25 mL (02/09/22 0917)     PRN Meds: albuterol sulfate HFA, 2 puff, Q4H PRN  benzocaine-menthol, 1 lozenge, Q2H PRN  sodium chloride flush, 5-40 mL, PRN  sodium chloride, 25 mL, PRN  ondansetron, 4 mg, Q8H PRN   Or  ondansetron, 4 mg, Q6H PRN  polyethylene glycol, 17 g, Daily PRN  acetaminophen, 650 mg, Q6H PRN   Or  acetaminophen, 650 mg, Q6H PRN  guaiFENesin-dextromethorphan, 5 mL, Q4H PRN  oxyCODONE-acetaminophen, 1 tablet, Q6H PRN      Electronically signed by Promise Fernandez MD on 2/16/2022 at 3:44 PM

## 2022-02-17 LAB
ANION GAP SERPL CALCULATED.3IONS-SCNC: 10 MMOL/L (ref 4–16)
BASOPHILS ABSOLUTE: 0 K/CU MM
BASOPHILS RELATIVE PERCENT: 0.1 % (ref 0–1)
BUN BLDV-MCNC: 22 MG/DL (ref 6–23)
CALCIUM SERPL-MCNC: 9.3 MG/DL (ref 8.3–10.6)
CHLORIDE BLD-SCNC: 100 MMOL/L (ref 99–110)
CO2: 27 MMOL/L (ref 21–32)
CREAT SERPL-MCNC: 0.5 MG/DL (ref 0.6–1.1)
DIFFERENTIAL TYPE: ABNORMAL
EOSINOPHILS ABSOLUTE: 0 K/CU MM
EOSINOPHILS RELATIVE PERCENT: 0.2 % (ref 0–3)
GFR AFRICAN AMERICAN: >60 ML/MIN/1.73M2
GFR NON-AFRICAN AMERICAN: >60 ML/MIN/1.73M2
GLUCOSE BLD-MCNC: 147 MG/DL (ref 70–99)
HCT VFR BLD CALC: 42.7 % (ref 37–47)
HEMOGLOBIN: 12.7 GM/DL (ref 12.5–16)
IMMATURE NEUTROPHIL %: 1.4 % (ref 0–0.43)
LYMPHOCYTES ABSOLUTE: 0.7 K/CU MM
LYMPHOCYTES RELATIVE PERCENT: 5.6 % (ref 24–44)
MCH RBC QN AUTO: 24.7 PG (ref 27–31)
MCHC RBC AUTO-ENTMCNC: 29.7 % (ref 32–36)
MCV RBC AUTO: 83.1 FL (ref 78–100)
MONOCYTES ABSOLUTE: 0.5 K/CU MM
MONOCYTES RELATIVE PERCENT: 3.9 % (ref 0–4)
NUCLEATED RBC %: 0 %
PDW BLD-RTO: 15.3 % (ref 11.7–14.9)
PLATELET # BLD: 343 K/CU MM (ref 140–440)
PMV BLD AUTO: 11.4 FL (ref 7.5–11.1)
POTASSIUM SERPL-SCNC: 4.7 MMOL/L (ref 3.5–5.1)
RBC # BLD: 5.14 M/CU MM (ref 4.2–5.4)
SEGMENTED NEUTROPHILS ABSOLUTE COUNT: 10.3 K/CU MM
SEGMENTED NEUTROPHILS RELATIVE PERCENT: 88.8 % (ref 36–66)
SODIUM BLD-SCNC: 137 MMOL/L (ref 135–145)
TOTAL IMMATURE NEUTOROPHIL: 0.16 K/CU MM
TOTAL NUCLEATED RBC: 0 K/CU MM
WBC # BLD: 11.6 K/CU MM (ref 4–10.5)

## 2022-02-17 PROCEDURE — 6370000000 HC RX 637 (ALT 250 FOR IP): Performed by: HOSPITALIST

## 2022-02-17 PROCEDURE — 36415 COLL VENOUS BLD VENIPUNCTURE: CPT

## 2022-02-17 PROCEDURE — 6360000002 HC RX W HCPCS: Performed by: NURSE PRACTITIONER

## 2022-02-17 PROCEDURE — 94761 N-INVAS EAR/PLS OXIMETRY MLT: CPT

## 2022-02-17 PROCEDURE — 6370000000 HC RX 637 (ALT 250 FOR IP): Performed by: NURSE PRACTITIONER

## 2022-02-17 PROCEDURE — 2060000000 HC ICU INTERMEDIATE R&B

## 2022-02-17 PROCEDURE — 80048 BASIC METABOLIC PNL TOTAL CA: CPT

## 2022-02-17 PROCEDURE — 85025 COMPLETE CBC W/AUTO DIFF WBC: CPT

## 2022-02-17 PROCEDURE — 6370000000 HC RX 637 (ALT 250 FOR IP): Performed by: STUDENT IN AN ORGANIZED HEALTH CARE EDUCATION/TRAINING PROGRAM

## 2022-02-17 PROCEDURE — 2700000000 HC OXYGEN THERAPY PER DAY

## 2022-02-17 PROCEDURE — 2580000003 HC RX 258: Performed by: NURSE PRACTITIONER

## 2022-02-17 RX ORDER — GUAIFENESIN 600 MG/1
600 TABLET, EXTENDED RELEASE ORAL 2 TIMES DAILY
Status: DISCONTINUED | OUTPATIENT
Start: 2022-02-17 | End: 2022-03-07

## 2022-02-17 RX ORDER — FLUTICASONE PROPIONATE 50 MCG
1 SPRAY, SUSPENSION (ML) NASAL DAILY
Status: DISCONTINUED | OUTPATIENT
Start: 2022-02-17 | End: 2022-03-17 | Stop reason: HOSPADM

## 2022-02-17 RX ADMIN — GUAIFENESIN 600 MG: 600 TABLET, EXTENDED RELEASE ORAL at 21:25

## 2022-02-17 RX ADMIN — MORPHINE SULFATE 30 MG: 15 TABLET, FILM COATED, EXTENDED RELEASE ORAL at 09:39

## 2022-02-17 RX ADMIN — FLUTICASONE PROPIONATE 1 SPRAY: 50 SPRAY, METERED NASAL at 21:26

## 2022-02-17 RX ADMIN — SENNOSIDES AND DOCUSATE SODIUM 1 TABLET: 50; 8.6 TABLET ORAL at 21:25

## 2022-02-17 RX ADMIN — CETIRIZINE HYDROCHLORIDE 10 MG: 10 TABLET, FILM COATED ORAL at 06:06

## 2022-02-17 RX ADMIN — BARICITINIB 4 MG: 2 TABLET, FILM COATED ORAL at 09:39

## 2022-02-17 RX ADMIN — FAMOTIDINE 20 MG: 20 TABLET ORAL at 21:25

## 2022-02-17 RX ADMIN — ENOXAPARIN SODIUM 90 MG: 100 INJECTION SUBCUTANEOUS at 21:25

## 2022-02-17 RX ADMIN — SODIUM CHLORIDE, PRESERVATIVE FREE 10 ML: 5 INJECTION INTRAVENOUS at 21:26

## 2022-02-17 RX ADMIN — OXYCODONE AND ACETAMINOPHEN 1 TABLET: 5; 325 TABLET ORAL at 03:54

## 2022-02-17 RX ADMIN — ENOXAPARIN SODIUM 90 MG: 100 INJECTION SUBCUTANEOUS at 09:32

## 2022-02-17 RX ADMIN — BENZOCAINE AND MENTHOL 1 LOZENGE: 15; 3.6 LOZENGE ORAL at 09:46

## 2022-02-17 RX ADMIN — SENNOSIDES AND DOCUSATE SODIUM 1 TABLET: 50; 8.6 TABLET ORAL at 09:39

## 2022-02-17 RX ADMIN — SODIUM CHLORIDE, PRESERVATIVE FREE 10 ML: 5 INJECTION INTRAVENOUS at 09:33

## 2022-02-17 RX ADMIN — FAMOTIDINE 20 MG: 20 TABLET ORAL at 09:39

## 2022-02-17 RX ADMIN — Medication 2000 UNITS: at 09:39

## 2022-02-17 RX ADMIN — MORPHINE SULFATE 30 MG: 15 TABLET, FILM COATED, EXTENDED RELEASE ORAL at 21:25

## 2022-02-17 RX ADMIN — DEXAMETHASONE SODIUM PHOSPHATE 10 MG: 10 INJECTION, SOLUTION INTRAMUSCULAR; INTRAVENOUS at 09:34

## 2022-02-17 RX ADMIN — OXYCODONE AND ACETAMINOPHEN 1 TABLET: 5; 325 TABLET ORAL at 15:52

## 2022-02-17 RX ADMIN — LOSARTAN POTASSIUM 50 MG: 50 TABLET, FILM COATED ORAL at 09:39

## 2022-02-17 ASSESSMENT — PAIN DESCRIPTION - LOCATION
LOCATION: BACK;HIP

## 2022-02-17 ASSESSMENT — PAIN SCALES - WONG BAKER
WONGBAKER_NUMERICALRESPONSE: 0
WONGBAKER_NUMERICALRESPONSE: 0

## 2022-02-17 ASSESSMENT — PAIN - FUNCTIONAL ASSESSMENT
PAIN_FUNCTIONAL_ASSESSMENT: PREVENTS OR INTERFERES SOME ACTIVE ACTIVITIES AND ADLS

## 2022-02-17 ASSESSMENT — PAIN DESCRIPTION - PAIN TYPE
TYPE: CHRONIC PAIN

## 2022-02-17 ASSESSMENT — PAIN DESCRIPTION - FREQUENCY
FREQUENCY: CONTINUOUS

## 2022-02-17 ASSESSMENT — PAIN SCALES - GENERAL
PAINLEVEL_OUTOF10: 8
PAINLEVEL_OUTOF10: 8
PAINLEVEL_OUTOF10: 6
PAINLEVEL_OUTOF10: 9
PAINLEVEL_OUTOF10: 8

## 2022-02-17 ASSESSMENT — PAIN DESCRIPTION - ONSET
ONSET: ON-GOING

## 2022-02-17 ASSESSMENT — PAIN DESCRIPTION - DESCRIPTORS
DESCRIPTORS: ACHING;DISCOMFORT
DESCRIPTORS: ACHING

## 2022-02-17 ASSESSMENT — PAIN DESCRIPTION - ORIENTATION
ORIENTATION: RIGHT;LEFT;LOWER;MID
ORIENTATION: RIGHT;LEFT;LOWER
ORIENTATION: RIGHT;LEFT;LOWER;MID
ORIENTATION: RIGHT

## 2022-02-17 ASSESSMENT — PAIN DESCRIPTION - PROGRESSION
CLINICAL_PROGRESSION: NOT CHANGED

## 2022-02-17 NOTE — PLAN OF CARE
Problem: Falls - Risk of:  Goal: Will remain free from falls  Description: Will remain free from falls  2/17/2022 0143 by Carmen Chandra RN  Outcome: Ongoing  2/16/2022 1735 by Jason Quezada RN  Outcome: Met This Shift  Goal: Absence of physical injury  Description: Absence of physical injury  2/17/2022 0143 by Carmen Chandra RN  Outcome: Ongoing  2/16/2022 1735 by Jason Quezada RN  Outcome: Met This Shift     Problem: Airway Clearance - Ineffective  Goal: Achieve or maintain patent airway  2/17/2022 0143 by Carmen Chandra RN  Outcome: Ongoing  2/16/2022 1735 by Jason Quezada RN  Outcome: Met This Shift     Problem: Gas Exchange - Impaired  Goal: Absence of hypoxia  2/17/2022 0143 by Carmen Chandra RN  Outcome: Ongoing  2/16/2022 1735 by Jason Quezada RN  Outcome: Met This Shift  Goal: Promote optimal lung function  2/17/2022 0143 by Carmen Chandra RN  Outcome: Ongoing  2/16/2022 1735 by Jason Quezada RN  Outcome: Met This Shift     Problem: Breathing Pattern - Ineffective  Goal: Ability to achieve and maintain a regular respiratory rate  2/17/2022 0143 by Carmen Chandra RN  Outcome: Ongoing  2/16/2022 1735 by Jason Quezada RN  Outcome: Met This Shift     Problem:  Body Temperature -  Risk of, Imbalanced  Goal: Ability to maintain a body temperature within defined limits  2/17/2022 0143 by Carmen Chandra RN  Outcome: Ongoing  2/16/2022 1735 by Jason Quezada RN  Outcome: Met This Shift  Goal: Will regain or maintain usual level of consciousness  2/17/2022 0143 by Carmen Chandra RN  Outcome: Ongoing  2/16/2022 1735 by Jason Quezada RN  Outcome: Met This Shift  Goal: Complications related to the disease process, condition or treatment will be avoided or minimized  2/17/2022 0143 by Carmen Chandra RN  Outcome: Ongoing  2/16/2022 1735 by Jason Quezada RN  Outcome: Met This Shift     Problem: Isolation Precautions - Risk of Spread of Infection  Goal: Prevent transmission of infection  2/17/2022 0143 by Gilda Chi RN  Outcome: Ongoing  2/16/2022 1735 by Karlene Hashimoto, RN  Outcome: Met This Shift     Problem: Nutrition Deficits  Goal: Optimize nutritional status  2/17/2022 0143 by Gilda Chi RN  Outcome: Ongoing  2/16/2022 1735 by Karlene Hashimoto, RN  Outcome: Met This Shift     Problem: Risk for Fluid Volume Deficit  Goal: Maintain normal heart rhythm  2/17/2022 0143 by Gilda Chi RN  Outcome: Ongoing  2/16/2022 1735 by Karlene Hashimoto, RN  Outcome: Met This Shift  Goal: Maintain absence of muscle cramping  2/17/2022 0143 by Gilda Chi RN  Outcome: Ongoing  2/16/2022 1735 by Karlene Hashimoto, RN  Outcome: Met This Shift  Goal: Maintain normal serum potassium, sodium, calcium, phosphorus, and pH  2/17/2022 0143 by Gilda Chi RN  Outcome: Ongoing  2/16/2022 1735 by Karlene Hashimoto, RN  Outcome: Met This Shift     Problem: Loneliness or Risk for Loneliness  Goal: Demonstrate positive use of time alone when socialization is not possible  2/17/2022 0143 by Gilda Chi RN  Outcome: Ongoing  2/16/2022 1735 by Karlene Hashimoto, RN  Outcome: Met This Shift     Problem: Fatigue  Goal: Verbalize increase energy and improved vitality  2/17/2022 0143 by Gilda Chi RN  Outcome: Ongoing  2/16/2022 1735 by Karlene Hashimoto, RN  Outcome: Met This Shift     Problem: Patient Education: Go to Patient Education Activity  Goal: Patient/Family Education  2/17/2022 0143 by Gilda Chi RN  Outcome: Ongoing  2/16/2022 1735 by Karlene Hashimoto, RN  Outcome: Met This Shift     Problem: Pain:  Goal: Pain level will decrease  Description: Pain level will decrease  2/17/2022 0143 by Gilda Chi RN  Outcome: Ongoing  2/16/2022 1735 by Karlene Hashimoto, RN  Outcome: Met This Shift  Goal: Control of acute pain  Description: Control of acute pain  2/17/2022 0143 by Gilda Chi RN  Outcome: Ongoing  2/16/2022 1735 by Karlene Hashimoto, RN  Outcome: Met This Shift  Goal: Control of chronic pain  Description: Control of chronic pain  2/17/2022 0143 by Demian Cullen RN  Outcome: Ongoing  2/16/2022 1735 by Ezekiel Pete RN  Outcome: Met This Shift     Problem: Skin Integrity:  Goal: Will show no infection signs and symptoms  Description: Will show no infection signs and symptoms  2/17/2022 0143 by Demian Cullen RN  Outcome: Ongoing  2/16/2022 1735 by Ezekiel Pete RN  Outcome: Met This Shift  Goal: Absence of new skin breakdown  Description: Absence of new skin breakdown  2/17/2022 0143 by Demian Cullen RN  Outcome: Ongoing  2/16/2022 1735 by Ezekiel Pete RN  Outcome: Met This Shift     Problem: Nutrition  Goal: Optimal nutrition therapy  2/17/2022 0143 by Demian Cullen RN  Outcome: Ongoing  2/16/2022 1735 by Ezekiel Pete RN  Outcome: Met This Shift

## 2022-02-17 NOTE — PROGRESS NOTES
02/17/22 1219   Oxygen Therapy/Pulse Ox   O2 Therapy Oxygen   $Oxygen $Daily Charge   O2 Device High flow nasal cannula   O2 Flow Rate (L/min) 11 L/min   Resp 25   SpO2 93 %   Pulse Oximeter Device Mode Continuous   Pulse Oximeter Device Location Finger   $Pulse Oximeter $Spot check (multiple/continuous)   Blood Gas  Performed?  No

## 2022-02-17 NOTE — PROGRESS NOTES
Comprehensive Nutrition Assessment    Type and Reason for Visit:  Reassess    Nutrition Recommendations/Plan:   · Modify diet to REJI diet   · Switch to Low Calorie, High Protein oral nutrition supplements   · Monitor fluids, PO intakes, weights, and labs   · Please obtain new measured weight dos     Nutrition Assessment:  Pt has been consuming adequately of % of meals x last week on Regular diet. Currently on 11 L/min HFNC. No GI distress. No current wt taken, AIDE to verify wt trends dos. Will monitor as moderate nutrition risk. Malnutrition Assessment:  Malnutrition Status: At risk for malnutrition (Comment)    Context:  Acute Illness       Estimated Daily Nutrient Needs:  Energy (kcal):  3091-6419 ([address] with stress factor 1.0-1.2); Weight Used for Energy Requirements:  Current     Protein (g):  55-65 (1.1-1.3 g/kg); Weight Used for Protein Requirements:  Ideal        Fluid (ml/day):  1500; Method Used for Fluid Requirements:  1 ml/kcal      Nutrition Related Findings:  Na 134      Wounds:  None       Current Nutrition Therapies:    ADULT DIET; Regular  ADULT ORAL NUTRITION SUPPLEMENT; Breakfast, Lunch, Dinner; Standard High Calorie/High Protein Oral Supplement    Anthropometric Measures:  · Height: 5' 2.01\" (157.5 cm)  · Current Body Weight: 186 lb 8.2 oz (84.6 kg)   · Admission Body Weight:      · Usual Body Weight:  (no weight hx available)     · Ideal Body Weight: 110 lbs; % Ideal Body Weight 169.6 %   · BMI: 34.1  · BMI Categories: Obese Class 1 (BMI 30.0-34. 9)       Nutrition Diagnosis:   · Predicted inadequate energy intake related to acute injury/trauma,impaired respiratory function as evidenced by  (varied po intakes during stay)    Nutrition Interventions:   Food and/or Nutrient Delivery:  Modify Current Diet,Modify Oral Nutrition Supplement  Nutrition Education/Counseling:  No recommendation at this time   Coordination of Nutrition Care:  Continue to monitor while inpatient    Goals:  Pt will meet greater than 50-75% of meals and supplements       Nutrition Monitoring and Evaluation:   Behavioral-Environmental Outcomes:  None Identified   Food/Nutrient Intake Outcomes:  Food and Nutrient Intake,Supplement Intake  Physical Signs/Symptoms Outcomes:  Biochemical Data,GI Status,Hemodynamic Status,Weight     Discharge Planning:     Too soon to determine     Electronically signed by Eugene Murry RD, LD on 2/17/22 at 2:15 PM EST    Contact: [phone]

## 2022-02-17 NOTE — PROGRESS NOTES
Hospitalist Progress Note      Name:  Hernán Pham /Age/Sex: 1945  (68 y.o. female)   MRN & CSN:  [id number] & [id number] Admission Date/Time: 2022  4:46 PM   Location:  -A PCP: No primary care provider on file. Hospital Day: 13    Assessment and Plan:   Hernán Pham is a 68 y.o.  female with past medical history of degenerative joint disease, hypertension, was admitted on 2020 when she presented with nausea, vomiting, fatigue, was found to be hypoxic, diagnosed with Covid. 1. Acute respiratory failure with hypoxia secondary to COVID-19 pneumonia:  She is down to 11L/min high flow in addition to a nonrebreather today. Oxygen saturation high 90s. Continue incentive spirometry, OOB as tolerated. Continue baricitinib, dexamethasone. Inflammatory markers are trending down. CT pulmonary angiogram negative for PE. Shows patchy consolidative groundglass opacities bilaterally. PCT 0.078. Low suspicion for superimposed bacterial pneumonia. 2. Upper airway congestion - mucinex, flonase, saline nasal spray  3. Essential hypertension: Stable on losartan 50 mg daily  4. Hyponatremia - likely due to COVID. Mild and asymptomatic. Will monitor  5. History of degenerative joint disease: Continue home dose of MS Contin 30 mg twice daily as well as Percocet 5/325 mg every 6 as needed  6. Obesity with BMI of 34.1  7. Advance care planning: Patient is DNR CCA    Interval History   Denies new complaints. Feels better today but has some upper airway congestion. 10 point review of systems negative other than the above. Objective: Intake/Output Summary (Last 24 hours) at 2022 1445  Last data filed at 2022 1800  Gross per 24 hour   Intake 360 ml   Output    Net 360 ml      Vitals:   Vitals:    22 1219   BP:    Pulse:    Resp: 25   Temp:    SpO2: 93%     Physical Exam:   GEN Awake female, sitting upright in bed in no apparent distress.  Appears given age.    Andreas Bouillon are equally round. No scleral erythema, discharge, or conjunctivitis. HENT Mucous membranes are moist. Oral pharynx without exudates, no evidence of thrush. NECK Supple, no apparent thyromegaly or masses. RESP Breath sounds are diminished bilateral bases. CARD: S1/S2 auscultated. Regular. No peripheral edema. GI Abdomen is soft without significant tenderness, masses, or guarding. Bowel sounds are normoactive. Rectal exam deferred.  No costovertebral angle tenderness. Normal appearing external genitalia. Waller catheter is not present. HEME/LYMP: No palpable cervical lymphadenopathy and no hepatosplenomegaly. No petechiae or ecchymoses. MSK No gross joint deformities. SKIN Normal coloration, warm, dry. NEURO Cranial nerves appear grossly intact, normal speech, no lateralizing weakness. PSYCH Awake, alert, oriented x 4. Affect appropriate.     Medications:   Medications:    baricitinib  4 mg Oral Daily    morphine  30 mg Oral 2 times per day    cetirizine  10 mg Oral Daily    losartan  50 mg Oral Daily    sodium chloride flush  5-40 mL IntraVENous 2 times per day    sennosides-docusate sodium  1 tablet Oral BID    famotidine  20 mg Oral BID    dexamethasone  10 mg IntraVENous Q24H    Vitamin D  2,000 Units Oral Daily    enoxaparin  1 mg/kg SubCUTAneous BID      Infusions:    sodium chloride 25 mL (02/09/22 0917)     PRN Meds: albuterol sulfate HFA, 2 puff, Q4H PRN  benzocaine-menthol, 1 lozenge, Q2H PRN  sodium chloride flush, 5-40 mL, PRN  sodium chloride, 25 mL, PRN  ondansetron, 4 mg, Q8H PRN   Or  ondansetron, 4 mg, Q6H PRN  polyethylene glycol, 17 g, Daily PRN  acetaminophen, 650 mg, Q6H PRN   Or  acetaminophen, 650 mg, Q6H PRN  guaiFENesin-dextromethorphan, 5 mL, Q4H PRN  oxyCODONE-acetaminophen, 1 tablet, Q6H PRN      Electronically signed by Caleb Renner MD on 2/17/2022 at 2:45 PM

## 2022-02-17 NOTE — PLAN OF CARE
Nutrition Problem #1: Predicted inadequate energy intake  Intervention: Food and/or Nutrient Delivery: Modify Current Diet,Modify Oral Nutrition Supplement  Nutritional Goals: Pt will meet greater than 50-75% of meals and supplements

## 2022-02-17 NOTE — PROGRESS NOTES
Pulmonary and Critical Care  Progress Note    Subjective: The patient is better. On 11L HFNC. Shortness of breath is sl better. Chest pain none  Addressing respiratory complaints Patient is negative for  hemoptysis and cyanosis  CONSTITUTIONAL:  negative for fevers and chills      Past Medical History:     has a past medical history of Chronic lower back pain, Hx of spinal fusion, Hypertension, MRSA (methicillin resistant staph aureus) culture positive, and Venous disease. has a past surgical history that includes fracture surgery and back surgery. reports that she has never smoked. She has never used smokeless tobacco. She reports that she does not drink alcohol and does not use drugs. Family history:  family history is not on file. No Known Allergies  Social History:    Reviewed; no changes    Objective:   PHYSICAL EXAM:        VITALS:  BP (!) 147/56   Pulse 78   Temp 98.8 °F (37.1 °C) (Axillary)   Resp 22   Ht 5' 2.01\" (1.575 m)   Wt 186 lb 8.2 oz (84.6 kg)   SpO2 99%   BMI 34.10 kg/m²     24HR INTAKE/OUTPUT:      Intake/Output Summary (Last 24 hours) at 2/17/2022 1132  Last data filed at 2/16/2022 1800  Gross per 24 hour   Intake 720 ml   Output    Net 720 ml       CONSTITUTIONAL:  Awake. LUNGS:  decreased breath sounds, basilar crackles. CARDIOVASCULAR:  normal S1 and S2 and negative JVD  ABD:Abdomen soft, non-tender. BS normal. No masses,  No organomegaly  NEURO:Alert. DATA:    CBC:  Recent Labs     02/15/22  1312   WBC 13.2*   RBC 5.38   HGB 13.3   HCT 44.3      MCV 82.3   MCH 24.7*   MCHC 30.0*   RDW 15.1*   SEGSPCT 86.9*      BMP:  Recent Labs     02/15/22  1312   *   K 4.7   CL 98*   CO2 27   BUN 28*   CREATININE 0.5*   CALCIUM 8.8   GLUCOSE 95      ABG:  No results for input(s): PH, PO2ART, JLJ4WMV, HCO3, BEART, O2SAT in the last 72 hours.   Lab Results   Component Value Date    PROBNP 1,360 (H) 02/08/2022     No results found for: [facility]    Radiology Review:  Pertinent images / reports were reviewed as a part of this visit. Assessment:     Patient Active Problem List   Diagnosis    WD-Venous stasis dermatitis of both lower extremities    WD-Lymphedema of both lower extremities    WD-Venous stasis ulcer of right calf with fat layer exposed with varicose veins (HCC)    WD-Venous stasis ulcer of left calf with fat layer exposed with varicose veins ([address]    COVID-19    Hypoxia       Plan:   1. Overall the patient is sl better. 2. [address] Activity.     David Jean MD  2/17/2022  11:32 AM

## 2022-02-18 ENCOUNTER — APPOINTMENT (OUTPATIENT)
Dept: GENERAL RADIOLOGY | Age: 77
DRG: 871 | End: 2022-02-18
Payer: MEDICARE

## 2022-02-18 LAB
ANION GAP SERPL CALCULATED.3IONS-SCNC: 10 MMOL/L (ref 4–16)
BASOPHILS ABSOLUTE: 0 K/CU MM
BASOPHILS RELATIVE PERCENT: 0.1 % (ref 0–1)
BUN BLDV-MCNC: 22 MG/DL (ref 6–23)
CALCIUM SERPL-MCNC: 8.6 MG/DL (ref 8.3–10.6)
CHLORIDE BLD-SCNC: 99 MMOL/L (ref 99–110)
CO2: 28 MMOL/L (ref 21–32)
CREAT SERPL-MCNC: 0.4 MG/DL (ref 0.6–1.1)
DIFFERENTIAL TYPE: ABNORMAL
EOSINOPHILS ABSOLUTE: 0.1 K/CU MM
EOSINOPHILS RELATIVE PERCENT: 0.6 % (ref 0–3)
GFR AFRICAN AMERICAN: >60 ML/MIN/1.73M2
GFR NON-AFRICAN AMERICAN: >60 ML/MIN/1.73M2
GLUCOSE BLD-MCNC: 89 MG/DL (ref 70–99)
HCT VFR BLD CALC: 38.8 % (ref 37–47)
HEMOGLOBIN: 11.6 GM/DL (ref 12.5–16)
IMMATURE NEUTROPHIL %: 1.7 % (ref 0–0.43)
LYMPHOCYTES ABSOLUTE: 1.1 K/CU MM
LYMPHOCYTES RELATIVE PERCENT: 9.7 % (ref 24–44)
MCH RBC QN AUTO: 25 PG (ref 27–31)
MCHC RBC AUTO-ENTMCNC: 29.9 % (ref 32–36)
MCV RBC AUTO: 83.6 FL (ref 78–100)
MONOCYTES ABSOLUTE: 1.6 K/CU MM
MONOCYTES RELATIVE PERCENT: 14.3 % (ref 0–4)
PDW BLD-RTO: 15.4 % (ref 11.7–14.9)
PLATELET # BLD: 284 K/CU MM (ref 140–440)
PMV BLD AUTO: 11 FL (ref 7.5–11.1)
POTASSIUM SERPL-SCNC: 4.5 MMOL/L (ref 3.5–5.1)
RBC # BLD: 4.64 M/CU MM (ref 4.2–5.4)
SEGMENTED NEUTROPHILS ABSOLUTE COUNT: 8 K/CU MM
SEGMENTED NEUTROPHILS RELATIVE PERCENT: 73.6 % (ref 36–66)
SODIUM BLD-SCNC: 137 MMOL/L (ref 135–145)
TOTAL IMMATURE NEUTOROPHIL: 0.19 K/CU MM
WBC # BLD: 10.9 K/CU MM (ref 4–10.5)

## 2022-02-18 PROCEDURE — 2060000000 HC ICU INTERMEDIATE R&B

## 2022-02-18 PROCEDURE — 85025 COMPLETE CBC W/AUTO DIFF WBC: CPT

## 2022-02-18 PROCEDURE — 80048 BASIC METABOLIC PNL TOTAL CA: CPT

## 2022-02-18 PROCEDURE — 6360000002 HC RX W HCPCS: Performed by: NURSE PRACTITIONER

## 2022-02-18 PROCEDURE — 6370000000 HC RX 637 (ALT 250 FOR IP): Performed by: NURSE PRACTITIONER

## 2022-02-18 PROCEDURE — 6370000000 HC RX 637 (ALT 250 FOR IP): Performed by: HOSPITALIST

## 2022-02-18 PROCEDURE — 94761 N-INVAS EAR/PLS OXIMETRY MLT: CPT

## 2022-02-18 PROCEDURE — 2580000003 HC RX 258: Performed by: NURSE PRACTITIONER

## 2022-02-18 PROCEDURE — 6370000000 HC RX 637 (ALT 250 FOR IP): Performed by: STUDENT IN AN ORGANIZED HEALTH CARE EDUCATION/TRAINING PROGRAM

## 2022-02-18 PROCEDURE — 36415 COLL VENOUS BLD VENIPUNCTURE: CPT

## 2022-02-18 PROCEDURE — 71045 X-RAY EXAM CHEST 1 VIEW: CPT

## 2022-02-18 PROCEDURE — 2700000000 HC OXYGEN THERAPY PER DAY

## 2022-02-18 RX ADMIN — SENNOSIDES AND DOCUSATE SODIUM 1 TABLET: 50; 8.6 TABLET ORAL at 08:52

## 2022-02-18 RX ADMIN — MORPHINE SULFATE 30 MG: 15 TABLET, FILM COATED, EXTENDED RELEASE ORAL at 20:19

## 2022-02-18 RX ADMIN — FLUTICASONE PROPIONATE 1 SPRAY: 50 SPRAY, METERED NASAL at 08:53

## 2022-02-18 RX ADMIN — GUAIFENESIN 600 MG: 600 TABLET, EXTENDED RELEASE ORAL at 08:52

## 2022-02-18 RX ADMIN — LOSARTAN POTASSIUM 50 MG: 50 TABLET, FILM COATED ORAL at 08:52

## 2022-02-18 RX ADMIN — OXYCODONE AND ACETAMINOPHEN 1 TABLET: 5; 325 TABLET ORAL at 14:44

## 2022-02-18 RX ADMIN — SODIUM CHLORIDE, PRESERVATIVE FREE 10 ML: 5 INJECTION INTRAVENOUS at 08:53

## 2022-02-18 RX ADMIN — ENOXAPARIN SODIUM 90 MG: 100 INJECTION SUBCUTANEOUS at 20:21

## 2022-02-18 RX ADMIN — SODIUM CHLORIDE, PRESERVATIVE FREE 10 ML: 5 INJECTION INTRAVENOUS at 20:20

## 2022-02-18 RX ADMIN — FAMOTIDINE 20 MG: 20 TABLET ORAL at 20:19

## 2022-02-18 RX ADMIN — DEXAMETHASONE SODIUM PHOSPHATE 10 MG: 10 INJECTION, SOLUTION INTRAMUSCULAR; INTRAVENOUS at 08:52

## 2022-02-18 RX ADMIN — SENNOSIDES AND DOCUSATE SODIUM 1 TABLET: 50; 8.6 TABLET ORAL at 20:19

## 2022-02-18 RX ADMIN — OXYCODONE AND ACETAMINOPHEN 1 TABLET: 5; 325 TABLET ORAL at 02:53

## 2022-02-18 RX ADMIN — Medication 2000 UNITS: at 08:52

## 2022-02-18 RX ADMIN — BARICITINIB 4 MG: 2 TABLET, FILM COATED ORAL at 08:52

## 2022-02-18 RX ADMIN — OXYCODONE AND ACETAMINOPHEN 1 TABLET: 5; 325 TABLET ORAL at 23:36

## 2022-02-18 RX ADMIN — ENOXAPARIN SODIUM 90 MG: 100 INJECTION SUBCUTANEOUS at 08:53

## 2022-02-18 RX ADMIN — FAMOTIDINE 20 MG: 20 TABLET ORAL at 08:52

## 2022-02-18 RX ADMIN — GUAIFENESIN 600 MG: 600 TABLET, EXTENDED RELEASE ORAL at 20:19

## 2022-02-18 RX ADMIN — MORPHINE SULFATE 30 MG: 15 TABLET, FILM COATED, EXTENDED RELEASE ORAL at 08:52

## 2022-02-18 RX ADMIN — CETIRIZINE HYDROCHLORIDE 10 MG: 10 TABLET, FILM COATED ORAL at 05:34

## 2022-02-18 ASSESSMENT — PAIN DESCRIPTION - LOCATION
LOCATION: BACK
LOCATION: BACK;HIP

## 2022-02-18 ASSESSMENT — PAIN DESCRIPTION - PROGRESSION
CLINICAL_PROGRESSION: NOT CHANGED

## 2022-02-18 ASSESSMENT — PAIN DESCRIPTION - FREQUENCY
FREQUENCY: CONTINUOUS
FREQUENCY: CONTINUOUS

## 2022-02-18 ASSESSMENT — PAIN SCALES - WONG BAKER
WONGBAKER_NUMERICALRESPONSE: 4
WONGBAKER_NUMERICALRESPONSE: 0

## 2022-02-18 ASSESSMENT — PAIN DESCRIPTION - PAIN TYPE
TYPE: CHRONIC PAIN
TYPE: CHRONIC PAIN

## 2022-02-18 ASSESSMENT — PAIN SCALES - GENERAL
PAINLEVEL_OUTOF10: 5
PAINLEVEL_OUTOF10: 8
PAINLEVEL_OUTOF10: 6
PAINLEVEL_OUTOF10: 6
PAINLEVEL_OUTOF10: 8
PAINLEVEL_OUTOF10: 7

## 2022-02-18 ASSESSMENT — PAIN DESCRIPTION - ORIENTATION
ORIENTATION: LOWER
ORIENTATION: RIGHT;LEFT

## 2022-02-18 ASSESSMENT — PAIN DESCRIPTION - ONSET
ONSET: ON-GOING
ONSET: ON-GOING

## 2022-02-18 ASSESSMENT — PAIN DESCRIPTION - DESCRIPTORS
DESCRIPTORS: ACHING
DESCRIPTORS: ACHING

## 2022-02-18 NOTE — PROGRESS NOTES
Pulmonary and Critical Care  Progress Note    Subjective: The patient is comfortable in bed. On 9 L HFNC. Shortness of breath is better. Chest pain none  Addressing respiratory complaints Patient is negative for  hemoptysis and cyanosis  CONSTITUTIONAL:  negative for fevers and chills      Past Medical History:     has a past medical history of Chronic lower back pain, Hx of spinal fusion, Hypertension, MRSA (methicillin resistant staph aureus) culture positive, and Venous disease. has a past surgical history that includes fracture surgery and back surgery. reports that she has never smoked. She has never used smokeless tobacco. She reports that she does not drink alcohol and does not use drugs. Family history:  family history is not on file. No Known Allergies  Social History:    Reviewed; no changes    Objective:   PHYSICAL EXAM:        VITALS:  BP (!) 132/52   Pulse 58   Temp 97.4 °F (36.3 °C) (Axillary)   Resp 15   Ht 5' 2.01\" (1.575 m)   Wt 186 lb 8.2 oz (84.6 kg)   SpO2 99%   BMI 34.10 kg/m²     24HR INTAKE/OUTPUT:      Intake/Output Summary (Last 24 hours) at 2/18/2022 1206  Last data filed at 2/18/2022 0600  Gross per 24 hour   Intake 1000 ml   Output 1650 ml   Net -650 ml       CONSTITUTIONAL:  Awake. LUNGS:  decreased breath sounds, basilar crackles. CARDIOVASCULAR:  normal S1 and S2 and negative JVD  ABD:Abdomen soft, non-tender. BS normal. No masses,  No organomegaly  NEURO:Alert.   DATA:    CBC:  Recent Labs     02/15/22  1312 02/17/22  1428 02/18/22  0538   WBC 13.2* 11.6* 10.9*   RBC 5.38 5.14 4.64   HGB 13.3 12.7 11.6*   HCT 44.3 42.7 38.8    343 284   MCV 82.3 83.1 83.6   MCH 24.7* 24.7* 25.0*   MCHC 30.0* 29.7* 29.9*   RDW 15.1* 15.3* 15.4*   SEGSPCT 86.9* 88.8*  --       BMP:  Recent Labs     02/15/22  1312 02/17/22  1428 02/18/22  0538   * 137 137   K 4.7 4.7 4.5   CL 98* 100 99   CO2 27 27 28   BUN 28* 22 22   CREATININE 0.5* 0.5* 0.4*   CALCIUM 8.8 9.3 8.6 GLUCOSE 95 147* 89      ABG:  No results for input(s): PH, PO2ART, XCI6JPL, HCO3, BEART, O2SAT in the last 72 hours. Lab Results   Component Value Date    PROBNP 1,360 (H) 02/08/2022     No results found for: [facility]    Radiology Review:  Pertinent images / reports were reviewed as a part of this visit. Assessment:     Patient Active Problem List   Diagnosis    WD-Venous stasis dermatitis of both lower extremities    WD-Lymphedema of both lower extremities    WD-Venous stasis ulcer of right calf with fat layer exposed with varicose veins (HCC)    WD-Venous stasis ulcer of left calf with fat layer exposed with varicose veins ([address]    COVID-19    Hypoxia       Plan:   1. Overall the patient is better. 2. [address] Activity.     Rowan Friedman MD  2/18/2022  12:06 PM

## 2022-02-18 NOTE — PROGRESS NOTES
Hospitalist Progress Note      Name:  Devang Bazzi /Age/Sex: 1945  (68 y.o. female)   MRN & CSN:  [id number] & [id number] Admission Date/Time: 2022  4:46 PM   Location:  -A PCP: No primary care provider on file. Hospital Day: 14    Assessment and Plan:   Devang Bazzi is a 68 y.o.  female with past medical history of degenerative joint disease, hypertension, was admitted on 2020 when she presented with nausea, vomiting, fatigue, was found to be hypoxic, diagnosed with Covid. 1. Acute respiratory failure with hypoxia secondary to COVID-19 pneumonia:  She is down to 9L/min high flow in addition to a nonrebreather today. Oxygen saturation high 90s. Continue incentive spirometry, OOB as tolerated. Continue baricitinib, dexamethasone. Inflammatory markers are trending down. CT pulmonary angiogram negative for PE. Shows patchy consolidative groundglass opacities bilaterally. PCT 0.078. Low suspicion for superimposed bacterial pneumonia. Blood cultures neg x 5 days. Urine legionella and strep pneumoniae negative. She is slowly improving. 2. Upper airway congestion - mucinex, flonase, saline nasal spray  3. Essential hypertension: Stable on losartan 50 mg daily  4. Hyponatremia - likely due to COVID. Mild and asymptomatic. Will monitor  5. History of degenerative joint disease: Continue home dose of MS Contin 30 mg twice daily as well as Percocet 5/325 mg every 6 as needed  6. Obesity with BMI of 34.1  7. Advance care planning: Patient is DNR [facility]    Interval History   Denies new complaints. Feels better today. Upper airway congestion unchanged she says. 10 point review of systems negative other than the above. Objective:        Intake/Output Summary (Last 24 hours) at 2022 1643  Last data filed at 2022 0600  Gross per 24 hour   Intake 1000 ml   Output 250 ml   Net 750 ml      Vitals:   Vitals:    22 1233   BP: 118/65   Pulse: 73   Resp: 22 Temp: 97.9 °F (36.6 °C)   SpO2: 90%     Physical Exam:   GEN Awake female, sitting upright in bed in no apparent distress. Appears given age. EYES Pupils are equally round. No scleral erythema, discharge, or conjunctivitis. HENT Mucous membranes are moist. Oral pharynx without exudates, no evidence of thrush. NECK Supple, no apparent thyromegaly or masses. RESP Breath sounds are diminished bilateral bases. CARD: S1/S2 auscultated. Regular. No peripheral edema. GI Abdomen is soft without significant tenderness, masses, or guarding. Bowel sounds are normoactive. Rectal exam deferred. MSK No gross joint deformities. SKIN Normal coloration, warm, dry. NEURO Cranial nerves appear grossly intact, normal speech, no lateralizing weakness. PSYCH Awake, alert, oriented x 4. Affect appropriate.     Medications:   Medications:    guaiFENesin  600 mg Oral BID    fluticasone  1 spray Each Nostril Daily    baricitinib  4 mg Oral Daily    morphine  30 mg Oral 2 times per day    cetirizine  10 mg Oral Daily    losartan  50 mg Oral Daily    sodium chloride flush  5-40 mL IntraVENous 2 times per day    sennosides-docusate sodium  1 tablet Oral BID    famotidine  20 mg Oral BID    dexamethasone  10 mg IntraVENous Q24H    Vitamin D  2,000 Units Oral Daily    enoxaparin  1 mg/kg SubCUTAneous BID      Infusions:    sodium chloride 25 mL (02/09/22 0917)     PRN Meds: sodium chloride, 1 spray, Q4H PRN  albuterol sulfate HFA, 2 puff, Q4H PRN  benzocaine-menthol, 1 lozenge, Q2H PRN  sodium chloride flush, 5-40 mL, PRN  sodium chloride, 25 mL, PRN  ondansetron, 4 mg, Q8H PRN   Or  ondansetron, 4 mg, Q6H PRN  polyethylene glycol, 17 g, Daily PRN  acetaminophen, 650 mg, Q6H PRN   Or  acetaminophen, 650 mg, Q6H PRN  oxyCODONE-acetaminophen, 1 tablet, Q6H PRN      Electronically signed by Brandi Paredes MD on 2/18/2022 at 4:43 PM

## 2022-02-18 NOTE — PLAN OF CARE
Problem: Falls - Risk of:  Goal: Will remain free from falls  Description: Will remain free from falls  Outcome: Ongoing  Goal: Absence of physical injury  Description: Absence of physical injury  Outcome: Ongoing     Problem: Airway Clearance - Ineffective  Goal: Achieve or maintain patent airway  Outcome: Ongoing     Problem: Gas Exchange - Impaired  Goal: Absence of hypoxia  Outcome: Ongoing  Goal: Promote optimal lung function  Outcome: Ongoing     Problem: Breathing Pattern - Ineffective  Goal: Ability to achieve and maintain a regular respiratory rate  Outcome: Ongoing     Problem:  Body Temperature -  Risk of, Imbalanced  Goal: Ability to maintain a body temperature within defined limits  Outcome: Ongoing  Goal: Will regain or maintain usual level of consciousness  Outcome: Ongoing  Goal: Complications related to the disease process, condition or treatment will be avoided or minimized  Outcome: Ongoing     Problem: Isolation Precautions - Risk of Spread of Infection  Goal: Prevent transmission of infection  Outcome: Ongoing     Problem: Nutrition Deficits  Goal: Optimize nutritional status  Outcome: Ongoing     Problem: Risk for Fluid Volume Deficit  Goal: Maintain normal heart rhythm  Outcome: Ongoing  Goal: Maintain absence of muscle cramping  Outcome: Ongoing  Goal: Maintain normal serum potassium, sodium, calcium, phosphorus, and pH  Outcome: Ongoing     Problem: Loneliness or Risk for Loneliness  Goal: Demonstrate positive use of time alone when socialization is not possible  Outcome: Ongoing     Problem: Fatigue  Goal: Verbalize increase energy and improved vitality  Outcome: Ongoing     Problem: Patient Education: Go to Patient Education Activity  Goal: Patient/Family Education  Outcome: Ongoing     Problem: Pain:  Goal: Pain level will decrease  Description: Pain level will decrease  Outcome: Ongoing  Goal: Control of acute pain  Description: Control of acute pain  Outcome: Ongoing  Goal: Control of chronic pain  Description: Control of chronic pain  Outcome: Ongoing     Problem: Skin Integrity:  Goal: Will show no infection signs and symptoms  Description: Will show no infection signs and symptoms  Outcome: Ongoing  Goal: Absence of new skin breakdown  Description: Absence of new skin breakdown  Outcome: Ongoing     Problem: Nutrition  Goal: Optimal nutrition therapy  2/18/2022 0308 by Harley Moore RN  Outcome: Ongoing  2/17/2022 1417 by Jimmy Norwood RD, LD  Outcome: Ongoing

## 2022-02-19 LAB
ANION GAP SERPL CALCULATED.3IONS-SCNC: 10 MMOL/L (ref 4–16)
BASOPHILS ABSOLUTE: 0 K/CU MM
BASOPHILS RELATIVE PERCENT: 0.2 % (ref 0–1)
BUN BLDV-MCNC: 22 MG/DL (ref 6–23)
CALCIUM SERPL-MCNC: 9 MG/DL (ref 8.3–10.6)
CHLORIDE BLD-SCNC: 98 MMOL/L (ref 99–110)
CO2: 27 MMOL/L (ref 21–32)
CREAT SERPL-MCNC: 0.4 MG/DL (ref 0.6–1.1)
DIFFERENTIAL TYPE: ABNORMAL
EOSINOPHILS ABSOLUTE: 0.1 K/CU MM
EOSINOPHILS RELATIVE PERCENT: 0.5 % (ref 0–3)
GFR AFRICAN AMERICAN: >60 ML/MIN/1.73M2
GFR NON-AFRICAN AMERICAN: >60 ML/MIN/1.73M2
GLUCOSE BLD-MCNC: 88 MG/DL (ref 70–99)
HCT VFR BLD CALC: 40.6 % (ref 37–47)
HEMOGLOBIN: 11.9 GM/DL (ref 12.5–16)
IMMATURE NEUTROPHIL %: 1.6 % (ref 0–0.43)
LYMPHOCYTES ABSOLUTE: 1.2 K/CU MM
LYMPHOCYTES RELATIVE PERCENT: 12.2 % (ref 24–44)
MCH RBC QN AUTO: 25.2 PG (ref 27–31)
MCHC RBC AUTO-ENTMCNC: 29.3 % (ref 32–36)
MCV RBC AUTO: 85.8 FL (ref 78–100)
MONOCYTES ABSOLUTE: 1.2 K/CU MM
MONOCYTES RELATIVE PERCENT: 12.6 % (ref 0–4)
NUCLEATED RBC %: 0 %
PDW BLD-RTO: 15.5 % (ref 11.7–14.9)
PLATELET # BLD: 273 K/CU MM (ref 140–440)
PMV BLD AUTO: 11.2 FL (ref 7.5–11.1)
POTASSIUM SERPL-SCNC: 4.3 MMOL/L (ref 3.5–5.1)
RBC # BLD: 4.73 M/CU MM (ref 4.2–5.4)
SEGMENTED NEUTROPHILS ABSOLUTE COUNT: 7 K/CU MM
SEGMENTED NEUTROPHILS RELATIVE PERCENT: 72.9 % (ref 36–66)
SODIUM BLD-SCNC: 135 MMOL/L (ref 135–145)
TOTAL IMMATURE NEUTOROPHIL: 0.15 K/CU MM
TOTAL NUCLEATED RBC: 0 K/CU MM
WBC # BLD: 9.6 K/CU MM (ref 4–10.5)

## 2022-02-19 PROCEDURE — 6360000002 HC RX W HCPCS: Performed by: NURSE PRACTITIONER

## 2022-02-19 PROCEDURE — 94761 N-INVAS EAR/PLS OXIMETRY MLT: CPT

## 2022-02-19 PROCEDURE — 6370000000 HC RX 637 (ALT 250 FOR IP): Performed by: NURSE PRACTITIONER

## 2022-02-19 PROCEDURE — 6370000000 HC RX 637 (ALT 250 FOR IP): Performed by: HOSPITALIST

## 2022-02-19 PROCEDURE — 6370000000 HC RX 637 (ALT 250 FOR IP): Performed by: STUDENT IN AN ORGANIZED HEALTH CARE EDUCATION/TRAINING PROGRAM

## 2022-02-19 PROCEDURE — 2700000000 HC OXYGEN THERAPY PER DAY

## 2022-02-19 PROCEDURE — 85025 COMPLETE CBC W/AUTO DIFF WBC: CPT

## 2022-02-19 PROCEDURE — 80048 BASIC METABOLIC PNL TOTAL CA: CPT

## 2022-02-19 PROCEDURE — 2580000003 HC RX 258: Performed by: NURSE PRACTITIONER

## 2022-02-19 PROCEDURE — 2060000000 HC ICU INTERMEDIATE R&B

## 2022-02-19 PROCEDURE — 36415 COLL VENOUS BLD VENIPUNCTURE: CPT

## 2022-02-19 RX ADMIN — Medication 2000 UNITS: at 08:55

## 2022-02-19 RX ADMIN — SODIUM CHLORIDE, PRESERVATIVE FREE 10 ML: 5 INJECTION INTRAVENOUS at 20:45

## 2022-02-19 RX ADMIN — FAMOTIDINE 20 MG: 20 TABLET ORAL at 08:55

## 2022-02-19 RX ADMIN — LOSARTAN POTASSIUM 50 MG: 50 TABLET, FILM COATED ORAL at 08:55

## 2022-02-19 RX ADMIN — SENNOSIDES AND DOCUSATE SODIUM 1 TABLET: 50; 8.6 TABLET ORAL at 08:55

## 2022-02-19 RX ADMIN — GUAIFENESIN 600 MG: 600 TABLET, EXTENDED RELEASE ORAL at 20:44

## 2022-02-19 RX ADMIN — GUAIFENESIN 600 MG: 600 TABLET, EXTENDED RELEASE ORAL at 08:54

## 2022-02-19 RX ADMIN — ENOXAPARIN SODIUM 90 MG: 100 INJECTION SUBCUTANEOUS at 20:44

## 2022-02-19 RX ADMIN — SODIUM CHLORIDE, PRESERVATIVE FREE 10 ML: 5 INJECTION INTRAVENOUS at 08:55

## 2022-02-19 RX ADMIN — MORPHINE SULFATE 30 MG: 15 TABLET, FILM COATED, EXTENDED RELEASE ORAL at 08:54

## 2022-02-19 RX ADMIN — FAMOTIDINE 20 MG: 20 TABLET ORAL at 20:44

## 2022-02-19 RX ADMIN — OXYCODONE AND ACETAMINOPHEN 1 TABLET: 5; 325 TABLET ORAL at 05:44

## 2022-02-19 RX ADMIN — MORPHINE SULFATE 30 MG: 15 TABLET, FILM COATED, EXTENDED RELEASE ORAL at 20:44

## 2022-02-19 RX ADMIN — BARICITINIB 4 MG: 2 TABLET, FILM COATED ORAL at 08:54

## 2022-02-19 RX ADMIN — SENNOSIDES AND DOCUSATE SODIUM 1 TABLET: 50; 8.6 TABLET ORAL at 20:44

## 2022-02-19 RX ADMIN — ENOXAPARIN SODIUM 90 MG: 100 INJECTION SUBCUTANEOUS at 08:55

## 2022-02-19 RX ADMIN — FLUTICASONE PROPIONATE 1 SPRAY: 50 SPRAY, METERED NASAL at 08:56

## 2022-02-19 RX ADMIN — DEXAMETHASONE SODIUM PHOSPHATE 10 MG: 10 INJECTION, SOLUTION INTRAMUSCULAR; INTRAVENOUS at 08:55

## 2022-02-19 RX ADMIN — OXYCODONE AND ACETAMINOPHEN 1 TABLET: 5; 325 TABLET ORAL at 14:29

## 2022-02-19 RX ADMIN — CETIRIZINE HYDROCHLORIDE 10 MG: 10 TABLET, FILM COATED ORAL at 05:44

## 2022-02-19 ASSESSMENT — PAIN DESCRIPTION - PAIN TYPE: TYPE: CHRONIC PAIN

## 2022-02-19 ASSESSMENT — PAIN SCALES - WONG BAKER
WONGBAKER_NUMERICALRESPONSE: 0

## 2022-02-19 ASSESSMENT — PAIN DESCRIPTION - ORIENTATION: ORIENTATION: LOWER

## 2022-02-19 ASSESSMENT — PAIN SCALES - GENERAL
PAINLEVEL_OUTOF10: 5
PAINLEVEL_OUTOF10: 8
PAINLEVEL_OUTOF10: 0
PAINLEVEL_OUTOF10: 8
PAINLEVEL_OUTOF10: 7
PAINLEVEL_OUTOF10: 6
PAINLEVEL_OUTOF10: 0
PAINLEVEL_OUTOF10: 8

## 2022-02-19 ASSESSMENT — PAIN DESCRIPTION - LOCATION: LOCATION: BACK

## 2022-02-19 NOTE — PROGRESS NOTES
Hospitalist Progress Note      Name:  Katherin Rendon /Age/Sex: 1945  (68 y.o. female)   MRN & CSN:  [id number] & [id number] Admission Date/Time: 2022  4:46 PM   Location:  -A PCP: No primary care provider on file. Hospital Day: 15    Assessment and Plan:   Katherin Rendon is a 68 y.o.  female with past medical history of degenerative joint disease, hypertension, was admitted on 2020 when she presented with nausea, vomiting, fatigue, was found to be hypoxic, diagnosed with Covid. 1. Acute respiratory failure with hypoxia secondary to COVID-19 pneumonia:  She is down to 8L/min high flow. Oxygen saturation 96%. Continue incentive spirometry, OOB as tolerated. Continue baricitinib, dexamethasone. Inflammatory markers are trending down. CT pulmonary angiogram negative for PE. Shows patchy consolidative groundglass opacities bilaterally. PCT 0.078. Low suspicion for superimposed bacterial pneumonia. Blood cultures neg x 5 days. Urine legionella and strep pneumoniae negative. She is improving. CXR  shows improvement compared to previous. 2. Upper airway congestion - mucinex, flonase, saline nasal spray. Improving. 3. Essential hypertension: Stable on losartan 50 mg daily  4. Hyponatremia - resolved. 5. History of degenerative joint disease: Continue home dose of MS Contin 30 mg twice daily as well as Percocet 5/325 mg every 6 as needed  6. Obesity with BMI of 34.1  7. Advance care planning: Patient is DNR [facility]    Interval History   Denies new complaints. Says she is slowly feeling better but has not been moving around much. 10 point review of systems negative other than the above. Objective:      Intake/Output Summary (Last 24 hours) at 2022 1500  Last data filed at 2022 0548  Gross per 24 hour   Intake 360 ml   Output 1150 ml   Net -790 ml      Vitals:   Vitals:    22 0833   BP:    Pulse:    Resp: 13   Temp:    SpO2: 96%     Physical Exam: GEN Awake female, sitting upright in bed in no apparent distress. Appears given age. EYES Pupils are equally round. No scleral erythema, discharge, or conjunctivitis. HENT Mucous membranes are moist. Oral pharynx without exudates, no evidence of thrush. NECK Supple, no apparent thyromegaly or masses. RESP Breath sounds are diminished bilateral bases. CARD: S1/S2 auscultated. Regular. No peripheral edema. GI Abdomen is soft without significant tenderness, masses, or guarding. Bowel sounds are normoactive. Rectal exam deferred. MSK No gross joint deformities. SKIN Normal coloration, warm, dry. NEURO Cranial nerves appear grossly intact, normal speech, no lateralizing weakness. PSYCH Awake, alert, oriented x 4. Affect appropriate.     Medications:   Medications:    guaiFENesin  600 mg Oral BID    fluticasone  1 spray Each Nostril Daily    baricitinib  4 mg Oral Daily    morphine  30 mg Oral 2 times per day    cetirizine  10 mg Oral Daily    losartan  50 mg Oral Daily    sodium chloride flush  5-40 mL IntraVENous 2 times per day    sennosides-docusate sodium  1 tablet Oral BID    famotidine  20 mg Oral BID    dexamethasone  10 mg IntraVENous Q24H    Vitamin D  2,000 Units Oral Daily    enoxaparin  1 mg/kg SubCUTAneous BID      Infusions:    sodium chloride 25 mL (02/09/22 0917)     PRN Meds: sodium chloride, 1 spray, Q4H PRN  albuterol sulfate HFA, 2 puff, Q4H PRN  benzocaine-menthol, 1 lozenge, Q2H PRN  sodium chloride flush, 5-40 mL, PRN  sodium chloride, 25 mL, PRN  ondansetron, 4 mg, Q8H PRN   Or  ondansetron, 4 mg, Q6H PRN  polyethylene glycol, 17 g, Daily PRN  acetaminophen, 650 mg, Q6H PRN   Or  acetaminophen, 650 mg, Q6H PRN  oxyCODONE-acetaminophen, 1 tablet, Q6H PRN      Electronically signed by Daren Bose MD on 2/19/2022 at 3:00 PM

## 2022-02-19 NOTE — PROGRESS NOTES
Pulmonary and Critical Care  Progress Note    Subjective: The patient is better. On 10L HFNC. CXR : Better. Shortness of breath is better. Chest pain none  Addressing respiratory complaints Patient is negative for  hemoptysis and cyanosis  CONSTITUTIONAL:  negative for fevers and chills      Past Medical History:     has a past medical history of Chronic lower back pain, Hx of spinal fusion, Hypertension, MRSA (methicillin resistant staph aureus) culture positive, and Venous disease. has a past surgical history that includes fracture surgery and back surgery. reports that she has never smoked. She has never used smokeless tobacco. She reports that she does not drink alcohol and does not use drugs. Family history:  family history is not on file. No Known Allergies  Social History:    Reviewed; no changes    Objective:   PHYSICAL EXAM:        VITALS:  /60   Pulse 66   Temp 98 °F (36.7 °C) (Axillary)   Resp 13   Ht 5' 2.01\" (1.575 m)   Wt 186 lb 8.2 oz (84.6 kg)   SpO2 96%   BMI 34.10 kg/m²     24HR INTAKE/OUTPUT:      Intake/Output Summary (Last 24 hours) at 2/19/2022 1214  Last data filed at 2/19/2022 0548  Gross per 24 hour   Intake 360 ml   Output 1150 ml   Net -790 ml       CONSTITUTIONAL:  Awake. LUNGS:  decreased breath sounds, basilar crackles. CARDIOVASCULAR:  normal S1 and S2 and negative JVD  ABD:Abdomen soft, non-tender. BS normal. No masses,  No organomegaly  NEURO:Alert.   DATA:    CBC:  Recent Labs     02/17/22  1428 02/18/22  0538 02/19/22  0605   WBC 11.6* 10.9* 9.6   RBC 5.14 4.64 4.73   HGB 12.7 11.6* 11.9*   HCT 42.7 38.8 40.6    284 273   MCV 83.1 83.6 85.8   MCH 24.7* 25.0* 25.2*   MCHC 29.7* 29.9* 29.3*   RDW 15.3* 15.4* 15.5*   SEGSPCT 88.8* 73.6* 72.9*      BMP:  Recent Labs     02/17/22  1428 02/18/22  0538 02/19/22  0605    137 135   K 4.7 4.5 4.3    99 98*   CO2 27 28 27   BUN 22 22 22   CREATININE 0.5* 0.4* 0.4*   CALCIUM 9.3 8.6 9.0   GLUCOSE 147* 89 88      ABG:  No results for input(s): PH, PO2ART, GXP4HCG, HCO3, BEART, O2SAT in the last 72 hours. Lab Results   Component Value Date    PROBNP 1,360 (H) 02/08/2022     No results found for: [facility]    Radiology Review:  Pertinent images / reports were reviewed as a part of this visit. Assessment:     Patient Active Problem List   Diagnosis    WD-Venous stasis dermatitis of both lower extremities    WD-Lymphedema of both lower extremities    WD-Venous stasis ulcer of right calf with fat layer exposed with varicose veins (HCC)    WD-Venous stasis ulcer of left calf with fat layer exposed with varicose veins ([address]    COVID-19    Hypoxia       Plan:   1. Overall the patient is better. 2. [address] Activity.     Gris Chaudhary MD  2/19/2022  12:14 PM

## 2022-02-19 NOTE — PROGRESS NOTES
02/19/22 0833   Oxygen Therapy/Pulse Ox   O2 Therapy Oxygen   $Oxygen $Daily Charge   O2 Device High flow nasal cannula   O2 Flow Rate (L/min) 8 L/min   Resp 13   SpO2 96 %   Pulse Oximeter Device Mode Continuous   Pulse Oximeter Device Location Finger   $Pulse Oximeter $Spot check (multiple/continuous)   Blood Gas  Performed?  No

## 2022-02-20 LAB
ANION GAP SERPL CALCULATED.3IONS-SCNC: 8 MMOL/L (ref 4–16)
BASOPHILS ABSOLUTE: 0 K/CU MM
BASOPHILS RELATIVE PERCENT: 0.1 % (ref 0–1)
BUN BLDV-MCNC: 24 MG/DL (ref 6–23)
CALCIUM SERPL-MCNC: 9 MG/DL (ref 8.3–10.6)
CHLORIDE BLD-SCNC: 97 MMOL/L (ref 99–110)
CO2: 28 MMOL/L (ref 21–32)
CREAT SERPL-MCNC: 0.5 MG/DL (ref 0.6–1.1)
DIFFERENTIAL TYPE: ABNORMAL
EOSINOPHILS ABSOLUTE: 0.1 K/CU MM
EOSINOPHILS RELATIVE PERCENT: 0.9 % (ref 0–3)
GFR AFRICAN AMERICAN: >60 ML/MIN/1.73M2
GFR NON-AFRICAN AMERICAN: >60 ML/MIN/1.73M2
GLUCOSE BLD-MCNC: 89 MG/DL (ref 70–99)
HCT VFR BLD CALC: 38.3 % (ref 37–47)
HEMOGLOBIN: 11.3 GM/DL (ref 12.5–16)
IMMATURE NEUTROPHIL %: 1.5 % (ref 0–0.43)
LYMPHOCYTES ABSOLUTE: 1.4 K/CU MM
LYMPHOCYTES RELATIVE PERCENT: 12.6 % (ref 24–44)
MCH RBC QN AUTO: 24.7 PG (ref 27–31)
MCHC RBC AUTO-ENTMCNC: 29.5 % (ref 32–36)
MCV RBC AUTO: 83.8 FL (ref 78–100)
MONOCYTES ABSOLUTE: 1.5 K/CU MM
MONOCYTES RELATIVE PERCENT: 13 % (ref 0–4)
NUCLEATED RBC %: 0 %
PDW BLD-RTO: 15.5 % (ref 11.7–14.9)
PLATELET # BLD: 257 K/CU MM (ref 140–440)
PMV BLD AUTO: 11.4 FL (ref 7.5–11.1)
POTASSIUM SERPL-SCNC: 4.6 MMOL/L (ref 3.5–5.1)
RBC # BLD: 4.57 M/CU MM (ref 4.2–5.4)
SEGMENTED NEUTROPHILS ABSOLUTE COUNT: 8.2 K/CU MM
SEGMENTED NEUTROPHILS RELATIVE PERCENT: 71.9 % (ref 36–66)
SODIUM BLD-SCNC: 133 MMOL/L (ref 135–145)
TOTAL IMMATURE NEUTOROPHIL: 0.17 K/CU MM
TOTAL NUCLEATED RBC: 0 K/CU MM
WBC # BLD: 11.4 K/CU MM (ref 4–10.5)

## 2022-02-20 PROCEDURE — 2580000003 HC RX 258: Performed by: NURSE PRACTITIONER

## 2022-02-20 PROCEDURE — 6360000002 HC RX W HCPCS: Performed by: NURSE PRACTITIONER

## 2022-02-20 PROCEDURE — 6370000000 HC RX 637 (ALT 250 FOR IP): Performed by: STUDENT IN AN ORGANIZED HEALTH CARE EDUCATION/TRAINING PROGRAM

## 2022-02-20 PROCEDURE — 6370000000 HC RX 637 (ALT 250 FOR IP): Performed by: HOSPITALIST

## 2022-02-20 PROCEDURE — 2060000000 HC ICU INTERMEDIATE R&B

## 2022-02-20 PROCEDURE — 85025 COMPLETE CBC W/AUTO DIFF WBC: CPT

## 2022-02-20 PROCEDURE — 6370000000 HC RX 637 (ALT 250 FOR IP): Performed by: NURSE PRACTITIONER

## 2022-02-20 PROCEDURE — 36415 COLL VENOUS BLD VENIPUNCTURE: CPT

## 2022-02-20 PROCEDURE — 97110 THERAPEUTIC EXERCISES: CPT

## 2022-02-20 PROCEDURE — 2700000000 HC OXYGEN THERAPY PER DAY

## 2022-02-20 PROCEDURE — 80048 BASIC METABOLIC PNL TOTAL CA: CPT

## 2022-02-20 PROCEDURE — 94761 N-INVAS EAR/PLS OXIMETRY MLT: CPT

## 2022-02-20 PROCEDURE — 97530 THERAPEUTIC ACTIVITIES: CPT

## 2022-02-20 RX ORDER — HYDROXYZINE HYDROCHLORIDE 25 MG/1
25 TABLET, FILM COATED ORAL 3 TIMES DAILY PRN
Status: DISCONTINUED | OUTPATIENT
Start: 2022-02-20 | End: 2022-03-17 | Stop reason: HOSPADM

## 2022-02-20 RX ADMIN — LOSARTAN POTASSIUM 50 MG: 50 TABLET, FILM COATED ORAL at 08:11

## 2022-02-20 RX ADMIN — GUAIFENESIN 600 MG: 600 TABLET, EXTENDED RELEASE ORAL at 08:11

## 2022-02-20 RX ADMIN — FAMOTIDINE 20 MG: 20 TABLET ORAL at 20:14

## 2022-02-20 RX ADMIN — HYDROXYZINE HYDROCHLORIDE 25 MG: 25 TABLET, FILM COATED ORAL at 16:50

## 2022-02-20 RX ADMIN — DEXAMETHASONE SODIUM PHOSPHATE 10 MG: 10 INJECTION, SOLUTION INTRAMUSCULAR; INTRAVENOUS at 08:11

## 2022-02-20 RX ADMIN — SENNOSIDES AND DOCUSATE SODIUM 1 TABLET: 50; 8.6 TABLET ORAL at 08:10

## 2022-02-20 RX ADMIN — Medication 2000 UNITS: at 08:10

## 2022-02-20 RX ADMIN — SENNOSIDES AND DOCUSATE SODIUM 1 TABLET: 50; 8.6 TABLET ORAL at 20:14

## 2022-02-20 RX ADMIN — ENOXAPARIN SODIUM 90 MG: 100 INJECTION SUBCUTANEOUS at 08:11

## 2022-02-20 RX ADMIN — SODIUM CHLORIDE, PRESERVATIVE FREE 10 ML: 5 INJECTION INTRAVENOUS at 20:15

## 2022-02-20 RX ADMIN — SODIUM CHLORIDE, PRESERVATIVE FREE 10 ML: 5 INJECTION INTRAVENOUS at 08:11

## 2022-02-20 RX ADMIN — FLUTICASONE PROPIONATE 1 SPRAY: 50 SPRAY, METERED NASAL at 08:11

## 2022-02-20 RX ADMIN — GUAIFENESIN 600 MG: 600 TABLET, EXTENDED RELEASE ORAL at 20:14

## 2022-02-20 RX ADMIN — SALINE NASAL SPRAY 1 SPRAY: 1.5 SOLUTION NASAL at 13:46

## 2022-02-20 RX ADMIN — MORPHINE SULFATE 30 MG: 15 TABLET, FILM COATED, EXTENDED RELEASE ORAL at 08:10

## 2022-02-20 RX ADMIN — OXYCODONE AND ACETAMINOPHEN 1 TABLET: 5; 325 TABLET ORAL at 01:21

## 2022-02-20 RX ADMIN — ENOXAPARIN SODIUM 90 MG: 100 INJECTION SUBCUTANEOUS at 20:14

## 2022-02-20 RX ADMIN — CETIRIZINE HYDROCHLORIDE 10 MG: 10 TABLET, FILM COATED ORAL at 07:12

## 2022-02-20 RX ADMIN — MORPHINE SULFATE 30 MG: 15 TABLET, FILM COATED, EXTENDED RELEASE ORAL at 20:13

## 2022-02-20 RX ADMIN — OXYCODONE AND ACETAMINOPHEN 1 TABLET: 5; 325 TABLET ORAL at 13:48

## 2022-02-20 RX ADMIN — FAMOTIDINE 20 MG: 20 TABLET ORAL at 08:10

## 2022-02-20 ASSESSMENT — PAIN SCALES - GENERAL
PAINLEVEL_OUTOF10: 0
PAINLEVEL_OUTOF10: 8
PAINLEVEL_OUTOF10: 7
PAINLEVEL_OUTOF10: 6
PAINLEVEL_OUTOF10: 8
PAINLEVEL_OUTOF10: 7

## 2022-02-20 ASSESSMENT — PAIN DESCRIPTION - PAIN TYPE: TYPE: ACUTE PAIN

## 2022-02-20 ASSESSMENT — PAIN DESCRIPTION - FREQUENCY: FREQUENCY: CONTINUOUS

## 2022-02-20 ASSESSMENT — PAIN DESCRIPTION - LOCATION: LOCATION: FOOT;LEG;HIP

## 2022-02-20 ASSESSMENT — PAIN - FUNCTIONAL ASSESSMENT: PAIN_FUNCTIONAL_ASSESSMENT: PREVENTS OR INTERFERES SOME ACTIVE ACTIVITIES AND ADLS

## 2022-02-20 ASSESSMENT — PAIN DESCRIPTION - ORIENTATION: ORIENTATION: RIGHT;LEFT

## 2022-02-20 ASSESSMENT — PAIN DESCRIPTION - ONSET: ONSET: ON-GOING

## 2022-02-20 ASSESSMENT — PAIN DESCRIPTION - DESCRIPTORS: DESCRIPTORS: ACHING;DISCOMFORT

## 2022-02-20 NOTE — PROGRESS NOTES
Received report from Carito, patient resting in bed, on cardiac monitor. Assessment completed as charted. VSS, saturations WNL on 9L per HFNC. Educated on use of Incentive spirometer, patient able to pull approx 250 on it, encouraged use. RN to continue to monitor closely.

## 2022-02-20 NOTE — PROGRESS NOTES
Hospitalist Progress Note      Name:  Harriett Ashley /Age/Sex: 1945  (68 y.o. female)   MRN & CSN:  [id number] & [id number] Admission Date/Time: 2022  4:46 PM   Location:  -A PCP: No primary care provider on file. Hospital Day: 16    Assessment and Plan:   Harriett Ashley is a 68 y.o.  female with past medical history of degenerative joint disease, hypertension, was admitted on 2020 when she presented with nausea, vomiting, fatigue, was found to be hypoxic, diagnosed with Covid. 1. Acute respiratory failure with hypoxia secondary to COVID-19 pneumonia:  She is on 10L/min high flow. Oxygen saturation 96%. Continue incentive spirometry, OOB as tolerated. Patient reportedly desats to 60s on exertion. Continue baricitinib, dexamethasone. Inflammatory markers are trending down. CT pulmonary angiogram negative for PE. Shows patchy consolidative groundglass opacities bilaterally. PCT 0.078. Low suspicion for superimposed bacterial pneumonia. Blood cultures neg x 5 days. Urine legionella and strep pneumoniae negative. She is improving. CXR  shows improvement compared to previous. 2. Upper airway congestion - mucinex, flonase, saline nasal spray. Improving. 3. Essential hypertension: Held losartan today due to lower blood pressures. 4. Hyponatremia -mild and asymptomatic  5. History of degenerative joint disease: Continue home dose of MS Contin 30 mg twice daily as well as Percocet 5/325 mg every 6 as needed  6. Obesity with BMI of 34.1  7. Advance care planning: Patient is DNR [facility]    Interval History   Denies new complaints. Laying in bed watching TV. 10 point review of systems negative other than the above. Objective:        Intake/Output Summary (Last 24 hours) at 2022 1648  Last data filed at 2022 1348  Gross per 24 hour   Intake 480 ml   Output    Net 480 ml      Vitals:   Vitals:    22 1639   BP:    Pulse:    Resp: 16   Temp:    SpO2: 95%     Physical Exam:   GEN Awake female, sitting upright in bed in no apparent distress. Appears given age. EYES Pupils are equally round. No scleral erythema, discharge, or conjunctivitis. HENT Mucous membranes are moist. Oral pharynx without exudates, no evidence of thrush. NECK Supple, no apparent thyromegaly or masses. RESP Breath sounds are diminished bilateral bases. CARD: S1/S2 auscultated. Regular. No peripheral edema. GI Abdomen is soft without significant tenderness, masses, or guarding. Bowel sounds are normoactive. Rectal exam deferred. MSK No gross joint deformities. SKIN Normal coloration, warm, dry. NEURO Cranial nerves appear grossly intact, normal speech, no lateralizing weakness. PSYCH Awake, alert, oriented x 4. Affect appropriate.     Medications:   Medications:    guaiFENesin  600 mg Oral BID    fluticasone  1 spray Each Nostril Daily    morphine  30 mg Oral 2 times per day    cetirizine  10 mg Oral Daily    [Held by provider] losartan  50 mg Oral Daily    sodium chloride flush  5-40 mL IntraVENous 2 times per day    sennosides-docusate sodium  1 tablet Oral BID    famotidine  20 mg Oral BID    dexamethasone  10 mg IntraVENous Q24H    Vitamin D  2,000 Units Oral Daily    enoxaparin  1 mg/kg SubCUTAneous BID      Infusions:    sodium chloride 25 mL (02/09/22 0917)     PRN Meds: hydrOXYzine, 25 mg, TID PRN  sodium chloride, 1 spray, Q4H PRN  albuterol sulfate HFA, 2 puff, Q4H PRN  benzocaine-menthol, 1 lozenge, Q2H PRN  sodium chloride flush, 5-40 mL, PRN  sodium chloride, 25 mL, PRN  ondansetron, 4 mg, Q8H PRN   Or  ondansetron, 4 mg, Q6H PRN  polyethylene glycol, 17 g, Daily PRN  acetaminophen, 650 mg, Q6H PRN   Or  acetaminophen, 650 mg, Q6H PRN  oxyCODONE-acetaminophen, 1 tablet, Q6H PRN      Electronically signed by Ivan Persaud MD on 2/20/2022 at 4:48 PM

## 2022-02-20 NOTE — PROGRESS NOTES
Pulmonary and Critical Care  Progress Note    Subjective: The patient is comfortable in bed. On 10 L HFNC. Shortness of breath is better. Chest pain none  Addressing respiratory complaints Patient is negative for  hemoptysis and cyanosis  CONSTITUTIONAL:  negative for fevers and chills      Past Medical History:     has a past medical history of Chronic lower back pain, Hx of spinal fusion, Hypertension, MRSA (methicillin resistant staph aureus) culture positive, and Venous disease. has a past surgical history that includes fracture surgery and back surgery. reports that she has never smoked. She has never used smokeless tobacco. She reports that she does not drink alcohol and does not use drugs. Family history:  family history is not on file. No Known Allergies  Social History:    Reviewed; no changes    Objective:   PHYSICAL EXAM:        VITALS:  /68   Pulse 99   Temp 97.7 °F (36.5 °C) (Oral)   Resp 18   Ht 5' 2.01\" (1.575 m)   Wt 186 lb 8.2 oz (84.6 kg)   SpO2 (!) 86%   BMI 34.10 kg/m²     24HR INTAKE/OUTPUT:      Intake/Output Summary (Last 24 hours) at 2/20/2022 1221  Last data filed at 2/20/2022 1000  Gross per 24 hour   Intake 240 ml   Output 1100 ml   Net -860 ml       CONSTITUTIONAL:  Awake. LUNGS:  decreased breath sounds, basilar crackles. CARDIOVASCULAR:  normal S1 and S2 and negative JVD  ABD:Abdomen soft, non-tender. BS normal. No masses,  No organomegaly  NEURO:Alert.   DATA:    CBC:  Recent Labs     02/18/22 0538 02/19/22  0605 02/20/22  0619   WBC 10.9* 9.6 11.4*   RBC 4.64 4.73 4.57   HGB 11.6* 11.9* 11.3*   HCT 38.8 40.6 38.3    273 257   MCV 83.6 85.8 83.8   MCH 25.0* 25.2* 24.7*   MCHC 29.9* 29.3* 29.5*   RDW 15.4* 15.5* 15.5*   SEGSPCT 73.6* 72.9* 71.9*      BMP:  Recent Labs     02/18/22  0538 02/19/22  0605 02/20/22  0619    135 133*   K 4.5 4.3 4.6   CL 99 98* 97*   CO2 28 27 28   BUN 22 22 24*   CREATININE 0.4* 0.4* 0.5*   CALCIUM 8.6 9.0 9.0   GLUCOSE 89 88 89      ABG:  No results for input(s): PH, PO2ART, FBN4ORY, HCO3, BEART, O2SAT in the last 72 hours. Lab Results   Component Value Date    PROBNP 1,360 (H) 02/08/2022     No results found for: [address] [facility]    Radiology Review:  Pertinent images / reports were reviewed as a part of this visit. Assessment:     Patient Active Problem List   Diagnosis    WD-Venous stasis dermatitis of both lower extremities    WD-Lymphedema of both lower extremities    WD-Venous stasis ulcer of right calf with fat layer exposed with varicose veins (HCC)    WD-Venous stasis ulcer of left calf with fat layer exposed with varicose veins ([address]    COVID-19    Hypoxia       Plan:   1. Overall the patient is better. 2. [address] Activity.     Loli Bee MD  2/20/2022  12:21 PM

## 2022-02-20 NOTE — PROGRESS NOTES
Physical Therapy    Physical Therapy Treatment Note  Name: Maria C Tao MRN: [id number] :   1945   Date:  2022   Admission Date: 2022 Room:  -A   Restrictions/Precautions:  Restrictions/Precautions  Restrictions/Precautions: Fall Risk,General Precautions,Contact Precautions (droplet +)  Communication with other providers:  RN handoff  Subjective:  Patient states:  My mouth and throat are so dry  Pain:   Location, Type, Intensity (0/10 to 10/10):  8/10 back, hip pain  Objective:    Observation:  Seated on BSC, awake/alert, agreeable. Not able to have successful BM. Intermittently anxious and very SOB requiring mod cues for redirection to assist in sufficient reoxygenation. Tele, pulse ox, BP cuff, purwick, NRB 15 L bumped to 12 L of HF NC. Treatment, including education/measures:  Therapeutic Activity Training:   Therapeutic activity training was instructed today. Cues were given for safety, sequence, UE/LE placement, awareness, and balance. Activities performed today included bed mobility training, sup-sit, sit-stand, SPT. Sit <-> stand: min A for completion  Seated balance: good  Standing balance: fair ; BL UE support through crutches  Stand pivot: CGA ; from BSC to recliner  Prolonged rest breaks w/ all functional mobility performance 2/2 desat  Positioned for comfort and pressure relief ; all needs met, left in recliner, chair alarmed, call light in reach, RN handoff provided, gait belt t/o    Therapeutic Exercise:  Cues were given for technique, safety, recruitment, and rationale. Cues were verbal and/or tactile. 15 ankle pumps  10 LAQ's, clamshells, marches, glute sets  Prolonged rest breaks between each set 2/2 desat    Assessment / Impression: Tolerating fairly, desats into low 80's - high 70's w/ functional mobility and exercise performance.  Tolerating O2 ween fairly, requiring intermittent 15 L of HFNC or NRB mask w/ onset of anxiety/panic attacks, communicated concerns to nursing and possibility of pt receiving meds to address anxiety. She cont to require physical assist intermittently, mod cues for pacing and breath patterning, dec activity tolerance; all improving slowly.      Patient's tolerance of treatment:  fair  Barriers to improvement:  O2 dependence; baseline functional mobility deficits  Plan for Next Session:    Cont w/ est POC  Progress OOB, functional strengthening, gait activities as activity tolerance allows    Time in:  1013  Time out:  1055  Timed treatment minutes:  42  Total treatment time:  40 (TA x 2, GT)    Previously filed items:  Social/Functional History  Lives With: Alone  Type of Home: House  Home Layout: One level  Home Access: Ramped entrance (railing on BL sides)  Bathroom Shower/Tub: Shower chair without back (dtr spongebathes)  Bathroom Toilet: Handicap height  Bathroom Equipment: Grab bars around toilet  Home Equipment: Crutches,Wheelchair-manual,Grab bars  Receives Help From: Family  ADL Assistance: Needs assistance (dtr assists w/ bathing, dressing, ; dtr comes M/W/F)  Homemaking Assistance: Needs assistance (dtr does shopping; pt does not cook, has meals on wheels; son does outside [place]; grand dtrs clean inside)  Homemaking Responsibilities: No (family performs most; she assists w/ what she can, mostly done seated)  Ambulation Assistance: Independent (mod ind using crutches; only short distances)  Transfer Assistance: Independent  Active : No (family)  Occupation: Retired,On disability  Additional Comments: plays cards, crochette, puzzle books, socializes w/ family; sleeps in adjustable bed w/ trapeze over for improved pt mobility  Short term goals  Time Frame for Short term goals: 1 week  Short term goal 1: pt will complete bed mobility at min A  Short term goal 2: pt will complete transfers at [facility] term goal 3: pt will ambulate 15 ft using crutches at [facility] term goal 4: pt will demonstrate near gross >3/5 BL LE strength       Electronically signed by:    Rivka Solorio PT, DPT  2/20/2022, 11:19 AM

## 2022-02-20 NOTE — PROGRESS NOTES
Full bath provided. Patient tolerated well. Assisted patient to sit on edge of bed, HFNC at 9L o2 WNL at this time. Patient transferred to [address] [place] [address] x1 person assist. Patient desaturated to 60%, attempted to increase HFNC to 15L, with little increase, patient was changed from HFNC to NRB and patient increased to high 90's within a few min. Requested a few min to sit on commode. Patients oxygen stayed WNL while on NRB. Patient did desaturate again while on the phone with dietary. Therapy at bedside, agreed to finish patient from commode and work with her. RN agreed. Tele aware.

## 2022-02-20 NOTE — PROGRESS NOTES
Assisted patient from chair to [facility] back to bed. Patient again desaturated quickly, down to 60% again, NRB placed on patient approx 10 min to allow to recover. Patient did get saturations up to 90%, once positioned for comfort changed back to HFNC, patient tolerating at 90% now. RN to monitor closely.

## 2022-02-21 ENCOUNTER — APPOINTMENT (OUTPATIENT)
Dept: GENERAL RADIOLOGY | Age: 77
DRG: 871 | End: 2022-02-21
Payer: MEDICARE

## 2022-02-21 LAB
ANION GAP SERPL CALCULATED.3IONS-SCNC: 13 MMOL/L (ref 4–16)
BASOPHILS ABSOLUTE: 0 K/CU MM
BASOPHILS RELATIVE PERCENT: 0.1 % (ref 0–1)
BUN BLDV-MCNC: 24 MG/DL (ref 6–23)
CALCIUM SERPL-MCNC: 9.1 MG/DL (ref 8.3–10.6)
CHLORIDE BLD-SCNC: 99 MMOL/L (ref 99–110)
CO2: 26 MMOL/L (ref 21–32)
CREAT SERPL-MCNC: 0.6 MG/DL (ref 0.6–1.1)
DIFFERENTIAL TYPE: ABNORMAL
EOSINOPHILS ABSOLUTE: 0 K/CU MM
EOSINOPHILS RELATIVE PERCENT: 0 % (ref 0–3)
GFR AFRICAN AMERICAN: >60 ML/MIN/1.73M2
GFR NON-AFRICAN AMERICAN: >60 ML/MIN/1.73M2
GLUCOSE BLD-MCNC: 183 MG/DL (ref 70–99)
HCT VFR BLD CALC: 40.9 % (ref 37–47)
HEMOGLOBIN: 12.1 GM/DL (ref 12.5–16)
HIGH SENSITIVE C-REACTIVE PROTEIN: 8.9 MG/L
IMMATURE NEUTROPHIL %: 1.1 % (ref 0–0.43)
LYMPHOCYTES ABSOLUTE: 0.4 K/CU MM
LYMPHOCYTES RELATIVE PERCENT: 3.2 % (ref 24–44)
MCH RBC QN AUTO: 24.7 PG (ref 27–31)
MCHC RBC AUTO-ENTMCNC: 29.6 % (ref 32–36)
MCV RBC AUTO: 83.6 FL (ref 78–100)
MONOCYTES ABSOLUTE: 0.5 K/CU MM
MONOCYTES RELATIVE PERCENT: 3.7 % (ref 0–4)
NUCLEATED RBC %: 0 %
PDW BLD-RTO: 15.8 % (ref 11.7–14.9)
PLATELET # BLD: 260 K/CU MM (ref 140–440)
PMV BLD AUTO: 11.7 FL (ref 7.5–11.1)
POTASSIUM SERPL-SCNC: 4.8 MMOL/L (ref 3.5–5.1)
PROCALCITONIN: 0.11
RBC # BLD: 4.89 M/CU MM (ref 4.2–5.4)
SEGMENTED NEUTROPHILS ABSOLUTE COUNT: 11.5 K/CU MM
SEGMENTED NEUTROPHILS RELATIVE PERCENT: 91.9 % (ref 36–66)
SODIUM BLD-SCNC: 138 MMOL/L (ref 135–145)
TOTAL IMMATURE NEUTOROPHIL: 0.14 K/CU MM
TOTAL NUCLEATED RBC: 0 K/CU MM
WBC # BLD: 12.5 K/CU MM (ref 4–10.5)

## 2022-02-21 PROCEDURE — 6370000000 HC RX 637 (ALT 250 FOR IP): Performed by: HOSPITALIST

## 2022-02-21 PROCEDURE — 85025 COMPLETE CBC W/AUTO DIFF WBC: CPT

## 2022-02-21 PROCEDURE — 36415 COLL VENOUS BLD VENIPUNCTURE: CPT

## 2022-02-21 PROCEDURE — 6370000000 HC RX 637 (ALT 250 FOR IP): Performed by: NURSE PRACTITIONER

## 2022-02-21 PROCEDURE — 86141 C-REACTIVE PROTEIN HS: CPT

## 2022-02-21 PROCEDURE — 80048 BASIC METABOLIC PNL TOTAL CA: CPT

## 2022-02-21 PROCEDURE — 71045 X-RAY EXAM CHEST 1 VIEW: CPT

## 2022-02-21 PROCEDURE — 6360000002 HC RX W HCPCS: Performed by: NURSE PRACTITIONER

## 2022-02-21 PROCEDURE — 94761 N-INVAS EAR/PLS OXIMETRY MLT: CPT

## 2022-02-21 PROCEDURE — 2580000003 HC RX 258: Performed by: NURSE PRACTITIONER

## 2022-02-21 PROCEDURE — 6370000000 HC RX 637 (ALT 250 FOR IP): Performed by: STUDENT IN AN ORGANIZED HEALTH CARE EDUCATION/TRAINING PROGRAM

## 2022-02-21 PROCEDURE — 2060000000 HC ICU INTERMEDIATE R&B

## 2022-02-21 PROCEDURE — 84145 PROCALCITONIN (PCT): CPT

## 2022-02-21 PROCEDURE — 6360000002 HC RX W HCPCS: Performed by: STUDENT IN AN ORGANIZED HEALTH CARE EDUCATION/TRAINING PROGRAM

## 2022-02-21 PROCEDURE — 2700000000 HC OXYGEN THERAPY PER DAY

## 2022-02-21 RX ORDER — METHYLPREDNISOLONE SODIUM SUCCINATE 40 MG/ML
40 INJECTION, POWDER, LYOPHILIZED, FOR SOLUTION INTRAMUSCULAR; INTRAVENOUS EVERY 12 HOURS
Status: DISCONTINUED | OUTPATIENT
Start: 2022-02-22 | End: 2022-03-02

## 2022-02-21 RX ORDER — METHYLPREDNISOLONE SODIUM SUCCINATE 40 MG/ML
40 INJECTION, POWDER, LYOPHILIZED, FOR SOLUTION INTRAMUSCULAR; INTRAVENOUS EVERY 12 HOURS
Status: DISCONTINUED | OUTPATIENT
Start: 2022-02-21 | End: 2022-02-21

## 2022-02-21 RX ADMIN — OXYCODONE AND ACETAMINOPHEN 1 TABLET: 5; 325 TABLET ORAL at 14:50

## 2022-02-21 RX ADMIN — Medication 2000 UNITS: at 09:13

## 2022-02-21 RX ADMIN — FAMOTIDINE 20 MG: 20 TABLET ORAL at 09:13

## 2022-02-21 RX ADMIN — OXYCODONE AND ACETAMINOPHEN 1 TABLET: 5; 325 TABLET ORAL at 04:39

## 2022-02-21 RX ADMIN — MORPHINE SULFATE 30 MG: 15 TABLET, FILM COATED, EXTENDED RELEASE ORAL at 20:29

## 2022-02-21 RX ADMIN — MORPHINE SULFATE 30 MG: 15 TABLET, FILM COATED, EXTENDED RELEASE ORAL at 09:13

## 2022-02-21 RX ADMIN — ENOXAPARIN SODIUM 30 MG: 100 INJECTION SUBCUTANEOUS at 20:30

## 2022-02-21 RX ADMIN — SODIUM CHLORIDE, PRESERVATIVE FREE 10 ML: 5 INJECTION INTRAVENOUS at 20:32

## 2022-02-21 RX ADMIN — SODIUM CHLORIDE, PRESERVATIVE FREE 10 ML: 5 INJECTION INTRAVENOUS at 09:14

## 2022-02-21 RX ADMIN — HYDROXYZINE HYDROCHLORIDE 25 MG: 25 TABLET, FILM COATED ORAL at 09:17

## 2022-02-21 RX ADMIN — DEXAMETHASONE SODIUM PHOSPHATE 10 MG: 10 INJECTION, SOLUTION INTRAMUSCULAR; INTRAVENOUS at 09:17

## 2022-02-21 RX ADMIN — FAMOTIDINE 20 MG: 20 TABLET ORAL at 20:30

## 2022-02-21 RX ADMIN — SENNOSIDES AND DOCUSATE SODIUM 1 TABLET: 50; 8.6 TABLET ORAL at 20:29

## 2022-02-21 RX ADMIN — ENOXAPARIN SODIUM 90 MG: 100 INJECTION SUBCUTANEOUS at 09:13

## 2022-02-21 RX ADMIN — GUAIFENESIN 600 MG: 600 TABLET, EXTENDED RELEASE ORAL at 09:13

## 2022-02-21 RX ADMIN — CETIRIZINE HYDROCHLORIDE 10 MG: 10 TABLET, FILM COATED ORAL at 06:08

## 2022-02-21 RX ADMIN — GUAIFENESIN 600 MG: 600 TABLET, EXTENDED RELEASE ORAL at 20:30

## 2022-02-21 RX ADMIN — SENNOSIDES AND DOCUSATE SODIUM 1 TABLET: 50; 8.6 TABLET ORAL at 09:13

## 2022-02-21 ASSESSMENT — PAIN DESCRIPTION - LOCATION
LOCATION: FOOT;HIP
LOCATION: FOOT;HIP;LEG
LOCATION: BACK

## 2022-02-21 ASSESSMENT — PAIN SCALES - GENERAL
PAINLEVEL_OUTOF10: 4
PAINLEVEL_OUTOF10: 0
PAINLEVEL_OUTOF10: 9
PAINLEVEL_OUTOF10: 4
PAINLEVEL_OUTOF10: 4
PAINLEVEL_OUTOF10: 8
PAINLEVEL_OUTOF10: 9
PAINLEVEL_OUTOF10: 6

## 2022-02-21 ASSESSMENT — PAIN DESCRIPTION - ORIENTATION
ORIENTATION: LOWER
ORIENTATION: RIGHT;LEFT

## 2022-02-21 ASSESSMENT — PAIN DESCRIPTION - ONSET
ONSET: ON-GOING
ONSET: ON-GOING

## 2022-02-21 ASSESSMENT — PAIN DESCRIPTION - DESCRIPTORS
DESCRIPTORS: ACHING
DESCRIPTORS: ACHING;DISCOMFORT

## 2022-02-21 ASSESSMENT — PAIN DESCRIPTION - FREQUENCY
FREQUENCY: CONTINUOUS
FREQUENCY: CONTINUOUS

## 2022-02-21 ASSESSMENT — PAIN DESCRIPTION - PAIN TYPE
TYPE: CHRONIC PAIN
TYPE: CHRONIC PAIN
TYPE: ACUTE PAIN

## 2022-02-21 ASSESSMENT — PAIN DESCRIPTION - PROGRESSION: CLINICAL_PROGRESSION: NOT CHANGED

## 2022-02-21 NOTE — PROGRESS NOTES
GEN Awake female, sitting upright in bed in no apparent distress. Appears given age. EYES Pupils are equally round. No scleral erythema, discharge, or conjunctivitis. HENT Mucous membranes are moist. Oral pharynx without exudates, no evidence of thrush. NECK Supple, no apparent thyromegaly or masses. RESP Breath sounds are diminished bilateral bases. CARD: S1/S2 auscultated. Regular. No peripheral edema. GI Abdomen is soft without significant tenderness, masses, or guarding. Bowel sounds are normoactive. Rectal exam deferred. MSK No gross joint deformities. SKIN Normal coloration, warm, dry. NEURO Cranial nerves appear grossly intact, normal speech, no lateralizing weakness. PSYCH Awake, alert, oriented x 4. Affect appropriate.     Medications:   Medications:    guaiFENesin  600 mg Oral BID    fluticasone  1 spray Each Nostril Daily    morphine  30 mg Oral 2 times per day    cetirizine  10 mg Oral Daily    [Held by provider] losartan  50 mg Oral Daily    sodium chloride flush  5-40 mL IntraVENous 2 times per day    sennosides-docusate sodium  1 tablet Oral BID    famotidine  20 mg Oral BID    dexamethasone  10 mg IntraVENous Q24H    Vitamin D  2,000 Units Oral Daily    enoxaparin  1 mg/kg SubCUTAneous BID      Infusions:    sodium chloride 25 mL (02/09/22 0917)     PRN Meds: hydrOXYzine, 25 mg, TID PRN  sodium chloride, 1 spray, Q4H PRN  albuterol sulfate HFA, 2 puff, Q4H PRN  benzocaine-menthol, 1 lozenge, Q2H PRN  sodium chloride flush, 5-40 mL, PRN  sodium chloride, 25 mL, PRN  ondansetron, 4 mg, Q8H PRN   Or  ondansetron, 4 mg, Q6H PRN  polyethylene glycol, 17 g, Daily PRN  acetaminophen, 650 mg, Q6H PRN   Or  acetaminophen, 650 mg, Q6H PRN  oxyCODONE-acetaminophen, 1 tablet, Q6H PRN      Electronically signed by Caleb Renner MD on 2/21/2022 at 3:26 PM

## 2022-02-21 NOTE — PROGRESS NOTES
Pulmonary and Critical Care  Progress Note    Subjective: The patient is unchanged. On 15 L HFNC. Shortness of breath is unchanged. Chest pain none. Addressing respiratory complaints Patient is negative for  hemoptysis and cyanosis  CONSTITUTIONAL:  negative for fevers and chills      Past Medical History:     has a past medical history of Chronic lower back pain, Hx of spinal fusion, Hypertension, MRSA (methicillin resistant staph aureus) culture positive, and Venous disease. has a past surgical history that includes fracture surgery and back surgery. reports that she has never smoked. She has never used smokeless tobacco. She reports that she does not drink alcohol and does not use drugs. Family history:  family history is not on file. No Known Allergies  Social History:    Reviewed; no changes    Objective:   PHYSICAL EXAM:        VITALS:  /71   Pulse 67   Temp 97.4 °F (36.3 °C) (Axillary)   Resp 17   Ht 5' 2.01\" (1.575 m)   Wt 186 lb 8.2 oz (84.6 kg)   SpO2 (!) 88%   BMI 34.10 kg/m²     24HR INTAKE/OUTPUT:      Intake/Output Summary (Last 24 hours) at 2/21/2022 1605  Last data filed at 2/21/2022 1545  Gross per 24 hour   Intake 550 ml   Output 2850 ml   Net -2300 ml       CONSTITUTIONAL:  Awake. LUNGS:  decreased breath sounds, basilar crackles. CARDIOVASCULAR:  normal S1 and S2 and negative JVD  ABD:Abdomen soft, non-tender. BS normal. No masses,  No organomegaly  NEURO:Alert. DATA:    CBC:  Recent Labs     02/19/22  0605 02/20/22 0619   WBC 9.6 11.4*   RBC 4.73 4.57   HGB 11.9* 11.3*   HCT 40.6 38.3    257   MCV 85.8 83.8   MCH 25.2* 24.7*   MCHC 29.3* 29.5*   RDW 15.5* 15.5*   SEGSPCT 72.9* 71.9*      BMP:  Recent Labs     02/19/22  0605 02/20/22 0619    133*   K 4.3 4.6   CL 98* 97*   CO2 27 28   BUN 22 24*   CREATININE 0.4* 0.5*   CALCIUM 9.0 9.0   GLUCOSE 88 89      ABG:  No results for input(s): PH, PO2ART, XTM8SKC, HCO3, BEART, O2SAT in the last 72 hours.   Lab Results   Component Value Date    PROBNP 1,360 (H) 02/08/2022     No results found for: [facility]    Radiology Review:  Pertinent images / reports were reviewed as a part of this visit. Assessment:     Patient Active Problem List   Diagnosis    WD-Venous stasis dermatitis of both lower extremities    WD-Lymphedema of both lower extremities    WD-Venous stasis ulcer of right calf with fat layer exposed with varicose veins (HCC)    WD-Venous stasis ulcer of left calf with fat layer exposed with varicose veins ([facility]    COVID-19    Hypoxia       Plan:   1. Overall the patient is unchanged. 2. [address] Activity. 4. Repeat CXR.   Kerry Nuñez MD  2/21/2022  4:05 PM

## 2022-02-21 NOTE — PLAN OF CARE
chronic pain  Description: Control of chronic pain  Outcome: Ongoing     Problem: Skin Integrity:  Goal: Will show no infection signs and symptoms  Description: Will show no infection signs and symptoms  Outcome: Ongoing  Goal: Absence of new skin breakdown  Description: Absence of new skin breakdown  Outcome: Ongoing     Problem: Nutrition  Goal: Optimal nutrition therapy  Outcome: Ongoing

## 2022-02-22 LAB
ANION GAP SERPL CALCULATED.3IONS-SCNC: 9 MMOL/L (ref 4–16)
BASOPHILS ABSOLUTE: 0 K/CU MM
BASOPHILS RELATIVE PERCENT: 0.1 % (ref 0–1)
BUN BLDV-MCNC: 22 MG/DL (ref 6–23)
CALCIUM SERPL-MCNC: 8.8 MG/DL (ref 8.3–10.6)
CHLORIDE BLD-SCNC: 98 MMOL/L (ref 99–110)
CO2: 28 MMOL/L (ref 21–32)
CREAT SERPL-MCNC: 0.5 MG/DL (ref 0.6–1.1)
DIFFERENTIAL TYPE: ABNORMAL
EOSINOPHILS ABSOLUTE: 0.2 K/CU MM
EOSINOPHILS RELATIVE PERCENT: 1.4 % (ref 0–3)
GFR AFRICAN AMERICAN: >60 ML/MIN/1.73M2
GFR NON-AFRICAN AMERICAN: >60 ML/MIN/1.73M2
GLUCOSE BLD-MCNC: 75 MG/DL (ref 70–99)
GLUCOSE BLD-MCNC: 98 MG/DL (ref 70–99)
HCT VFR BLD CALC: 39.7 % (ref 37–47)
HEMOGLOBIN: 11.8 GM/DL (ref 12.5–16)
IMMATURE NEUTROPHIL %: 0.9 % (ref 0–0.43)
LYMPHOCYTES ABSOLUTE: 1.1 K/CU MM
LYMPHOCYTES RELATIVE PERCENT: 10.3 % (ref 24–44)
MCH RBC QN AUTO: 24.9 PG (ref 27–31)
MCHC RBC AUTO-ENTMCNC: 29.7 % (ref 32–36)
MCV RBC AUTO: 83.8 FL (ref 78–100)
MONOCYTES ABSOLUTE: 1.4 K/CU MM
MONOCYTES RELATIVE PERCENT: 12.8 % (ref 0–4)
NUCLEATED RBC %: 0 %
PDW BLD-RTO: 15.7 % (ref 11.7–14.9)
PLATELET # BLD: 220 K/CU MM (ref 140–440)
PMV BLD AUTO: 11.4 FL (ref 7.5–11.1)
POTASSIUM SERPL-SCNC: 3.8 MMOL/L (ref 3.5–5.1)
PRO-BNP: 289.8 PG/ML
PROCALCITONIN: 0.12
RBC # BLD: 4.74 M/CU MM (ref 4.2–5.4)
SEGMENTED NEUTROPHILS ABSOLUTE COUNT: 8.2 K/CU MM
SEGMENTED NEUTROPHILS RELATIVE PERCENT: 74.5 % (ref 36–66)
SODIUM BLD-SCNC: 135 MMOL/L (ref 135–145)
TOTAL IMMATURE NEUTOROPHIL: 0.1 K/CU MM
TOTAL NUCLEATED RBC: 0 K/CU MM
WBC # BLD: 11 K/CU MM (ref 4–10.5)

## 2022-02-22 PROCEDURE — 6370000000 HC RX 637 (ALT 250 FOR IP): Performed by: NURSE PRACTITIONER

## 2022-02-22 PROCEDURE — 80048 BASIC METABOLIC PNL TOTAL CA: CPT

## 2022-02-22 PROCEDURE — 2700000000 HC OXYGEN THERAPY PER DAY

## 2022-02-22 PROCEDURE — 6370000000 HC RX 637 (ALT 250 FOR IP): Performed by: HOSPITALIST

## 2022-02-22 PROCEDURE — 6370000000 HC RX 637 (ALT 250 FOR IP): Performed by: STUDENT IN AN ORGANIZED HEALTH CARE EDUCATION/TRAINING PROGRAM

## 2022-02-22 PROCEDURE — 6360000002 HC RX W HCPCS: Performed by: STUDENT IN AN ORGANIZED HEALTH CARE EDUCATION/TRAINING PROGRAM

## 2022-02-22 PROCEDURE — 84145 PROCALCITONIN (PCT): CPT

## 2022-02-22 PROCEDURE — 85025 COMPLETE CBC W/AUTO DIFF WBC: CPT

## 2022-02-22 PROCEDURE — 2580000003 HC RX 258: Performed by: NURSE PRACTITIONER

## 2022-02-22 PROCEDURE — 83880 ASSAY OF NATRIURETIC PEPTIDE: CPT

## 2022-02-22 PROCEDURE — 94761 N-INVAS EAR/PLS OXIMETRY MLT: CPT

## 2022-02-22 PROCEDURE — 97530 THERAPEUTIC ACTIVITIES: CPT

## 2022-02-22 PROCEDURE — 82962 GLUCOSE BLOOD TEST: CPT

## 2022-02-22 PROCEDURE — 2060000000 HC ICU INTERMEDIATE R&B

## 2022-02-22 PROCEDURE — 36415 COLL VENOUS BLD VENIPUNCTURE: CPT

## 2022-02-22 RX ADMIN — ENOXAPARIN SODIUM 30 MG: 100 INJECTION SUBCUTANEOUS at 08:40

## 2022-02-22 RX ADMIN — MORPHINE SULFATE 30 MG: 15 TABLET, FILM COATED, EXTENDED RELEASE ORAL at 08:39

## 2022-02-22 RX ADMIN — HYDROXYZINE HYDROCHLORIDE 25 MG: 25 TABLET, FILM COATED ORAL at 12:47

## 2022-02-22 RX ADMIN — METHYLPREDNISOLONE SODIUM SUCCINATE 40 MG: 40 INJECTION, POWDER, LYOPHILIZED, FOR SOLUTION INTRAMUSCULAR; INTRAVENOUS at 05:08

## 2022-02-22 RX ADMIN — OXYCODONE AND ACETAMINOPHEN 1 TABLET: 5; 325 TABLET ORAL at 06:44

## 2022-02-22 RX ADMIN — OXYCODONE AND ACETAMINOPHEN 1 TABLET: 5; 325 TABLET ORAL at 22:13

## 2022-02-22 RX ADMIN — FAMOTIDINE 20 MG: 20 TABLET ORAL at 20:11

## 2022-02-22 RX ADMIN — OXYCODONE AND ACETAMINOPHEN 1 TABLET: 5; 325 TABLET ORAL at 00:59

## 2022-02-22 RX ADMIN — SODIUM CHLORIDE, PRESERVATIVE FREE 10 ML: 5 INJECTION INTRAVENOUS at 20:11

## 2022-02-22 RX ADMIN — CETIRIZINE HYDROCHLORIDE 10 MG: 10 TABLET, FILM COATED ORAL at 08:40

## 2022-02-22 RX ADMIN — ENOXAPARIN SODIUM 30 MG: 100 INJECTION SUBCUTANEOUS at 20:11

## 2022-02-22 RX ADMIN — FLUTICASONE PROPIONATE 1 SPRAY: 50 SPRAY, METERED NASAL at 08:41

## 2022-02-22 RX ADMIN — SALINE NASAL SPRAY 1 SPRAY: 1.5 SOLUTION NASAL at 20:14

## 2022-02-22 RX ADMIN — FAMOTIDINE 20 MG: 20 TABLET ORAL at 08:39

## 2022-02-22 RX ADMIN — SENNOSIDES AND DOCUSATE SODIUM 1 TABLET: 50; 8.6 TABLET ORAL at 08:39

## 2022-02-22 RX ADMIN — HYDROXYZINE HYDROCHLORIDE 25 MG: 25 TABLET, FILM COATED ORAL at 17:03

## 2022-02-22 RX ADMIN — Medication 2000 UNITS: at 08:39

## 2022-02-22 RX ADMIN — GUAIFENESIN 600 MG: 600 TABLET, EXTENDED RELEASE ORAL at 08:40

## 2022-02-22 RX ADMIN — METHYLPREDNISOLONE SODIUM SUCCINATE 40 MG: 40 INJECTION, POWDER, LYOPHILIZED, FOR SOLUTION INTRAMUSCULAR; INTRAVENOUS at 17:03

## 2022-02-22 RX ADMIN — MORPHINE SULFATE 30 MG: 15 TABLET, FILM COATED, EXTENDED RELEASE ORAL at 20:10

## 2022-02-22 RX ADMIN — GUAIFENESIN 600 MG: 600 TABLET, EXTENDED RELEASE ORAL at 20:10

## 2022-02-22 RX ADMIN — SENNOSIDES AND DOCUSATE SODIUM 1 TABLET: 50; 8.6 TABLET ORAL at 20:11

## 2022-02-22 RX ADMIN — OXYCODONE AND ACETAMINOPHEN 1 TABLET: 5; 325 TABLET ORAL at 12:47

## 2022-02-22 RX ADMIN — SODIUM CHLORIDE, PRESERVATIVE FREE 10 ML: 5 INJECTION INTRAVENOUS at 08:40

## 2022-02-22 ASSESSMENT — PAIN DESCRIPTION - ONSET: ONSET: ON-GOING

## 2022-02-22 ASSESSMENT — PAIN DESCRIPTION - PAIN TYPE
TYPE: CHRONIC PAIN

## 2022-02-22 ASSESSMENT — PAIN SCALES - GENERAL
PAINLEVEL_OUTOF10: 8
PAINLEVEL_OUTOF10: 9
PAINLEVEL_OUTOF10: 8
PAINLEVEL_OUTOF10: 9

## 2022-02-22 ASSESSMENT — PAIN - FUNCTIONAL ASSESSMENT: PAIN_FUNCTIONAL_ASSESSMENT: PREVENTS OR INTERFERES SOME ACTIVE ACTIVITIES AND ADLS

## 2022-02-22 ASSESSMENT — PAIN DESCRIPTION - FREQUENCY: FREQUENCY: CONTINUOUS

## 2022-02-22 ASSESSMENT — PAIN DESCRIPTION - LOCATION
LOCATION: HIP
LOCATION: HIP

## 2022-02-22 ASSESSMENT — PAIN DESCRIPTION - PROGRESSION: CLINICAL_PROGRESSION: GRADUALLY WORSENING

## 2022-02-22 ASSESSMENT — PAIN DESCRIPTION - DESCRIPTORS: DESCRIPTORS: ACHING

## 2022-02-22 NOTE — PROGRESS NOTES
02/22/22 0802   Oxygen Therapy/Pulse Ox   O2 Therapy Oxygen   $Oxygen $Daily Charge   O2 Device High flow nasal cannula   O2 Flow Rate (L/min) 15 L/min   Resp 24   SpO2 98 %   Pulse Oximeter Device Mode Continuous   Pulse Oximeter Device Location Finger   $Pulse Oximeter $Spot check (multiple/continuous)   Blood Gas  Performed?  No

## 2022-02-22 NOTE — PROGRESS NOTES
Pulmonary and Critical Care  Progress Note    Subjective: The patient is about the same. On 15 L HFNC. CXR+- change. Shortness of breath is unchanged. Chest pain none. Addressing respiratory complaints Patient is negative for  hemoptysis and cyanosis  CONSTITUTIONAL:  negative for fevers and chills      Past Medical History:     has a past medical history of Chronic lower back pain, Hx of spinal fusion, Hypertension, MRSA (methicillin resistant staph aureus) culture positive, and Venous disease. has a past surgical history that includes fracture surgery and back surgery. reports that she has never smoked. She has never used smokeless tobacco. She reports that she does not drink alcohol and does not use drugs. Family history:  family history is not on file. No Known Allergies  Social History:    Reviewed; no changes    Objective:   PHYSICAL EXAM:        VITALS:  /60   Pulse 62   Temp 97.8 °F (36.6 °C) (Axillary)   Resp 24   Ht 5' 2.01\" (1.575 m)   Wt 186 lb 8.2 oz (84.6 kg)   SpO2 98%   BMI 34.10 kg/m²     24HR INTAKE/OUTPUT:      Intake/Output Summary (Last 24 hours) at 2/22/2022 1043  Last data filed at 2/22/2022 0500  Gross per 24 hour   Intake 1030 ml   Output 1800 ml   Net -770 ml       CONSTITUTIONAL:  Awake. LUNGS:  decreased breath sounds, basilar crackles. CARDIOVASCULAR:  normal S1 and S2 and negative JVD  ABD:Abdomen soft, non-tender. BS normal. No masses,  No organomegaly  NEURO:Alert.   DATA:    CBC:  Recent Labs     02/20/22  0619 02/21/22  1645   WBC 11.4* 12.5*   RBC 4.57 4.89   HGB 11.3* 12.1*   HCT 38.3 40.9    260   MCV 83.8 83.6   MCH 24.7* 24.7*   MCHC 29.5* 29.6*   RDW 15.5* 15.8*   SEGSPCT 71.9* 91.9*      BMP:  Recent Labs     02/20/22  0619 02/21/22  1645   * 138   K 4.6 4.8   CL 97* 99   CO2 28 26   BUN 24* 24*   CREATININE 0.5* 0.6   CALCIUM 9.0 9.1   GLUCOSE 89 183*      ABG:  No results for input(s): PH, PO2ART, SQX8VTI, HCO3, BEART, O2SAT in the last 72 hours. Lab Results   Component Value Date    PROBNP 1,360 (H) 02/08/2022     No results found for: [facility]    Radiology Review:  Pertinent images / reports were reviewed as a part of this visit. Assessment:     Patient Active Problem List   Diagnosis    WD-Venous stasis dermatitis of both lower extremities    WD-Lymphedema of both lower extremities    WD-Venous stasis ulcer of right calf with fat layer exposed with varicose veins (HCC)    WD-Venous stasis ulcer of left calf with fat layer exposed with varicose veins ([address]    COVID-19    Hypoxia       Plan:   1. Overall the patient is unchanged. 2. [address] Activity.   Javier Pearson MD  2/22/2022  10:43 AM

## 2022-02-22 NOTE — CARE COORDINATION
CM reviewed notes. COVID+2/5  Pt is currently on 15lmin HFNC. Per previous notes pt is from home alone. Pt has four children and all of them assist in providing care for pt. CM called [facility] to continue with discharge planning and left a VM. [facility] received call from [address] Per VM they plan to take pt home when she is ready to discharge.

## 2022-02-22 NOTE — PROGRESS NOTES
Physical Therapy    Physical Therapy Treatment Note  Name: Jonna Humphrey MRN: [id number] :   1945   Date:  2022   Admission Date: 2022 Room:  A   Restrictions/Precautions:  Restrictions/Precautions  Restrictions/Precautions: Fall Risk,General Precautions,Contact Precautions (droplet +)  Communication with other providers:  RN Notified that patient requests \"panic\" medication and handoff on transfer assist/min A SPT  Subjective:  Patient states:  She is agreeable for therapy today  Pain:   Location, Type, Intensity (0/10 to 10/10): Does not c/o pain  Objective:    Observation:  Anthony Presumdonal has inadequate suction, RN fixes at EOS, but patient supine on two saturated driss pads with urine. Successful BM today, small and round. 95% O2 Saturation at rest, NC O2 14L, drops to ~85% with OOB activity and needs moderate rest and PLB to achieve 90% saturation  Treatment, including education/measures:  Therapeutic Activity Training:   Therapeutic activity training was instructed today. Cues were given for safety, sequence, UE/LE placement, awareness, and balance. Activities performed today included bed mobility training, sup-sit, sit-stand, SPT. Supine to Sit: HOB slighlty elevated, Mod A with BLE and trunk management  Scooting: To EOB Min A with BLE managment  Sit to stand: Min A with initial balance, She requests no use of crutches for SPT today  SPT: To BSC, Min A for balance. Patient required min cues for hand placement and BLE advancement today. Seated tolerance: Fair, O2 Saturation ~85%, patient is able to PLB without cues but required cues for upright posture/avoid forward lean  Standing balance: During rectal cleanup, patient uses bed for BUE support and is bent at the hips.  Recliner is brought up behind her after clean up  Stand to Sit: Min A for guidance to transfer surface to [facility] and Recliner    Assessment / Impression:    Today she demo's less episodes of anxiety but does need ample rest breaks and extra time with activities. O2 Saturation drops to as low as 85% but is able to PLB without cues. She cont to require physical assist intermittently, mod cues for pacing and breath patterning, dec activity tolerance; all improving slowly.   Patient's tolerance of treatment:  fair  Barriers to improvement:  O2 dependence; baseline functional mobility deficits  Plan for Next Session:    Cont w/ est POC  Progress OOB, functional strengthening, gait activities as activity tolerance allows    Time in:  1645  Time out:  1714  Timed treatment minutes:  29  Total treatment time:  29    Previously filed items:  Social/Functional History  Lives With: Alone  Type of Home: House  Home Layout: One level  Home Access: Ramped entrance (railing on BL sides)  Bathroom Shower/Tub: Shower chair without back (dtr spongebathes)  Bathroom Toilet: Handicap height  Bathroom Equipment: Grab bars around toilet  Home Equipment: Crutches,Wheelchair-manual,Grab bars  Receives Help From: Family  ADL Assistance: Needs assistance (dtr assists w/ bathing, dressing, ; dtr comes M/W/F)  Homemaking Assistance: Needs assistance (dtr does shopping; pt does not cook, has meals on wheels; son does outside [place]; grand dtrs clean inside)  Homemaking Responsibilities: No (family performs most; she assists w/ what she can, mostly done seated)  Ambulation Assistance: Independent (mod ind using crutches; only short distances)  Transfer Assistance: Independent  Active : No (family)  Occupation: Retired,On disability  Additional Comments: plays cards, crochette, puzzle books, socializes w/ family; sleeps in adjustable bed w/ trapeze over for improved pt mobility  Short term goals  Time Frame for Short term goals: 1 week  Short term goal 1: pt will complete bed mobility at min A  Short term goal 2: pt will complete transfers at [facility] term goal 3: pt will ambulate 15 ft using crutches at [facility] term goal 4: pt will demonstrate near gross >3/5 BL LE strength       Electronically signed by:     George Burks, VIVIANA  2/22/2022, 5:42 PM

## 2022-02-23 LAB
ANION GAP SERPL CALCULATED.3IONS-SCNC: 11 MMOL/L (ref 4–16)
BASOPHILS ABSOLUTE: 0 K/CU MM
BASOPHILS RELATIVE PERCENT: 0 % (ref 0–1)
BUN BLDV-MCNC: 20 MG/DL (ref 6–23)
CALCIUM SERPL-MCNC: 9.2 MG/DL (ref 8.3–10.6)
CHLORIDE BLD-SCNC: 98 MMOL/L (ref 99–110)
CO2: 28 MMOL/L (ref 21–32)
CREAT SERPL-MCNC: 0.3 MG/DL (ref 0.6–1.1)
DIFFERENTIAL TYPE: ABNORMAL
EOSINOPHILS ABSOLUTE: 0 K/CU MM
EOSINOPHILS RELATIVE PERCENT: 0.1 % (ref 0–3)
GFR AFRICAN AMERICAN: >60 ML/MIN/1.73M2
GFR NON-AFRICAN AMERICAN: >60 ML/MIN/1.73M2
GLUCOSE BLD-MCNC: 125 MG/DL (ref 70–99)
HCT VFR BLD CALC: 42 % (ref 37–47)
HEMOGLOBIN: 12.2 GM/DL (ref 12.5–16)
IMMATURE NEUTROPHIL %: 0.9 % (ref 0–0.43)
LYMPHOCYTES ABSOLUTE: 0.6 K/CU MM
LYMPHOCYTES RELATIVE PERCENT: 6 % (ref 24–44)
MCH RBC QN AUTO: 24.6 PG (ref 27–31)
MCHC RBC AUTO-ENTMCNC: 29 % (ref 32–36)
MCV RBC AUTO: 84.7 FL (ref 78–100)
MONOCYTES ABSOLUTE: 0.4 K/CU MM
MONOCYTES RELATIVE PERCENT: 3.6 % (ref 0–4)
NUCLEATED RBC %: 0 %
PDW BLD-RTO: 15.8 % (ref 11.7–14.9)
PLATELET # BLD: 211 K/CU MM (ref 140–440)
PMV BLD AUTO: 11.6 FL (ref 7.5–11.1)
POTASSIUM SERPL-SCNC: 4.4 MMOL/L (ref 3.5–5.1)
PRO-BNP: 286.5 PG/ML
RBC # BLD: 4.96 M/CU MM (ref 4.2–5.4)
SEGMENTED NEUTROPHILS ABSOLUTE COUNT: 8.6 K/CU MM
SEGMENTED NEUTROPHILS RELATIVE PERCENT: 89.4 % (ref 36–66)
SODIUM BLD-SCNC: 137 MMOL/L (ref 135–145)
TOTAL IMMATURE NEUTOROPHIL: 0.09 K/CU MM
TOTAL NUCLEATED RBC: 0 K/CU MM
WBC # BLD: 9.7 K/CU MM (ref 4–10.5)

## 2022-02-23 PROCEDURE — 83880 ASSAY OF NATRIURETIC PEPTIDE: CPT

## 2022-02-23 PROCEDURE — 6370000000 HC RX 637 (ALT 250 FOR IP): Performed by: NURSE PRACTITIONER

## 2022-02-23 PROCEDURE — 2580000003 HC RX 258: Performed by: NURSE PRACTITIONER

## 2022-02-23 PROCEDURE — 94761 N-INVAS EAR/PLS OXIMETRY MLT: CPT

## 2022-02-23 PROCEDURE — 85025 COMPLETE CBC W/AUTO DIFF WBC: CPT

## 2022-02-23 PROCEDURE — 2060000000 HC ICU INTERMEDIATE R&B

## 2022-02-23 PROCEDURE — 6370000000 HC RX 637 (ALT 250 FOR IP): Performed by: STUDENT IN AN ORGANIZED HEALTH CARE EDUCATION/TRAINING PROGRAM

## 2022-02-23 PROCEDURE — 2700000000 HC OXYGEN THERAPY PER DAY

## 2022-02-23 PROCEDURE — 80048 BASIC METABOLIC PNL TOTAL CA: CPT

## 2022-02-23 PROCEDURE — 36415 COLL VENOUS BLD VENIPUNCTURE: CPT

## 2022-02-23 PROCEDURE — 97530 THERAPEUTIC ACTIVITIES: CPT

## 2022-02-23 PROCEDURE — 6370000000 HC RX 637 (ALT 250 FOR IP): Performed by: HOSPITALIST

## 2022-02-23 PROCEDURE — 97110 THERAPEUTIC EXERCISES: CPT

## 2022-02-23 PROCEDURE — 6360000002 HC RX W HCPCS: Performed by: STUDENT IN AN ORGANIZED HEALTH CARE EDUCATION/TRAINING PROGRAM

## 2022-02-23 RX ADMIN — SODIUM CHLORIDE, PRESERVATIVE FREE 10 ML: 5 INJECTION INTRAVENOUS at 09:27

## 2022-02-23 RX ADMIN — SENNOSIDES AND DOCUSATE SODIUM 1 TABLET: 50; 8.6 TABLET ORAL at 20:06

## 2022-02-23 RX ADMIN — OXYCODONE AND ACETAMINOPHEN 1 TABLET: 5; 325 TABLET ORAL at 15:07

## 2022-02-23 RX ADMIN — SODIUM CHLORIDE, PRESERVATIVE FREE 10 ML: 5 INJECTION INTRAVENOUS at 20:07

## 2022-02-23 RX ADMIN — ENOXAPARIN SODIUM 30 MG: 100 INJECTION SUBCUTANEOUS at 09:27

## 2022-02-23 RX ADMIN — MORPHINE SULFATE 30 MG: 15 TABLET, FILM COATED, EXTENDED RELEASE ORAL at 20:06

## 2022-02-23 RX ADMIN — SENNOSIDES AND DOCUSATE SODIUM 1 TABLET: 50; 8.6 TABLET ORAL at 09:27

## 2022-02-23 RX ADMIN — GUAIFENESIN 600 MG: 600 TABLET, EXTENDED RELEASE ORAL at 09:27

## 2022-02-23 RX ADMIN — FAMOTIDINE 20 MG: 20 TABLET ORAL at 20:06

## 2022-02-23 RX ADMIN — MORPHINE SULFATE 30 MG: 15 TABLET, FILM COATED, EXTENDED RELEASE ORAL at 09:26

## 2022-02-23 RX ADMIN — FLUTICASONE PROPIONATE 1 SPRAY: 50 SPRAY, METERED NASAL at 09:27

## 2022-02-23 RX ADMIN — ENOXAPARIN SODIUM 30 MG: 100 INJECTION SUBCUTANEOUS at 20:06

## 2022-02-23 RX ADMIN — GUAIFENESIN 600 MG: 600 TABLET, EXTENDED RELEASE ORAL at 20:06

## 2022-02-23 RX ADMIN — METHYLPREDNISOLONE SODIUM SUCCINATE 40 MG: 40 INJECTION, POWDER, LYOPHILIZED, FOR SOLUTION INTRAMUSCULAR; INTRAVENOUS at 05:47

## 2022-02-23 RX ADMIN — CETIRIZINE HYDROCHLORIDE 10 MG: 10 TABLET, FILM COATED ORAL at 05:47

## 2022-02-23 RX ADMIN — FAMOTIDINE 20 MG: 20 TABLET ORAL at 09:27

## 2022-02-23 RX ADMIN — Medication 2000 UNITS: at 09:27

## 2022-02-23 RX ADMIN — METHYLPREDNISOLONE SODIUM SUCCINATE 40 MG: 40 INJECTION, POWDER, LYOPHILIZED, FOR SOLUTION INTRAMUSCULAR; INTRAVENOUS at 17:09

## 2022-02-23 RX ADMIN — SALINE NASAL SPRAY 1 SPRAY: 1.5 SOLUTION NASAL at 01:13

## 2022-02-23 ASSESSMENT — PAIN DESCRIPTION - PAIN TYPE
TYPE: CHRONIC PAIN

## 2022-02-23 ASSESSMENT — PAIN DESCRIPTION - LOCATION
LOCATION: HIP
LOCATION: BACK

## 2022-02-23 ASSESSMENT — PAIN SCALES - GENERAL
PAINLEVEL_OUTOF10: 8
PAINLEVEL_OUTOF10: 3
PAINLEVEL_OUTOF10: 6
PAINLEVEL_OUTOF10: 8
PAINLEVEL_OUTOF10: 1
PAINLEVEL_OUTOF10: 6

## 2022-02-23 ASSESSMENT — PAIN DESCRIPTION - PROGRESSION
CLINICAL_PROGRESSION: GRADUALLY WORSENING
CLINICAL_PROGRESSION: GRADUALLY WORSENING

## 2022-02-23 ASSESSMENT — PAIN DESCRIPTION - DESCRIPTORS: DESCRIPTORS: ACHING

## 2022-02-23 ASSESSMENT — PAIN DESCRIPTION - ORIENTATION
ORIENTATION: LOWER;MID
ORIENTATION: LOWER

## 2022-02-23 ASSESSMENT — PAIN DESCRIPTION - FREQUENCY: FREQUENCY: CONTINUOUS

## 2022-02-23 ASSESSMENT — PAIN DESCRIPTION - ONSET: ONSET: ON-GOING

## 2022-02-23 NOTE — PROGRESS NOTES
Physical Therapy    Physical Therapy Treatment Note  Name: Harriett Ashely MRN: [id number] :   1945   Date:  2022   Admission Date: 2022 Room:  -A   Restrictions/Precautions:  Restrictions/Precautions  Restrictions/Precautions: Fall Risk,General Precautions,Contact Precautions (droplet +)       Communication with other providers:  RN approves tx session. Co-tx with Fawn Monae for pt safety    Subjective:  Patient states:  Agreeable to tx session; \"I just sat up in the chair for about 3 hours, can we just do stuff in bed?\"  Pain:   Location, Type, Intensity (0/10 to 10/10): Has pain; does not rate    Objective:    Observation:  Pt in bed upon arrival.     Treatment, including education/measures:  Transfers:  Scooting :maxA up in bed    Exercises: bed   Ankle Pumps x 10  Heel Slides x 10  SLR x 10  Abduction x 10  Glut Sets x 10  Requires modA to complete all ex with increased rest breaks. Safety  Patient left safely in the bed, with call light/phone in reach with alarm applied.      Assessment / Impression:       Patient's tolerance of treatment:  good   Adverse Reaction: none  Significant change in status and impact:  none  Barriers to improvement:  Strength, endurance     Plan for Next Session:    Will cont to work towards pt's goals per her tolerance    Time in:  1405  Time out:  1444  Timed treatment minutes:  39  Total treatment time:  39    Previously filed items:  Social/Functional History  Lives With: Alone  Type of Home: House  Home Layout: One level  Home Access: Ramped entrance (railing on BL sides)  Bathroom Shower/Tub: Shower chair without back (dtr spongebathes)  Bathroom Toilet: Handicap height  Bathroom Equipment: Grab bars around toilet  Home Equipment: Crutches,Wheelchair-manual,Grab bars  Receives Help From: Family  ADL Assistance: Needs assistance (dtr assists w/ bathing, dressing, ; dtr comes M/W/F)  Homemaking Assistance: Needs assistance (dtr does shopping; pt does

## 2022-02-23 NOTE — PROGRESS NOTES
reach, alarm on and nursing aware.          Assessment / Impression:    Patient's tolerance of treatment: well  Adverse Reaction: none  Significant change in status and impact:  none  Barriers to improvement: weakness      Plan for Next Session:    Continue with OT POC      Time in:  1405  Time out:  1444  Timed treatment minutes:  39  Total treatment time:  39      Electronically signed by:    SATURNINO Mckeon COTA/L [id number]    2/23/2022, 3:11 PM

## 2022-02-23 NOTE — PROGRESS NOTES
Pulmonary and Critical Care  Progress Note    Subjective: The patient is comfortable in bed. On 14 L HFNC. Shortness of breath is better. Chest pain none  Addressing respiratory complaints Patient is negative for  hemoptysis and cyanosis  CONSTITUTIONAL:  negative for fevers and chills      Past Medical History:     has a past medical history of Chronic lower back pain, Hx of spinal fusion, Hypertension, MRSA (methicillin resistant staph aureus) culture positive, and Venous disease. has a past surgical history that includes fracture surgery and back surgery. reports that she has never smoked. She has never used smokeless tobacco. She reports that she does not drink alcohol and does not use drugs. Family history:  family history is not on file. No Known Allergies  Social History:    Reviewed; no changes    Objective:   PHYSICAL EXAM:        VITALS:  /64   Pulse 102   Temp 98.6 °F (37 °C) (Oral)   Resp 21   Ht 5' 2.01\" (1.575 m)   Wt 186 lb 8.2 oz (84.6 kg)   SpO2 (!) 85%   BMI 34.10 kg/m²     24HR INTAKE/OUTPUT:      Intake/Output Summary (Last 24 hours) at 2/23/2022 1030  Last data filed at 2/23/2022 0208  Gross per 24 hour   Intake    Output 1300 ml   Net -1300 ml       CONSTITUTIONAL:  Awake. LUNGS:  decreased breath sounds, basilar crackles. CARDIOVASCULAR:  normal S1 and S2 and negative JVD  ABD:Abdomen soft, non-tender. BS normal. No masses,  No organomegaly  NEURO:Alert.   DATA:    CBC:  Recent Labs     02/21/22  1645 02/22/22  1108 02/23/22  0830   WBC 12.5* 11.0* 9.7   RBC 4.89 4.74 4.96   HGB 12.1* 11.8* 12.2*   HCT 40.9 39.7 42.0    220 211   MCV 83.6 83.8 84.7   MCH 24.7* 24.9* 24.6*   MCHC 29.6* 29.7* 29.0*   RDW 15.8* 15.7* 15.8*   SEGSPCT 91.9* 74.5* 89.4*      BMP:  Recent Labs     02/21/22  1645 02/22/22  1108 02/23/22  0830    135 137   K 4.8 3.8 4.4   CL 99 98* 98*   CO2 26 28 28   BUN 24* 22 20   CREATININE 0.6 0.5* 0.3*   CALCIUM 9.1 8.8 9.2   GLUCOSE 183* 98 125*      ABG:  No results for input(s): PH, PO2ART, UXD2DTT, HCO3, BEART, O2SAT in the last 72 hours. Lab Results   Component Value Date    PROBNP 286.5 02/23/2022    PROBNP 289.8 02/22/2022    PROBNP 1,360 (H) 02/08/2022     No results found for: [facility]    Radiology Review:  Pertinent images / reports were reviewed as a part of this visit. Assessment:     Patient Active Problem List   Diagnosis    WD-Venous stasis dermatitis of both lower extremities    WD-Lymphedema of both lower extremities    WD-Venous stasis ulcer of right calf with fat layer exposed with varicose veins (HCC)    WD-Venous stasis ulcer of left calf with fat layer exposed with varicose veins ([facility] [address]    COVID-19    Hypoxia       Plan:   1. Overall the patient is better. 2. [address] Activity.     Vamshi Figueroa MD  2/23/2022  10:30 AM

## 2022-02-23 NOTE — PROGRESS NOTES
Hospitalist Progress Note      Name:  Robin Sommers /Age/Sex: 1945  (68 y.o. female)   MRN & CSN:  [id number] & [id number] Admission Date/Time: 2022  4:46 PM   Location:  -A PCP: No primary care provider on file. Hospital Day:     Assessment and Plan:   Robin Sommers is a 68 y.o.  female with past medical history of degenerative joint disease, hypertension, was admitted on 2020 when she presented with nausea, vomiting, fatigue, was found to be hypoxic, diagnosed with Covid. 1. Acute respiratory failure with hypoxia secondary to COVID-19 pneumonia:  She is on 15L/min high flow. Oxygen saturation 88%. Continue incentive spirometry, OOB as tolerated. Patient reportedly desats to 60s on exertion. Completed 14 days baricitinib and dexamethasone. Inflammatory markers are trending down. CT pulmonary angiogram negative for PE. Shows patchy consolidative groundglass opacities bilaterally. PCT 0.078. Low suspicion for superimposed bacterial pneumonia. Blood cultures neg x 5 days. Urine legionella and strep pneumoniae negative. CXR  shows improvement compared to previous. On  she is still on 15 L oxygen- continue steroids  2. Upper airway congestion - mucinex, flonase, saline nasal spray. Improving. 3. Essential hypertension: Held losartan 22 due to lower blood pressures. 4. Hyponatremia -mild and asymptomatic  5. History of degenerative joint disease: Continue home dose of MS Contin 30 mg twice daily as well as Percocet 5/325 mg every 6 as needed. 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 6. Obesity with BMI of 34.1  7.  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 -She uses crutches at home  -She uses a wheelchair when she leaves the house  -I updated her son Chelsey Crespo on 2/22    Objective: Intake/Output Summary (Last 24 hours) at 2/23/2022 1718  Last data filed at 2/23/2022 1422  Gross per 24 hour   Intake    Output 1302 ml   Net -1302 ml      Vitals:   Vitals:    02/23/22 1600   BP: (!) 115/57   Pulse: 74   Resp: 20   Temp: 98.2 °F (36.8 °C)   SpO2: 96%     Physical Exam:     Physical Exam  HENT:      Nose: No rhinorrhea. Eyes:      General:         Right eye: No discharge. Left eye: No discharge. Pulmonary:      Effort: Pulmonary effort is normal.   Neurological:      Mental Status: She is alert. Cranial Nerves: No cranial nerve deficit.          Medications:   Medications:    methylPREDNISolone  40 mg IntraVENous Q12H    enoxaparin  30 mg SubCUTAneous BID    guaiFENesin  600 mg Oral BID    fluticasone  1 spray Each Nostril Daily    morphine  30 mg Oral 2 times per day    cetirizine  10 mg Oral Daily    [Held by provider] losartan  50 mg Oral Daily    sodium chloride flush  5-40 mL IntraVENous 2 times per day    sennosides-docusate sodium  1 tablet Oral BID    famotidine  20 mg Oral BID    Vitamin D  2,000 Units Oral Daily      Infusions:    sodium chloride 25 mL (02/09/22 0917)     PRN Meds: hydrOXYzine, 25 mg, TID PRN  sodium chloride, 1 spray, Q4H PRN  albuterol sulfate HFA, 2 puff, Q4H PRN  benzocaine-menthol, 1 lozenge, Q2H PRN  sodium chloride flush, 5-40 mL, PRN  sodium chloride, 25 mL, PRN  ondansetron, 4 mg, Q6H PRN  polyethylene glycol, 17 g, Daily PRN  acetaminophen, 650 mg, Q6H PRN   Or  acetaminophen, 650 mg, Q6H PRN  oxyCODONE-acetaminophen, 1 tablet, Q6H PRN      Electronically signed by Lorenzo Basilio MD on 2/23/2022 at 5:18 PM

## 2022-02-23 NOTE — PROGRESS NOTES
Hospitalist Progress Note      Name:  Stephanie Lambert /Age/Sex: 1945  (68 y.o. female)   MRN & CSN:  [id number] & [id number] Admission Date/Time: 2022  4:46 PM   Location:  -A PCP: No primary care provider on file. Hospital Day: 18    Assessment and Plan:   Stephanie Lambert is a 68 y.o.  female with past medical history of degenerative joint disease, hypertension, was admitted on 2020 when she presented with nausea, vomiting, fatigue, was found to be hypoxic, diagnosed with Covid. 1. Acute respiratory failure with hypoxia secondary to COVID-19 pneumonia:  She is on 15L/min high flow. Oxygen saturation 88%. Continue incentive spirometry, OOB as tolerated. Patient reportedly desats to 60s on exertion. Completed 14 days baricitinib and dexamethasone. Inflammatory markers are trending down. CT pulmonary angiogram negative for PE. Shows patchy consolidative groundglass opacities bilaterally. PCT 0.078. Low suspicion for superimposed bacterial pneumonia. Blood cultures neg x 5 days. Urine legionella and strep pneumoniae negative. CXR  shows improvement compared to previous. On  she is still on 15 L oxygen. Continue steroids. Updated parents. 2. Upper airway congestion - mucinex, flonase, saline nasal spray. Improving. 3. Essential hypertension: Held losartan 22 due to lower blood pressures. 4. Hyponatremia -mild and asymptomatic  5. History of degenerative joint disease: Continue home dose of MS Contin 30 mg twice daily as well as Percocet 5/325 mg every 6 as needed. 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 6. Obesity with BMI of 34.1  7. Advance care planning: Patient is DNR [facility]      Mobility  -she remains on 15 L oxygen. She asked about getting out of bed. PT/OT not ordered today because she is on 15 L oxygen. Today is hospital day 17.     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 -She uses crutches at home  -She uses a wheelchair when she leaves the house  -I updated her son Katty Cardenas on 2/22    Objective: Intake/Output Summary (Last 24 hours) at 2/22/2022 1959  Last data filed at 2/22/2022 0500  Gross per 24 hour   Intake 480 ml   Output 300 ml   Net 180 ml      Vitals:   Vitals:    02/22/22 1900   BP:    Pulse: 82   Resp:    Temp:    SpO2:      Physical Exam:     Physical Exam  HENT:      Nose: No rhinorrhea. Eyes:      General:         Right eye: No discharge. Left eye: No discharge. Pulmonary:      Effort: Pulmonary effort is normal.   Neurological:      Mental Status: She is alert. Cranial Nerves: No cranial nerve deficit.          Medications:   Medications:    methylPREDNISolone  40 mg IntraVENous Q12H    enoxaparin  30 mg SubCUTAneous BID    guaiFENesin  600 mg Oral BID    fluticasone  1 spray Each Nostril Daily    morphine  30 mg Oral 2 times per day    cetirizine  10 mg Oral Daily    [Held by provider] losartan  50 mg Oral Daily    sodium chloride flush  5-40 mL IntraVENous 2 times per day    sennosides-docusate sodium  1 tablet Oral BID    famotidine  20 mg Oral BID    Vitamin D  2,000 Units Oral Daily      Infusions:    sodium chloride 25 mL (02/09/22 0917)     PRN Meds: hydrOXYzine, 25 mg, TID PRN  sodium chloride, 1 spray, Q4H PRN  albuterol sulfate HFA, 2 puff, Q4H PRN  benzocaine-menthol, 1 lozenge, Q2H PRN  sodium chloride flush, 5-40 mL, PRN  sodium chloride, 25 mL, PRN  ondansetron, 4 mg, Q6H PRN  polyethylene glycol, 17 g, Daily PRN  acetaminophen, 650 mg, Q6H PRN   Or  acetaminophen, 650 mg, Q6H PRN  oxyCODONE-acetaminophen, 1 tablet, Q6H PRN      Electronically signed by Stacey Wall Jeff Jade MD on 2/22/2022 at 7:59 PM

## 2022-02-23 NOTE — PROGRESS NOTES
Patient tested positive for Covid 19 on 2/5/2002. Per Dr Marychuy Tillman and infection prevention. Patient is ok to be removed from isolation at this time.

## 2022-02-23 NOTE — CARE COORDINATION
CM team is following. Pt is down to 13 lmin NC. CM following. CM received call from [facility] and they would like to take pt home. Per previous notes pt has has bed trapeze w/c crutches. No home O2. Cm team will follow. CM called and left a VM for Irene regarding desires for home care and or any other needs.  [address]

## 2022-02-24 LAB
ANION GAP SERPL CALCULATED.3IONS-SCNC: 11 MMOL/L (ref 4–16)
BASOPHILS ABSOLUTE: 0 K/CU MM
BASOPHILS RELATIVE PERCENT: 0.1 % (ref 0–1)
BUN BLDV-MCNC: 25 MG/DL (ref 6–23)
CALCIUM SERPL-MCNC: 8.8 MG/DL (ref 8.3–10.6)
CHLORIDE BLD-SCNC: 100 MMOL/L (ref 99–110)
CO2: 28 MMOL/L (ref 21–32)
CREAT SERPL-MCNC: 0.6 MG/DL (ref 0.6–1.1)
DIFFERENTIAL TYPE: ABNORMAL
EOSINOPHILS ABSOLUTE: 0 K/CU MM
EOSINOPHILS RELATIVE PERCENT: 0.2 % (ref 0–3)
GFR AFRICAN AMERICAN: >60 ML/MIN/1.73M2
GFR NON-AFRICAN AMERICAN: >60 ML/MIN/1.73M2
GLUCOSE BLD-MCNC: 110 MG/DL (ref 70–99)
HCT VFR BLD CALC: 40.8 % (ref 37–47)
HEMOGLOBIN: 11.9 GM/DL (ref 12.5–16)
IMMATURE NEUTROPHIL %: 1.2 % (ref 0–0.43)
LYMPHOCYTES ABSOLUTE: 1 K/CU MM
LYMPHOCYTES RELATIVE PERCENT: 8 % (ref 24–44)
MCH RBC QN AUTO: 24.8 PG (ref 27–31)
MCHC RBC AUTO-ENTMCNC: 29.2 % (ref 32–36)
MCV RBC AUTO: 85 FL (ref 78–100)
MONOCYTES ABSOLUTE: 1.3 K/CU MM
MONOCYTES RELATIVE PERCENT: 10.5 % (ref 0–4)
NUCLEATED RBC %: 0 %
PDW BLD-RTO: 16.1 % (ref 11.7–14.9)
PLATELET # BLD: 212 K/CU MM (ref 140–440)
PMV BLD AUTO: 11.4 FL (ref 7.5–11.1)
POTASSIUM SERPL-SCNC: 4 MMOL/L (ref 3.5–5.1)
PROCALCITONIN: 0.12
RBC # BLD: 4.8 M/CU MM (ref 4.2–5.4)
SEGMENTED NEUTROPHILS ABSOLUTE COUNT: 10.1 K/CU MM
SEGMENTED NEUTROPHILS RELATIVE PERCENT: 80 % (ref 36–66)
SODIUM BLD-SCNC: 139 MMOL/L (ref 135–145)
TOTAL IMMATURE NEUTOROPHIL: 0.15 K/CU MM
TOTAL NUCLEATED RBC: 0 K/CU MM
WBC # BLD: 12.6 K/CU MM (ref 4–10.5)

## 2022-02-24 PROCEDURE — 6370000000 HC RX 637 (ALT 250 FOR IP): Performed by: NURSE PRACTITIONER

## 2022-02-24 PROCEDURE — 84145 PROCALCITONIN (PCT): CPT

## 2022-02-24 PROCEDURE — 94761 N-INVAS EAR/PLS OXIMETRY MLT: CPT

## 2022-02-24 PROCEDURE — 85025 COMPLETE CBC W/AUTO DIFF WBC: CPT

## 2022-02-24 PROCEDURE — 6360000002 HC RX W HCPCS: Performed by: STUDENT IN AN ORGANIZED HEALTH CARE EDUCATION/TRAINING PROGRAM

## 2022-02-24 PROCEDURE — 6370000000 HC RX 637 (ALT 250 FOR IP): Performed by: STUDENT IN AN ORGANIZED HEALTH CARE EDUCATION/TRAINING PROGRAM

## 2022-02-24 PROCEDURE — 36415 COLL VENOUS BLD VENIPUNCTURE: CPT

## 2022-02-24 PROCEDURE — 80048 BASIC METABOLIC PNL TOTAL CA: CPT

## 2022-02-24 PROCEDURE — 2060000000 HC ICU INTERMEDIATE R&B

## 2022-02-24 PROCEDURE — 6370000000 HC RX 637 (ALT 250 FOR IP): Performed by: HOSPITALIST

## 2022-02-24 PROCEDURE — 2580000003 HC RX 258: Performed by: NURSE PRACTITIONER

## 2022-02-24 PROCEDURE — 2700000000 HC OXYGEN THERAPY PER DAY

## 2022-02-24 RX ADMIN — ENOXAPARIN SODIUM 30 MG: 100 INJECTION SUBCUTANEOUS at 07:57

## 2022-02-24 RX ADMIN — GUAIFENESIN 600 MG: 600 TABLET, EXTENDED RELEASE ORAL at 07:56

## 2022-02-24 RX ADMIN — ENOXAPARIN SODIUM 30 MG: 100 INJECTION SUBCUTANEOUS at 19:24

## 2022-02-24 RX ADMIN — FAMOTIDINE 20 MG: 20 TABLET ORAL at 19:23

## 2022-02-24 RX ADMIN — OXYCODONE AND ACETAMINOPHEN 1 TABLET: 5; 325 TABLET ORAL at 12:02

## 2022-02-24 RX ADMIN — SENNOSIDES AND DOCUSATE SODIUM 1 TABLET: 50; 8.6 TABLET ORAL at 07:56

## 2022-02-24 RX ADMIN — METHYLPREDNISOLONE SODIUM SUCCINATE 40 MG: 40 INJECTION, POWDER, LYOPHILIZED, FOR SOLUTION INTRAMUSCULAR; INTRAVENOUS at 17:36

## 2022-02-24 RX ADMIN — FAMOTIDINE 20 MG: 20 TABLET ORAL at 07:56

## 2022-02-24 RX ADMIN — SODIUM CHLORIDE, PRESERVATIVE FREE 10 ML: 5 INJECTION INTRAVENOUS at 07:59

## 2022-02-24 RX ADMIN — Medication 2000 UNITS: at 07:56

## 2022-02-24 RX ADMIN — METHYLPREDNISOLONE SODIUM SUCCINATE 40 MG: 40 INJECTION, POWDER, LYOPHILIZED, FOR SOLUTION INTRAMUSCULAR; INTRAVENOUS at 04:40

## 2022-02-24 RX ADMIN — GUAIFENESIN 600 MG: 600 TABLET, EXTENDED RELEASE ORAL at 19:23

## 2022-02-24 RX ADMIN — MORPHINE SULFATE 30 MG: 15 TABLET, FILM COATED, EXTENDED RELEASE ORAL at 07:56

## 2022-02-24 RX ADMIN — MORPHINE SULFATE 30 MG: 15 TABLET, FILM COATED, EXTENDED RELEASE ORAL at 19:23

## 2022-02-24 RX ADMIN — CETIRIZINE HYDROCHLORIDE 10 MG: 10 TABLET, FILM COATED ORAL at 07:56

## 2022-02-24 RX ADMIN — SODIUM CHLORIDE, PRESERVATIVE FREE 10 ML: 5 INJECTION INTRAVENOUS at 19:25

## 2022-02-24 RX ADMIN — SENNOSIDES AND DOCUSATE SODIUM 1 TABLET: 50; 8.6 TABLET ORAL at 19:23

## 2022-02-24 RX ADMIN — OXYCODONE AND ACETAMINOPHEN 1 TABLET: 5; 325 TABLET ORAL at 01:08

## 2022-02-24 ASSESSMENT — PAIN DESCRIPTION - DESCRIPTORS
DESCRIPTORS: ACHING
DESCRIPTORS: ACHING

## 2022-02-24 ASSESSMENT — PAIN DESCRIPTION - ORIENTATION
ORIENTATION: LOWER
ORIENTATION: RIGHT

## 2022-02-24 ASSESSMENT — PAIN SCALES - GENERAL
PAINLEVEL_OUTOF10: 9
PAINLEVEL_OUTOF10: 9
PAINLEVEL_OUTOF10: 4
PAINLEVEL_OUTOF10: 9
PAINLEVEL_OUTOF10: 8
PAINLEVEL_OUTOF10: 9
PAINLEVEL_OUTOF10: 9

## 2022-02-24 ASSESSMENT — PAIN DESCRIPTION - PAIN TYPE
TYPE: CHRONIC PAIN
TYPE: CHRONIC PAIN

## 2022-02-24 ASSESSMENT — PAIN DESCRIPTION - LOCATION
LOCATION: BACK
LOCATION: HIP

## 2022-02-24 ASSESSMENT — PAIN DESCRIPTION - PROGRESSION
CLINICAL_PROGRESSION: GRADUALLY WORSENING

## 2022-02-24 ASSESSMENT — PAIN SCALES - WONG BAKER: WONGBAKER_NUMERICALRESPONSE: 0

## 2022-02-24 ASSESSMENT — PAIN DESCRIPTION - FREQUENCY
FREQUENCY: CONTINUOUS
FREQUENCY: CONTINUOUS

## 2022-02-24 ASSESSMENT — PAIN DESCRIPTION - ONSET
ONSET: ON-GOING
ONSET: ON-GOING

## 2022-02-24 NOTE — PROGRESS NOTES
Pulmonary and Critical Care  Progress Note    Subjective: The patient is about the same. On 14 L HFNC. Shortness of breath is unchanged. Chest pain none. Addressing respiratory complaints Patient is negative for  hemoptysis and cyanosis  CONSTITUTIONAL:  negative for fevers and chills      Past Medical History:     has a past medical history of Chronic lower back pain, Hx of spinal fusion, Hypertension, MRSA (methicillin resistant staph aureus) culture positive, and Venous disease. has a past surgical history that includes fracture surgery and back surgery. reports that she has never smoked. She has never used smokeless tobacco. She reports that she does not drink alcohol and does not use drugs. Family history:  family history is not on file. No Known Allergies  Social History:    Reviewed; no changes    Objective:   PHYSICAL EXAM:        VITALS:  /60   Pulse 74   Temp 97.8 °F (36.6 °C)   Resp 15   Ht 5' 2.01\" (1.575 m)   Wt 186 lb 8.2 oz (84.6 kg)   SpO2 94%   BMI 34.10 kg/m²     24HR INTAKE/OUTPUT:      Intake/Output Summary (Last 24 hours) at 2/24/2022 1048  Last data filed at 2/24/2022 0400  Gross per 24 hour   Intake 960 ml   Output 402 ml   Net 558 ml       CONSTITUTIONAL:  Awake. LUNGS:  decreased breath sounds, basilar crackles. CARDIOVASCULAR:  normal S1 and S2 and negative JVD  ABD:Abdomen soft, non-tender. BS normal. No masses,  No organomegaly  NEURO:Alert.   DATA:    CBC:  Recent Labs     02/21/22  1645 02/22/22  1108 02/23/22  0830   WBC 12.5* 11.0* 9.7   RBC 4.89 4.74 4.96   HGB 12.1* 11.8* 12.2*   HCT 40.9 39.7 42.0    220 211   MCV 83.6 83.8 84.7   MCH 24.7* 24.9* 24.6*   MCHC 29.6* 29.7* 29.0*   RDW 15.8* 15.7* 15.8*   SEGSPCT 91.9* 74.5* 89.4*      BMP:  Recent Labs     02/21/22  1645 02/22/22  1108 02/23/22  0830    135 137   K 4.8 3.8 4.4   CL 99 98* 98*   CO2 26 28 28   BUN 24* 22 20   CREATININE 0.6 0.5* 0.3*   CALCIUM 9.1 8.8 9.2   GLUCOSE 183* 98 125* ABG:  No results for input(s): PH, PO2ART, UNC7HVF, HCO3, BEART, O2SAT in the last 72 hours. Lab Results   Component Value Date    PROBNP 286.5 02/23/2022    PROBNP 289.8 02/22/2022    PROBNP 1,360 (H) 02/08/2022     No results found for: [facility]    Radiology Review:  Pertinent images / reports were reviewed as a part of this visit. Assessment:     Patient Active Problem List   Diagnosis    WD-Venous stasis dermatitis of both lower extremities    WD-Lymphedema of both lower extremities    WD-Venous stasis ulcer of right calf with fat layer exposed with varicose veins (HCC)    WD-Venous stasis ulcer of left calf with fat layer exposed with varicose veins ([address]    COVID-19    Hypoxia       Plan:   1. Overall the patient is unchanged. 2. [address] Activity.   Denisse Cloud MD  2/24/2022  10:48 AM

## 2022-02-24 NOTE — CARE COORDINATION
CM reviewed notes. O2 at 15 lmin. CM team is following. CM will offer home care for pt. [address]  [address] CM reviewed VM, /received call from [address] [facility]. CM informed that a nephew and his family would be staying with pt and additional care by her children. Declined home care. Pt may need home O2. . CM team will follow [address]

## 2022-02-24 NOTE — PROGRESS NOTES
Hospitalist Progress Note      Name:  Glenn Martinez /Age/Sex: 1945  (68 y.o. female)   MRN & CSN:  [id number] & [id number] Admission Date/Time: 2022  4:46 PM   Location:  -A PCP: No primary care provider on file. Hospital Day:     Assessment and Plan:   Glenn Martinez is a 68 y.o.  female with past medical history of degenerative joint disease, hypertension, was admitted on 2020 when she presented with nausea, vomiting, fatigue, was found to be hypoxic, diagnosed with Covid. 1. Acute respiratory failure with hypoxia secondary to COVID-19 pneumonia:  She is on 15L/min high flow. Oxygen saturation 88%. Continue incentive spirometry, OOB as tolerated. Completed 14 days baricitinib and dexamethasone. Inflammatory markers are trending down. BP low suspicion for superimposed bacterial pneumonia. Blood cultures neg x 5 days. On  she is still on 15 L oxygencontinue  2. Upper airway congestion - mucinex, flonase, saline nasal spray. Improving. 3. Essential hypertension: Held losartan 22 due to lower blood pressures. 4. Hyponatremia -mild and asymptomatic  5. History of degenerative joint disease: Continue home dose of MS Contin 30 mg twice daily as well as Percocet 5/325 mg every 6 as needed. 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 6. Obesity with BMI of 34.1  7.  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 -She uses crutches at home  -She uses a wheelchair when she leaves the house  -I updated her son Rachel Kwok on 2/22    Objective: Intake/Output Summary (Last 24 hours) at 2/24/2022 1726  Last data filed at 2/24/2022 0400  Gross per 24 hour   Intake 960 ml   Output 400 ml   Net 560 ml      Vitals:   Vitals:    02/24/22 0728   BP:    Pulse:    Resp: 15   Temp:    SpO2: 94%     Physical Exam:     Physical Exam  HENT:      Nose: No rhinorrhea. Eyes:      General:         Right eye: No discharge. Left eye: No discharge. Pulmonary:      Effort: Pulmonary effort is normal.   Neurological:      Mental Status: She is alert. Cranial Nerves: No cranial nerve deficit.          Medications:   Medications:    methylPREDNISolone  40 mg IntraVENous Q12H    enoxaparin  30 mg SubCUTAneous BID    guaiFENesin  600 mg Oral BID    fluticasone  1 spray Each Nostril Daily    morphine  30 mg Oral 2 times per day    cetirizine  10 mg Oral Daily    [Held by provider] losartan  50 mg Oral Daily    sodium chloride flush  5-40 mL IntraVENous 2 times per day    sennosides-docusate sodium  1 tablet Oral BID    famotidine  20 mg Oral BID    Vitamin D  2,000 Units Oral Daily      Infusions:    sodium chloride 25 mL (02/09/22 0917)     PRN Meds: hydrOXYzine, 25 mg, TID PRN  sodium chloride, 1 spray, Q4H PRN  albuterol sulfate HFA, 2 puff, Q4H PRN  benzocaine-menthol, 1 lozenge, Q2H PRN  sodium chloride flush, 5-40 mL, PRN  sodium chloride, 25 mL, PRN  ondansetron, 4 mg, Q6H PRN  polyethylene glycol, 17 g, Daily PRN  acetaminophen, 650 mg, Q6H PRN   Or  acetaminophen, 650 mg, Q6H PRN  oxyCODONE-acetaminophen, 1 tablet, Q6H PRN      Electronically signed by Deepali Mukherjee MD on 2/24/2022 at 5:26 PM

## 2022-02-24 NOTE — PROGRESS NOTES
Comprehensive Nutrition Assessment    Type and Reason for Visit:  Reassess    Nutrition Recommendations/Plan:   · Continue current diet and supplements    Nutrition Assessment:  Pt continues to consume 100% of her meals and is at low risk at this time    Malnutrition Assessment:  Malnutrition Status: At risk for malnutrition (Comment)    Context:  Acute Illness       Estimated Daily Nutrient Needs:  Energy (kcal):  0278-7213 ([address] with stress factor 1.0-1.2); Weight Used for Energy Requirements:  Current     Protein (g):  55-65 (1.1-1.3 g/kg); Weight Used for Protein Requirements:  Ideal        Fluid (ml/day):  1500; Method Used for Fluid Requirements:  1 ml/kcal      Nutrition Related Findings:  Na 134      Wounds:  None       Current Nutrition Therapies:    ADULT ORAL NUTRITION SUPPLEMENT; Breakfast, Lunch, Dinner; Low Calorie/High Protein Oral Supplement  ADULT DIET; Regular; No Added Salt (3-4 gm)    Anthropometric Measures:  · Height: 5' 2.01\" (157.5 cm)  · Current Body Weight: 186 lb 8.2 oz (84.6 kg)   · Usual Body Weight:  (no weight hx available)     · Ideal Body Weight: 110 lbs; % Ideal Body Weight 169.6 %   · BMI: 34.1  · BMI Categories: Obese Class 1 (BMI 30.0-34. 9)       Nutrition Diagnosis:   · Predicted inadequate energy intake related to acute injury/trauma,impaired respiratory function as evidenced by  (varied po intakes during stay)      Nutrition Interventions:   Food and/or Nutrient Delivery:  Continue Current Diet,Continue Oral Nutrition Supplement  Nutrition Education/Counseling:  No recommendation at this time   Coordination of Nutrition Care:  Continue to monitor while inpatient    Goals:  Pt will meet greater than 50-75% of meals and supplements       Nutrition Monitoring and Evaluation:   Behavioral-Environmental Outcomes:  None Identified   Food/Nutrient Intake Outcomes:  Food and Nutrient Intake,Supplement Intake  Physical Signs/Symptoms Outcomes:  Biochemical Data,GI Status,Hemodynamic Status,Weight     Discharge Planning:     Too soon to determine     Electronically signed by Mark Marcial RD, DENISA on 3/79/63 at 9:03 PM EST    Contact: [phone]

## 2022-02-25 LAB
ANION GAP SERPL CALCULATED.3IONS-SCNC: 9 MMOL/L (ref 4–16)
BASOPHILS ABSOLUTE: 0 K/CU MM
BASOPHILS RELATIVE PERCENT: 0.1 % (ref 0–1)
BUN BLDV-MCNC: 22 MG/DL (ref 6–23)
CALCIUM SERPL-MCNC: 8.9 MG/DL (ref 8.3–10.6)
CHLORIDE BLD-SCNC: 99 MMOL/L (ref 99–110)
CO2: 29 MMOL/L (ref 21–32)
CREAT SERPL-MCNC: 0.5 MG/DL (ref 0.6–1.1)
DIFFERENTIAL TYPE: ABNORMAL
EOSINOPHILS ABSOLUTE: 0.1 K/CU MM
EOSINOPHILS RELATIVE PERCENT: 1 % (ref 0–3)
GFR AFRICAN AMERICAN: >60 ML/MIN/1.73M2
GFR NON-AFRICAN AMERICAN: >60 ML/MIN/1.73M2
GLUCOSE BLD-MCNC: 98 MG/DL (ref 70–99)
HCT VFR BLD CALC: 41.7 % (ref 37–47)
HEMOGLOBIN: 12 GM/DL (ref 12.5–16)
IMMATURE NEUTROPHIL %: 1.7 % (ref 0–0.43)
LYMPHOCYTES ABSOLUTE: 1.3 K/CU MM
LYMPHOCYTES RELATIVE PERCENT: 12.4 % (ref 24–44)
MCH RBC QN AUTO: 24.5 PG (ref 27–31)
MCHC RBC AUTO-ENTMCNC: 28.8 % (ref 32–36)
MCV RBC AUTO: 85.3 FL (ref 78–100)
MONOCYTES ABSOLUTE: 1.1 K/CU MM
MONOCYTES RELATIVE PERCENT: 10.4 % (ref 0–4)
NUCLEATED RBC %: 0 %
PDW BLD-RTO: 16.4 % (ref 11.7–14.9)
PLATELET # BLD: 191 K/CU MM (ref 140–440)
PMV BLD AUTO: 11.6 FL (ref 7.5–11.1)
POTASSIUM SERPL-SCNC: 3.9 MMOL/L (ref 3.5–5.1)
RBC # BLD: 4.89 M/CU MM (ref 4.2–5.4)
SEGMENTED NEUTROPHILS ABSOLUTE COUNT: 7.5 K/CU MM
SEGMENTED NEUTROPHILS RELATIVE PERCENT: 74.4 % (ref 36–66)
SODIUM BLD-SCNC: 137 MMOL/L (ref 135–145)
TOTAL IMMATURE NEUTOROPHIL: 0.17 K/CU MM
TOTAL NUCLEATED RBC: 0 K/CU MM
WBC # BLD: 10.1 K/CU MM (ref 4–10.5)

## 2022-02-25 PROCEDURE — 6370000000 HC RX 637 (ALT 250 FOR IP): Performed by: STUDENT IN AN ORGANIZED HEALTH CARE EDUCATION/TRAINING PROGRAM

## 2022-02-25 PROCEDURE — 94761 N-INVAS EAR/PLS OXIMETRY MLT: CPT

## 2022-02-25 PROCEDURE — 6370000000 HC RX 637 (ALT 250 FOR IP): Performed by: INTERNAL MEDICINE

## 2022-02-25 PROCEDURE — 36415 COLL VENOUS BLD VENIPUNCTURE: CPT

## 2022-02-25 PROCEDURE — 2060000000 HC ICU INTERMEDIATE R&B

## 2022-02-25 PROCEDURE — 85025 COMPLETE CBC W/AUTO DIFF WBC: CPT

## 2022-02-25 PROCEDURE — 6360000002 HC RX W HCPCS: Performed by: STUDENT IN AN ORGANIZED HEALTH CARE EDUCATION/TRAINING PROGRAM

## 2022-02-25 PROCEDURE — 6370000000 HC RX 637 (ALT 250 FOR IP): Performed by: HOSPITALIST

## 2022-02-25 PROCEDURE — 2700000000 HC OXYGEN THERAPY PER DAY

## 2022-02-25 PROCEDURE — 80048 BASIC METABOLIC PNL TOTAL CA: CPT

## 2022-02-25 PROCEDURE — 2580000003 HC RX 258: Performed by: NURSE PRACTITIONER

## 2022-02-25 PROCEDURE — 6370000000 HC RX 637 (ALT 250 FOR IP): Performed by: NURSE PRACTITIONER

## 2022-02-25 RX ORDER — OXYMETAZOLINE HYDROCHLORIDE 0.05 G/100ML
2 SPRAY NASAL 2 TIMES DAILY
Status: COMPLETED | OUTPATIENT
Start: 2022-02-25 | End: 2022-02-26

## 2022-02-25 RX ADMIN — OXYCODONE AND ACETAMINOPHEN 1 TABLET: 5; 325 TABLET ORAL at 23:49

## 2022-02-25 RX ADMIN — FLUTICASONE PROPIONATE 1 SPRAY: 50 SPRAY, METERED NASAL at 08:16

## 2022-02-25 RX ADMIN — FAMOTIDINE 20 MG: 20 TABLET ORAL at 20:14

## 2022-02-25 RX ADMIN — MORPHINE SULFATE 30 MG: 15 TABLET, FILM COATED, EXTENDED RELEASE ORAL at 20:14

## 2022-02-25 RX ADMIN — SENNOSIDES AND DOCUSATE SODIUM 1 TABLET: 50; 8.6 TABLET ORAL at 08:15

## 2022-02-25 RX ADMIN — GUAIFENESIN 600 MG: 600 TABLET, EXTENDED RELEASE ORAL at 08:15

## 2022-02-25 RX ADMIN — METHYLPREDNISOLONE SODIUM SUCCINATE 40 MG: 40 INJECTION, POWDER, LYOPHILIZED, FOR SOLUTION INTRAMUSCULAR; INTRAVENOUS at 18:05

## 2022-02-25 RX ADMIN — SODIUM CHLORIDE, PRESERVATIVE FREE 10 ML: 5 INJECTION INTRAVENOUS at 08:17

## 2022-02-25 RX ADMIN — OXYMETAZOLINE HCL 2 SPRAY: 0.05 SPRAY NASAL at 12:49

## 2022-02-25 RX ADMIN — METHYLPREDNISOLONE SODIUM SUCCINATE 40 MG: 40 INJECTION, POWDER, LYOPHILIZED, FOR SOLUTION INTRAMUSCULAR; INTRAVENOUS at 05:44

## 2022-02-25 RX ADMIN — FAMOTIDINE 20 MG: 20 TABLET ORAL at 08:15

## 2022-02-25 RX ADMIN — GUAIFENESIN 600 MG: 600 TABLET, EXTENDED RELEASE ORAL at 20:14

## 2022-02-25 RX ADMIN — OXYMETAZOLINE HCL 2 SPRAY: 0.05 SPRAY NASAL at 20:17

## 2022-02-25 RX ADMIN — SENNOSIDES AND DOCUSATE SODIUM 1 TABLET: 50; 8.6 TABLET ORAL at 20:14

## 2022-02-25 RX ADMIN — ENOXAPARIN SODIUM 30 MG: 100 INJECTION SUBCUTANEOUS at 08:15

## 2022-02-25 RX ADMIN — SODIUM CHLORIDE, PRESERVATIVE FREE 10 ML: 5 INJECTION INTRAVENOUS at 20:15

## 2022-02-25 RX ADMIN — Medication 2000 UNITS: at 08:15

## 2022-02-25 RX ADMIN — MORPHINE SULFATE 30 MG: 15 TABLET, FILM COATED, EXTENDED RELEASE ORAL at 08:15

## 2022-02-25 RX ADMIN — OXYCODONE AND ACETAMINOPHEN 1 TABLET: 5; 325 TABLET ORAL at 15:16

## 2022-02-25 RX ADMIN — CETIRIZINE HYDROCHLORIDE 10 MG: 10 TABLET, FILM COATED ORAL at 05:45

## 2022-02-25 RX ADMIN — OXYCODONE AND ACETAMINOPHEN 1 TABLET: 5; 325 TABLET ORAL at 00:27

## 2022-02-25 RX ADMIN — ENOXAPARIN SODIUM 30 MG: 100 INJECTION SUBCUTANEOUS at 20:14

## 2022-02-25 ASSESSMENT — PAIN DESCRIPTION - LOCATION
LOCATION: BACK
LOCATION: BACK

## 2022-02-25 ASSESSMENT — PAIN DESCRIPTION - DESCRIPTORS: DESCRIPTORS: ACHING

## 2022-02-25 ASSESSMENT — PAIN DESCRIPTION - PROGRESSION
CLINICAL_PROGRESSION: GRADUALLY WORSENING

## 2022-02-25 ASSESSMENT — PAIN SCALES - WONG BAKER
WONGBAKER_NUMERICALRESPONSE: 0

## 2022-02-25 ASSESSMENT — PAIN - FUNCTIONAL ASSESSMENT: PAIN_FUNCTIONAL_ASSESSMENT: PREVENTS OR INTERFERES SOME ACTIVE ACTIVITIES AND ADLS

## 2022-02-25 ASSESSMENT — PAIN DESCRIPTION - FREQUENCY: FREQUENCY: CONTINUOUS

## 2022-02-25 ASSESSMENT — PAIN SCALES - GENERAL
PAINLEVEL_OUTOF10: 6
PAINLEVEL_OUTOF10: 5
PAINLEVEL_OUTOF10: 8
PAINLEVEL_OUTOF10: 5
PAINLEVEL_OUTOF10: 3
PAINLEVEL_OUTOF10: 8

## 2022-02-25 ASSESSMENT — PAIN DESCRIPTION - ONSET: ONSET: ON-GOING

## 2022-02-25 ASSESSMENT — PAIN DESCRIPTION - PAIN TYPE
TYPE: CHRONIC PAIN
TYPE: CHRONIC PAIN

## 2022-02-25 NOTE — PROGRESS NOTES
Hospitalist Progress Note      Name:  Ester Moeller /Age/Sex: 1945  (68 y.o. female)   MRN & CSN:  [id number] & [id number] Admission Date/Time: 2022  4:46 PM   Location:  -A PCP: No primary care provider on file. Hospital Day:     Assessment and Plan:   Ester Moeller is a 68 y.o.  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 Acute respiratory failure with hypoxia secondary to COVID-19 pneumonia  -Baricitinib completed   -Dexamethasone started  and completed   -: Restart dexamethasone     Upper airway congestion  -Mucinex  -Flonase  -Saline nasal spray    Essential hypertension: Held losartan 22 due to lower blood pressures. Hyponatremia -mild and asymptomatic    History of degenerative joint disease: Status post multiple hip replacements     Chronic pain syndrome   -On MS Contin 30 mg twice daily and Percocet every 6 hours as needed at homecontinue     Obesity with BMI of 34.1    Code status: Patient is DNR [facility]    Interval History       -She had nasal congestion therefore the nasal cannula was not working. She had a mask on when I saw her.   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   -She uses crutches at home  -She uses a wheelchair when she leaves the house  -I updated her son Sriram Merino on 2/22    Objective: Intake/Output Summary (Last 24 hours) at 2/25/2022 1558  Last data filed at 2/25/2022 0555  Gross per 24 hour   Intake    Output 980 ml   Net -980 ml      Vitals:   Vitals:    02/25/22 1252   BP: 123/68   Pulse: 78   Resp: 22   Temp:    SpO2: 96%     Physical Exam:     Physical Exam  HENT:      Nose: No rhinorrhea. Eyes:      General:         Right eye: No discharge. Left eye: No discharge. Pulmonary:      Effort: Pulmonary effort is normal.   Neurological:      Mental Status: She is alert. Cranial Nerves: No cranial nerve deficit.          Medications:   Medications:    oxymetazoline  2 spray Each Nostril BID    methylPREDNISolone  40 mg IntraVENous Q12H    enoxaparin  30 mg SubCUTAneous BID    guaiFENesin  600 mg Oral BID    fluticasone  1 spray Each Nostril Daily    morphine  30 mg Oral 2 times per day    cetirizine  10 mg Oral Daily    [Held by provider] losartan  50 mg Oral Daily    sodium chloride flush  5-40 mL IntraVENous 2 times per day    sennosides-docusate sodium  1 tablet Oral BID    famotidine  20 mg Oral BID    Vitamin D  2,000 Units Oral Daily      Infusions:    sodium chloride 25 mL (02/09/22 0917)     PRN Meds: hydrOXYzine, 25 mg, TID PRN  sodium chloride, 1 spray, Q4H PRN  albuterol sulfate HFA, 2 puff, Q4H PRN  benzocaine-menthol, 1 lozenge, Q2H PRN  sodium chloride flush, 5-40 mL, PRN  sodium chloride, 25 mL, PRN  ondansetron, 4 mg, Q6H PRN  polyethylene glycol, 17 g, Daily PRN  acetaminophen, 650 mg, Q6H PRN   Or  acetaminophen, 650 mg, Q6H PRN  oxyCODONE-acetaminophen, 1 tablet, Q6H PRN      Electronically signed by Aishwarya Ramírez MD on 2/25/2022 at 3:58 PM

## 2022-02-25 NOTE — PROGRESS NOTES
Physical Therapy  Attempt Note    PT tx attempted am/pm. Up to [facility] on arrival in AM. Pt endorsing inc SOB in PM, preferring to not perform OOB this session, requesting to reattempt in am when she has more energy. Will attempt as time/schedule allows.     Jey Vickers PT, DPT

## 2022-02-25 NOTE — PROGRESS NOTES
Pulmonary and Critical Care  Progress Note    Subjective: The patient is comfortable in bed. On 14 L HFNC. Shortness of breath is unchanged. Chest pain none  Addressing respiratory complaints Patient is negative for  hemoptysis and cyanosis  CONSTITUTIONAL:  negative for fevers and chills      Past Medical History:     has a past medical history of Chronic lower back pain, Hx of spinal fusion, Hypertension, MRSA (methicillin resistant staph aureus) culture positive, and Venous disease. has a past surgical history that includes fracture surgery and back surgery. reports that she has never smoked. She has never used smokeless tobacco. She reports that she does not drink alcohol and does not use drugs. Family history:  family history is not on file. No Known Allergies  Social History:    Reviewed; no changes    Objective:   PHYSICAL EXAM:        VITALS:  BP (!) 142/68   Pulse 83   Temp 97.7 °F (36.5 °C) (Oral)   Resp 25   Ht 5' 2.01\" (1.575 m)   Wt 186 lb 8.2 oz (84.6 kg)   SpO2 96%   BMI 34.10 kg/m²     24HR INTAKE/OUTPUT:      Intake/Output Summary (Last 24 hours) at 2/25/2022 1201  Last data filed at 2/25/2022 0555  Gross per 24 hour   Intake    Output 980 ml   Net -980 ml       CONSTITUTIONAL:  Awake. LUNGS:  decreased breath sounds, basilar crackles. CARDIOVASCULAR:  normal S1 and S2 and negative JVD  ABD:Abdomen soft, non-tender. BS normal. No masses,  No organomegaly  NEURO:Alert. DATA:    CBC:  Recent Labs     02/23/22  0830 02/24/22  1703   WBC 9.7 12.6*   RBC 4.96 4.80   HGB 12.2* 11.9*   HCT 42.0 40.8    212   MCV 84.7 85.0   MCH 24.6* 24.8*   MCHC 29.0* 29.2*   RDW 15.8* 16.1*   SEGSPCT 89.4* 80.0*      BMP:  Recent Labs     02/23/22  0830 02/24/22  1703    139   K 4.4 4.0   CL 98* 100   CO2 28 28   BUN 20 25*   CREATININE 0.3* 0.6   CALCIUM 9.2 8.8   GLUCOSE 125* 110*      ABG:  No results for input(s): PH, PO2ART, PYM8YWA, HCO3, BEART, O2SAT in the last 72 hours.   Lab

## 2022-02-26 LAB
ANION GAP SERPL CALCULATED.3IONS-SCNC: 12 MMOL/L (ref 4–16)
BASOPHILS ABSOLUTE: 0 K/CU MM
BASOPHILS RELATIVE PERCENT: 0.1 % (ref 0–1)
BUN BLDV-MCNC: 21 MG/DL (ref 6–23)
CALCIUM SERPL-MCNC: 9.1 MG/DL (ref 8.3–10.6)
CHLORIDE BLD-SCNC: 99 MMOL/L (ref 99–110)
CO2: 28 MMOL/L (ref 21–32)
CREAT SERPL-MCNC: 0.5 MG/DL (ref 0.6–1.1)
DIFFERENTIAL TYPE: ABNORMAL
EOSINOPHILS ABSOLUTE: 0 K/CU MM
EOSINOPHILS RELATIVE PERCENT: 0.1 % (ref 0–3)
GFR AFRICAN AMERICAN: >60 ML/MIN/1.73M2
GFR NON-AFRICAN AMERICAN: >60 ML/MIN/1.73M2
GLUCOSE BLD-MCNC: 172 MG/DL (ref 70–99)
HCT VFR BLD CALC: 42.8 % (ref 37–47)
HEMOGLOBIN: 12.6 GM/DL (ref 12.5–16)
IMMATURE NEUTROPHIL %: 1.2 % (ref 0–0.43)
LYMPHOCYTES ABSOLUTE: 0.5 K/CU MM
LYMPHOCYTES RELATIVE PERCENT: 5.5 % (ref 24–44)
MCH RBC QN AUTO: 25 PG (ref 27–31)
MCHC RBC AUTO-ENTMCNC: 29.4 % (ref 32–36)
MCV RBC AUTO: 84.8 FL (ref 78–100)
MONOCYTES ABSOLUTE: 0.4 K/CU MM
MONOCYTES RELATIVE PERCENT: 5.4 % (ref 0–4)
NUCLEATED RBC %: 0 %
PDW BLD-RTO: 16.5 % (ref 11.7–14.9)
PLATELET # BLD: 192 K/CU MM (ref 140–440)
PMV BLD AUTO: 11.8 FL (ref 7.5–11.1)
POTASSIUM SERPL-SCNC: 4.2 MMOL/L (ref 3.5–5.1)
RBC # BLD: 5.05 M/CU MM (ref 4.2–5.4)
SEGMENTED NEUTROPHILS ABSOLUTE COUNT: 7.2 K/CU MM
SEGMENTED NEUTROPHILS RELATIVE PERCENT: 87.7 % (ref 36–66)
SODIUM BLD-SCNC: 139 MMOL/L (ref 135–145)
TOTAL IMMATURE NEUTOROPHIL: 0.1 K/CU MM
TOTAL NUCLEATED RBC: 0 K/CU MM
WBC # BLD: 8.2 K/CU MM (ref 4–10.5)

## 2022-02-26 PROCEDURE — 94761 N-INVAS EAR/PLS OXIMETRY MLT: CPT

## 2022-02-26 PROCEDURE — 85025 COMPLETE CBC W/AUTO DIFF WBC: CPT

## 2022-02-26 PROCEDURE — 6360000002 HC RX W HCPCS: Performed by: STUDENT IN AN ORGANIZED HEALTH CARE EDUCATION/TRAINING PROGRAM

## 2022-02-26 PROCEDURE — 2060000000 HC ICU INTERMEDIATE R&B

## 2022-02-26 PROCEDURE — 6370000000 HC RX 637 (ALT 250 FOR IP): Performed by: HOSPITALIST

## 2022-02-26 PROCEDURE — 6370000000 HC RX 637 (ALT 250 FOR IP): Performed by: STUDENT IN AN ORGANIZED HEALTH CARE EDUCATION/TRAINING PROGRAM

## 2022-02-26 PROCEDURE — 80048 BASIC METABOLIC PNL TOTAL CA: CPT

## 2022-02-26 PROCEDURE — 2700000000 HC OXYGEN THERAPY PER DAY

## 2022-02-26 PROCEDURE — 6370000000 HC RX 637 (ALT 250 FOR IP): Performed by: NURSE PRACTITIONER

## 2022-02-26 PROCEDURE — 2580000003 HC RX 258: Performed by: NURSE PRACTITIONER

## 2022-02-26 PROCEDURE — 6370000000 HC RX 637 (ALT 250 FOR IP): Performed by: INTERNAL MEDICINE

## 2022-02-26 RX ORDER — POLYETHYLENE GLYCOL 3350 17 G/17G
17 POWDER, FOR SOLUTION ORAL DAILY
Status: DISCONTINUED | OUTPATIENT
Start: 2022-02-26 | End: 2022-03-17 | Stop reason: HOSPADM

## 2022-02-26 RX ADMIN — SALINE NASAL SPRAY 1 SPRAY: 1.5 SOLUTION NASAL at 09:06

## 2022-02-26 RX ADMIN — METHYLPREDNISOLONE SODIUM SUCCINATE 40 MG: 40 INJECTION, POWDER, LYOPHILIZED, FOR SOLUTION INTRAMUSCULAR; INTRAVENOUS at 06:03

## 2022-02-26 RX ADMIN — ENOXAPARIN SODIUM 30 MG: 100 INJECTION SUBCUTANEOUS at 09:00

## 2022-02-26 RX ADMIN — MORPHINE SULFATE 30 MG: 15 TABLET, FILM COATED, EXTENDED RELEASE ORAL at 09:01

## 2022-02-26 RX ADMIN — OXYCODONE AND ACETAMINOPHEN 1 TABLET: 5; 325 TABLET ORAL at 06:03

## 2022-02-26 RX ADMIN — OXYCODONE AND ACETAMINOPHEN 1 TABLET: 5; 325 TABLET ORAL at 15:36

## 2022-02-26 RX ADMIN — CETIRIZINE HYDROCHLORIDE 10 MG: 10 TABLET, FILM COATED ORAL at 06:03

## 2022-02-26 RX ADMIN — FAMOTIDINE 20 MG: 20 TABLET ORAL at 20:32

## 2022-02-26 RX ADMIN — SODIUM CHLORIDE, PRESERVATIVE FREE 10 ML: 5 INJECTION INTRAVENOUS at 20:33

## 2022-02-26 RX ADMIN — SENNOSIDES AND DOCUSATE SODIUM 1 TABLET: 50; 8.6 TABLET ORAL at 08:59

## 2022-02-26 RX ADMIN — MORPHINE SULFATE 30 MG: 15 TABLET, FILM COATED, EXTENDED RELEASE ORAL at 20:31

## 2022-02-26 RX ADMIN — METHYLPREDNISOLONE SODIUM SUCCINATE 40 MG: 40 INJECTION, POWDER, LYOPHILIZED, FOR SOLUTION INTRAMUSCULAR; INTRAVENOUS at 18:44

## 2022-02-26 RX ADMIN — OXYCODONE AND ACETAMINOPHEN 1 TABLET: 5; 325 TABLET ORAL at 22:22

## 2022-02-26 RX ADMIN — FLUTICASONE PROPIONATE 1 SPRAY: 50 SPRAY, METERED NASAL at 09:09

## 2022-02-26 RX ADMIN — GUAIFENESIN 600 MG: 600 TABLET, EXTENDED RELEASE ORAL at 08:59

## 2022-02-26 RX ADMIN — ENOXAPARIN SODIUM 30 MG: 100 INJECTION SUBCUTANEOUS at 20:31

## 2022-02-26 RX ADMIN — POLYETHYLENE GLYCOL (3350) 17 G: 17 POWDER, FOR SOLUTION ORAL at 18:44

## 2022-02-26 RX ADMIN — SENNOSIDES AND DOCUSATE SODIUM 1 TABLET: 50; 8.6 TABLET ORAL at 20:32

## 2022-02-26 RX ADMIN — GUAIFENESIN 600 MG: 600 TABLET, EXTENDED RELEASE ORAL at 20:32

## 2022-02-26 RX ADMIN — Medication 2000 UNITS: at 08:59

## 2022-02-26 RX ADMIN — FAMOTIDINE 20 MG: 20 TABLET ORAL at 09:00

## 2022-02-26 RX ADMIN — OXYMETAZOLINE HCL 2 SPRAY: 0.05 SPRAY NASAL at 09:07

## 2022-02-26 RX ADMIN — SODIUM CHLORIDE, PRESERVATIVE FREE 10 ML: 5 INJECTION INTRAVENOUS at 09:05

## 2022-02-26 RX ADMIN — OXYMETAZOLINE HCL 2 SPRAY: 0.05 SPRAY NASAL at 20:33

## 2022-02-26 ASSESSMENT — PAIN SCALES - GENERAL
PAINLEVEL_OUTOF10: 8
PAINLEVEL_OUTOF10: 7
PAINLEVEL_OUTOF10: 3
PAINLEVEL_OUTOF10: 8
PAINLEVEL_OUTOF10: 8
PAINLEVEL_OUTOF10: 9
PAINLEVEL_OUTOF10: 9
PAINLEVEL_OUTOF10: 8

## 2022-02-26 ASSESSMENT — PAIN DESCRIPTION - PROGRESSION
CLINICAL_PROGRESSION: GRADUALLY WORSENING

## 2022-02-26 ASSESSMENT — PAIN SCALES - WONG BAKER
WONGBAKER_NUMERICALRESPONSE: 0

## 2022-02-26 NOTE — PROGRESS NOTES
Hospitalist Progress Note      Name:  Estefania Schmitt /Age/Sex: 1945  (68 y.o. female)   MRN & CSN:  [id number] & [id number] Admission Date/Time: 2022  4:46 PM   Location:  -A PCP: No primary care provider on file. Hospital Day:     Assessment and Plan:   Estefania Schmitt is a 68 y.o.  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 Acute respiratory failure with hypoxia secondary to COVID-19 pneumonia  -Baricitinib completed   -Dexamethasone started  and completed   -Now on methylprednisolone 40 mg IV every 12 hours     Upper airway congestion  -Mucinex  -Flonase  -Saline nasal spray    Essential hypertension: Held losartan 22 due to lower blood pressures. Hyponatremia -mild and asymptomatic    History of degenerative joint disease: Status post multiple hip replacements     Chronic pain syndrome   -On MS Contin 30 mg twice daily and Percocet every 6 hours as needed at homecontinue     Bowel regimen: MiraLAX once a day    Obesity with BMI of 34.1    Code status: Patient is DNR CCA    Interval History       Please see nursing note filed at 11:49 am - d/w pt she is now on BR - she stated she has to have the Morena-Con" because she has too much pain due to hip problems and she said she can't use the bedpan. Discussed again she is now on BR.   -She had nasal congestion therefore the nasal cannula was not working. She had a mask on when I saw her.       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 -She uses crutches at home  -She uses a wheelchair when she leaves the house  -I updated her son Sharyle Matter on 2/22    Objective: Intake/Output Summary (Last 24 hours) at 2/26/2022 1730  Last data filed at 2/25/2022 1956  Gross per 24 hour   Intake    Output 1200 ml   Net -1200 ml      Vitals:   Vitals:    02/26/22 1600   BP:    Pulse: 73   Resp:    Temp:    SpO2:      Physical Exam:     Physical Exam  HENT:      Nose: No rhinorrhea. Eyes:      General:         Right eye: No discharge. Left eye: No discharge. Pulmonary:      Effort: Pulmonary effort is normal.   Neurological:      Mental Status: She is alert. Cranial Nerves: No cranial nerve deficit.          Medications:   Medications:    oxymetazoline  2 spray Each Nostril BID    methylPREDNISolone  40 mg IntraVENous Q12H    enoxaparin  30 mg SubCUTAneous BID    guaiFENesin  600 mg Oral BID    fluticasone  1 spray Each Nostril Daily    morphine  30 mg Oral 2 times per day    cetirizine  10 mg Oral Daily    [Held by provider] losartan  50 mg Oral Daily    sodium chloride flush  5-40 mL IntraVENous 2 times per day    sennosides-docusate sodium  1 tablet Oral BID    famotidine  20 mg Oral BID    Vitamin D  2,000 Units Oral Daily      Infusions:    sodium chloride 25 mL (02/09/22 0917)     PRN Meds: hydrOXYzine, 25 mg, TID PRN  sodium chloride, 1 spray, Q4H PRN  albuterol sulfate HFA, 2 puff, Q4H PRN  benzocaine-menthol, 1 lozenge, Q2H PRN  sodium chloride flush, 5-40 mL, PRN  sodium chloride, 25 mL, PRN  ondansetron, 4 mg, Q6H PRN  polyethylene glycol, 17 g, Daily PRN  acetaminophen, 650 mg, Q6H PRN   Or  acetaminophen, 650 mg, Q6H PRN  oxyCODONE-acetaminophen, 1 tablet, Q6H PRN      Electronically signed by Joselin Joel MD on 2/26/2022 at 5:30 PM

## 2022-02-26 NOTE — PROGRESS NOTES
Pulmonary and Critical Care  Progress Note    Subjective: The patient is about the same. On 14 L HFNC. Shortness of breath is unchanged. Chest pain none. Addressing respiratory complaints Patient is negative for  hemoptysis and cyanosis  CONSTITUTIONAL:  negative for fevers and chills      Past Medical History:     has a past medical history of Chronic lower back pain, Hx of spinal fusion, Hypertension, MRSA (methicillin resistant staph aureus) culture positive, and Venous disease. has a past surgical history that includes fracture surgery and back surgery. reports that she has never smoked. She has never used smokeless tobacco. She reports that she does not drink alcohol and does not use drugs. Family history:  family history is not on file. No Known Allergies  Social History:    Reviewed; no changes    Objective:   PHYSICAL EXAM:        VITALS:  /77   Pulse 66   Temp 96.9 °F (36.1 °C) (Infrared)   Resp 19   Ht 5' 2.01\" (1.575 m)   Wt 186 lb 8.2 oz (84.6 kg)   SpO2 (!) 89%   BMI 34.10 kg/m²     24HR INTAKE/OUTPUT:      Intake/Output Summary (Last 24 hours) at 2/26/2022 1104  Last data filed at 2/25/2022 1956  Gross per 24 hour   Intake    Output 1200 ml   Net -1200 ml       CONSTITUTIONAL:  Awake. LUNGS:  decreased breath sounds, basilar crackles. CARDIOVASCULAR:  normal S1 and S2 and negative JVD  ABD:Abdomen soft, non-tender. BS normal. No masses,  No organomegaly  NEURO:Alert. DATA:    CBC:  Recent Labs     02/24/22  1703 02/25/22  1127   WBC 12.6* 10.1   RBC 4.80 4.89   HGB 11.9* 12.0*   HCT 40.8 41.7    191   MCV 85.0 85.3   MCH 24.8* 24.5*   MCHC 29.2* 28.8*   RDW 16.1* 16.4*   SEGSPCT 80.0* 74.4*      BMP:  Recent Labs     02/24/22  1703 02/25/22  1127    137   K 4.0 3.9    99   CO2 28 29   BUN 25* 22   CREATININE 0.6 0.5*   CALCIUM 8.8 8.9   GLUCOSE 110* 98      ABG:  No results for input(s): PH, PO2ART, PAO6DZM, HCO3, BEART, O2SAT in the last 72 hours.   Lab Results   Component Value Date    PROBNP 286.5 02/23/2022    PROBNP 289.8 02/22/2022    PROBNP 1,360 (H) 02/08/2022     No results found for: [address] [facility]    Radiology Review:  Pertinent images / reports were reviewed as a part of this visit. Assessment:     Patient Active Problem List   Diagnosis    WD-Venous stasis dermatitis of both lower extremities    WD-Lymphedema of both lower extremities    WD-Venous stasis ulcer of right calf with fat layer exposed with varicose veins (HCC)    WD-Venous stasis ulcer of left calf with fat layer exposed with varicose veins ([facility] [address]    COVID-19    Hypoxia       Plan:   1. Overall the patient is unchanged. 2. [place] Activity.   Aneta Suresh MD  2/26/2022  11:04 AM

## 2022-02-26 NOTE — PROGRESS NOTES
DR Rene Mason notified patient was OOB to bsc and desatted  in 52's on 15L high flow NC,  she is pretty insistent about getting up to [facility],  it took several minutesto get her O2  Sat back up with NRB and high flow  It is now at 96%. Suggest strict  Bedrest. Patient was educated on possible consequences such as further disability up to and including death. Patient is alert and oriented and states that she knows this. This  RN explained to her that Nursing is not ok with that.

## 2022-02-27 LAB
ANION GAP SERPL CALCULATED.3IONS-SCNC: 9 MMOL/L (ref 4–16)
BASOPHILS ABSOLUTE: 0 K/CU MM
BASOPHILS RELATIVE PERCENT: 0.1 % (ref 0–1)
BUN BLDV-MCNC: 21 MG/DL (ref 6–23)
CALCIUM SERPL-MCNC: 9.1 MG/DL (ref 8.3–10.6)
CHLORIDE BLD-SCNC: 99 MMOL/L (ref 99–110)
CO2: 30 MMOL/L (ref 21–32)
CREAT SERPL-MCNC: 0.4 MG/DL (ref 0.6–1.1)
DIFFERENTIAL TYPE: ABNORMAL
EOSINOPHILS ABSOLUTE: 0 K/CU MM
EOSINOPHILS RELATIVE PERCENT: 0 % (ref 0–3)
GFR AFRICAN AMERICAN: >60 ML/MIN/1.73M2
GFR NON-AFRICAN AMERICAN: >60 ML/MIN/1.73M2
GLUCOSE BLD-MCNC: 152 MG/DL (ref 70–99)
HCT VFR BLD CALC: 44 % (ref 37–47)
HEMOGLOBIN: 12.7 GM/DL (ref 12.5–16)
IMMATURE NEUTROPHIL %: 1.6 % (ref 0–0.43)
LYMPHOCYTES ABSOLUTE: 0.6 K/CU MM
LYMPHOCYTES RELATIVE PERCENT: 6.9 % (ref 24–44)
MCH RBC QN AUTO: 24.6 PG (ref 27–31)
MCHC RBC AUTO-ENTMCNC: 28.9 % (ref 32–36)
MCV RBC AUTO: 85.1 FL (ref 78–100)
MONOCYTES ABSOLUTE: 0.4 K/CU MM
MONOCYTES RELATIVE PERCENT: 4.8 % (ref 0–4)
NUCLEATED RBC %: 0 %
PDW BLD-RTO: 16.7 % (ref 11.7–14.9)
PLATELET # BLD: 167 K/CU MM (ref 140–440)
PMV BLD AUTO: 11.7 FL (ref 7.5–11.1)
POTASSIUM SERPL-SCNC: 4.5 MMOL/L (ref 3.5–5.1)
RBC # BLD: 5.17 M/CU MM (ref 4.2–5.4)
SEGMENTED NEUTROPHILS ABSOLUTE COUNT: 7.1 K/CU MM
SEGMENTED NEUTROPHILS RELATIVE PERCENT: 86.6 % (ref 36–66)
SODIUM BLD-SCNC: 138 MMOL/L (ref 135–145)
TOTAL IMMATURE NEUTOROPHIL: 0.13 K/CU MM
TOTAL NUCLEATED RBC: 0 K/CU MM
WBC # BLD: 8.2 K/CU MM (ref 4–10.5)

## 2022-02-27 PROCEDURE — 6370000000 HC RX 637 (ALT 250 FOR IP): Performed by: NURSE PRACTITIONER

## 2022-02-27 PROCEDURE — 6370000000 HC RX 637 (ALT 250 FOR IP): Performed by: HOSPITALIST

## 2022-02-27 PROCEDURE — 94761 N-INVAS EAR/PLS OXIMETRY MLT: CPT

## 2022-02-27 PROCEDURE — 6370000000 HC RX 637 (ALT 250 FOR IP): Performed by: STUDENT IN AN ORGANIZED HEALTH CARE EDUCATION/TRAINING PROGRAM

## 2022-02-27 PROCEDURE — 6360000002 HC RX W HCPCS: Performed by: STUDENT IN AN ORGANIZED HEALTH CARE EDUCATION/TRAINING PROGRAM

## 2022-02-27 PROCEDURE — 85025 COMPLETE CBC W/AUTO DIFF WBC: CPT

## 2022-02-27 PROCEDURE — 2580000003 HC RX 258: Performed by: NURSE PRACTITIONER

## 2022-02-27 PROCEDURE — 6370000000 HC RX 637 (ALT 250 FOR IP): Performed by: INTERNAL MEDICINE

## 2022-02-27 PROCEDURE — 2700000000 HC OXYGEN THERAPY PER DAY

## 2022-02-27 PROCEDURE — 2060000000 HC ICU INTERMEDIATE R&B

## 2022-02-27 PROCEDURE — 80048 BASIC METABOLIC PNL TOTAL CA: CPT

## 2022-02-27 RX ORDER — LUBIPROSTONE 8 UG/1
8 CAPSULE, GELATIN COATED ORAL 2 TIMES DAILY WITH MEALS
Status: DISCONTINUED | OUTPATIENT
Start: 2022-02-27 | End: 2022-03-17 | Stop reason: HOSPADM

## 2022-02-27 RX ADMIN — Medication 2000 UNITS: at 08:29

## 2022-02-27 RX ADMIN — OXYCODONE AND ACETAMINOPHEN 1 TABLET: 5; 325 TABLET ORAL at 15:34

## 2022-02-27 RX ADMIN — SENNOSIDES AND DOCUSATE SODIUM 1 TABLET: 50; 8.6 TABLET ORAL at 20:32

## 2022-02-27 RX ADMIN — MORPHINE SULFATE 30 MG: 15 TABLET, FILM COATED, EXTENDED RELEASE ORAL at 08:29

## 2022-02-27 RX ADMIN — OXYCODONE AND ACETAMINOPHEN 1 TABLET: 5; 325 TABLET ORAL at 04:55

## 2022-02-27 RX ADMIN — GUAIFENESIN 600 MG: 600 TABLET, EXTENDED RELEASE ORAL at 20:32

## 2022-02-27 RX ADMIN — SENNOSIDES AND DOCUSATE SODIUM 1 TABLET: 50; 8.6 TABLET ORAL at 08:30

## 2022-02-27 RX ADMIN — HYDROXYZINE HYDROCHLORIDE 25 MG: 25 TABLET, FILM COATED ORAL at 08:29

## 2022-02-27 RX ADMIN — METHYLPREDNISOLONE SODIUM SUCCINATE 40 MG: 40 INJECTION, POWDER, LYOPHILIZED, FOR SOLUTION INTRAMUSCULAR; INTRAVENOUS at 18:24

## 2022-02-27 RX ADMIN — CETIRIZINE HYDROCHLORIDE 10 MG: 10 TABLET, FILM COATED ORAL at 05:22

## 2022-02-27 RX ADMIN — HYDROXYZINE HYDROCHLORIDE 25 MG: 25 TABLET, FILM COATED ORAL at 20:31

## 2022-02-27 RX ADMIN — ENOXAPARIN SODIUM 30 MG: 100 INJECTION SUBCUTANEOUS at 20:32

## 2022-02-27 RX ADMIN — MORPHINE SULFATE 30 MG: 15 TABLET, FILM COATED, EXTENDED RELEASE ORAL at 20:32

## 2022-02-27 RX ADMIN — FAMOTIDINE 20 MG: 20 TABLET ORAL at 08:29

## 2022-02-27 RX ADMIN — POLYETHYLENE GLYCOL (3350) 17 G: 17 POWDER, FOR SOLUTION ORAL at 08:27

## 2022-02-27 RX ADMIN — ENOXAPARIN SODIUM 30 MG: 100 INJECTION SUBCUTANEOUS at 08:30

## 2022-02-27 RX ADMIN — SODIUM CHLORIDE, PRESERVATIVE FREE 10 ML: 5 INJECTION INTRAVENOUS at 20:33

## 2022-02-27 RX ADMIN — METOPROLOL 12.5 MG: 25 TABLET ORAL at 20:31

## 2022-02-27 RX ADMIN — SODIUM CHLORIDE, PRESERVATIVE FREE 10 ML: 5 INJECTION INTRAVENOUS at 08:30

## 2022-02-27 RX ADMIN — FAMOTIDINE 20 MG: 20 TABLET ORAL at 20:32

## 2022-02-27 RX ADMIN — FLUTICASONE PROPIONATE 1 SPRAY: 50 SPRAY, METERED NASAL at 08:32

## 2022-02-27 RX ADMIN — GUAIFENESIN 600 MG: 600 TABLET, EXTENDED RELEASE ORAL at 08:29

## 2022-02-27 RX ADMIN — METHYLPREDNISOLONE SODIUM SUCCINATE 40 MG: 40 INJECTION, POWDER, LYOPHILIZED, FOR SOLUTION INTRAMUSCULAR; INTRAVENOUS at 05:17

## 2022-02-27 ASSESSMENT — PAIN DESCRIPTION - ONSET
ONSET: ON-GOING
ONSET: ON-GOING

## 2022-02-27 ASSESSMENT — PAIN DESCRIPTION - FREQUENCY
FREQUENCY: CONTINUOUS
FREQUENCY: CONTINUOUS

## 2022-02-27 ASSESSMENT — PAIN DESCRIPTION - DESCRIPTORS: DESCRIPTORS: ACHING

## 2022-02-27 ASSESSMENT — PAIN - FUNCTIONAL ASSESSMENT: PAIN_FUNCTIONAL_ASSESSMENT: ACTIVITIES ARE NOT PREVENTED

## 2022-02-27 ASSESSMENT — PAIN DESCRIPTION - LOCATION
LOCATION: BACK;HIP
LOCATION: HIP

## 2022-02-27 ASSESSMENT — PAIN SCALES - GENERAL
PAINLEVEL_OUTOF10: 8
PAINLEVEL_OUTOF10: 6
PAINLEVEL_OUTOF10: 0
PAINLEVEL_OUTOF10: 6
PAINLEVEL_OUTOF10: 3

## 2022-02-27 ASSESSMENT — PAIN DESCRIPTION - DIRECTION: RADIATING_TOWARDS: BOTH HIPS

## 2022-02-27 ASSESSMENT — PAIN DESCRIPTION - PAIN TYPE
TYPE: CHRONIC PAIN
TYPE: CHRONIC PAIN

## 2022-02-27 ASSESSMENT — PAIN DESCRIPTION - PROGRESSION: CLINICAL_PROGRESSION: GRADUALLY WORSENING

## 2022-02-27 ASSESSMENT — PAIN DESCRIPTION - ORIENTATION: ORIENTATION: RIGHT;LEFT

## 2022-02-27 NOTE — PROGRESS NOTES
Refused Lauren Daughter at bedside and is aware. I  Informed patient and  Her daughter that there is only one visitor permitted a day for 30 minutes, not 1 visitor for 30 minutes. Daughter became instantly loud as if I had insulted her, she zaid to her feet and began yelling and pointing her finger at me and said \" so you are the crazy one my mom was telling us about\" , I said if I am crazy because I refused to get her OOB with her decompensating respiratory status as she had yesterday then yes that would be me and I cannot do it today either. Personally cannot get her OOB in good  I tried to explain what had  Actually occurred yesterday but the daughter cut me off and wouldn't hear me out. After she was done I explained to the daughter  that the patient was gotten up on bedside commode  By staff where her O2 Sats had declined  77% when I was called to the room. Upon my arrival her  Oxygen sats continued to spiral downward into the 50\"s., patient remained reluctant to allow staff to put her back into the bed so that she could rest and recover her oxygenation, but did comply and took some time to recover her.

## 2022-02-27 NOTE — PROGRESS NOTES
Hospitalist Progress Note      Name:  Harriett Ashley /Age/Sex: 1945  (68 y.o. female)   MRN & CSN:  [id number] & [id number] Admission Date/Time: 2022  4:46 PM   Location:  -A PCP: No primary care provider on file. Hospital Day:     Assessment and Plan:   Harriett Ashley is a 68 y.o.  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 Acute respiratory failure with hypoxia secondary to COVID-19 pneumonia  -Baricitinib completed   -Dexamethasone started  until   -on methylprednisolone 40 mg IV every 12 hours since continue     Upper airway congestion  -Mucinex  -Flonase  -Saline nasal spray    Essential hypertension: Held losartan 22 due to lower blood pressures. Hyponatremia -mild and asymptomatic    History of degenerative joint disease: Status post multiple hip replacements     Chronic pain syndrome   -On MS Contin 30 mg twice daily and Percocet every 6 hours as needed at homecontinue     Bowel regimen: MiraLAX once a day  - - add Amitiza for OIC    Obesity with BMI of 34.1    Code status: Patient is DNR CCA    Interval History       Nurse indicated that the O2 sat dropped into the 80s at times on 15 L oxygen      Please see nursing note filed at 11:49 am - d/w pt she is now on BR - she stated she has to have the Morena-Con" because she has too much pain due to hip problems and she said she can't use the bedpan. Discussed again she is now on BR.   -She had nasal congestion therefore the nasal cannula was not working. She had a mask on when I saw her.       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 -She uses crutches at home  -She uses a wheelchair when she leaves the house  -I updated her son Angely Guy on 2/22    Objective: Intake/Output Summary (Last 24 hours) at 2/27/2022 1500  Last data filed at 2/27/2022 1138  Gross per 24 hour   Intake 840 ml   Output 1800 ml   Net -960 ml      Vitals:   Vitals:    02/27/22 1416   BP:    Pulse: 95   Resp:    Temp:    SpO2:      Physical Exam:     Physical Exam  HENT:      Nose: No rhinorrhea. Eyes:      General:         Right eye: No discharge. Left eye: No discharge. Pulmonary:      Effort: Pulmonary effort is normal.   Neurological:      Mental Status: She is alert. Cranial Nerves: No cranial nerve deficit.          Medications:   Medications:    polyethylene glycol  17 g Oral Daily    methylPREDNISolone  40 mg IntraVENous Q12H    enoxaparin  30 mg SubCUTAneous BID    guaiFENesin  600 mg Oral BID    fluticasone  1 spray Each Nostril Daily    morphine  30 mg Oral 2 times per day    cetirizine  10 mg Oral Daily    [Held by provider] losartan  50 mg Oral Daily    sodium chloride flush  5-40 mL IntraVENous 2 times per day    sennosides-docusate sodium  1 tablet Oral BID    famotidine  20 mg Oral BID    Vitamin D  2,000 Units Oral Daily      Infusions:    sodium chloride 25 mL (02/09/22 0917)     PRN Meds: hydrOXYzine, 25 mg, TID PRN  sodium chloride, 1 spray, Q4H PRN  albuterol sulfate HFA, 2 puff, Q4H PRN  benzocaine-menthol, 1 lozenge, Q2H PRN  sodium chloride flush, 5-40 mL, PRN  sodium chloride, 25 mL, PRN  ondansetron, 4 mg, Q6H PRN  acetaminophen, 650 mg, Q6H PRN   Or  acetaminophen, 650 mg, Q6H PRN  oxyCODONE-acetaminophen, 1 tablet, Q6H PRN      Electronically signed by Skip Brown MD on 2/27/2022 at 3:00 PM

## 2022-02-27 NOTE — PROGRESS NOTES
Hospitalist    2 runs    1st one reported to be SVT - 7 beats     The 2nd one came later and was also about 7 beats - and this one had a wide complex. Will start a low dose BB. Check Echo.

## 2022-02-28 ENCOUNTER — APPOINTMENT (OUTPATIENT)
Dept: GENERAL RADIOLOGY | Age: 77
DRG: 871 | End: 2022-02-28
Payer: MEDICARE

## 2022-02-28 PROBLEM — I47.1 SVT (SUPRAVENTRICULAR TACHYCARDIA) (HCC): Status: ACTIVE | Noted: 2022-02-28

## 2022-02-28 LAB
ANION GAP SERPL CALCULATED.3IONS-SCNC: 14 MMOL/L (ref 4–16)
BASOPHILS ABSOLUTE: 0 K/CU MM
BASOPHILS RELATIVE PERCENT: 0.1 % (ref 0–1)
BUN BLDV-MCNC: 22 MG/DL (ref 6–23)
CALCIUM SERPL-MCNC: 8.8 MG/DL (ref 8.3–10.6)
CHLORIDE BLD-SCNC: 100 MMOL/L (ref 99–110)
CO2: 22 MMOL/L (ref 21–32)
CREAT SERPL-MCNC: 0.4 MG/DL (ref 0.6–1.1)
DIFFERENTIAL TYPE: ABNORMAL
EOSINOPHILS ABSOLUTE: 0 K/CU MM
EOSINOPHILS RELATIVE PERCENT: 0.1 % (ref 0–3)
GFR AFRICAN AMERICAN: >60 ML/MIN/1.73M2
GFR NON-AFRICAN AMERICAN: >60 ML/MIN/1.73M2
GLUCOSE BLD-MCNC: 138 MG/DL (ref 70–99)
HCT VFR BLD CALC: 41.3 % (ref 37–47)
HEMOGLOBIN: 12.1 GM/DL (ref 12.5–16)
IMMATURE NEUTROPHIL %: 1.5 % (ref 0–0.43)
LV EF: 55 %
LVEF MODALITY: NORMAL
LYMPHOCYTES ABSOLUTE: 0.5 K/CU MM
LYMPHOCYTES RELATIVE PERCENT: 6.2 % (ref 24–44)
MAGNESIUM: 2.2 MG/DL (ref 1.8–2.4)
MCH RBC QN AUTO: 24.9 PG (ref 27–31)
MCHC RBC AUTO-ENTMCNC: 29.3 % (ref 32–36)
MCV RBC AUTO: 85.2 FL (ref 78–100)
MONOCYTES ABSOLUTE: 0.5 K/CU MM
MONOCYTES RELATIVE PERCENT: 5.8 % (ref 0–4)
NUCLEATED RBC %: 0 %
PDW BLD-RTO: 16.9 % (ref 11.7–14.9)
PHOSPHORUS: 3.6 MG/DL (ref 2.5–4.9)
PLATELET # BLD: 146 K/CU MM (ref 140–440)
PMV BLD AUTO: 11 FL (ref 7.5–11.1)
POTASSIUM SERPL-SCNC: 4.7 MMOL/L (ref 3.5–5.1)
RBC # BLD: 4.85 M/CU MM (ref 4.2–5.4)
SEGMENTED NEUTROPHILS ABSOLUTE COUNT: 7.5 K/CU MM
SEGMENTED NEUTROPHILS RELATIVE PERCENT: 86.3 % (ref 36–66)
SODIUM BLD-SCNC: 136 MMOL/L (ref 135–145)
TOTAL IMMATURE NEUTOROPHIL: 0.13 K/CU MM
TOTAL NUCLEATED RBC: 0 K/CU MM
TROPONIN T: <0.01 NG/ML
TSH HIGH SENSITIVITY: 0.44 UIU/ML (ref 0.27–4.2)
WBC # BLD: 8.7 K/CU MM (ref 4–10.5)

## 2022-02-28 PROCEDURE — 6370000000 HC RX 637 (ALT 250 FOR IP): Performed by: NURSE PRACTITIONER

## 2022-02-28 PROCEDURE — 99223 1ST HOSP IP/OBS HIGH 75: CPT | Performed by: INTERNAL MEDICINE

## 2022-02-28 PROCEDURE — 6370000000 HC RX 637 (ALT 250 FOR IP): Performed by: HOSPITALIST

## 2022-02-28 PROCEDURE — 2700000000 HC OXYGEN THERAPY PER DAY

## 2022-02-28 PROCEDURE — 92610 EVALUATE SWALLOWING FUNCTION: CPT

## 2022-02-28 PROCEDURE — 36415 COLL VENOUS BLD VENIPUNCTURE: CPT

## 2022-02-28 PROCEDURE — 6370000000 HC RX 637 (ALT 250 FOR IP): Performed by: STUDENT IN AN ORGANIZED HEALTH CARE EDUCATION/TRAINING PROGRAM

## 2022-02-28 PROCEDURE — 71045 X-RAY EXAM CHEST 1 VIEW: CPT

## 2022-02-28 PROCEDURE — 84100 ASSAY OF PHOSPHORUS: CPT

## 2022-02-28 PROCEDURE — 85025 COMPLETE CBC W/AUTO DIFF WBC: CPT

## 2022-02-28 PROCEDURE — 2580000003 HC RX 258: Performed by: NURSE PRACTITIONER

## 2022-02-28 PROCEDURE — 6360000002 HC RX W HCPCS: Performed by: STUDENT IN AN ORGANIZED HEALTH CARE EDUCATION/TRAINING PROGRAM

## 2022-02-28 PROCEDURE — 6370000000 HC RX 637 (ALT 250 FOR IP): Performed by: INTERNAL MEDICINE

## 2022-02-28 PROCEDURE — 83735 ASSAY OF MAGNESIUM: CPT

## 2022-02-28 PROCEDURE — 93306 TTE W/DOPPLER COMPLETE: CPT

## 2022-02-28 PROCEDURE — 80048 BASIC METABOLIC PNL TOTAL CA: CPT

## 2022-02-28 PROCEDURE — 84443 ASSAY THYROID STIM HORMONE: CPT

## 2022-02-28 PROCEDURE — 84484 ASSAY OF TROPONIN QUANT: CPT

## 2022-02-28 PROCEDURE — 94761 N-INVAS EAR/PLS OXIMETRY MLT: CPT

## 2022-02-28 PROCEDURE — 2060000000 HC ICU INTERMEDIATE R&B

## 2022-02-28 RX ORDER — LORAZEPAM 0.5 MG/1
0.5 TABLET ORAL
Status: DISCONTINUED | OUTPATIENT
Start: 2022-02-28 | End: 2022-02-28

## 2022-02-28 RX ADMIN — GUAIFENESIN 600 MG: 600 TABLET, EXTENDED RELEASE ORAL at 08:36

## 2022-02-28 RX ADMIN — FAMOTIDINE 20 MG: 20 TABLET ORAL at 21:17

## 2022-02-28 RX ADMIN — OXYCODONE AND ACETAMINOPHEN 1 TABLET: 5; 325 TABLET ORAL at 06:41

## 2022-02-28 RX ADMIN — FAMOTIDINE 20 MG: 20 TABLET ORAL at 08:36

## 2022-02-28 RX ADMIN — SODIUM CHLORIDE, PRESERVATIVE FREE 10 ML: 5 INJECTION INTRAVENOUS at 08:36

## 2022-02-28 RX ADMIN — GUAIFENESIN 600 MG: 600 TABLET, EXTENDED RELEASE ORAL at 21:16

## 2022-02-28 RX ADMIN — OXYCODONE AND ACETAMINOPHEN 1 TABLET: 5; 325 TABLET ORAL at 23:58

## 2022-02-28 RX ADMIN — ENOXAPARIN SODIUM 30 MG: 100 INJECTION SUBCUTANEOUS at 08:36

## 2022-02-28 RX ADMIN — MORPHINE SULFATE 30 MG: 15 TABLET, FILM COATED, EXTENDED RELEASE ORAL at 21:17

## 2022-02-28 RX ADMIN — METOPROLOL 12.5 MG: 25 TABLET ORAL at 21:16

## 2022-02-28 RX ADMIN — CETIRIZINE HYDROCHLORIDE 10 MG: 10 TABLET, FILM COATED ORAL at 06:41

## 2022-02-28 RX ADMIN — OXYCODONE AND ACETAMINOPHEN 1 TABLET: 5; 325 TABLET ORAL at 00:34

## 2022-02-28 RX ADMIN — SENNOSIDES AND DOCUSATE SODIUM 1 TABLET: 50; 8.6 TABLET ORAL at 08:36

## 2022-02-28 RX ADMIN — SODIUM CHLORIDE, PRESERVATIVE FREE 10 ML: 5 INJECTION INTRAVENOUS at 21:17

## 2022-02-28 RX ADMIN — METOPROLOL 12.5 MG: 25 TABLET ORAL at 08:36

## 2022-02-28 RX ADMIN — SENNOSIDES AND DOCUSATE SODIUM 1 TABLET: 50; 8.6 TABLET ORAL at 21:16

## 2022-02-28 RX ADMIN — METHYLPREDNISOLONE SODIUM SUCCINATE 40 MG: 40 INJECTION, POWDER, LYOPHILIZED, FOR SOLUTION INTRAMUSCULAR; INTRAVENOUS at 06:41

## 2022-02-28 RX ADMIN — ENOXAPARIN SODIUM 30 MG: 100 INJECTION SUBCUTANEOUS at 21:17

## 2022-02-28 RX ADMIN — METHYLPREDNISOLONE SODIUM SUCCINATE 40 MG: 40 INJECTION, POWDER, LYOPHILIZED, FOR SOLUTION INTRAMUSCULAR; INTRAVENOUS at 17:34

## 2022-02-28 RX ADMIN — LUBIPROSTONE 8 MCG: 8 CAPSULE, GELATIN COATED ORAL at 17:34

## 2022-02-28 RX ADMIN — MORPHINE SULFATE 30 MG: 15 TABLET, FILM COATED, EXTENDED RELEASE ORAL at 08:36

## 2022-02-28 RX ADMIN — OXYCODONE AND ACETAMINOPHEN 1 TABLET: 5; 325 TABLET ORAL at 14:40

## 2022-02-28 RX ADMIN — HYDROXYZINE HYDROCHLORIDE 25 MG: 25 TABLET, FILM COATED ORAL at 14:40

## 2022-02-28 RX ADMIN — Medication 2000 UNITS: at 08:36

## 2022-02-28 RX ADMIN — LUBIPROSTONE 8 MCG: 8 CAPSULE, GELATIN COATED ORAL at 08:36

## 2022-02-28 RX ADMIN — FLUTICASONE PROPIONATE 1 SPRAY: 50 SPRAY, METERED NASAL at 08:38

## 2022-02-28 ASSESSMENT — PAIN DESCRIPTION - DESCRIPTORS
DESCRIPTORS: ACHING
DESCRIPTORS: ACHING

## 2022-02-28 ASSESSMENT — PAIN SCALES - GENERAL
PAINLEVEL_OUTOF10: 7
PAINLEVEL_OUTOF10: 8
PAINLEVEL_OUTOF10: 7
PAINLEVEL_OUTOF10: 7
PAINLEVEL_OUTOF10: 9
PAINLEVEL_OUTOF10: 6
PAINLEVEL_OUTOF10: 8
PAINLEVEL_OUTOF10: 8

## 2022-02-28 ASSESSMENT — PAIN DESCRIPTION - PAIN TYPE
TYPE: CHRONIC PAIN

## 2022-02-28 ASSESSMENT — PAIN - FUNCTIONAL ASSESSMENT
PAIN_FUNCTIONAL_ASSESSMENT: ACTIVITIES ARE NOT PREVENTED
PAIN_FUNCTIONAL_ASSESSMENT: ACTIVITIES ARE NOT PREVENTED

## 2022-02-28 ASSESSMENT — PAIN DESCRIPTION - ORIENTATION
ORIENTATION: LOWER;MID
ORIENTATION: RIGHT;LEFT

## 2022-02-28 ASSESSMENT — PAIN DESCRIPTION - ONSET
ONSET: ON-GOING
ONSET: ON-GOING

## 2022-02-28 ASSESSMENT — PAIN DESCRIPTION - DIRECTION
RADIATING_TOWARDS: BOTH HIPS
RADIATING_TOWARDS: NO WHERE

## 2022-02-28 ASSESSMENT — PAIN DESCRIPTION - LOCATION
LOCATION: BACK
LOCATION: BACK;BUTTOCKS
LOCATION: HIP
LOCATION: BACK

## 2022-02-28 ASSESSMENT — PAIN DESCRIPTION - PROGRESSION
CLINICAL_PROGRESSION: GRADUALLY WORSENING
CLINICAL_PROGRESSION: NOT CHANGED

## 2022-02-28 ASSESSMENT — PAIN DESCRIPTION - FREQUENCY
FREQUENCY: CONTINUOUS
FREQUENCY: CONTINUOUS

## 2022-02-28 NOTE — PROGRESS NOTES
Called Jayda Badillo to review concerns. Patient requests to get out of bed to use the bed side commode. Nursing is concerned that when the patient ambulates the Spo2 drops to 50 %. Patient is becoming more agiated with staff not allowing her to get out of bed due to low Spo2 levels. Nursing is concerned that patient is unable to oxygenate while ambulating. During the weekend patient would desaturate to 70s% during ambulation. Nursing is seeing a decline in patient clinical status and feels it is safer for the patient to remain in bed until oxygen level stays 70 or higher with ambulation. Plan to keep patient on bedrest as ordered per Dr Carmen Morales.   Will re-evaluate patient 3/1/2022 by 8 am on oxygen levels. RN manager will call Jaqueline Limon with patient status after morning evaluation. Nursing Sup Yariel Led, Maria Teresa [address] [place] charge RN and Inez RN bed side nurse notified of plan and situation.

## 2022-02-28 NOTE — PLAN OF CARE
Problem: Falls - Risk of:  Goal: Will remain free from falls  Description: Will remain free from falls  Outcome: Ongoing  Goal: Absence of physical injury  Description: Absence of physical injury  Outcome: Ongoing     Problem: Airway Clearance - Ineffective  Goal: Achieve or maintain patent airway  Outcome: Ongoing     Problem: Gas Exchange - Impaired  Goal: Absence of hypoxia  Outcome: Ongoing  Goal: Promote optimal lung function  Outcome: Ongoing     Problem: Breathing Pattern - Ineffective  Goal: Ability to achieve and maintain a regular respiratory rate  Outcome: Ongoing     Problem:  Body Temperature -  Risk of, Imbalanced  Goal: Ability to maintain a body temperature within defined limits  Outcome: Ongoing  Goal: Will regain or maintain usual level of consciousness  Outcome: Ongoing  Goal: Complications related to the disease process, condition or treatment will be avoided or minimized  Outcome: Ongoing     Problem: Isolation Precautions - Risk of Spread of Infection  Goal: Prevent transmission of infection  Outcome: Ongoing     Problem: Nutrition Deficits  Goal: Optimize nutritional status  Outcome: Ongoing     Problem: Risk for Fluid Volume Deficit  Goal: Maintain normal heart rhythm  Outcome: Ongoing  Goal: Maintain absence of muscle cramping  Outcome: Ongoing  Goal: Maintain normal serum potassium, sodium, calcium, phosphorus, and pH  Outcome: Ongoing     Problem: Loneliness or Risk for Loneliness  Goal: Demonstrate positive use of time alone when socialization is not possible  Outcome: Ongoing     Problem: Fatigue  Goal: Verbalize increase energy and improved vitality  Outcome: Ongoing     Problem: Patient Education: Go to Patient Education Activity  Goal: Patient/Family Education  Outcome: Ongoing     Problem: Pain:  Goal: Pain level will decrease  Description: Pain level will decrease  Outcome: Ongoing  Goal: Control of acute pain  Description: Control of acute pain  Outcome: Ongoing  Goal: Control of

## 2022-02-28 NOTE — PROGRESS NOTES
Spoke with pt's son & POLEANNE Fuller- [phone]) and gave an update on pt's status. Also answered questions concerning pt's bedrest status.  Mr. Thaddeus Alexander requested a phone call from the hospitalist. Dr. Ceasar Gardner notified of request.

## 2022-02-28 NOTE — PROGRESS NOTES
Hospitalist Progress Note      Name:  Wesley Rodriguez /Age/Sex: 1945  (68 y.o. female)   MRN & CSN:  [id number] & [id number] Admission Date/Time: 2022  4:46 PM   Location:  -A PCP: No primary care provider on file. Hospital Day: 24    Assessment and Plan:   Wesley Rodriguez is a 68 y.o.  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 Acute respiratory failure with hypoxia secondary to COVID-19 pneumonia  -Baricitinib completed   -Dexamethasone started  until   -on methylprednisolone 40 mg IV every 12 hours since continue  -still requiring 15 L. Discussed with pulmonary     Upper airway congestion  -Mucinex  -Flonase  -Saline nasal spray    Essential hypertension: Held losartan 22 due to lower blood pressures. Hyponatremia -mild and asymptomatic    History of degenerative joint disease: Status post multiple hip replacements     Chronic pain syndrome   -On MS Contin 30 mg twice daily and Percocet every 6 hours as needed at homecontinue     Bowel regimen: MiraLAX once a day  - - add Amitiza for OIC    Obesity with BMI of 34.1    Code status: Patient is DNR CCA    Interval History       She is still very hypoxic  She is coping with the opiate-induced complication  Updated her family  Discussed with pulmonary        Nurse indicated that the O2 sat dropped into the 80s at times on 15 L oxygen      Please see nursing note filed at 11:49 am - d/w pt she is now on BR - she stated she has to have the Morena-Con" because she has too much pain due to hip problems and she said she can't use the bedpan. Discussed again she is now on BR.       -She had nasal congestion therefore the nasal cannula was not working. She had a mask on when I saw her. 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 -She uses crutches at home  -She uses a wheelchair when she leaves the house  -I updated her son Jeaneth Urbina on 2/22    Objective: Intake/Output Summary (Last 24 hours) at 2/28/2022 1630  Last data filed at 2/28/2022 1246  Gross per 24 hour   Intake 740 ml   Output 1050 ml   Net -310 ml      Vitals:   Vitals:    02/28/22 1246   BP: (!) 108/56   Pulse: 63   Resp: 16   Temp: 98 °F (36.7 °C)   SpO2: 94%     Physical Exam:     Physical Exam  HENT:      Nose: No rhinorrhea. Eyes:      General:         Right eye: No discharge. Left eye: No discharge. Pulmonary:      Effort: Pulmonary effort is normal.   Neurological:      Mental Status: She is alert. Cranial Nerves: No cranial nerve deficit.          Medications:   Medications:    lubiprostone  8 mcg Oral BID WC    metoprolol tartrate  12.5 mg Oral BID    polyethylene glycol  17 g Oral Daily    methylPREDNISolone  40 mg IntraVENous Q12H    enoxaparin  30 mg SubCUTAneous BID    guaiFENesin  600 mg Oral BID    fluticasone  1 spray Each Nostril Daily    morphine  30 mg Oral 2 times per day    cetirizine  10 mg Oral Daily    [Held by provider] losartan  50 mg Oral Daily    sodium chloride flush  5-40 mL IntraVENous 2 times per day    sennosides-docusate sodium  1 tablet Oral BID    famotidine  20 mg Oral BID    Vitamin D  2,000 Units Oral Daily      Infusions:    sodium chloride 25 mL (02/09/22 0917)     PRN Meds: hydrOXYzine, 25 mg, TID PRN  sodium chloride, 1 spray, Q4H PRN  albuterol sulfate HFA, 2 puff, Q4H PRN  benzocaine-menthol, 1 lozenge, Q2H PRN  sodium chloride flush, 5-40 mL, PRN  sodium chloride, 25 mL, PRN  ondansetron, 4 mg, Q6H PRN  acetaminophen, 650 mg, Q6H PRN   Or  acetaminophen, 650 mg, Q6H PRN  oxyCODONE-acetaminophen, 1 tablet, Q6H PRN      Electronically signed by Gissell Zazueta MD on 2/28/2022 at 4:30 PM

## 2022-02-28 NOTE — CONSULTS
CARDIOLOGY CONSULT NOTE   Reason for consultation:  Wide complex tachycardia     Referring physician:  Raye Boeck, MD     Primary care physician: No primary care provider on file. Dear  Dr. Raye Boeck, MD   Thanks for the consult. Chief Complaints :  Chief Complaint   Patient presents with    Shortness of Breath    Cough        History of present Tonya Parrish is a 68 y. o.year old who noted for respiratory distress due toCOVID-19 positive test (U07.1, COVID-19) with Acute Pneumonia   Last 48 hours on telemetry she was noted to have several runs of wide-complex tachycardia of 67 beats without any significant symptoms she denies any palpitations or previous history of cardiac problems. Still on 15 L of oxygen. Has bilateral lower extremity swelling for quite some time but labs are normalizing  She denies any previous history of palpitations or heart problems  On the whole blood pressures been generally controlled but  in 140s to 150s occasionally        Past medical history:    has a past medical history of Chronic lower back pain, Hx of spinal fusion, Hypertension, MRSA (methicillin resistant staph aureus) culture positive, and Venous disease. Past surgical history:   has a past surgical history that includes fracture surgery and back surgery. Social History:   reports that she has never smoked. She has never used smokeless tobacco. She reports that she does not drink alcohol and does not use drugs.   Family history:   no family history of CAD, STROKE of DM at early age    No Known Allergies    LORazepam (ATIVAN) tablet 0.5 mg, Once PRN  lubiprostone (AMITIZA) capsule 8 mcg, BID WC  metoprolol tartrate (LOPRESSOR) tablet 12.5 mg, BID  polyethylene glycol (GLYCOLAX) packet 17 g, Daily  methylPREDNISolone sodium (SOLU-MEDROL) injection 40 mg, Q12H  enoxaparin (LOVENOX) injection 30 mg, BID  hydrOXYzine (ATARAX) tablet 25 mg, TID PRN  guaiFENesin (MUCINEX) extended release tablet 600 mg, BID  fluticasone (FLONASE) 50 MCG/ACT nasal spray 1 spray, Daily  sodium chloride (OCEAN, BABY AYR) 0.65 % nasal spray 1 spray, Q4H PRN  albuterol sulfate  (90 Base) MCG/ACT inhaler 2 puff, Q4H PRN  benzocaine-menthol (CEPACOL SORE THROAT) lozenge 1 lozenge, Q2H PRN  morphine (MS CONTIN) extended release tablet 30 mg, 2 times per day  cetirizine (ZYRTEC) tablet 10 mg, Daily  [Held by provider] losartan (COZAAR) tablet 50 mg, Daily  sodium chloride flush 0.9 % injection 5-40 mL, 2 times per day  sodium chloride flush 0.9 % injection 5-40 mL, PRN  0.9 % sodium chloride infusion, PRN  ondansetron (ZOFRAN) injection 4 mg, Q6H PRN  acetaminophen (TYLENOL) tablet 650 mg, Q6H PRN   Or  acetaminophen (TYLENOL) suppository 650 mg, Q6H PRN  sennosides-docusate sodium (SENOKOT-S) 8.6-50 MG tablet 1 tablet, BID  famotidine (PEPCID) tablet 20 mg, BID  Vitamin D (CHOLECALCIFEROL) tablet 2,000 Units, Daily  oxyCODONE-acetaminophen (PERCOCET) 5-325 MG per tablet 1 tablet, Q6H PRN      Current Facility-Administered Medications   Medication Dose Route Frequency Provider Last Rate Last Admin    LORazepam (ATIVAN) tablet 0.5 mg  0.5 mg Oral Once PRN Monroe Contreras MD        lubiprostone (AMITIZA) capsule 8 mcg  8 mcg Oral BID  Monroe Contreras MD   8 mcg at 02/28/22 0836    metoprolol tartrate (LOPRESSOR) tablet 12.5 mg  12.5 mg Oral BID Monroe Contreras MD   12.5 mg at 02/28/22 5301    polyethylene glycol (GLYCOLAX) packet 17 g  17 g Oral Daily Monroe Contreras MD   17 g at 02/27/22 0827    methylPREDNISolone sodium (SOLU-MEDROL) injection 40 mg  40 mg IntraVENous Q12H Faviola Garcia MD   40 mg at 02/28/22 0641    enoxaparin (LOVENOX) injection 30 mg  30 mg SubCUTAneous BID Faviola Garcia MD   30 mg at 02/28/22 0836    hydrOXYzine (ATARAX) tablet 25 mg  25 mg Oral TID PRN Faviola Garcia MD   25 mg at 02/27/22 2031    guaiFENesin (MUCINEX) extended release tablet 600 mg  600 mg Oral BID Faviola Garcia MD   600 mg at 02/28/22 0836    fluticasone (FLONASE) 50 MCG/ACT nasal spray 1 spray  1 spray Each Nostril Daily Faviola Garcia MD   1 spray at 02/28/22 0838    sodium chloride (OCEAN, BABY AYR) 0.65 % nasal spray 1 spray  1 spray Each Nostril Q4H PRN Faviola Garcia MD   1 spray at 02/26/22 0906    albuterol sulfate  (90 Base) MCG/ACT inhaler 2 puff  2 puff Inhalation Q4H PRN Lavonne Rainey MD        benzocaine-menthol (CEPACOL SORE THROAT) lozenge 1 lozenge  1 lozenge Oral Q2H PRN SANDER Foster CNP   1 lozenge at 02/17/22 0946    morphine (MS CONTIN) extended release tablet 30 mg  30 mg Oral 2 times per day Earlene Gowers, MD   30 mg at 02/28/22 0836    cetirizine (ZYRTEC) tablet 10 mg  10 mg Oral Daily Sia James, APRN - CNP   10 mg at 02/28/22 0641    [Held by provider] losartan (COZAAR) tablet 50 mg  50 mg Oral Daily Earlene Gowers, MD   50 mg at 02/20/22 9384    sodium chloride flush 0.9 % injection 5-40 mL  5-40 mL IntraVENous 2 times per day Sia Console, APRN - CNP   10 mL at 02/28/22 0836    sodium chloride flush 0.9 % injection 5-40 mL  5-40 mL IntraVENous PRN Sia Console, APRN - CNP   10 mL at 02/09/22 0904    0.9 % sodium chloride infusion  25 mL IntraVENous PRN Sia Console, APRN -  mL/hr at 02/09/22 0917 25 mL at 02/09/22 0917    ondansetron (ZOFRAN) injection 4 mg  4 mg IntraVENous Q6H PRN SANDER Almaguer CNP        acetaminophen (TYLENOL) tablet 650 mg  650 mg Oral Q6H PRN SANDER Almaguer - CNP        Or    acetaminophen (TYLENOL) suppository 650 mg  650 mg Rectal Q6H PRN SANDER Almaguer CNP        sennosides-docusate sodium (SENOKOT-S) 8.6-50 MG tablet 1 tablet  1 tablet Oral BID SANDER Knapp CNP   1 tablet at 02/28/22 0836    famotidine (PEPCID) tablet 20 mg  20 mg Oral BID SANDER Knapp CNP   20 mg at 02/28/22 0836    Vitamin D (CHOLECALCIFEROL) tablet 2,000 Units  2,000 Units Absent  , 1+, s1 and s2 audible,Murmur. Absent , JVD not noted    Abdomen /GI:  Bowel sounds normal, Soft, No tenderness, No masses, No gross visceromegaly   :  No costovertebral angle tenderness   Musculoskeletal:  No edema, no tenderness, no deformities. Back- no tenderness  Integument:  Well hydrated, no rash   Lymphatic:  No lymphadenopathy noted   Neurologic:  Alert & oriented x 3, CN 2-12 normal, normal motor function, normal sensory function, no focal deficits noted           Medical decision making and Data review:    Lab Review   Recent Labs     02/27/22  0918   WBC 8.2   HGB 12.7   HCT 44.0         Recent Labs     02/27/22  0918      K 4.5   CL 99   CO2 30   BUN 21   CREATININE 0.4*     No results for input(s): AST, ALT, ALB, BILIDIR, BILITOT, ALKPHOS in the last 72 hours. No results for input(s): TROPONINT in the last 72 hours. No results for input(s): PROBNP in the last 72 hours. Lab Results   Component Value Date    INR 1.01 02/11/2022    PROTIME 13.0 02/11/2022       EKG: (reviewed by myself)    ECHO:(reviewed by myself)    Chest Xray:(reviewed by myself)  XR CHEST PORTABLE    Result Date: 2/22/2022  EXAMINATION: ONE XRAY VIEW OF THE CHEST 2/21/2022 4:14 pm COMPARISON: 02/18/2022 HISTORY: ORDERING SYSTEM PROVIDED HISTORY: covid pneumonia TECHNOLOGIST PROVIDED HISTORY: Reason for exam:->covid pneumonia Reason for Exam: covid pneumonia Additional signs and symptoms: na Relevant Medical/Surgical History: hypertension Initial evaluation FINDINGS: Monitor wires overlie the chest.  The trachea is midline. The cardiac silhouette is upper rib workable. There are persistent bilateral pulmonary opacities consistent with the patient's history of COVID pneumonia. The findings appear similar to the prior study without a significant improvement or change. Degenerative changes of the shoulders. Stable bilateral pulmonary opacities consistent with COVID pneumonia.   Follow up to resolution is suggested. XR CHEST PORTABLE    Result Date: 2/18/2022  EXAMINATION: ONE XRAY VIEW OF THE CHEST 2/18/2022 5:45 pm COMPARISON: Chest radiograph 02/05/2022. CT chest 02/06/2022. HISTORY: ORDERING SYSTEM PROVIDED HISTORY: covid pneumonia TECHNOLOGIST PROVIDED HISTORY: Reason for exam:->covid pneumonia Reason for Exam: covid pneumonia Additional signs and symptoms: COVID 19 Relevant Medical/Surgical History: NA FINDINGS: The cardiomediastinal silhouette is within normal limits. Patchy bilateral airspace opacities most pronounced in the right apex and left parahilar lung. Bibasilar opacities also seen. No pneumothorax or pleural effusion. The opacities are improved from 02/05/2022. No acute osseous abnormality. Patchy bilateral airspace opacities compatible with COVID pneumonia overall improved from 02/05/2022. XR CHEST PORTABLE    Result Date: 2/5/2022  EXAMINATION: ONE XRAY VIEW OF THE CHEST 2/5/2022 5:51 pm COMPARISON: None. HISTORY: ORDERING SYSTEM PROVIDED HISTORY: dyspnea TECHNOLOGIST PROVIDED HISTORY: Reason for exam:->dyspnea Reason for Exam: dyspnea, Resp distress, SOB, cough FINDINGS: Cardiomediastinal silhouette is obscured. No definite pleural effusion or pneumothorax. Extensive bilateral airspace opacities, right greater than left. Some preserved aeration is noted at the left lung apex. Severe degenerative changes of the bilateral shoulders. Surgical clips in the right upper quadrant. Bilateral airspace opacities, right greater than left, concerning for extensive multifocal pneumonia     CT CHEST PULMONARY EMBOLISM W CONTRAST    Result Date: 2/6/2022  EXAMINATION: CTA OF THE CHEST 2/6/2022 1:09 am TECHNIQUE: CTA of the chest was performed after the administration of intravenous contrast.  Multiplanar reformatted images are provided for review. MIP images are provided for review.  Dose modulation, iterative reconstruction, and/or weight based adjustment of the mA/kV was utilized to reduce the radiation dose to as low as reasonably achievable. COMPARISON: Chest radiograph 02/05/2022 HISTORY: ORDERING SYSTEM PROVIDED HISTORY: Signifigantly elevated d dimer TECHNOLOGIST PROVIDED HISTORY: Reason for exam:->Signifigantly elevated d dimer Reason for Exam: Signifigantly elevated d dimer FINDINGS: Pulmonary Arteries: Pulmonary arteries are adequately opacified for evaluation. Respiratory motion artifact and expiratory phase of imaging is noted. No evidence of intraluminal filling defect to suggest pulmonary embolism. Main pulmonary artery is normal in caliber. Mediastinum: No evidence of mediastinal lymphadenopathy. The heart and pericardium demonstrate no acute abnormality. There is no acute abnormality of the thoracic aorta. Lungs/pleura: Expiratory phase of imaging is noted. Patchy peripheral consolidative ground-glass opacities are present. No septal thickening. No pleural effusion. The central airway is patent. Upper Abdomen: Findings suggestive of hepatic steatosis. Status post cholecystectomy. Soft Tissues/Bones: No acute bone or soft tissue abnormality. 1.  No evidence of pulmonary embolism. 2.  Patchy bilateral consolidative ground-glass opacities are noted, for which multifocal inflammatory process or infection including COVID pneumonia should be considered. VL DUP LOWER EXTREMITY VENOUS BILATERAL    Result Date: 2/5/2022  EXAMINATION: DUPLEX VENOUS ULTRASOUND OF THE BILATERAL LOWER EXTREMITIES2/5/2022 9:15 pm TECHNIQUE: Duplex ultrasound using B-mode/gray scaled imaging, Doppler spectral analysis and color flow Doppler was obtained of the deep venous structures of the lower bilateral extremities. COMPARISON: None. HISTORY: ORDERING SYSTEM PROVIDED HISTORY: d dimer signifigantly elevated TECHNOLOGIST PROVIDED HISTORY: Reason for exam:->d dimer signifigantly elevated FINDINGS: The visualized veins of the bilateral lower extremities are patent and free of echogenic thrombus. The veins demonstrate good compressibility with normal color flow study and spectral analysis. No evidence of DVT in either lower extremity. All labs, medications and tests reviewed by myself including data  from outside source , patient and available family . Continue all other medications of all above medical condition listed as is. Impression:  Active Problems:    COVID-19    Hypoxia    SVT (supraventricular tachycardia) (HCC)  Resolved Problems:    * No resolved hospital problems. *      Assessment: 68 y. o.year old with PMH of  has a past medical history of Chronic lower back pain, Hx of spinal fusion, Hypertension, MRSA (methicillin resistant staph aureus) culture positive, and Venous disease. Plan and Recommendations:    Wide-complex tachycardia possible SVT with aberrant conduction  Keep lites within normal range check magnesium check TSH  Agree with small dose metoprolol 12 point twice daily or Cardizem  Get echo  Supportive care for Covid pneumonia  DVT prophylaxis if no contraindication  6. We will see as needed basis          Thank you  much for consult and giving us the opportunity in contributing in the care of this patient. Please feel free to call me for any questions.        Toma Rendon MD, 2/28/2022 10:58 AM

## 2022-02-28 NOTE — PROGRESS NOTES
02/28/22 0722   Oxygen Therapy/Pulse Ox   O2 Therapy Oxygen   $Oxygen $Daily Charge   O2 Device High flow nasal cannula   O2 Flow Rate (L/min) 15 L/min   Resp 17   SpO2 92 %   Pulse Oximeter Device Mode Continuous   Pulse Oximeter Device Location Finger   $Pulse Oximeter $Spot check (multiple/continuous)   Blood Gas  Performed?  No

## 2022-02-28 NOTE — CARE COORDINATION
Received VM from patient's son Elaine Laureano . CM returned call and spoke with Elaine Laureano. He had questions regarding what patient's oxygen levels would need to be in order to discharge to rehab or home. CM answered questions for him and he voiced understanding. Elaine Laureano stated that he is waiting to speak with physician for an update and that patient's nurse Sukh Randall has already assisted him with that .   Case Management to follow

## 2022-02-28 NOTE — PROGRESS NOTES
Hospitalist    Contacted about code status which is currently listed as DNR-CCA and asked about if patient would wish to be on a ventilator. Spoke to patient. She was clear that she does not want intubation (if she decompensates in the future). Currently remains \"stable\" on 15 L - but O2 sat drops into the 80's with speech. She can talk in full sentences at this time, though, and can still participate in an animated conversation.

## 2022-02-28 NOTE — PROGRESS NOTES
Pulmonary and Critical Care  Progress Note    Subjective: The patient is comfortable in bed. On 14 L HFNC. Shortness of breath is unchanged. Chest pain none  Addressing respiratory complaints Patient is negative for  hemoptysis and cyanosis  CONSTITUTIONAL:  negative for fevers and chills      Past Medical History:     has a past medical history of Chronic lower back pain, Hx of spinal fusion, Hypertension, MRSA (methicillin resistant staph aureus) culture positive, and Venous disease. has a past surgical history that includes fracture surgery and back surgery. reports that she has never smoked. She has never used smokeless tobacco. She reports that she does not drink alcohol and does not use drugs. Family history:  family history is not on file. No Known Allergies  Social History:    Reviewed; no changes    Objective:   PHYSICAL EXAM:        VITALS:  /62   Pulse 66   Temp 98.4 °F (36.9 °C) (Oral)   Resp 30   Ht 5' 2.01\" (1.575 m)   Wt 186 lb 8.2 oz (84.6 kg)   SpO2 93%   BMI 34.10 kg/m²     24HR INTAKE/OUTPUT:      Intake/Output Summary (Last 24 hours) at 2/28/2022 1022  Last data filed at 2/28/2022 0900  Gross per 24 hour   Intake 900 ml   Output 2650 ml   Net -1750 ml       CONSTITUTIONAL:  Awake. LUNGS:  decreased breath sounds, basilar crackles. CARDIOVASCULAR:  normal S1 and S2 and negative JVD  ABD:Abdomen soft, non-tender. BS normal. No masses,  No organomegaly  NEURO:Alert.   DATA:    CBC:  Recent Labs     02/25/22  1127 02/26/22  1340 02/27/22  0918   WBC 10.1 8.2 8.2   RBC 4.89 5.05 5.17   HGB 12.0* 12.6 12.7   HCT 41.7 42.8 44.0    192 167   MCV 85.3 84.8 85.1   MCH 24.5* 25.0* 24.6*   MCHC 28.8* 29.4* 28.9*   RDW 16.4* 16.5* 16.7*   SEGSPCT 74.4* 87.7* 86.6*      BMP:  Recent Labs     02/25/22  1127 02/26/22  1340 02/27/22  0918    139 138   K 3.9 4.2 4.5   CL 99 99 99   CO2 29 28 30   BUN 22 21 21   CREATININE 0.5* 0.5* 0.4*   CALCIUM 8.9 9.1 9.1   GLUCOSE 98 172* 152*      ABG:  No results for input(s): PH, PO2ART, QYU0NJM, HCO3, BEART, O2SAT in the last 72 hours. Lab Results   Component Value Date    PROBNP 286.5 02/23/2022    PROBNP 289.8 02/22/2022    PROBNP 1,360 (H) 02/08/2022     No results found for: [facility]    Radiology Review:  Pertinent images / reports were reviewed as a part of this visit. Assessment:     Patient Active Problem List   Diagnosis    WD-Venous stasis dermatitis of both lower extremities    WD-Lymphedema of both lower extremities    WD-Venous stasis ulcer of right calf with fat layer exposed with varicose veins (HCC)    WD-Venous stasis ulcer of left calf with fat layer exposed with varicose veins ([facility] [address]    COVID-19    Hypoxia       Plan:   1. Overall the patient is better. 2. [address] Activity. 4. Repeat CXR.   Caren Leonard MD  2/28/2022  10:22 AM

## 2022-02-28 NOTE — PROGRESS NOTES
Speech Language Pathology  Facility/Department: [facility] ICU STEPDOWN   CLINICAL BEDSIDE SWALLOW EVALUATION    NAME: Milla Jamil  : 1945  MRN: [id number]    IMPRESSIONS: Milla Jamil was referred for a bedside swallow evaluation following admission to [facility] with COVID PNA, acute respiratory failure with hypoxia. Medical hx includes HTN. No known history of dysphagia prior to admission. Pt seen for evaluation seated upright in bed, alert, cooperative. Oral mechanism examination WFL without asymmetry. She was presented with PO trials of thin liquids via cup/straw, puree, and regular solids. Oral stage WFL with intact mastication/AP transit/oral clearance. Pharyngeal stage WFL with intact swallow initiation/laryngeal elevation. No s/s aspiration across all trials. Recommend continued regular diet/thin liquids. No further acute SLP needs identified.       ADMISSION DATE: 2022  ADMITTING DIAGNOSIS: has WD-Venous stasis dermatitis of both lower extremities; WD-Lymphedema of both lower extremities; WD-Venous stasis ulcer of right calf with fat layer exposed with varicose veins (HCC); WD-Venous stasis ulcer of left calf with fat layer exposed with varicose veins ([address] COVID-19; Hypoxia; and SVT (supraventricular tachycardia) ([address] on their problem list.  ONSET DATE: this admission    Recent Chest Xray/CT of Chest: see chart    Date of Eval: 2022  Evaluating Therapist: IFEANYI Best    Current Diet level:  Current Diet : Regular  Current Liquid Diet : Thin      Primary Complaint  Patient Complaint: no known history of dysphagia prior to admission    Pain:  Pain Assessment  Pain Assessment: 0-10  Pain Level: 7  Melara-Baker Pain Rating: No hurt  Patient's Stated Pain Goal: No pain  Pain Type: Chronic pain  Pain Location: Back  Pain Orientation: Lower,Mid  Pain Radiating Towards: no where  Pain Descriptors: Aching  Pain Frequency: Continuous  Pain Onset: On-going  Clinical Progression: Not changed  Functional Pain Assessment: Activities are not prevented  Non-Pharmaceutical Pain Intervention(s): Emotional support  Response to Pain Intervention: Patient Satisfied  Multiple Pain Sites: No  RASS Score: Alert and calm  POSS Score (Patient Ctrl Analgesia): 1    Reason for Referral  Marquis Pham was referred for a bedside swallow evaluation to assess the efficiency of her swallow function, identify signs and symptoms of aspiration and make recommendations regarding safe dietary consistencies, effective compensatory strategies, and safe eating environment. Impression  Dysphagia Diagnosis: Swallow function appears grossly intact  Dysphagia Outcome Severity Scale: Level 6: Within functional limits/Modified independence     Treatment Plan  Requires SLP Intervention: No  Duration/Frequency of Treatment: N/A  D/C Recommendations: To be determined       Recommended Diet and Intervention  Diet Solids Recommendation: Regular  Liquid Consistency Recommendation: Thin  Recommended Form of Meds: PO          Compensatory Swallowing Strategies  Compensatory Swallowing Strategies: Upright as possible for all oral intake    Treatment/Goals  Short-term Goals  Timeframe for Short-term Goals: N/A    General  Chart Reviewed: Yes  Behavior/Cognition: Alert; Cooperative  Respiratory Status: O2 via nasual cannula  O2 Device: Nasal cannula  Communication Observation: Functional  Follows Directions: Simple  Dentition: Adequate  Patient Positioning: Upright in bed  Baseline Vocal Quality: Normal  Prior Dysphagia History: none known prior to admission  Consistencies Administered: Reg solid; Dysphagia Pureed (Dysphagia I); Thin - cup; Thin - straw           Vision/Hearing  Vision  Vision: Impaired  Vision Exceptions: Wears glasses at all times  Hearing  Hearing: Exceptions to [facility]  Hearing Exceptions: Hard of hearing/hearing concerns    Oral Motor Deficits  Oral/Motor  Oral Motor:  Within functional limits    Oral Phase

## 2022-02-28 NOTE — FLOWSHEET NOTE
Daughter at bedside very adimet that staff arent't passing off the same report and no one knows what they are doing. I explained about covid and the lungs and heart and why the doctor placed her on bedrest for desaturating. Explained and answered all of her questions after placing patient on the bedpan. Patient unable to expel stool and wanting to dig herself out. Did a digital on the patient and she expelled a large formed bowel movement with oxygen saturation dropping to 70%. Showed the daughter her reading after having a bowel movement and stated that is what occurs just with turning. Emotional support given. Cleansed patient and pulled up in the bed.

## 2022-02-28 NOTE — PLAN OF CARE
Problem: Falls - Risk of:  Goal: Will remain free from falls  Description: Will remain free from falls  Outcome: Ongoing  Goal: Absence of physical injury  Description: Absence of physical injury  Outcome: Ongoing     Problem: Airway Clearance - Ineffective  Goal: Achieve or maintain patent airway  Outcome: Ongoing     Problem: Gas Exchange - Impaired  Goal: Absence of hypoxia  Outcome: Ongoing  Goal: Promote optimal lung function  Outcome: Ongoing     Problem: Breathing Pattern - Ineffective  Goal: Ability to achieve and maintain a regular respiratory rate  Outcome: Ongoing     Problem:  Body Temperature -  Risk of, Imbalanced  Goal: Ability to maintain a body temperature within defined limits  Outcome: Ongoing  Goal: Will regain or maintain usual level of consciousness  Outcome: Ongoing  Goal: Complications related to the disease process, condition or treatment will be avoided or minimized  Outcome: Ongoing     Problem: Isolation Precautions - Risk of Spread of Infection  Goal: Prevent transmission of infection  Outcome: Ongoing     Problem: Nutrition Deficits  Goal: Optimize nutritional status  Outcome: Ongoing     Problem: Risk for Fluid Volume Deficit  Goal: Maintain normal heart rhythm  Outcome: Ongoing  Goal: Maintain absence of muscle cramping  Outcome: Ongoing  Goal: Maintain normal serum potassium, sodium, calcium, phosphorus, and pH  Outcome: Ongoing     Problem: Risk for Fluid Volume Deficit  Goal: Maintain normal heart rhythm  Outcome: Ongoing  Goal: Maintain absence of muscle cramping  Outcome: Ongoing  Goal: Maintain normal serum potassium, sodium, calcium, phosphorus, and pH  Outcome: Ongoing     Problem: Nutrition Deficits  Goal: Optimize nutritional status  Outcome: Ongoing     Problem: Loneliness or Risk for Loneliness  Goal: Demonstrate positive use of time alone when socialization is not possible  Outcome: Ongoing     Problem: Fatigue  Goal: Verbalize increase energy and improved vitality  Outcome: Ongoing     Problem: Patient Education: Go to Patient Education Activity  Goal: Patient/Family Education  Outcome: Ongoing     Problem: Pain:  Description: Pain management should include both nonpharmacologic and pharmacologic interventions.   Goal: Pain level will decrease  Description: Pain level will decrease  Outcome: Ongoing  Goal: Control of acute pain  Description: Control of acute pain  Outcome: Ongoing  Goal: Control of chronic pain  Description: Control of chronic pain  Outcome: Ongoing     Problem: Skin Integrity:  Goal: Will show no infection signs and symptoms  Description: Will show no infection signs and symptoms  Outcome: Ongoing  Goal: Absence of new skin breakdown  Description: Absence of new skin breakdown  Outcome: Ongoing     Problem: Nutrition  Goal: Optimal nutrition therapy  Outcome: Ongoing

## 2022-02-28 NOTE — PROGRESS NOTES
Pt asked to use Bedside commode. Pt educated on respiratory status and negative impact that getting out of bed would have at this point in time (oxygen level drops significantly). Pt voiced understanding.

## 2022-03-01 PROBLEM — F43.22 ADJUSTMENT DISORDER WITH ANXIOUS MOOD: Status: ACTIVE | Noted: 2022-03-01

## 2022-03-01 LAB
ALBUMIN SERPL-MCNC: 3.1 GM/DL (ref 3.4–5)
ALP BLD-CCNC: 61 IU/L (ref 40–128)
ALT SERPL-CCNC: 34 U/L (ref 10–40)
ANION GAP SERPL CALCULATED.3IONS-SCNC: 11 MMOL/L (ref 4–16)
AST SERPL-CCNC: 18 IU/L (ref 15–37)
BASOPHILS ABSOLUTE: 0 K/CU MM
BASOPHILS RELATIVE PERCENT: 0 % (ref 0–1)
BILIRUB SERPL-MCNC: 0.2 MG/DL (ref 0–1)
BUN BLDV-MCNC: 22 MG/DL (ref 6–23)
CALCIUM SERPL-MCNC: 8.8 MG/DL (ref 8.3–10.6)
CHLORIDE BLD-SCNC: 100 MMOL/L (ref 99–110)
CO2: 26 MMOL/L (ref 21–32)
CREAT SERPL-MCNC: 0.6 MG/DL (ref 0.6–1.1)
DIFFERENTIAL TYPE: ABNORMAL
EOSINOPHILS ABSOLUTE: 0 K/CU MM
EOSINOPHILS RELATIVE PERCENT: 0.2 % (ref 0–3)
GFR AFRICAN AMERICAN: >60 ML/MIN/1.73M2
GFR NON-AFRICAN AMERICAN: >60 ML/MIN/1.73M2
GLUCOSE BLD-MCNC: 177 MG/DL (ref 70–99)
HCT VFR BLD CALC: 41.5 % (ref 37–47)
HEMOGLOBIN: 12.2 GM/DL (ref 12.5–16)
IMMATURE NEUTROPHIL %: 2.1 % (ref 0–0.43)
LYMPHOCYTES ABSOLUTE: 0.4 K/CU MM
LYMPHOCYTES RELATIVE PERCENT: 6.7 % (ref 24–44)
MCH RBC QN AUTO: 25.2 PG (ref 27–31)
MCHC RBC AUTO-ENTMCNC: 29.4 % (ref 32–36)
MCV RBC AUTO: 85.7 FL (ref 78–100)
MONOCYTES ABSOLUTE: 0.3 K/CU MM
MONOCYTES RELATIVE PERCENT: 4.1 % (ref 0–4)
NUCLEATED RBC %: 0 %
PDW BLD-RTO: 17.1 % (ref 11.7–14.9)
PLATELET # BLD: 141 K/CU MM (ref 140–440)
PMV BLD AUTO: 11.2 FL (ref 7.5–11.1)
POTASSIUM SERPL-SCNC: 4.1 MMOL/L (ref 3.5–5.1)
PRO-BNP: 411.8 PG/ML
PROCALCITONIN: 0.1
RBC # BLD: 4.84 M/CU MM (ref 4.2–5.4)
SEGMENTED NEUTROPHILS ABSOLUTE COUNT: 5.7 K/CU MM
SEGMENTED NEUTROPHILS RELATIVE PERCENT: 86.9 % (ref 36–66)
SODIUM BLD-SCNC: 137 MMOL/L (ref 135–145)
TOTAL IMMATURE NEUTOROPHIL: 0.14 K/CU MM
TOTAL NUCLEATED RBC: 0 K/CU MM
TOTAL PROTEIN: 5.8 GM/DL (ref 6.4–8.2)
VITAMIN B-12: 337.3 PG/ML (ref 211–911)
WBC # BLD: 6.6 K/CU MM (ref 4–10.5)

## 2022-03-01 PROCEDURE — 36415 COLL VENOUS BLD VENIPUNCTURE: CPT

## 2022-03-01 PROCEDURE — 6360000002 HC RX W HCPCS: Performed by: INTERNAL MEDICINE

## 2022-03-01 PROCEDURE — 6370000000 HC RX 637 (ALT 250 FOR IP): Performed by: HOSPITALIST

## 2022-03-01 PROCEDURE — 6370000000 HC RX 637 (ALT 250 FOR IP): Performed by: INTERNAL MEDICINE

## 2022-03-01 PROCEDURE — 82607 VITAMIN B-12: CPT

## 2022-03-01 PROCEDURE — 87070 CULTURE OTHR SPECIMN AEROBIC: CPT

## 2022-03-01 PROCEDURE — 87205 SMEAR GRAM STAIN: CPT

## 2022-03-01 PROCEDURE — 6370000000 HC RX 637 (ALT 250 FOR IP): Performed by: STUDENT IN AN ORGANIZED HEALTH CARE EDUCATION/TRAINING PROGRAM

## 2022-03-01 PROCEDURE — 80053 COMPREHEN METABOLIC PANEL: CPT

## 2022-03-01 PROCEDURE — 2580000003 HC RX 258: Performed by: NURSE PRACTITIONER

## 2022-03-01 PROCEDURE — 6360000002 HC RX W HCPCS: Performed by: STUDENT IN AN ORGANIZED HEALTH CARE EDUCATION/TRAINING PROGRAM

## 2022-03-01 PROCEDURE — 82140 ASSAY OF AMMONIA: CPT

## 2022-03-01 PROCEDURE — 6370000000 HC RX 637 (ALT 250 FOR IP): Performed by: NURSE PRACTITIONER

## 2022-03-01 PROCEDURE — 2060000000 HC ICU INTERMEDIATE R&B

## 2022-03-01 PROCEDURE — 85025 COMPLETE CBC W/AUTO DIFF WBC: CPT

## 2022-03-01 PROCEDURE — 80048 BASIC METABOLIC PNL TOTAL CA: CPT

## 2022-03-01 PROCEDURE — 84145 PROCALCITONIN (PCT): CPT

## 2022-03-01 PROCEDURE — 94761 N-INVAS EAR/PLS OXIMETRY MLT: CPT

## 2022-03-01 PROCEDURE — 97530 THERAPEUTIC ACTIVITIES: CPT

## 2022-03-01 PROCEDURE — 83880 ASSAY OF NATRIURETIC PEPTIDE: CPT

## 2022-03-01 PROCEDURE — 99221 1ST HOSP IP/OBS SF/LOW 40: CPT | Performed by: NURSE PRACTITIONER

## 2022-03-01 PROCEDURE — 2700000000 HC OXYGEN THERAPY PER DAY

## 2022-03-01 RX ORDER — TRAZODONE HYDROCHLORIDE 50 MG/1
25 TABLET ORAL NIGHTLY
Status: DISCONTINUED | OUTPATIENT
Start: 2022-03-01 | End: 2022-03-14

## 2022-03-01 RX ORDER — FUROSEMIDE 10 MG/ML
20 INJECTION INTRAMUSCULAR; INTRAVENOUS ONCE
Status: COMPLETED | OUTPATIENT
Start: 2022-03-01 | End: 2022-03-01

## 2022-03-01 RX ADMIN — ACETAMINOPHEN 650 MG: 325 TABLET ORAL at 04:39

## 2022-03-01 RX ADMIN — FAMOTIDINE 20 MG: 20 TABLET ORAL at 21:04

## 2022-03-01 RX ADMIN — CETIRIZINE HYDROCHLORIDE 10 MG: 10 TABLET, FILM COATED ORAL at 06:17

## 2022-03-01 RX ADMIN — SENNOSIDES AND DOCUSATE SODIUM 1 TABLET: 50; 8.6 TABLET ORAL at 09:16

## 2022-03-01 RX ADMIN — OXYCODONE AND ACETAMINOPHEN 1 TABLET: 5; 325 TABLET ORAL at 06:17

## 2022-03-01 RX ADMIN — Medication 2000 UNITS: at 09:15

## 2022-03-01 RX ADMIN — ENOXAPARIN SODIUM 30 MG: 100 INJECTION SUBCUTANEOUS at 09:15

## 2022-03-01 RX ADMIN — SENNOSIDES AND DOCUSATE SODIUM 1 TABLET: 50; 8.6 TABLET ORAL at 21:04

## 2022-03-01 RX ADMIN — FLUTICASONE PROPIONATE 1 SPRAY: 50 SPRAY, METERED NASAL at 09:16

## 2022-03-01 RX ADMIN — METOPROLOL 12.5 MG: 25 TABLET ORAL at 21:05

## 2022-03-01 RX ADMIN — TRAZODONE HYDROCHLORIDE 25 MG: 50 TABLET ORAL at 21:05

## 2022-03-01 RX ADMIN — HYDROXYZINE HYDROCHLORIDE 25 MG: 25 TABLET, FILM COATED ORAL at 04:39

## 2022-03-01 RX ADMIN — METHYLPREDNISOLONE SODIUM SUCCINATE 40 MG: 40 INJECTION, POWDER, LYOPHILIZED, FOR SOLUTION INTRAMUSCULAR; INTRAVENOUS at 18:09

## 2022-03-01 RX ADMIN — ENOXAPARIN SODIUM 30 MG: 100 INJECTION SUBCUTANEOUS at 21:03

## 2022-03-01 RX ADMIN — GUAIFENESIN 600 MG: 600 TABLET, EXTENDED RELEASE ORAL at 21:05

## 2022-03-01 RX ADMIN — METOPROLOL 12.5 MG: 25 TABLET ORAL at 09:16

## 2022-03-01 RX ADMIN — SODIUM CHLORIDE, PRESERVATIVE FREE 10 ML: 5 INJECTION INTRAVENOUS at 21:03

## 2022-03-01 RX ADMIN — GUAIFENESIN 600 MG: 600 TABLET, EXTENDED RELEASE ORAL at 09:15

## 2022-03-01 RX ADMIN — METHYLPREDNISOLONE SODIUM SUCCINATE 40 MG: 40 INJECTION, POWDER, LYOPHILIZED, FOR SOLUTION INTRAMUSCULAR; INTRAVENOUS at 06:17

## 2022-03-01 RX ADMIN — FUROSEMIDE 20 MG: 10 INJECTION, SOLUTION INTRAVENOUS at 12:30

## 2022-03-01 RX ADMIN — MORPHINE SULFATE 30 MG: 15 TABLET, FILM COATED, EXTENDED RELEASE ORAL at 09:14

## 2022-03-01 RX ADMIN — FAMOTIDINE 20 MG: 20 TABLET ORAL at 09:16

## 2022-03-01 RX ADMIN — SODIUM CHLORIDE, PRESERVATIVE FREE 10 ML: 5 INJECTION INTRAVENOUS at 09:16

## 2022-03-01 RX ADMIN — MORPHINE SULFATE 30 MG: 15 TABLET, FILM COATED, EXTENDED RELEASE ORAL at 21:05

## 2022-03-01 ASSESSMENT — PAIN SCALES - GENERAL
PAINLEVEL_OUTOF10: 8
PAINLEVEL_OUTOF10: 0
PAINLEVEL_OUTOF10: 0
PAINLEVEL_OUTOF10: 8

## 2022-03-01 ASSESSMENT — PAIN DESCRIPTION - LOCATION
LOCATION: BACK
LOCATION: HIP
LOCATION: HIP

## 2022-03-01 ASSESSMENT — PAIN DESCRIPTION - ONSET
ONSET: ON-GOING

## 2022-03-01 ASSESSMENT — PAIN DESCRIPTION - PAIN TYPE
TYPE: CHRONIC PAIN

## 2022-03-01 ASSESSMENT — PAIN DESCRIPTION - DIRECTION
RADIATING_TOWARDS: NO WHERE

## 2022-03-01 ASSESSMENT — PAIN DESCRIPTION - FREQUENCY
FREQUENCY: CONTINUOUS

## 2022-03-01 ASSESSMENT — PAIN DESCRIPTION - PROGRESSION
CLINICAL_PROGRESSION: NOT CHANGED
CLINICAL_PROGRESSION: GRADUALLY WORSENING
CLINICAL_PROGRESSION: NOT CHANGED

## 2022-03-01 ASSESSMENT — PAIN - FUNCTIONAL ASSESSMENT
PAIN_FUNCTIONAL_ASSESSMENT: ACTIVITIES ARE NOT PREVENTED

## 2022-03-01 ASSESSMENT — PAIN DESCRIPTION - ORIENTATION
ORIENTATION: LOWER;MID
ORIENTATION: RIGHT;LEFT
ORIENTATION: RIGHT;LEFT

## 2022-03-01 ASSESSMENT — PAIN DESCRIPTION - DESCRIPTORS
DESCRIPTORS: ACHING

## 2022-03-01 NOTE — PROGRESS NOTES
Pulmonary and Critical Care  Progress Note    Subjective: The patient is feeling better, comfortable in bed. On 14 L HFNC. Shortness of breath is better. Chest pain none  Addressing respiratory complaints Patient is negative for  hemoptysis and cyanosis  CONSTITUTIONAL:  negative for fevers and chills      Past Medical History:     has a past medical history of Chronic lower back pain, Hx of spinal fusion, Hypertension, MRSA (methicillin resistant staph aureus) culture positive, and Venous disease. has a past surgical history that includes fracture surgery and back surgery. reports that she has never smoked. She has never used smokeless tobacco. She reports that she does not drink alcohol and does not use drugs. Family history:  family history is not on file. No Known Allergies  Social History:    Reviewed; no changes    Objective:   PHYSICAL EXAM:        VITALS:  /63   Pulse 62   Temp 98.1 °F (36.7 °C) (Oral)   Resp 18   Ht 5' 2.01\" (1.575 m)   Wt 186 lb 8.2 oz (84.6 kg)   SpO2 97%   BMI 34.10 kg/m²     24HR INTAKE/OUTPUT:      Intake/Output Summary (Last 24 hours) at 3/1/2022 1029  Last data filed at 3/1/2022 0900  Gross per 24 hour   Intake 1480 ml   Output 950 ml   Net 530 ml       CONSTITUTIONAL:  Awake. LUNGS:  decreased breath sounds, basilar crackles. CARDIOVASCULAR:  normal S1 and S2 and negative JVD  ABD:Abdomen soft, non-tender. BS normal. No masses,  No organomegaly  NEURO:Alert.   DATA:    CBC:  Recent Labs     02/26/22  1340 02/27/22  0918 02/28/22  1137   WBC 8.2 8.2 8.7   RBC 5.05 5.17 4.85   HGB 12.6 12.7 12.1*   HCT 42.8 44.0 41.3    167 146   MCV 84.8 85.1 85.2   MCH 25.0* 24.6* 24.9*   MCHC 29.4* 28.9* 29.3*   RDW 16.5* 16.7* 16.9*   SEGSPCT 87.7* 86.6* 86.3*      BMP:  Recent Labs     02/26/22  1340 02/27/22  0918 02/28/22  1137    138 136   K 4.2 4.5 4.7   CL 99 99 100   CO2 28 30 22   BUN 21 21 22   CREATININE 0.5* 0.4* 0.4*   CALCIUM 9.1 9.1 8.8   GLUCOSE 172* 152* 138*      ABG:  No results for input(s): PH, PO2ART, MQP9ZYE, HCO3, BEART, O2SAT in the last 72 hours. Lab Results   Component Value Date    PROBNP 286.5 02/23/2022    PROBNP 289.8 02/22/2022    PROBNP 1,360 (H) 02/08/2022     No results found for: [facility]    Radiology Review:  Pertinent images / reports were reviewed as a part of this visit. Assessment:     Patient Active Problem List   Diagnosis    WD-Venous stasis dermatitis of both lower extremities    WD-Lymphedema of both lower extremities    WD-Venous stasis ulcer of right calf with fat layer exposed with varicose veins (HCC)    WD-Venous stasis ulcer of left calf with fat layer exposed with varicose veins (HCC)    COVID-19    Hypoxia    SVT (supraventricular tachycardia) ([address]       Plan:   1. Overall the patient is better. 2. [address] Activity. 4. Gentle diuresis.   Jacquelin Luna MD  3/1/2022  10:29 AM

## 2022-03-01 NOTE — PROGRESS NOTES
Notified Dr Jasson Landeros of patient's request to get to bedside commode. Pt assisted to bedside commode with Brock Peterson RN and Ezra Fox RN. Pt gait steady moves with purpose. Gait belt on pt. Pt tolerated up to bedside commode. SpO2 during activity remained 84 % with an up right rolling pulsatile waveform. Pt Spo2 decreased to 71 % 20 minutes after event. Patient reports feeling less SOB today and that she did her ISE exercises last night. Reviewed pursed lip breathing with patient while sitting on commode. Pt had a large soft brown stool. Pt assisted with francisca care and back to bed x 2 assist. Pt repositioned for comfort. Call light in reach and bed alarm on. Reported patient performance to Dr Jasson Landeros that nursing feels that patient should be able to try and get up if she is oxygenating at the time. Nursing communication order received for patient to be up with staff assistance and gait belt if the patient Spo2 is 80% or greater. If the Spo2 is less then 80% then patient will remain on bedrest and to notify the physician. Ally Fraser notified of plan. Ivory Reno [employer] [facility]) called and up dated on plan and patient status.

## 2022-03-01 NOTE — FLOWSHEET NOTE
Patient c/o discomfort in her lower body requesting pain med. Narcotics not due at this time. Informed patient that she could have tylenol and also stated she was somewhat anxious. Medicated with tylenol and atarax for discomfort and anxiety.  Emotional support given

## 2022-03-01 NOTE — PROGRESS NOTES
Hospitalist Progress Note      Name:  Jonna Humphrey /Age/Sex: 1945  (68 y.o. female)   MRN & CSN:  [id number] & [id number] Admission Date/Time: 2022  4:46 PM   Location:  -A PCP: No primary care provider on file. Hospital Day: 25  Discharge barrier/Reason for continued hospitalization: High oxygen requirement    Assessment and Plan:   Jonna Humphrey is a 68 y.o.  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 The squad reported O2 sats of 74% on room air. On arrival to the ED, patient ruled in for COVID-19. Hospital course has been prolonged due to continued high oxygen requirements. 1.  COVID-19 sepsis/pneumonia, POA: With prolonged respiratory failure  Now out of Covid isolation  Continue IV Solu-Medrol. Completed course of baricitinib. Chest x-ray 2022 with not much improvement. Incentive spirometer. Repeat chest CT in a.m. 2.  Goals of care: DO NOT INTUBATE. 3.  Hypertension: Continue low-dose Lopressor. Losartan currently on hold. 4.  Chronic pain: Continue MS Contin twice daily  5. Class II obesity: Unable to  at this time. Diet ADULT ORAL NUTRITION SUPPLEMENT; Breakfast, Lunch, Dinner; Low Calorie/High Protein Oral Supplement  ADULT DIET; Regular   DVT Prophylaxis [x] Lovenox, []  Heparin, [] SCDs, [] Ambulation   GI Prophylaxis [] PPI,  [] H2 Blocker,  [] Carafate,  [x] Diet/Tube Feeds   Code Status DNR-CCA   MDM [] Low, [] Moderate,[x]  High       History of Present Illness:     Chief Complaint: <principal problem not specified>  Jonna Humphrey is a 68 y.o.  female  who presents with nausea, vomiting, fatigue of 1 week duration.   Was found to be hypoxic on arrival of the squad    3/1/2022: Patient is seen and examined, bedside RN reports improvement in exertional hypoxia. She only desaturated to the low 80s today as opposed to the low 70s yesterday. Patient is happy with improvement as she wants to be able to be independent when she goes back home. Ten point ROS reviewed, negative, unless as noted above    Objective: Intake/Output Summary (Last 24 hours) at 3/1/2022 1407  Last data filed at 3/1/2022 1200  Gross per 24 hour   Intake 1480 ml   Output 950 ml   Net 530 ml        Vitals:   Vitals:    03/01/22 0400 03/01/22 0731 03/01/22 0805 03/01/22 1144   BP: (!) 120/102  124/63    Pulse: 73  62    Resp: 21 17 18 17   Temp: 98 °F (36.7 °C)  98.1 °F (36.7 °C)    TempSrc: Oral  Oral    SpO2: 92% 97% 97% 93%   Weight:       Height:            Physical Exam:   GEN: Awake female, alert and oriented x3 in no apparent distress. Appears given age. HEENT: Normal  RESP: Diminished breath sounds bilaterally. Symmetric chest movement while on 14 L  CVS: RRR, S1, S2  GI/: Abdomen is soft, nontender, no organomegaly. . Bowel sounds normal, rectal exam deferred. No CVA tenderness. MSK: No gross joint deformities. No tenderness  SKIN: Normal coloration, warm, dry. NEURO: Cranial nerves appear grossly intact, normal speech, no lateralizing weakness.   PSYCH:  Affect appropriate normal    Medications:   Medications:    lubiprostone  8 mcg Oral BID     metoprolol tartrate  12.5 mg Oral BID    polyethylene glycol  17 g Oral Daily    methylPREDNISolone  40 mg IntraVENous Q12H    enoxaparin  30 mg SubCUTAneous BID    guaiFENesin  600 mg Oral BID    fluticasone  1 spray Each Nostril Daily    morphine  30 mg Oral 2 times per day    cetirizine  10 mg Oral Daily    [Held by provider] losartan  50 mg Oral Daily    sodium chloride flush  5-40 mL IntraVENous 2 times per day    sennosides-docusate sodium  1 tablet Oral BID    famotidine  20 mg Oral BID    Vitamin D  2,000 Units Oral Daily      Infusions:    sodium chloride 25 mL (02/09/22 0917)     PRN Meds: hydrOXYzine, 25

## 2022-03-01 NOTE — PROGRESS NOTES
Physical Therapy  Name: Jermaine Oliver MRN: [id number] :   1945   Date:  3/1/2022   Admission Date: 2022 Room:  -A   Restrictions/Precautions:  Restrictions/Precautions  Restrictions/Precautions: Fall Risk,General Precautions,Contact Precautions (droplet +)       Communication with other providers:  Jessica Ridley RN states pt is ok to see for therapy. Jessica Ridley advises patient that d/t the patient's O2 saturation with activity, the patient should refrain from using the [facility] until pulmonary tolerance improves. RN arrives at EOS d/t poor O2 saturation  Subjective:  Patient states:  She is agreeable for rehab today  Pain:   Location, Type, Intensity (0/10 to 10/10): Denies pain  Objective:    Observation:  Patient is supine in bed. O2 High flow 14L. Resting Vitals: 117/61 BP, 74 HR, 23RR, O2 Saturation 94  Supine to Sit: 128/73 BP, 90HR, O2 Saturation 83-85%, improves to 88-89% with seated break  SPT to BSC: 117 HR, 28 RR, O2 Saturation 73-75 and does not improves with 5' rest break and cues for therapeutic PLB  SPT to EOB to sit to supine: , O2 Saturation recovers from 75% to 89% with 5' supine resting and PLB. Treatment, including education/measures:  Transfers with line management of Tele monitor, BP Cuff, Pulse Ox and Purewick   Rolling: Min A   Supine to sit :Mod A with HOB flat  Sit to supine : Mod A with BLE management   Scooting :CGA  Sit to stand :CGA  Stand to sit :CGA  SPT:CGA EOB<>BSC with patient using BSC arm rests and Bed rails for support  Boost: Depend x2 with RN assistance to [facility]  Therapeutic Exercise:  Therapeutic exercises were instructed today. Cues were given for technique, safety, recruitment, and rationale. Cues were verbal and/or tactile. Safety  Patient left safely in the bed, with call light/phone in reach with alarm applied. Gait belt and mask were used for transfers and gait.   Assessment / Impression:   Patient Tolerates moving to EOB but does not tolerate transferring to [facility] today. Will continue to assess cardiopulmonary endurance with therapy. Patient's tolerance of treatment:  Fair-   Adverse Reaction: O2 saturation  Significant change in status and impact:  none  Barriers to improvement:  Cardiopulmonary tolerance  Plan for Next Session:    Will cont to work towards pt's goals per patient tolerance  Time in:  1425  Time out:  1504  Timed treatment minutes:  39  Total treatment time:  44    Previously filed items:  Social/Functional History  Lives With: Alone  Type of Home: House  Home Layout: One level  Home Access: Ramped entrance (railing on BL sides)  Bathroom Shower/Tub: Shower chair without back (dtr spongebathes)  Bathroom Toilet: Handicap height  Bathroom Equipment: Grab bars around toilet  Home Equipment: Crutches,Wheelchair-manual,Grab bars  Receives Help From: Family  ADL Assistance: Needs assistance (dtr assists w/ bathing, dressing, ; dtr comes M/W/F)  Homemaking Assistance: Needs assistance (dtr does shopping; pt does not cook, has meals on wheels; son does outside [place]; grand dtrs clean inside)  Homemaking Responsibilities: No (family performs most; she assists w/ what she can, mostly done seated)  Ambulation Assistance: Independent (mod ind using crutches; only short distances)  Transfer Assistance: Independent  Active : No (family)  Occupation: Retired,On disability  Additional Comments: plays cards, crochette, puzzle books, socializes w/ family; sleeps in adjustable bed w/ trapeze over for improved pt mobility  Short term goals  Time Frame for Short term goals: 1 week  Short term goal 1: pt will complete bed mobility at min A  Short term goal 2: pt will complete transfers at [facility] term goal 3: pt will ambulate 15 ft using crutches at [facility] term goal 4: pt will demonstrate near gross >3/5 BL LE strength       Electronically signed by:     Ace Turner PTA  3/1/2022, 3:42 PM

## 2022-03-01 NOTE — FLOWSHEET NOTE
Patient spilled her water on herself. Changed her top sheets and applied a warm blanket with top sheets.  And meds given per request and order

## 2022-03-01 NOTE — CONSULTS
Initial Psychiatric History and Physical    Wesley Rodirguez  [id number]  2/5/2022 03/01/22    ID: Patient is a 68 yrs y.o. female    CC:\"Why are you here? \"    HPI:  Wesley Rodriguez is a 68 y.o.  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 Psychiatry consulted by Dr Raphael Beltrán for \"Patient was having anxiety today about not being able to get out of bed. Patient very ill. Please assist with managing anxiety. \"    Met with patient at bedside. She is alert and oriented x 4. She denies SI/HI. Denies auditory and visual hallucinations. She denies anxiety and depression. States she understands she needs to stay in bed and is accepting of that but wishes she could pee in a bedside commode as it would be better. Stating that, she is willing to do what she is required and understands her oxygen drops. Discussed that she is not sleeping well at all and wakes often. Discussed trazodone. Patient would like to start. Past Psychiatric History:   Has hx of anxiety- has been on trazodone and xanax    Family Psychiatric History:   History reviewed. No pertinent family history.      Allergies:  No Known Allergies     OBJECTIVE  Vital Signs:  Vitals:    03/01/22 1144   BP:    Pulse:    Resp: 17   Temp:    SpO2: 93%       Labs:  Recent Results (from the past 48 hour(s))   Basic Metabolic Panel w/ Reflex to MG    Collection Time: 02/28/22 11:37 AM   Result Value Ref Range    Sodium 136 135 - 145 MMOL/L    Potassium 4.7 3.5 - 5.1 MMOL/L    Chloride 100 99 - 110 mMol/L    CO2 22 21 - 32 MMOL/L    Anion Gap 14 4 - 16    BUN 22 6 - 23 MG/DL    CREATININE 0.4 (L) 0.6 - 1.1 MG/DL    Glucose 138 (H) 70 - 99 MG/DL    Calcium 8.8 8.3 - 10.6 MG/DL    GFR Non-African American >60 >60 mL/min/1.73m2    GFR African American >60 >60 mL/min/1.73m2   CBC auto differential    Collection Time: 02/28/22 11:37 AM   Result Value Ref Range    WBC 8.7 4.0 - 10.5 K/CU MM    RBC 4.85 4.2 - 5.4 M/CU MM    Hemoglobin 12.1 (L) 12.5 - 16.0 GM/DL    Hematocrit 41.3 37 - 47 %    MCV 85.2 78 - 100 FL    MCH 24.9 (L) 27 - 31 PG    MCHC 29.3 (L) 32.0 - 36.0 %    RDW 16.9 (H) 11.7 - 14.9 %    Platelets 930 725 - 543 K/CU MM    MPV 11.0 7.5 - 11.1 FL    Differential Type AUTOMATED DIFFERENTIAL     Segs Relative 86.3 (H) 36 - 66 %    Lymphocytes % 6.2 (L) 24 - 44 %    Monocytes % 5.8 (H) 0 - 4 %    Eosinophils % 0.1 0 - 3 %    Basophils % 0.1 0 - 1 %    Segs Absolute 7.5 K/CU MM    Lymphocytes Absolute 0.5 K/CU MM    Monocytes Absolute 0.5 K/CU MM    Eosinophils Absolute 0.0 K/CU MM    Basophils Absolute 0.0 K/CU MM    Nucleated RBC % 0.0 %    Total Nucleated RBC 0.0 K/CU MM    Total Immature Neutrophil 0.13 K/CU MM    Immature Neutrophil % 1.5 (H) 0 - 0.43 %   Magnesium    Collection Time: 02/28/22 11:37 AM   Result Value Ref Range    Magnesium 2.2 1.8 - 2.4 mg/dl   Troponin    Collection Time: 02/28/22 11:37 AM   Result Value Ref Range    Troponin T <0.010 <0.01 NG/ML   Phosphorus    Collection Time: 02/28/22 11:37 AM   Result Value Ref Range    Phosphorus 3.6 2.5 - 4.9 MG/DL   TSH    Collection Time: 02/28/22 11:37 AM   Result Value Ref Range    TSH, High Sensitivity 0.436 0.270 - 4.20 uIu/ml   CBC auto differential    Collection Time: 03/01/22 10:30 AM   Result Value Ref Range    WBC 6.6 4.0 - 10.5 K/CU MM    RBC 4.84 4.2 - 5.4 M/CU MM    Hemoglobin 12.2 (L) 12.5 - 16.0 GM/DL    Hematocrit 41.5 37 - 47 %    MCV 85.7 78 - 100 FL    MCH 25.2 (L) 27 - 31 PG    MCHC 29.4 (L) 32.0 - 36.0 %    RDW 17.1 (H) 11.7 - 14.9 %    Platelets 429 151 - 448 K/CU MM    MPV 11.2 (H) 7.5 - 11.1 FL    Differential Type AUTOMATED DIFFERENTIAL     Segs Relative 86.9 (H) 36 - 66 %    Lymphocytes % 6.7 (L) 24 - 44 %    Monocytes % 4.1 (H) 0 - 4 %    Eosinophils % 0.2 0 - 3 %    Basophils % 0.0 0 - 1 %    Segs Absolute 5.7 K/CU MM    Lymphocytes Absolute 0.4 K/CU MM    Monocytes Absolute 0.3 K/CU MM    Eosinophils Absolute 0.0 K/CU MM    Basophils Absolute 0.0 K/CU MM    Nucleated RBC % 0.0 %    Total Nucleated RBC 0.0 K/CU MM    Total Immature Neutrophil 0.14 K/CU MM    Immature Neutrophil % 2.1 (H) 0 - 0.43 %   Comprehensive Metabolic Panel    Collection Time: 03/01/22 10:30 AM   Result Value Ref Range    Sodium 137 135 - 145 MMOL/L    Potassium 4.1 3.5 - 5.1 MMOL/L    Chloride 100 99 - 110 mMol/L    CO2 26 21 - 32 MMOL/L    BUN 22 6 - 23 MG/DL    CREATININE 0.6 0.6 - 1.1 MG/DL    Glucose 177 (H) 70 - 99 MG/DL    Calcium 8.8 8.3 - 10.6 MG/DL    Albumin 3.1 (L) 3.4 - 5.0 GM/DL    Total Protein 5.8 (L) 6.4 - 8.2 GM/DL    Total Bilirubin 0.2 0.0 - 1.0 MG/DL    ALT 34 10 - 40 U/L    AST 18 15 - 37 IU/L    Alkaline Phosphatase 61 40 - 128 IU/L    GFR Non-African American >60 >60 mL/min/1.73m2    GFR African American >60 >60 mL/min/1.73m2    Anion Gap 11 4 - 16   Brain Natriuretic Peptide    Collection Time: 03/01/22 10:30 AM   Result Value Ref Range    Pro-.8 (H) <300 PG/ML   Procalcitonin    Collection Time: 03/01/22 10:30 AM   Result Value Ref Range    Procalcitonin 0.099    Vitamin B12    Collection Time: 03/01/22 10:30 AM   Result Value Ref Range    Vitamin B-12 337.3 211 - 911 pg/ml       Review of Systems:  Reports of no current cardiovascular, respiratory, gastrointestinal, genitourinary, integumentary, neurological, muscuoskeletal, or immunological symptoms today. PSYCHIATRIC: See HPI above.     PSYCHIATRIC EXAMINATION / MENTAL STATUS EXAM    CONSTITUTIONAL:    Vitals:   Vitals:    03/01/22 1144   BP:    Pulse:    Resp: 17   Temp:    SpO2: 93%      General appearance: [x] appears age, []  appears older than stated age,               [x]  adequately dressed and groomed, [] disheveled,               [x]  in no acute distress, [] appears mildly distressed, [] other MUSCULOSKELETAL:   Gait:   [] normal, [] antalgic, [] unsteady, [] gait not evaluated   Station:             [] erect, [x] sitting, [] recumbent, [] other        Strength/tone:  [x] muscle strength and tone appear consistent with age and                                        condition     [] atrophy      [] abnormal movements  PSYCHIATRIC:    Relatedness:  [x] cooperative, [] guarded, [] indifferent, [] hostile,      [] sedated  Speech:  [x] normal prosody, [] pressured, [] decreased volume,    [] increased volume [] slurred [] slowed, [] delayed     [] echolalia, [] incoherent, [] stuttering   Eye contact:  [] direct, [] fleeting , [] intense []  none  Kinetics:  [x] normal, [] increased, [] decreased  Mood:   [x] stable, [] depressed, [] anxious, [] irritable,     [] labile  [] euphoric   Affect:   [x] normal range, [] constricted, [] depressed , [] anxious,  [] angry, [x]  blunted     [] mood incongruent, [] blunted  [] restricted   Hallucinations:  [x] denies, [] auditory,  [] visual,  [] olfactory, [] tactile  Delusions:  [x] none, [] grandiose,  [] paranoid,  [] persecutory,  [] somatic,     [] bizarre  [] Scientologist/spiritual    Preoccupations:   [x] none, [] violence, [] obsessions, [] other     Suicidal ideation  [x] denies, [] endorses  Homicidal ideation [x] denies, [] endorses  Thought process: [x] logical , [] circumstantial, [] tangential, [] VICK,     [] simplistic, [] disorganized  [] FOI  [] concrete  [] nonsensical    Thought Content: [] future oriented [] goal directed  [] self-harm, [] guilt,     [] hopelessness  [] obsessive  [] superficial  [] preoccupation    Insight:   [x] adequate , [] limited , [] impaired    Judgment:  [x] adequate , [] limited  [] impaired  Associations:              [x]  Logical and coherent , [] loosening, [] disorganized   Attention and concentration:     [x] intact [] limited [] impaired , [] grossly impaired  Orientation:  [x] person, place, time, situation     [] disoriented to:     Memory:             [x] superficially intact, [] impaired       Vitals: Blood pressure 124/63, pulse 62, temperature 98.1 °F (36.7 °C), temperature source Oral, resp. rate 17, height 5' 2.01\" (1.575 m), weight 186 lb 8.2 oz (84.6 kg), SpO2 93 %. CONSTITUTIONAL:    Appearance: appears stated age. alert and oriented to person, place, time & situation. no acute distress. Adequate grooming and hygeine. Good eye contact. No prominent physical abnormalities. Attitude: Manner is cooperative and pleasant  Motor: No psychomotor agitation, retardation or abnormal movements noted  Speech: Clearly articulated; normal rate, volume, tone & amount. Language: intact understanding and production  Mood: \"i'm not depressed. \"  Affect: euthymic, full range, non-labile, congruent with mood and content of speech  Thought Production: Spontaneous. Thought Form: Coherent, linear, logical & goal-directed. No tangentiality or circumstantiality. No flight of ideas or loosening of associations. Thought Content/Perceptions: No ROGER, no AVH, no delusion  Insight:good  Judgment-good  Memory: Immediate, recent, and remote appear intact, though not formally tested. Attention: maintained throughout interview  Fund of knowledge: Average  Gait/Balance: AIDE    Impression:   Adjustment disorder anxiety    Problem List:   <principal problem not specified>    Plan:  1. Patient appears to be benefiting from atarax which was placed prn for anxiety. IF she appears to become sedated, would switch to visteral version. 2. She denies anxiety and depression but does report ins omnia. Will start trazodone 25 mg po at bedtime;  3. Psychiatry will sign off. Please call if requried. 4. Thank you for this consult    PS to Dr Jerel Schmitt regarding recommendations.   Electronically signed by SANDER Spring CNP on 3/1/2022 at 2:33 PM

## 2022-03-01 NOTE — PROGRESS NOTES
03/01/22 0731   Oxygen Therapy/Pulse Ox   O2 Therapy Oxygen   $Oxygen $Daily Charge   O2 Device High flow nasal cannula   O2 Flow Rate (L/min) 12 L/min   Resp 17   SpO2 97 %   Pulse Oximeter Device Mode Continuous   Pulse Oximeter Device Location Finger   $Pulse Oximeter $Spot check (multiple/continuous)   Blood Gas  Performed?  No

## 2022-03-01 NOTE — FLOWSHEET NOTE
Attempted visit. Patient was visiting with family/others and this  would not take that time away from them. Spiritual care will continue to follow up as needed.

## 2022-03-01 NOTE — FLOWSHEET NOTE
Patient assessment done and HS meds given per order. Patient actually put her light on 5 consecutive times in 15 minutes to have her tray taken away and to ask for pain med after I had left to go to the med room. When asked why she continues to put the light on she pressed the button again. I reoriented her to that brings me in the room and I am already here. She stated \"I must have forgot that I hit the light\" and then she pressed it again. Patient is oriented to place and reason for admission but smiles inappropriately when answering some questions. When asked if she could hear what I stated she repeated it back to me. In which was correct. Emotional support given.

## 2022-03-01 NOTE — PROGRESS NOTES
03/01/22 1144   Oxygen Therapy/Pulse Ox   O2 Therapy Oxygen   $Oxygen $Daily Charge   O2 Device High flow nasal cannula   O2 Flow Rate (L/min) 14 L/min   Resp 17   SpO2 93 %   Pulse Oximeter Device Mode Continuous   Pulse Oximeter Device Location Finger   Blood Gas  Performed?  No

## 2022-03-01 NOTE — FLOWSHEET NOTE
Patient assessment done after patient placed her light on to have her dinner tray removed. Took the tray out of the room and patient put her light on again. When asked what she needed she stated \"Its time for my pain med\" I informed her I was going out to get it but she placed her light on again. She then pressed the button again while I was standing in front of her and informed her that call light brings me into the room and I am already here. She stated \" maybe I hit it by accident\". Went and got the patient her HS meds and she placed her call light on again. When informed patient she knew I went to get her meds she smiled. administered meds per order.  Emotional support given to patient

## 2022-03-02 ENCOUNTER — APPOINTMENT (OUTPATIENT)
Dept: CT IMAGING | Age: 77
DRG: 871 | End: 2022-03-02
Payer: MEDICARE

## 2022-03-02 LAB
ANION GAP SERPL CALCULATED.3IONS-SCNC: 13 MMOL/L (ref 4–16)
ANISOCYTOSIS: ABNORMAL
BANDED NEUTROPHILS ABSOLUTE COUNT: 0.49 K/CU MM
BANDED NEUTROPHILS RELATIVE PERCENT: 6 % (ref 5–11)
BUN BLDV-MCNC: 23 MG/DL (ref 6–23)
CALCIUM SERPL-MCNC: 9 MG/DL (ref 8.3–10.6)
CHLORIDE BLD-SCNC: 100 MMOL/L (ref 99–110)
CO2: 29 MMOL/L (ref 21–32)
CREAT SERPL-MCNC: 0.5 MG/DL (ref 0.6–1.1)
DIFFERENTIAL TYPE: ABNORMAL
GFR AFRICAN AMERICAN: >60 ML/MIN/1.73M2
GFR NON-AFRICAN AMERICAN: >60 ML/MIN/1.73M2
GLUCOSE BLD-MCNC: 167 MG/DL (ref 70–99)
HCT VFR BLD CALC: 42.6 % (ref 37–47)
HEMOGLOBIN: 12.4 GM/DL (ref 12.5–16)
LYMPHOCYTES ABSOLUTE: 0.8 K/CU MM
LYMPHOCYTES RELATIVE PERCENT: 10 % (ref 24–44)
MCH RBC QN AUTO: 24.9 PG (ref 27–31)
MCHC RBC AUTO-ENTMCNC: 29.1 % (ref 32–36)
MCV RBC AUTO: 85.7 FL (ref 78–100)
METAMYELOCYTES ABSOLUTE COUNT: 0.08 K/CU MM
METAMYELOCYTES PERCENT: 1 %
MONOCYTES ABSOLUTE: 0.5 K/CU MM
MONOCYTES RELATIVE PERCENT: 6 % (ref 0–4)
MYELOCYTE PERCENT: 1 %
MYELOCYTES ABSOLUTE COUNT: 0.08 K/CU MM
PDW BLD-RTO: 17.2 % (ref 11.7–14.9)
PLATELET # BLD: 148 K/CU MM (ref 140–440)
PMV BLD AUTO: 11.2 FL (ref 7.5–11.1)
POTASSIUM SERPL-SCNC: 3.9 MMOL/L (ref 3.5–5.1)
RBC # BLD: 4.97 M/CU MM (ref 4.2–5.4)
RBC # BLD: ABNORMAL 10*6/UL
SEGMENTED NEUTROPHILS ABSOLUTE COUNT: 6.3 K/CU MM
SEGMENTED NEUTROPHILS RELATIVE PERCENT: 76 % (ref 36–66)
SODIUM BLD-SCNC: 142 MMOL/L (ref 135–145)
WBC # BLD: 8.2 K/CU MM (ref 4–10.5)

## 2022-03-02 PROCEDURE — 6370000000 HC RX 637 (ALT 250 FOR IP): Performed by: STUDENT IN AN ORGANIZED HEALTH CARE EDUCATION/TRAINING PROGRAM

## 2022-03-02 PROCEDURE — 6370000000 HC RX 637 (ALT 250 FOR IP): Performed by: INTERNAL MEDICINE

## 2022-03-02 PROCEDURE — 36415 COLL VENOUS BLD VENIPUNCTURE: CPT

## 2022-03-02 PROCEDURE — 6370000000 HC RX 637 (ALT 250 FOR IP): Performed by: NURSE PRACTITIONER

## 2022-03-02 PROCEDURE — 85007 BL SMEAR W/DIFF WBC COUNT: CPT

## 2022-03-02 PROCEDURE — 6360000002 HC RX W HCPCS: Performed by: STUDENT IN AN ORGANIZED HEALTH CARE EDUCATION/TRAINING PROGRAM

## 2022-03-02 PROCEDURE — 6360000002 HC RX W HCPCS: Performed by: INTERNAL MEDICINE

## 2022-03-02 PROCEDURE — 85027 COMPLETE CBC AUTOMATED: CPT

## 2022-03-02 PROCEDURE — 6370000000 HC RX 637 (ALT 250 FOR IP): Performed by: HOSPITALIST

## 2022-03-02 PROCEDURE — 80048 BASIC METABOLIC PNL TOTAL CA: CPT

## 2022-03-02 PROCEDURE — 2580000003 HC RX 258: Performed by: NURSE PRACTITIONER

## 2022-03-02 PROCEDURE — 2140000000 HC CCU INTERMEDIATE R&B

## 2022-03-02 PROCEDURE — 76937 US GUIDE VASCULAR ACCESS: CPT

## 2022-03-02 PROCEDURE — 6360000004 HC RX CONTRAST MEDICATION: Performed by: INTERNAL MEDICINE

## 2022-03-02 PROCEDURE — 2700000000 HC OXYGEN THERAPY PER DAY

## 2022-03-02 PROCEDURE — 71275 CT ANGIOGRAPHY CHEST: CPT

## 2022-03-02 PROCEDURE — 94761 N-INVAS EAR/PLS OXIMETRY MLT: CPT

## 2022-03-02 RX ORDER — METHYLPREDNISOLONE SODIUM SUCCINATE 40 MG/ML
40 INJECTION, POWDER, LYOPHILIZED, FOR SOLUTION INTRAMUSCULAR; INTRAVENOUS EVERY 6 HOURS
Status: DISCONTINUED | OUTPATIENT
Start: 2022-03-02 | End: 2022-03-10

## 2022-03-02 RX ORDER — LANOLIN ALCOHOL/MO/W.PET/CERES
1000 CREAM (GRAM) TOPICAL DAILY
Status: DISCONTINUED | OUTPATIENT
Start: 2022-03-02 | End: 2022-03-17 | Stop reason: HOSPADM

## 2022-03-02 RX ORDER — SODIUM CHLORIDE 0.9 % (FLUSH) 0.9 %
10 SYRINGE (ML) INJECTION PRN
Status: DISCONTINUED | OUTPATIENT
Start: 2022-03-02 | End: 2022-03-17 | Stop reason: HOSPADM

## 2022-03-02 RX ADMIN — Medication 2000 UNITS: at 08:42

## 2022-03-02 RX ADMIN — GUAIFENESIN 600 MG: 600 TABLET, EXTENDED RELEASE ORAL at 20:55

## 2022-03-02 RX ADMIN — FLUTICASONE PROPIONATE 1 SPRAY: 50 SPRAY, METERED NASAL at 08:57

## 2022-03-02 RX ADMIN — LUBIPROSTONE 8 MCG: 8 CAPSULE, GELATIN COATED ORAL at 17:01

## 2022-03-02 RX ADMIN — MORPHINE SULFATE 30 MG: 15 TABLET, FILM COATED, EXTENDED RELEASE ORAL at 08:42

## 2022-03-02 RX ADMIN — SALINE NASAL SPRAY 1 SPRAY: 1.5 SOLUTION NASAL at 08:55

## 2022-03-02 RX ADMIN — METOPROLOL 12.5 MG: 25 TABLET ORAL at 08:42

## 2022-03-02 RX ADMIN — METHYLPREDNISOLONE SODIUM SUCCINATE 40 MG: 40 INJECTION, POWDER, LYOPHILIZED, FOR SOLUTION INTRAMUSCULAR; INTRAVENOUS at 11:33

## 2022-03-02 RX ADMIN — METHYLPREDNISOLONE SODIUM SUCCINATE 40 MG: 40 INJECTION, POWDER, LYOPHILIZED, FOR SOLUTION INTRAMUSCULAR; INTRAVENOUS at 06:27

## 2022-03-02 RX ADMIN — METHYLPREDNISOLONE SODIUM SUCCINATE 40 MG: 40 INJECTION, POWDER, LYOPHILIZED, FOR SOLUTION INTRAMUSCULAR; INTRAVENOUS at 17:03

## 2022-03-02 RX ADMIN — IOPAMIDOL 80 ML: 755 INJECTION, SOLUTION INTRAVENOUS at 13:49

## 2022-03-02 RX ADMIN — METOPROLOL 12.5 MG: 25 TABLET ORAL at 20:55

## 2022-03-02 RX ADMIN — OXYCODONE AND ACETAMINOPHEN 1 TABLET: 5; 325 TABLET ORAL at 11:33

## 2022-03-02 RX ADMIN — CYANOCOBALAMIN TAB 1000 MCG 1000 MCG: 1000 TAB at 13:15

## 2022-03-02 RX ADMIN — SODIUM CHLORIDE, PRESERVATIVE FREE 10 ML: 5 INJECTION INTRAVENOUS at 08:44

## 2022-03-02 RX ADMIN — MORPHINE SULFATE 30 MG: 15 TABLET, FILM COATED, EXTENDED RELEASE ORAL at 20:56

## 2022-03-02 RX ADMIN — FAMOTIDINE 20 MG: 20 TABLET ORAL at 20:55

## 2022-03-02 RX ADMIN — CETIRIZINE HYDROCHLORIDE 10 MG: 10 TABLET, FILM COATED ORAL at 06:27

## 2022-03-02 RX ADMIN — SENNOSIDES AND DOCUSATE SODIUM 1 TABLET: 50; 8.6 TABLET ORAL at 20:56

## 2022-03-02 RX ADMIN — ENOXAPARIN SODIUM 30 MG: 100 INJECTION SUBCUTANEOUS at 08:43

## 2022-03-02 RX ADMIN — TRAZODONE HYDROCHLORIDE 25 MG: 50 TABLET ORAL at 20:55

## 2022-03-02 RX ADMIN — FAMOTIDINE 20 MG: 20 TABLET ORAL at 08:42

## 2022-03-02 RX ADMIN — GUAIFENESIN 600 MG: 600 TABLET, EXTENDED RELEASE ORAL at 08:42

## 2022-03-02 RX ADMIN — ENOXAPARIN SODIUM 30 MG: 100 INJECTION SUBCUTANEOUS at 20:56

## 2022-03-02 RX ADMIN — OXYCODONE AND ACETAMINOPHEN 1 TABLET: 5; 325 TABLET ORAL at 01:26

## 2022-03-02 RX ADMIN — SODIUM CHLORIDE, PRESERVATIVE FREE 10 ML: 5 INJECTION INTRAVENOUS at 20:57

## 2022-03-02 RX ADMIN — SENNOSIDES AND DOCUSATE SODIUM 1 TABLET: 50; 8.6 TABLET ORAL at 08:42

## 2022-03-02 ASSESSMENT — PAIN DESCRIPTION - LOCATION
LOCATION: BACK
LOCATION: BACK
LOCATION: HIP

## 2022-03-02 ASSESSMENT — PAIN SCALES - GENERAL
PAINLEVEL_OUTOF10: 0
PAINLEVEL_OUTOF10: 8
PAINLEVEL_OUTOF10: 9
PAINLEVEL_OUTOF10: 8
PAINLEVEL_OUTOF10: 8
PAINLEVEL_OUTOF10: 0
PAINLEVEL_OUTOF10: 8

## 2022-03-02 ASSESSMENT — PAIN DESCRIPTION - ORIENTATION
ORIENTATION: LOWER
ORIENTATION: RIGHT;LEFT

## 2022-03-02 ASSESSMENT — PAIN DESCRIPTION - PROGRESSION: CLINICAL_PROGRESSION: NOT CHANGED

## 2022-03-02 ASSESSMENT — PAIN DESCRIPTION - FREQUENCY
FREQUENCY: CONTINUOUS
FREQUENCY: CONTINUOUS

## 2022-03-02 ASSESSMENT — PAIN DESCRIPTION - DESCRIPTORS
DESCRIPTORS: ACHING;DISCOMFORT
DESCRIPTORS: ACHING
DESCRIPTORS: ACHING

## 2022-03-02 ASSESSMENT — PAIN DESCRIPTION - DIRECTION: RADIATING_TOWARDS: NO WHERE

## 2022-03-02 ASSESSMENT — PAIN DESCRIPTION - ONSET
ONSET: ON-GOING
ONSET: ON-GOING

## 2022-03-02 ASSESSMENT — PAIN - FUNCTIONAL ASSESSMENT: PAIN_FUNCTIONAL_ASSESSMENT: ACTIVITIES ARE NOT PREVENTED

## 2022-03-02 ASSESSMENT — PAIN DESCRIPTION - PAIN TYPE
TYPE: CHRONIC PAIN

## 2022-03-02 ASSESSMENT — PAIN SCALES - WONG BAKER: WONGBAKER_NUMERICALRESPONSE: 0

## 2022-03-02 NOTE — PROGRESS NOTES
Comprehensive Nutrition Assessment    Type and Reason for Visit:  Reassess    Nutrition Recommendations/Plan:   · Continue current diet and supplement regimen as ordered  · Please obtain updated measured weight, last weight documented 2/5  · Will closely monitor po intake, weight trends, poc    Nutrition Assessment:  Pt on 15 L HFNC, pt on regular diet with high protein oral nutrition supplments ordered TID, pt consuming % of meals documented in the past 48 hr per chart review, will continue to follow at moderate nutrition risk    Malnutrition Assessment:  Malnutrition Status: At risk for malnutrition (Comment)    Context:  Acute Illness       Estimated Daily Nutrient Needs:  Energy (kcal):  2262-5003 ([address] w/ stress factor 1.1-1.3); Weight Used for Energy Requirements:  Current     Protein (g):  50-60 (1.0-1.2 g/kg); Weight Used for Protein Requirements:  Ideal        Fluid (ml/day):  1500; Method Used for Fluid Requirements:  1 ml/kcal      Nutrition Related Findings:  Glucose 177      Wounds:  None       Current Nutrition Therapies:    ADULT ORAL NUTRITION SUPPLEMENT; Breakfast, Lunch, Dinner; Low Calorie/High Protein Oral Supplement  ADULT DIET; Regular    Anthropometric Measures:  · Height: 5' 2.01\" (157.5 cm)  · Current Body Weight: 142 lb 6.7 oz (64.6 kg) ((2/5)    · Usual Body Weight:  (no weight hx available)     · Ideal Body Weight: 110 lbs; % Ideal Body Weight 129.5 %   · BMI: 26  · BMI Categories: Overweight (BMI 25.0-29. 9)       Nutrition Diagnosis:   · Predicted inadequate energy intake related to acute injury/trauma,impaired respiratory function as evidenced by varied po intakes during stay    Nutrition Interventions:   Food and/or Nutrient Delivery:  Continue Current Diet,Continue Oral Nutrition Supplement  Nutrition Education/Counseling:  No recommendation at this time   Coordination of Nutrition Care:  Continue to monitor while inpatient    Goals:  Pt will meet greater than 50-75% of meals and supplements       Nutrition Monitoring and Evaluation:   Behavioral-Environmental Outcomes:  None Identified   Food/Nutrient Intake Outcomes:  Supplement Intake,Food and Nutrient Intake  Physical Signs/Symptoms Outcomes:  Biochemical Data,Weight,GI Status,Hemodynamic Status,Fluid Status or Edema     Discharge Planning:     Too soon to determine     Electronically signed by German Reeves MS, RD, LD on 3/2/22 at 12:04 PM EST    Contact: [phone]

## 2022-03-02 NOTE — PROGRESS NOTES
Pulmonary and Critical Care  Progress Note    Subjective: The patient is unchanged, comfortable in bed. On 14 L HFNC. Shortness of breath is unchanged. Chest pain none  Addressing respiratory complaints Patient is negative for  hemoptysis and cyanosis  CONSTITUTIONAL:  negative for fevers and chills      Past Medical History:     has a past medical history of Chronic lower back pain, Hx of spinal fusion, Hypertension, MRSA (methicillin resistant staph aureus) culture positive, and Venous disease. has a past surgical history that includes fracture surgery and back surgery. reports that she has never smoked. She has never used smokeless tobacco. She reports that she does not drink alcohol and does not use drugs. Family history:  family history is not on file. No Known Allergies  Social History:    Reviewed; no changes    Objective:   PHYSICAL EXAM:        VITALS:  /71   Pulse 68   Temp 97.8 °F (36.6 °C) (Oral)   Resp 17   Ht 5' 2.01\" (1.575 m)   Wt 186 lb 8.2 oz (84.6 kg)   SpO2 (!) 89%   BMI 34.10 kg/m²     24HR INTAKE/OUTPUT:      Intake/Output Summary (Last 24 hours) at 3/2/2022 1155  Last data filed at 3/2/2022 0842  Gross per 24 hour   Intake 825 ml   Output 400 ml   Net 425 ml       CONSTITUTIONAL:  Awake. LUNGS:  decreased breath sounds, basilar crackles. CARDIOVASCULAR:  normal S1 and S2 and negative JVD  ABD:Abdomen soft, non-tender. BS normal. No masses,  No organomegaly  NEURO:Alert.   DATA:    CBC:  Recent Labs     02/28/22  1137 03/01/22  1030   WBC 8.7 6.6   RBC 4.85 4.84   HGB 12.1* 12.2*   HCT 41.3 41.5    141   MCV 85.2 85.7   MCH 24.9* 25.2*   MCHC 29.3* 29.4*   RDW 16.9* 17.1*   SEGSPCT 86.3* 86.9*      BMP:  Recent Labs     02/28/22  1137 03/01/22  1030    137   K 4.7 4.1    100   CO2 22 26   BUN 22 22   CREATININE 0.4* 0.6   CALCIUM 8.8 8.8   GLUCOSE 138* 177*      ABG:  No results for input(s): PH, PO2ART, KDE4PFE, HCO3, BEART, O2SAT in the last 72 hours. Lab Results   Component Value Date    PROBNP 411.8 (H) 03/01/2022    PROBNP 286.5 02/23/2022    PROBNP 289.8 02/22/2022     No results found for: [facility]    Radiology Review:  Pertinent images / reports were reviewed as a part of this visit. Assessment:     Patient Active Problem List   Diagnosis    WD-Venous stasis dermatitis of both lower extremities    WD-Lymphedema of both lower extremities    WD-Venous stasis ulcer of right calf with fat layer exposed with varicose veins (HCC)    WD-Venous stasis ulcer of left calf with fat layer exposed with varicose veins (HCC)    COVID-19    Hypoxia    SVT (supraventricular tachycardia) (HCC)    Adjustment disorder with anxious mood       Plan:   1. Overall the patient is unchanged. 2. [place] [address] Activity.   Gómez Cerna MD  3/2/2022  11:55 AM

## 2022-03-02 NOTE — PROGRESS NOTES
Pt.'s 02 saturations have been declining throughout the day. Placed pt on vapotherm at 30L 100% with 02 saturations improving to 94%.

## 2022-03-02 NOTE — CONSULTS
IV Consult complete. Neixva 20g 1.75\" extra long PIV inserted in left FA x1 attempt with ultrasound guidance. Brisk blood return, flushes easy.

## 2022-03-02 NOTE — CARE COORDINATION
Pt transferred from Covid Unit. D/c plan is home with family that will provide her care. Son/Yunier declined SNF and HHC per CM/SK's note. Pt is on 30L HF O2 at 94% FiO2.   TE

## 2022-03-02 NOTE — PROGRESS NOTES
Hospitalist Progress Note      Name:  Bo Garvin /Age/Sex: 1945  (68 y.o. female)   MRN & CSN:  [id number] & [id number] Admission Date/Time: 2022  4:46 PM   Location:  -A PCP: No primary care provider on file. Hospital Day: 32  Discharge barrier/Reason for continued hospitalization: High oxygen requirement    Assessment and Plan:   Bo Garvin is a 68 y.o.  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 The squad reported O2 sats of 74% on room air. On arrival to the ED, patient ruled in for COVID-19. Hospital course has been prolonged due to continued high oxygen requirements. 1.  COVID-19 sepsis/pneumonia, POA: With prolonged respiratory failure  Now out of Covid isolation, Completed course of baricitinib. Continue IV Solu-Medrol, increase frequency to every 6  Chest x-ray 2022 with not much improvement. Repeat chest CT today. Aggressive pulmonary toileting, incentive spirometer. 2.  COVID-19 acute hypoxic respiratory failure: O2 sats still borderline on 15 L. 3.  Low vitamin B12 levels: Start replacement    4. Hypertension: Continue low-dose Lopressor. Losartan currently on hold. 5.  Chronic pain: Continue MS Contin twice daily, Percocet every 6 as needed  6. Class II obesity: Not a candidate for counseling due to advanced age  9. Insomnia: Improved with trazodone  8. Goals of care: DNR. Diet ADULT ORAL NUTRITION SUPPLEMENT; Breakfast, Lunch, Dinner; Low Calorie/High Protein Oral Supplement  ADULT DIET;  Regular   DVT Prophylaxis [x] Lovenox, []  Heparin, [] SCDs, [] Ambulation   GI Prophylaxis [] PPI,  [] H2 Blocker,  [] Carafate,  [x] Diet/Tube Feeds   Code Status DNR-CCA   MDM [] Low, [] Moderate,[x]  High       History of Present Illness:     Chief Complaint: <principal problem not specified>  Akil Jacques is a 68 y.o.  female  who presents with nausea, vomiting, fatigue of 1 week duration. Was found to be hypoxic on arrival of the squad    3/1/2022: Patient is seen and examined, bedside RN reports improvement in exertional hypoxia. She only desaturated to the low 80s today as opposed to the low 70s yesterday. Patient is happy with improvement as she wants to be able to be independent when she goes back home. 3/2/2022, patient is seen and examined, has no new complaints. She questions whether there is anything that can be done to suck out the infiltrates and or inflammation from her lungs to make her breathe better. I explained that and is not technically possible and we are using steroids to reduce inflammation. I did let her know that we will try to get a repeat chest CT today. No family member at bedside. Ten point ROS reviewed, negative, unless as noted above    Objective: Intake/Output Summary (Last 24 hours) at 3/2/2022 1134  Last data filed at 3/2/2022 0842  Gross per 24 hour   Intake 825 ml   Output 400 ml   Net 425 ml        Vitals:   Vitals:    03/02/22 0615 03/02/22 0736 03/02/22 0842 03/02/22 0844   BP:   107/71 107/71   Pulse: 69  63 68   Resp: 21 21  17   Temp:    97.8 °F (36.6 °C)   TempSrc:    Oral   SpO2: 100% 95%  (!) 89%   Weight:       Height:            Physical Exam:   GEN: Awake female, alert and oriented x3 in no apparent distress. Appears given age. HEENT: Normal  RESP: Rhonchi bilaterally. Symmetric chest movement while on 15 L. Increased work of breathing with mild exertion. CVS: RRR, S1, S2  GI/: Abdomen is soft, nontender, no organomegaly. Bowel sounds normal, rectal exam deferred. No CVA tenderness. MSK: No gross joint deformities. No tenderness  SKIN: Normal coloration, warm, dry. NEURO: Cranial nerves appear grossly intact, normal speech, no lateralizing weakness.   PSYCH:  Affect appropriate

## 2022-03-02 NOTE — PLAN OF CARE
Problem: Falls - Risk of:  Goal: Will remain free from falls  Description: Will remain free from falls  Outcome: Ongoing  Goal: Absence of physical injury  Description: Absence of physical injury  Outcome: Ongoing     Problem: Airway Clearance - Ineffective  Goal: Achieve or maintain patent airway  Outcome: Ongoing     Problem: Gas Exchange - Impaired  Goal: Absence of hypoxia  Outcome: Ongoing  Goal: Promote optimal lung function  Outcome: Ongoing     Problem: Breathing Pattern - Ineffective  Goal: Ability to achieve and maintain a regular respiratory rate  Outcome: Ongoing     Problem: Body Temperature -  Risk of, Imbalanced  Goal: Ability to maintain a body temperature within defined limits  Outcome: Ongoing  Goal: Will regain or maintain usual level of consciousness  Outcome: Ongoing  Goal: Complications related to the disease process, condition or treatment will be avoided or minimized  Outcome: Ongoing     Problem: Isolation Precautions - Risk of Spread of Infection  Goal: Prevent transmission of infection  Outcome: Ongoing     Problem: Nutrition Deficits  Goal: Optimize nutritional status  Outcome: Ongoing     Problem: Risk for Fluid Volume Deficit  Goal: Maintain normal heart rhythm  Outcome: Ongoing  Goal: Maintain absence of muscle cramping  Outcome: Ongoing  Goal: Maintain normal serum potassium, sodium, calcium, phosphorus, and pH  Outcome: Ongoing     Problem: Loneliness or Risk for Loneliness  Goal: Demonstrate positive use of time alone when socialization is not possible  Outcome: Ongoing     Problem: Fatigue  Goal: Verbalize increase energy and improved vitality  Outcome: Ongoing     Problem: Patient Education: Go to Patient Education Activity  Goal: Patient/Family Education  Outcome: Ongoing     Problem: Pain:  Description: Pain management should include both nonpharmacologic and pharmacologic interventions.   Goal: Pain level will decrease  Description: Pain level will decrease  Outcome: Ongoing  Goal: Control of acute pain  Description: Control of acute pain  Outcome: Ongoing  Goal: Control of chronic pain  Description: Control of chronic pain  Outcome: Ongoing     Problem: Skin Integrity:  Goal: Will show no infection signs and symptoms  Description: Will show no infection signs and symptoms  Outcome: Ongoing  Goal: Absence of new skin breakdown  Description: Absence of new skin breakdown  Outcome: Ongoing     Problem: Nutrition  Goal: Optimal nutrition therapy  Outcome: Ongoing

## 2022-03-02 NOTE — PROGRESS NOTES
03/02/22 0736   Oxygen Therapy/Pulse Ox   O2 Therapy Oxygen   $Oxygen $Daily Charge   O2 Device High flow nasal cannula   O2 Flow Rate (L/min) 15 L/min   Resp 21   SpO2 95 %   Pulse Oximeter Device Mode Continuous   Pulse Oximeter Device Location Finger   $Pulse Oximeter $Spot check (multiple/continuous)   Blood Gas  Performed?  No

## 2022-03-02 NOTE — PROGRESS NOTES
03/02/22 1515   Oxygen Therapy/Pulse Ox   O2 Therapy Oxygen humidified   O2 Device Heated high flow cannula   O2 Flow Rate (L/min) 30 L/min   FiO2  100 %   Resp 20   SpO2 94 %   Humidification Source Heated wire   Humidification Temp 33   Humidification Temp Measured 33   Circuit Condensation Drained   Pulse Oximeter Device Mode Continuous   Pulse Oximeter Device Location Finger   Blood Gas  Performed?  No

## 2022-03-03 PROCEDURE — 6370000000 HC RX 637 (ALT 250 FOR IP): Performed by: NURSE PRACTITIONER

## 2022-03-03 PROCEDURE — 6370000000 HC RX 637 (ALT 250 FOR IP): Performed by: HOSPITALIST

## 2022-03-03 PROCEDURE — 2700000000 HC OXYGEN THERAPY PER DAY

## 2022-03-03 PROCEDURE — 6370000000 HC RX 637 (ALT 250 FOR IP): Performed by: INTERNAL MEDICINE

## 2022-03-03 PROCEDURE — 2140000000 HC CCU INTERMEDIATE R&B

## 2022-03-03 PROCEDURE — 6360000002 HC RX W HCPCS: Performed by: STUDENT IN AN ORGANIZED HEALTH CARE EDUCATION/TRAINING PROGRAM

## 2022-03-03 PROCEDURE — 2580000003 HC RX 258: Performed by: NURSE PRACTITIONER

## 2022-03-03 PROCEDURE — 6370000000 HC RX 637 (ALT 250 FOR IP): Performed by: STUDENT IN AN ORGANIZED HEALTH CARE EDUCATION/TRAINING PROGRAM

## 2022-03-03 PROCEDURE — 6360000002 HC RX W HCPCS: Performed by: INTERNAL MEDICINE

## 2022-03-03 PROCEDURE — 94761 N-INVAS EAR/PLS OXIMETRY MLT: CPT

## 2022-03-03 RX ORDER — FUROSEMIDE 10 MG/ML
40 INJECTION INTRAMUSCULAR; INTRAVENOUS ONCE
Status: COMPLETED | OUTPATIENT
Start: 2022-03-03 | End: 2022-03-03

## 2022-03-03 RX ADMIN — CETIRIZINE HYDROCHLORIDE 10 MG: 10 TABLET, FILM COATED ORAL at 06:18

## 2022-03-03 RX ADMIN — OXYCODONE AND ACETAMINOPHEN 1 TABLET: 5; 325 TABLET ORAL at 01:59

## 2022-03-03 RX ADMIN — SODIUM CHLORIDE, PRESERVATIVE FREE 10 ML: 5 INJECTION INTRAVENOUS at 08:21

## 2022-03-03 RX ADMIN — LUBIPROSTONE 8 MCG: 8 CAPSULE, GELATIN COATED ORAL at 08:21

## 2022-03-03 RX ADMIN — ENOXAPARIN SODIUM 30 MG: 100 INJECTION SUBCUTANEOUS at 08:21

## 2022-03-03 RX ADMIN — OXYCODONE AND ACETAMINOPHEN 1 TABLET: 5; 325 TABLET ORAL at 16:01

## 2022-03-03 RX ADMIN — FUROSEMIDE 40 MG: 10 INJECTION, SOLUTION INTRAMUSCULAR; INTRAVENOUS at 16:01

## 2022-03-03 RX ADMIN — MORPHINE SULFATE 30 MG: 15 TABLET, FILM COATED, EXTENDED RELEASE ORAL at 08:20

## 2022-03-03 RX ADMIN — FLUTICASONE PROPIONATE 1 SPRAY: 50 SPRAY, METERED NASAL at 08:23

## 2022-03-03 RX ADMIN — GUAIFENESIN 600 MG: 600 TABLET, EXTENDED RELEASE ORAL at 08:20

## 2022-03-03 RX ADMIN — METHYLPREDNISOLONE SODIUM SUCCINATE 40 MG: 40 INJECTION, POWDER, LYOPHILIZED, FOR SOLUTION INTRAMUSCULAR; INTRAVENOUS at 00:18

## 2022-03-03 RX ADMIN — METHYLPREDNISOLONE SODIUM SUCCINATE 40 MG: 40 INJECTION, POWDER, LYOPHILIZED, FOR SOLUTION INTRAMUSCULAR; INTRAVENOUS at 12:28

## 2022-03-03 RX ADMIN — METHYLPREDNISOLONE SODIUM SUCCINATE 40 MG: 40 INJECTION, POWDER, LYOPHILIZED, FOR SOLUTION INTRAMUSCULAR; INTRAVENOUS at 06:18

## 2022-03-03 RX ADMIN — METOPROLOL 12.5 MG: 25 TABLET ORAL at 21:11

## 2022-03-03 RX ADMIN — TRAZODONE HYDROCHLORIDE 25 MG: 50 TABLET ORAL at 21:12

## 2022-03-03 RX ADMIN — METHYLPREDNISOLONE SODIUM SUCCINATE 40 MG: 40 INJECTION, POWDER, LYOPHILIZED, FOR SOLUTION INTRAMUSCULAR; INTRAVENOUS at 16:42

## 2022-03-03 RX ADMIN — FAMOTIDINE 20 MG: 20 TABLET ORAL at 21:12

## 2022-03-03 RX ADMIN — LUBIPROSTONE 8 MCG: 8 CAPSULE, GELATIN COATED ORAL at 16:42

## 2022-03-03 RX ADMIN — MORPHINE SULFATE 30 MG: 15 TABLET, FILM COATED, EXTENDED RELEASE ORAL at 21:12

## 2022-03-03 RX ADMIN — SENNOSIDES AND DOCUSATE SODIUM 1 TABLET: 50; 8.6 TABLET ORAL at 21:12

## 2022-03-03 RX ADMIN — METOPROLOL 12.5 MG: 25 TABLET ORAL at 08:20

## 2022-03-03 RX ADMIN — GUAIFENESIN 600 MG: 600 TABLET, EXTENDED RELEASE ORAL at 21:30

## 2022-03-03 RX ADMIN — FAMOTIDINE 20 MG: 20 TABLET ORAL at 08:21

## 2022-03-03 RX ADMIN — ENOXAPARIN SODIUM 30 MG: 100 INJECTION SUBCUTANEOUS at 21:12

## 2022-03-03 RX ADMIN — SODIUM CHLORIDE, PRESERVATIVE FREE 10 ML: 5 INJECTION INTRAVENOUS at 21:13

## 2022-03-03 RX ADMIN — Medication 2000 UNITS: at 08:21

## 2022-03-03 RX ADMIN — CYANOCOBALAMIN TAB 1000 MCG 1000 MCG: 1000 TAB at 08:20

## 2022-03-03 ASSESSMENT — PAIN SCALES - GENERAL
PAINLEVEL_OUTOF10: 8
PAINLEVEL_OUTOF10: 0
PAINLEVEL_OUTOF10: 8
PAINLEVEL_OUTOF10: 8
PAINLEVEL_OUTOF10: 0
PAINLEVEL_OUTOF10: 6

## 2022-03-03 ASSESSMENT — PAIN DESCRIPTION - ONSET: ONSET: ON-GOING

## 2022-03-03 ASSESSMENT — PAIN DESCRIPTION - PROGRESSION: CLINICAL_PROGRESSION: NOT CHANGED

## 2022-03-03 ASSESSMENT — PAIN DESCRIPTION - FREQUENCY: FREQUENCY: CONTINUOUS

## 2022-03-03 ASSESSMENT — PAIN DESCRIPTION - ORIENTATION: ORIENTATION: LOWER

## 2022-03-03 ASSESSMENT — PAIN DESCRIPTION - DESCRIPTORS: DESCRIPTORS: ACHING

## 2022-03-03 ASSESSMENT — PAIN DESCRIPTION - PAIN TYPE: TYPE: CHRONIC PAIN

## 2022-03-03 ASSESSMENT — PAIN DESCRIPTION - LOCATION: LOCATION: BACK

## 2022-03-03 ASSESSMENT — PAIN - FUNCTIONAL ASSESSMENT: PAIN_FUNCTIONAL_ASSESSMENT: ACTIVITIES ARE NOT PREVENTED

## 2022-03-03 NOTE — PROGRESS NOTES
Pulmonary and Critical Care  Progress Note    Subjective: The patient is requiring more O2. On Vapotherm 90%. CT chest:Better. Shortness of breath is unchanged. Chest pain none  Addressing respiratory complaints Patient is negative for  hemoptysis and cyanosis  CONSTITUTIONAL:  negative for fevers and chills      Past Medical History:     has a past medical history of Chronic lower back pain, Hx of spinal fusion, Hypertension, MRSA (methicillin resistant staph aureus) culture positive, and Venous disease. has a past surgical history that includes fracture surgery and back surgery. reports that she has never smoked. She has never used smokeless tobacco. She reports that she does not drink alcohol and does not use drugs. Family history:  family history is not on file. No Known Allergies  Social History:    Reviewed; no changes    Objective:   PHYSICAL EXAM:        VITALS:  BP (!) 144/78   Pulse 80   Temp 98.1 °F (36.7 °C) (Oral)   Resp 19   Ht 5' 2.01\" (1.575 m)   Wt 186 lb 8.2 oz (84.6 kg)   SpO2 94%   BMI 34.10 kg/m²     24HR INTAKE/OUTPUT:      Intake/Output Summary (Last 24 hours) at 3/3/2022 1040  Last data filed at 3/2/2022 1905  Gross per 24 hour   Intake    Output 300 ml   Net -300 ml       CONSTITUTIONAL:  Awake. LUNGS:  decreased breath sounds, basilar crackles. CARDIOVASCULAR:  normal S1 and S2 and negative JVD  ABD:Abdomen soft, non-tender. BS normal. No masses,  No organomegaly  NEURO:Alert.   DATA:    CBC:  Recent Labs     02/28/22  1137 03/01/22  1030 03/02/22  1130   WBC 8.7 6.6 8.2   RBC 4.85 4.84 4.97   HGB 12.1* 12.2* 12.4*   HCT 41.3 41.5 42.6    141 148   MCV 85.2 85.7 85.7   MCH 24.9* 25.2* 24.9*   MCHC 29.3* 29.4* 29.1*   RDW 16.9* 17.1* 17.2*   SEGSPCT 86.3* 86.9* 76.0*   BANDSPCT  --   --  6      BMP:  Recent Labs     02/28/22  1137 03/01/22  1030 03/02/22  1130    137 142   K 4.7 4.1 3.9    100 100   CO2 22 26 29   BUN 22 22 23   CREATININE 0.4* 0.6 0.5* CALCIUM 8.8 8.8 9.0   GLUCOSE 138* 177* 167*      ABG:  No results for input(s): PH, PO2ART, YWV3XPK, HCO3, BEART, O2SAT in the last 72 hours. Lab Results   Component Value Date    PROBNP 411.8 (H) 03/01/2022    PROBNP 286.5 02/23/2022    PROBNP 289.8 02/22/2022     No results found for: [facility]    Radiology Review:  Pertinent images / reports were reviewed as a part of this visit. Assessment:     Patient Active Problem List   Diagnosis    WD-Venous stasis dermatitis of both lower extremities    WD-Lymphedema of both lower extremities    WD-Venous stasis ulcer of right calf with fat layer exposed with varicose veins (HCC)    WD-Venous stasis ulcer of left calf with fat layer exposed with varicose veins (HCC)    COVID-19    Hypoxia    SVT (supraventricular tachycardia) (HCC)    Adjustment disorder with anxious mood       Plan:   1. Steroids increased. 2. [place] [address] Activity.   Aneta Suresh MD  3/3/2022  10:40 AM

## 2022-03-03 NOTE — PROGRESS NOTES
Hospitalist Progress Note      Name:  Glenn Martinez /Age/Sex: 1945  (68 y.o. female)   MRN & CSN:  [id number] & [id number] Admission Date/Time: 2022  4:46 PM   Location:  [address] [place] [address] PCP: No primary care provider on file. Hospital Day: 32  Discharge barrier/Reason for continued hospitalization: High oxygen requirement    Assessment and Plan:   Glenn Martinez is a 68 y.o.  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 The squad reported O2 sats of 74% on room air. On arrival to the ED, patient ruled in for COVID-19. Hospital course has been prolonged due to continued high oxygen requirements. 1.  COVID-19 sepsis/pneumonia, POA: Persistent. Repeat chest CT 3/2/2022, persistent bilateral airspace disease  Aggressive pulmonary toileting, incentive spirometer. Continue IV Solu-Medrol, every 6  Now out of Covid isolation, Completed course of baricitinib. 2.  COVID-19 acute hypoxic respiratory failure: Worsening. ?  ARDS. Now on Vapotherm at 30 L. Continue high-dose steroids as above. Therapeutic trial of diuretic    3. Low vitamin B12 levels: Continue replacement. 4.  Hypertension: Continue low-dose Lopressor. Losartan currently on hold. 5.  Chronic pain: Continue MS Contin twice daily, Percocet every 6 as needed  6. Class II obesity: Not a candidate for counseling due to advanced age  9. Insomnia: Improved with trazodone  8. Goals of care: DNR. Diet ADULT ORAL NUTRITION SUPPLEMENT; Breakfast, Lunch, Dinner; Low Calorie/High Protein Oral Supplement  ADULT DIET;  Regular   DVT Prophylaxis [x] Lovenox, []  Heparin, [] SCDs, [] Ambulation   GI Prophylaxis [] PPI,  [] H2 Blocker,  [] Carafate,  [x] Diet/Tube Feeds   Code Status DNR-CCA   MDM [] Low, [x] Moderate,[]  High       History of Present Illness:     Chief Complaint: <principal problem not specified>  Shekhar Qiu is a 68 y.o.  female  who presents with nausea, vomiting, fatigue of 1 week duration. Was found to be hypoxic on arrival of the squad    3/1/2022: Patient is seen and examined, bedside RN reports improvement in exertional hypoxia. She only desaturated to the low 80s today as opposed to the low 70s yesterday. Patient is happy with improvement as she wants to be able to be independent when she goes back home. 3/2/2022, patient is seen and examined, has no new complaints. She questions whether there is anything that can be done to suck out the infiltrates and or inflammation from her lungs to make her breathe better. I explained that and is not technically possible and we are using steroids to reduce inflammation. I did let her know that we will try to get a repeat chest CT today. No family member at bedside. 3/3/2022: Patient is seen and examined, does not want bedrest.  Wants to be able to get out of bed and sit in a chair. Ten point ROS reviewed, negative, unless as noted above    Objective: Intake/Output Summary (Last 24 hours) at 3/3/2022 1413  Last data filed at 3/2/2022 1905  Gross per 24 hour   Intake    Output 300 ml   Net -300 ml        Vitals:   Vitals:    03/03/22 0734 03/03/22 0800 03/03/22 1110 03/03/22 1135   BP:  (!) 144/78 134/65    Pulse:  80 79    Resp: 27 19 23 17   Temp:  98.1 °F (36.7 °C) 98.3 °F (36.8 °C)    TempSrc:  Oral Oral    SpO2: 97% 94% 92% 92%   Weight:       Height:            Physical Exam:   GEN: Awake female, alert and oriented x3 in no apparent distress. Appears given age. HEENT: Normal  RESP: Managed BS bilaterally. Symmetric chest movement while on HFNC   CVS: RRR, S1, S2  GI/: Abdomen is soft, nontender, no organomegaly. Bowel sounds normal, rectal exam deferred. No CVA tenderness. MSK: No gross joint deformities. No tenderness  SKIN: Normal coloration, warm, dry. Bilateral lower extremity stasis dermatitis. NEURO: Cranial nerves appear grossly intact, normal speech, no lateralizing weakness.   PSYCH:  Affect appropriate normal    Medications:   Medications:    methylPREDNISolone  40 mg IntraVENous Q6H    vitamin B-12  1,000 mcg Oral Daily    traZODone  25 mg Oral Nightly    lubiprostone  8 mcg Oral BID WC    metoprolol tartrate  12.5 mg Oral BID    polyethylene glycol  17 g Oral Daily    enoxaparin  30 mg SubCUTAneous BID    guaiFENesin  600 mg Oral BID    fluticasone  1 spray Each Nostril Daily    morphine  30 mg Oral 2 times per day    cetirizine  10 mg Oral Daily    [Held by provider] losartan  50 mg Oral Daily    sodium chloride flush  5-40 mL IntraVENous 2 times per day    sennosides-docusate sodium  1 tablet Oral BID    famotidine  20 mg Oral BID    Vitamin D  2,000 Units Oral Daily      Infusions:    sodium chloride 25 mL (02/09/22 0917)     PRN Meds: sodium chloride flush, 10 mL, PRN  hydrOXYzine, 25 mg, TID PRN  sodium chloride, 1 spray, Q4H PRN  albuterol sulfate HFA, 2 puff, Q4H PRN  benzocaine-menthol, 1 lozenge, Q2H PRN  sodium chloride flush, 5-40 mL, PRN  sodium chloride, 25 mL, PRN  ondansetron, 4 mg, Q6H PRN  acetaminophen, 650 mg, Q6H PRN   Or  acetaminophen, 650 mg, Q6H PRN  oxyCODONE-acetaminophen, 1 tablet, Q6H PRN          Electronically signed by Tia Camejo MD on 3/3/2022 at 2:13 PM

## 2022-03-04 PROCEDURE — 2700000000 HC OXYGEN THERAPY PER DAY

## 2022-03-04 PROCEDURE — 6360000002 HC RX W HCPCS: Performed by: STUDENT IN AN ORGANIZED HEALTH CARE EDUCATION/TRAINING PROGRAM

## 2022-03-04 PROCEDURE — 94761 N-INVAS EAR/PLS OXIMETRY MLT: CPT

## 2022-03-04 PROCEDURE — 6370000000 HC RX 637 (ALT 250 FOR IP): Performed by: NURSE PRACTITIONER

## 2022-03-04 PROCEDURE — 2140000000 HC CCU INTERMEDIATE R&B

## 2022-03-04 PROCEDURE — 6370000000 HC RX 637 (ALT 250 FOR IP): Performed by: INTERNAL MEDICINE

## 2022-03-04 PROCEDURE — 6370000000 HC RX 637 (ALT 250 FOR IP): Performed by: HOSPITALIST

## 2022-03-04 PROCEDURE — 6370000000 HC RX 637 (ALT 250 FOR IP): Performed by: STUDENT IN AN ORGANIZED HEALTH CARE EDUCATION/TRAINING PROGRAM

## 2022-03-04 PROCEDURE — 2580000003 HC RX 258: Performed by: NURSE PRACTITIONER

## 2022-03-04 PROCEDURE — 97110 THERAPEUTIC EXERCISES: CPT

## 2022-03-04 PROCEDURE — 97530 THERAPEUTIC ACTIVITIES: CPT

## 2022-03-04 PROCEDURE — 6360000002 HC RX W HCPCS: Performed by: INTERNAL MEDICINE

## 2022-03-04 RX ADMIN — CYANOCOBALAMIN TAB 1000 MCG 1000 MCG: 1000 TAB at 09:20

## 2022-03-04 RX ADMIN — METOPROLOL 12.5 MG: 25 TABLET ORAL at 09:19

## 2022-03-04 RX ADMIN — OXYCODONE AND ACETAMINOPHEN 1 TABLET: 5; 325 TABLET ORAL at 06:00

## 2022-03-04 RX ADMIN — FAMOTIDINE 20 MG: 20 TABLET ORAL at 20:30

## 2022-03-04 RX ADMIN — Medication 2000 UNITS: at 09:20

## 2022-03-04 RX ADMIN — MORPHINE SULFATE 30 MG: 15 TABLET, FILM COATED, EXTENDED RELEASE ORAL at 20:30

## 2022-03-04 RX ADMIN — METOPROLOL 12.5 MG: 25 TABLET ORAL at 20:30

## 2022-03-04 RX ADMIN — GUAIFENESIN 600 MG: 600 TABLET, EXTENDED RELEASE ORAL at 09:20

## 2022-03-04 RX ADMIN — POLYETHYLENE GLYCOL (3350) 17 G: 17 POWDER, FOR SOLUTION ORAL at 09:20

## 2022-03-04 RX ADMIN — OXYCODONE AND ACETAMINOPHEN 1 TABLET: 5; 325 TABLET ORAL at 17:28

## 2022-03-04 RX ADMIN — SENNOSIDES AND DOCUSATE SODIUM 1 TABLET: 50; 8.6 TABLET ORAL at 20:29

## 2022-03-04 RX ADMIN — TRAZODONE HYDROCHLORIDE 25 MG: 50 TABLET ORAL at 20:30

## 2022-03-04 RX ADMIN — OXYCODONE AND ACETAMINOPHEN 1 TABLET: 5; 325 TABLET ORAL at 23:54

## 2022-03-04 RX ADMIN — SODIUM CHLORIDE, PRESERVATIVE FREE 10 ML: 5 INJECTION INTRAVENOUS at 09:21

## 2022-03-04 RX ADMIN — ENOXAPARIN SODIUM 30 MG: 100 INJECTION SUBCUTANEOUS at 09:20

## 2022-03-04 RX ADMIN — SODIUM CHLORIDE, PRESERVATIVE FREE 10 ML: 5 INJECTION INTRAVENOUS at 20:30

## 2022-03-04 RX ADMIN — LUBIPROSTONE 8 MCG: 8 CAPSULE, GELATIN COATED ORAL at 17:28

## 2022-03-04 RX ADMIN — LUBIPROSTONE 8 MCG: 8 CAPSULE, GELATIN COATED ORAL at 09:20

## 2022-03-04 RX ADMIN — FLUTICASONE PROPIONATE 1 SPRAY: 50 SPRAY, METERED NASAL at 09:21

## 2022-03-04 RX ADMIN — FAMOTIDINE 20 MG: 20 TABLET ORAL at 09:20

## 2022-03-04 RX ADMIN — METHYLPREDNISOLONE SODIUM SUCCINATE 40 MG: 40 INJECTION, POWDER, LYOPHILIZED, FOR SOLUTION INTRAMUSCULAR; INTRAVENOUS at 18:02

## 2022-03-04 RX ADMIN — ENOXAPARIN SODIUM 30 MG: 100 INJECTION SUBCUTANEOUS at 20:30

## 2022-03-04 RX ADMIN — METHYLPREDNISOLONE SODIUM SUCCINATE 40 MG: 40 INJECTION, POWDER, LYOPHILIZED, FOR SOLUTION INTRAMUSCULAR; INTRAVENOUS at 12:18

## 2022-03-04 RX ADMIN — METHYLPREDNISOLONE SODIUM SUCCINATE 40 MG: 40 INJECTION, POWDER, LYOPHILIZED, FOR SOLUTION INTRAMUSCULAR; INTRAVENOUS at 06:00

## 2022-03-04 RX ADMIN — MORPHINE SULFATE 30 MG: 15 TABLET, FILM COATED, EXTENDED RELEASE ORAL at 09:19

## 2022-03-04 RX ADMIN — METHYLPREDNISOLONE SODIUM SUCCINATE 40 MG: 40 INJECTION, POWDER, LYOPHILIZED, FOR SOLUTION INTRAMUSCULAR; INTRAVENOUS at 00:16

## 2022-03-04 RX ADMIN — GUAIFENESIN 600 MG: 600 TABLET, EXTENDED RELEASE ORAL at 20:29

## 2022-03-04 RX ADMIN — CETIRIZINE HYDROCHLORIDE 10 MG: 10 TABLET, FILM COATED ORAL at 06:00

## 2022-03-04 ASSESSMENT — PAIN SCALES - GENERAL
PAINLEVEL_OUTOF10: 6
PAINLEVEL_OUTOF10: 3
PAINLEVEL_OUTOF10: 6
PAINLEVEL_OUTOF10: 2
PAINLEVEL_OUTOF10: 3
PAINLEVEL_OUTOF10: 8

## 2022-03-04 ASSESSMENT — PAIN DESCRIPTION - FREQUENCY
FREQUENCY: CONTINUOUS

## 2022-03-04 ASSESSMENT — PAIN DESCRIPTION - ORIENTATION
ORIENTATION: RIGHT;POSTERIOR
ORIENTATION: RIGHT
ORIENTATION: MID

## 2022-03-04 ASSESSMENT — PAIN DESCRIPTION - ONSET
ONSET: ON-GOING
ONSET: ON-GOING

## 2022-03-04 ASSESSMENT — PAIN DESCRIPTION - DESCRIPTORS
DESCRIPTORS: ACHING;DISCOMFORT
DESCRIPTORS: ACHING
DESCRIPTORS: ACHING;DISCOMFORT

## 2022-03-04 ASSESSMENT — PAIN DESCRIPTION - PAIN TYPE
TYPE: ACUTE PAIN;CHRONIC PAIN
TYPE: ACUTE PAIN;CHRONIC PAIN
TYPE: ACUTE PAIN

## 2022-03-04 ASSESSMENT — PAIN DESCRIPTION - LOCATION
LOCATION: BACK;LEG
LOCATION: HIP;BACK
LOCATION: BACK

## 2022-03-04 ASSESSMENT — PAIN SCALES - WONG BAKER: WONGBAKER_NUMERICALRESPONSE: 8

## 2022-03-04 ASSESSMENT — PAIN DESCRIPTION - PROGRESSION
CLINICAL_PROGRESSION: NOT CHANGED
CLINICAL_PROGRESSION: NOT CHANGED

## 2022-03-04 NOTE — PROGRESS NOTES
RRT checked on pt. Pt's SaO2 97% on 60% and 25lpm. RRT decreased O2 to 50% and 25lpm. Pt sleeping, and tolerating well.

## 2022-03-04 NOTE — PROGRESS NOTES
03/04/22 0746   Oxygen Therapy/Pulse Ox   O2 Therapy Oxygen humidified   $Oxygen $Daily Charge   O2 Device Heated high flow cannula   O2 Flow Rate (L/min) 20 L/min   FiO2  45 %   Resp 23   SpO2 95 %   Pulse Oximeter Device Mode Continuous   Pulse Oximeter Device Location Finger   $Pulse Oximeter $Spot check (multiple/continuous)     Pt weaned down this morning.  This afternoon I will trial regular high flow nasal cannula 89y old m s/p unwitnessed fall from bed this morning, able to ambulate and get up after fall. Later in the day the patient told his family that he fell and that he had neck pain and they brought him to the [facility]. He was scanned and found to have a dens fracture and was sent to the [facility] for neurosurgical and trauma eval. Here in the ED he is not in pain. A hard collar was placed. Neurosurgery saw the patient and due to his pacemaker he will not be able to get an MRI. No motor or sensory deficits in upper or lower extremities.  No numbness or tingling.       Trauma Admission  CT CAP   f/u neurosurgery consult and neurocheck recs  hard collar, c spine precautions

## 2022-03-04 NOTE — PROGRESS NOTES
RRT checked on pt and decreased the pt's O2.  Pt's SaO2 99% on 90% and 30lpm,  RRT decreased the pt's O2 to 70% and the flow to 25lpm.  Pt tolerating well.

## 2022-03-04 NOTE — PROGRESS NOTES
RRT checked the pt's SaO2 96% on 70% and 25lpm. Pt tolerating well,  RRT decreased O2 to 60%and 25lpm

## 2022-03-04 NOTE — PROGRESS NOTES
Hospitalist Progress Note      Name:  Kostas Parrish /Age/Sex: 1945  (68 y.o. female)   MRN & CSN:  [id number] & [id number] Admission Date/Time: 2022  4:46 PM   Location:  [address] [place] [address] PCP: No primary care provider on file. Hospital Day: 29  Discharge barrier/Reason for continued hospitalization: Persistently high though improving oxygen requirement. 30 L - 20 L    Assessment and Plan:   Kostas Parrish is a 68 y.o.  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 The squad reported O2 sats of 74% on room air. On arrival to the ED, patient ruled in for COVID-19. Hospital course has been prolonged due to continued high oxygen requirements. 1.  COVID-19 sepsis/pneumonia, POA: Persistent. Repeat chest CT 3/2/2022, persistent bilateral airspace disease  Aggressive pulmonary toileting, incentive spirometer. Continue IV Solu-Medrol, every 6  Completed course of baricitinib. 2.  COVID-19 acute hypoxic respiratory failure: Now improving. Down to 20 L on Vapotherm from 30 L. Continue high-dose steroids as above. Repeat trial of diuretic, seem to have helped yesterday    3. Low vitamin B12 levels: Continue replacement. 4.  Hypertension: Continue low-dose Lopressor. Losartan currently on hold. 5.  Chronic pain: Continue MS Contin twice daily, Percocet every 6 as needed  6. Class II obesity: Not a candidate for counseling due to advanced age  9. Insomnia: Improved with trazodone  8. Goals of care: DNR. Diet ADULT ORAL NUTRITION SUPPLEMENT; Breakfast, Lunch, Dinner; Low Calorie/High Protein Oral Supplement  ADULT DIET;  Regular   DVT Prophylaxis [x] Lovenox, []  Heparin, [] SCDs, [] Ambulation   GI Prophylaxis [] PPI,  [] H2 Blocker,  [] Carafate,  [x] Diet/Tube Feeds   Code Status DNR-CCA   MDM [] Low, [x] Moderate,[]  High       History of Present Illness:     Chief Complaint: <principal problem not specified>  Devang Bazzi is a 68 y.o.  female  who presents with nausea, vomiting, fatigue of 1 week duration. Was found to be hypoxic on arrival of the squad    3/1/2022: Patient is seen and examined, bedside RN reports improvement in exertional hypoxia. She only desaturated to the low 80s today as opposed to the low 70s yesterday. Patient is happy with improvement as she wants to be able to be independent when she goes back home. 3/2/2022, patient is seen and examined, has no new complaints. She questions whether there is anything that can be done to suck out the infiltrates and or inflammation from her lungs to make her breathe better. I explained that and is not technically possible and we are using steroids to reduce inflammation. I did let her know that we will try to get a repeat chest CT today. No family member at bedside. 3/3/2022: Patient is seen and examined, does not want bedrest.  Wants to be able to get out of bed and sit in a chair. 3/4/2022: Patient is seen and examined, wants to be able to sit in a chair today. I okayed this. She is hopeful for discharge home with her current oxygen requirement, this weekend. I did let her know that we will take 1 day at a time. More likely sometime next week. Ten point ROS reviewed, negative, unless as noted above    Objective: Intake/Output Summary (Last 24 hours) at 3/4/2022 1427  Last data filed at 3/3/2022 2140  Gross per 24 hour   Intake    Output 350 ml   Net -350 ml        Vitals:   Vitals:    03/04/22 0746 03/04/22 0916 03/04/22 1102 03/04/22 1121   BP:  (!) 127/58  122/76   Pulse:  85  76   Resp: 23 23  17   Temp:  98.1 °F (36.7 °C)  98.3 °F (36.8 °C)   TempSrc:  Oral  Oral   SpO2: 95% 93% 91%    Weight:       Height:            Physical Exam:   GEN: Awake female, alert and oriented x3 in no apparent distress.  Appears given age.  HEENT: Normal  RESP: Diminished BS bilaterally. Symmetric chest movement while on HFNC   CVS: RRR, S1, S2  GI/: Abdomen is soft, nontender, no organomegaly. Bowel sounds normal, rectal exam deferred. No CVA tenderness. MSK: No gross joint deformities. No tenderness  SKIN: Normal coloration, warm, dry. Bilateral lower extremity stasis dermatitis. NEURO: Cranial nerves appear grossly intact, normal speech, no lateralizing weakness.   PSYCH:  Affect appropriate normal    Medications:   Medications:    methylPREDNISolone  40 mg IntraVENous Q6H    vitamin B-12  1,000 mcg Oral Daily    traZODone  25 mg Oral Nightly    lubiprostone  8 mcg Oral BID WC    metoprolol tartrate  12.5 mg Oral BID    polyethylene glycol  17 g Oral Daily    enoxaparin  30 mg SubCUTAneous BID    guaiFENesin  600 mg Oral BID    fluticasone  1 spray Each Nostril Daily    morphine  30 mg Oral 2 times per day    cetirizine  10 mg Oral Daily    [Held by provider] losartan  50 mg Oral Daily    sodium chloride flush  5-40 mL IntraVENous 2 times per day    sennosides-docusate sodium  1 tablet Oral BID    famotidine  20 mg Oral BID    Vitamin D  2,000 Units Oral Daily      Infusions:    sodium chloride 25 mL (02/09/22 0917)     PRN Meds: sodium chloride flush, 10 mL, PRN  hydrOXYzine, 25 mg, TID PRN  sodium chloride, 1 spray, Q4H PRN  albuterol sulfate HFA, 2 puff, Q4H PRN  benzocaine-menthol, 1 lozenge, Q2H PRN  sodium chloride flush, 5-40 mL, PRN  sodium chloride, 25 mL, PRN  ondansetron, 4 mg, Q6H PRN  acetaminophen, 650 mg, Q6H PRN   Or  acetaminophen, 650 mg, Q6H PRN  oxyCODONE-acetaminophen, 1 tablet, Q6H PRN          Electronically signed by Saundra Hernandez MD on 3/4/2022 at 2:27 PM

## 2022-03-04 NOTE — PROGRESS NOTES
Physical Therapy    Physical Therapy Treatment Note  Name: Fifi Dee MRN: [id number] :   1945   Date:  3/4/2022   Admission Date: 2022 Room:  [address] [place] [address]   Restrictions/Precautions:  Restrictions/Precautions  Restrictions/Precautions: Fall Risk,General Precautions,Contact Precautions (droplet +)  Communication with other providers:  RN clears for activity performance and OOB this session  Subjective:  Patient states:  \"I'd really love to get over to the chair\" \"I'm so much more comfortable in the chair  Pain:   Location, Type, Intensity (0/10 to 10/10): Denies on arrival; requesting pain pill on completion  Objective:    Observation:  Supine in bed, awake, alert, pleasant, agreeable. She is rebounding from sequential desats w/ activity faster than prev. 15 L of HF nc O2, tele, pulse ox, BP cuff, purwick, bed alarm in place. Treatment, including education/measures:  Therapeutic Activity Training:   Therapeutic activity training was instructed today. Cues were given for safety, sequence, UE/LE placement, awareness, and balance. Activities performed today included bed mobility training, sup-sit, sit-stand, SPT. Supine <-> sit: mod A for initiation and completion  Seated balance: good  Sit <- >stand: CGA from raised EOB; min A for initiation from lowest height  Standing balance: fair - ; reliant on BL UE support and min A to balance   Ambulates 10 ft using BL crutches w/ min A for steady and postural mgmt ; inc time and effort w/ all OOB mobility  Stand <- >sit: min A   Positioned for comfort and pressure relief ; call light in reach, all needs met, chair alarm set, gait belt for OOB    Therapeutic Exercise:  Cues were given for technique, safety, recruitment, and rationale. Cues were verbal and/or tactile.   2 x 10 LAQ's w/ hold at end range ext, clamshells, quad sets, glute sets  Instructed in performance of lower level therapeutic exercise this session during in bed performance  Inc time and effort to attend to onset of fatigue and SOB      Assessment / Impression: Tolerating well, ambulating greatest distance since admission. She is requiring intermittent light to moderate assist for all functional mobility, desats to low 80's w/ OOB activity, high supplemental O2 needs, baseline deficits. She prefers to return home w/ family assist, at time her O2 needs are too high; will cont to benefit from skilled acute therapy services.      Patient's tolerance of treatment:  Fair +  Barriers to improvement:  Length of stay; O2 dependency; baseline deficits  Plan for Next Session:    Cont w/ est POC; would greatly benefit from further skilled subacute therapy services to promote return to PLOF, family is declining SNF at this time ; would benefit from home health physical therapy, home health nsg if plan is for home    Cont to progress ambulation distance, functional mobility, functional strengthening    Time in:  1439  Time out:  1524  Timed treatment minutes:  45  Total treatment time:  40 (TA, TE x 2)    Previously filed items:  Social/Functional History  Lives With: Alone  Type of Home: House  Home Layout: One level  Home Access: Ramped entrance (railing on BL sides)  Bathroom Shower/Tub: Shower chair without back (dtr spongebathes)  Bathroom Toilet: Handicap height  Bathroom Equipment: Grab bars around toilet  Home Equipment: Crutches,Wheelchair-manual,Grab bars  Receives Help From: Family  ADL Assistance: Needs assistance (dtr assists w/ bathing, dressing, ; dtr comes M/W/F)  Homemaking Assistance: Needs assistance (dtr does shopping; pt does not cook, has meals on wheels; son does outside [facility]; grand dtrs clean inside)  Homemaking Responsibilities: No (family performs most; she assists w/ what she can, mostly done seated)  Ambulation Assistance: Independent (mod ind using crutches; only short distances)  Transfer Assistance: Independent  Active : No (family)  Occupation: Retired,On disability  Additional Comments: plays cards, crochette, puzzle books, socializes w/ family; sleeps in adjustable bed w/ trapeze over for improved pt mobility  Short term goals  Time Frame for Short term goals: 1 week  Short term goal 1: pt will complete bed mobility at min A  Short term goal 2: pt will complete transfers at [facility]  Short term goal 3: pt will ambulate 15 ft using crutches at [facility]  Short term goal 4: pt will demonstrate near gross >3/5 BL LE strength       Electronically signed by:    Lorena Hebert, PT, DPT  3/4/2022, 3:48 PM

## 2022-03-04 NOTE — PROGRESS NOTES
Pulmonary and Critical Care  Progress Note    Subjective: The patient is feeling better, comfortable in bed. On Vapotherm 40%. Shortness of breath is better. Chest pain none  Addressing respiratory complaints Patient is negative for  hemoptysis and cyanosis  CONSTITUTIONAL:  negative for fevers and chills      Past Medical History:     has a past medical history of Chronic lower back pain, Hx of spinal fusion, Hypertension, MRSA (methicillin resistant staph aureus) culture positive, and Venous disease. has a past surgical history that includes fracture surgery and back surgery. reports that she has never smoked. She has never used smokeless tobacco. She reports that she does not drink alcohol and does not use drugs. Family history:  family history is not on file. No Known Allergies  Social History:    Reviewed; no changes    Objective:   PHYSICAL EXAM:        VITALS:  /76   Pulse 76   Temp 98.3 °F (36.8 °C) (Oral)   Resp 17   Ht 5' 2.01\" (1.575 m)   Wt 186 lb 8.2 oz (84.6 kg)   SpO2 91%   BMI 34.10 kg/m²     24HR INTAKE/OUTPUT:      Intake/Output Summary (Last 24 hours) at 3/4/2022 1319  Last data filed at 3/3/2022 2140  Gross per 24 hour   Intake    Output 350 ml   Net -350 ml       CONSTITUTIONAL:  Awake. LUNGS:  decreased breath sounds, basilar crackles. CARDIOVASCULAR:  normal S1 and S2 and negative JVD  ABD:Abdomen soft, non-tender. BS normal. No masses,  No organomegaly  NEURO:Alert. DATA:    CBC:  Recent Labs     03/02/22  1130   WBC 8.2   RBC 4.97   HGB 12.4*   HCT 42.6      MCV 85.7   MCH 24.9*   MCHC 29.1*   RDW 17.2*   SEGSPCT 76.0*   BANDSPCT 6      BMP:  Recent Labs     03/02/22  1130      K 3.9      CO2 29   BUN 23   CREATININE 0.5*   CALCIUM 9.0   GLUCOSE 167*      ABG:  No results for input(s): PH, PO2ART, VSG0STA, HCO3, BEART, O2SAT in the last 72 hours.   Lab Results   Component Value Date    PROBNP 411.8 (H) 03/01/2022    PROBNP 286.5 02/23/2022

## 2022-03-04 NOTE — PROGRESS NOTES
Pt taken off of vapotherm and placed onto 15L high flow. O2 sat is 94-90%, will wean down when able to.

## 2022-03-04 NOTE — CARE COORDINATION
D/c plan is home with family/Son declined SNF & HHC. On Vapotherm  20 liters O2 at 45% FiO2 today. Notify CM if any d/c needs arise.   TE

## 2022-03-05 LAB
ANION GAP SERPL CALCULATED.3IONS-SCNC: 13 MMOL/L (ref 4–16)
BANDED NEUTROPHILS ABSOLUTE COUNT: 0.31 K/CU MM
BANDED NEUTROPHILS RELATIVE PERCENT: 4 % (ref 5–11)
BUN BLDV-MCNC: 26 MG/DL (ref 6–23)
CALCIUM SERPL-MCNC: 8.7 MG/DL (ref 8.3–10.6)
CHLORIDE BLD-SCNC: 99 MMOL/L (ref 99–110)
CO2: 27 MMOL/L (ref 21–32)
CREAT SERPL-MCNC: 0.5 MG/DL (ref 0.6–1.1)
DIFFERENTIAL TYPE: ABNORMAL
GFR AFRICAN AMERICAN: >60 ML/MIN/1.73M2
GFR NON-AFRICAN AMERICAN: >60 ML/MIN/1.73M2
GLUCOSE BLD-MCNC: 228 MG/DL (ref 70–99)
HCT VFR BLD CALC: 40.1 % (ref 37–47)
HEMOGLOBIN: 11.9 GM/DL (ref 12.5–16)
LYMPHOCYTES ABSOLUTE: 0.8 K/CU MM
LYMPHOCYTES RELATIVE PERCENT: 10 % (ref 24–44)
MCH RBC QN AUTO: 25.1 PG (ref 27–31)
MCHC RBC AUTO-ENTMCNC: 29.7 % (ref 32–36)
MCV RBC AUTO: 84.4 FL (ref 78–100)
MONOCYTES ABSOLUTE: 0.2 K/CU MM
MONOCYTES RELATIVE PERCENT: 3 % (ref 0–4)
PDW BLD-RTO: 17.4 % (ref 11.7–14.9)
PLATELET # BLD: 128 K/CU MM (ref 140–440)
PMV BLD AUTO: 11.2 FL (ref 7.5–11.1)
POTASSIUM SERPL-SCNC: 3.9 MMOL/L (ref 3.5–5.1)
RBC # BLD: 4.75 M/CU MM (ref 4.2–5.4)
SEGMENTED NEUTROPHILS ABSOLUTE COUNT: 6.5 K/CU MM
SEGMENTED NEUTROPHILS RELATIVE PERCENT: 83 % (ref 36–66)
SODIUM BLD-SCNC: 139 MMOL/L (ref 135–145)
WBC # BLD: 7.8 K/CU MM (ref 4–10.5)

## 2022-03-05 PROCEDURE — 6370000000 HC RX 637 (ALT 250 FOR IP): Performed by: NURSE PRACTITIONER

## 2022-03-05 PROCEDURE — 36415 COLL VENOUS BLD VENIPUNCTURE: CPT

## 2022-03-05 PROCEDURE — 80048 BASIC METABOLIC PNL TOTAL CA: CPT

## 2022-03-05 PROCEDURE — 85027 COMPLETE CBC AUTOMATED: CPT

## 2022-03-05 PROCEDURE — 85007 BL SMEAR W/DIFF WBC COUNT: CPT

## 2022-03-05 PROCEDURE — 97110 THERAPEUTIC EXERCISES: CPT

## 2022-03-05 PROCEDURE — 94761 N-INVAS EAR/PLS OXIMETRY MLT: CPT

## 2022-03-05 PROCEDURE — 2580000003 HC RX 258: Performed by: NURSE PRACTITIONER

## 2022-03-05 PROCEDURE — 6370000000 HC RX 637 (ALT 250 FOR IP): Performed by: INTERNAL MEDICINE

## 2022-03-05 PROCEDURE — 6360000002 HC RX W HCPCS: Performed by: INTERNAL MEDICINE

## 2022-03-05 PROCEDURE — 2140000000 HC CCU INTERMEDIATE R&B

## 2022-03-05 PROCEDURE — 6360000002 HC RX W HCPCS: Performed by: STUDENT IN AN ORGANIZED HEALTH CARE EDUCATION/TRAINING PROGRAM

## 2022-03-05 PROCEDURE — 2700000000 HC OXYGEN THERAPY PER DAY

## 2022-03-05 PROCEDURE — 6370000000 HC RX 637 (ALT 250 FOR IP): Performed by: STUDENT IN AN ORGANIZED HEALTH CARE EDUCATION/TRAINING PROGRAM

## 2022-03-05 PROCEDURE — 6370000000 HC RX 637 (ALT 250 FOR IP): Performed by: HOSPITALIST

## 2022-03-05 RX ADMIN — ENOXAPARIN SODIUM 30 MG: 100 INJECTION SUBCUTANEOUS at 21:58

## 2022-03-05 RX ADMIN — TRAZODONE HYDROCHLORIDE 25 MG: 50 TABLET ORAL at 21:57

## 2022-03-05 RX ADMIN — SODIUM CHLORIDE, PRESERVATIVE FREE 10 ML: 5 INJECTION INTRAVENOUS at 21:58

## 2022-03-05 RX ADMIN — FAMOTIDINE 20 MG: 20 TABLET ORAL at 21:56

## 2022-03-05 RX ADMIN — FLUTICASONE PROPIONATE 1 SPRAY: 50 SPRAY, METERED NASAL at 08:14

## 2022-03-05 RX ADMIN — SENNOSIDES AND DOCUSATE SODIUM 1 TABLET: 50; 8.6 TABLET ORAL at 08:12

## 2022-03-05 RX ADMIN — METOPROLOL 12.5 MG: 25 TABLET ORAL at 08:12

## 2022-03-05 RX ADMIN — FAMOTIDINE 20 MG: 20 TABLET ORAL at 08:12

## 2022-03-05 RX ADMIN — METOPROLOL 12.5 MG: 25 TABLET ORAL at 21:57

## 2022-03-05 RX ADMIN — SENNOSIDES AND DOCUSATE SODIUM 1 TABLET: 50; 8.6 TABLET ORAL at 21:57

## 2022-03-05 RX ADMIN — METHYLPREDNISOLONE SODIUM SUCCINATE 40 MG: 40 INJECTION, POWDER, LYOPHILIZED, FOR SOLUTION INTRAMUSCULAR; INTRAVENOUS at 18:43

## 2022-03-05 RX ADMIN — GUAIFENESIN 600 MG: 600 TABLET, EXTENDED RELEASE ORAL at 08:12

## 2022-03-05 RX ADMIN — MORPHINE SULFATE 30 MG: 15 TABLET, FILM COATED, EXTENDED RELEASE ORAL at 08:13

## 2022-03-05 RX ADMIN — LUBIPROSTONE 8 MCG: 8 CAPSULE, GELATIN COATED ORAL at 18:43

## 2022-03-05 RX ADMIN — METHYLPREDNISOLONE SODIUM SUCCINATE 40 MG: 40 INJECTION, POWDER, LYOPHILIZED, FOR SOLUTION INTRAMUSCULAR; INTRAVENOUS at 00:59

## 2022-03-05 RX ADMIN — POLYETHYLENE GLYCOL (3350) 17 G: 17 POWDER, FOR SOLUTION ORAL at 08:12

## 2022-03-05 RX ADMIN — METHYLPREDNISOLONE SODIUM SUCCINATE 40 MG: 40 INJECTION, POWDER, LYOPHILIZED, FOR SOLUTION INTRAMUSCULAR; INTRAVENOUS at 11:31

## 2022-03-05 RX ADMIN — ENOXAPARIN SODIUM 30 MG: 100 INJECTION SUBCUTANEOUS at 08:13

## 2022-03-05 RX ADMIN — LUBIPROSTONE 8 MCG: 8 CAPSULE, GELATIN COATED ORAL at 08:12

## 2022-03-05 RX ADMIN — GUAIFENESIN 600 MG: 600 TABLET, EXTENDED RELEASE ORAL at 21:58

## 2022-03-05 RX ADMIN — CYANOCOBALAMIN TAB 1000 MCG 1000 MCG: 1000 TAB at 08:12

## 2022-03-05 RX ADMIN — SODIUM CHLORIDE, PRESERVATIVE FREE 10 ML: 5 INJECTION INTRAVENOUS at 08:13

## 2022-03-05 RX ADMIN — Medication 2000 UNITS: at 08:12

## 2022-03-05 RX ADMIN — MORPHINE SULFATE 30 MG: 15 TABLET, FILM COATED, EXTENDED RELEASE ORAL at 21:57

## 2022-03-05 RX ADMIN — CETIRIZINE HYDROCHLORIDE 10 MG: 10 TABLET, FILM COATED ORAL at 06:02

## 2022-03-05 RX ADMIN — METHYLPREDNISOLONE SODIUM SUCCINATE 40 MG: 40 INJECTION, POWDER, LYOPHILIZED, FOR SOLUTION INTRAMUSCULAR; INTRAVENOUS at 06:02

## 2022-03-05 ASSESSMENT — PAIN SCALES - GENERAL
PAINLEVEL_OUTOF10: 6
PAINLEVEL_OUTOF10: 8
PAINLEVEL_OUTOF10: 2

## 2022-03-05 NOTE — PROGRESS NOTES
Physical Therapy    Physical Therapy Treatment Note  Name: Jermaine Oliver MRN: [id number] :   1945   Date:  3/5/2022   Admission Date: 2022 Room:  [address] [place] [address]   Restrictions/Precautions:  Restrictions/Precautions  Restrictions/Precautions: Fall Risk,General Precautions,Contact Precautions (droplet +)  Communication with other providers:  RN clears for bed exercises only due to low O2 levels with activity  Subjective:  Patient states:  Pt reports that she is not allowd to get OOB today  Pain:   Location, Type, Intensity (0/10 to 10/10): Denies on arrival  Objective:   Pt now back on Vapotherm @ 35L of 100% O2,     O2 Sat between 88%-95% during tx  Observation:  Supine in bed, awake, alert, pleasant, agreeable. Treatment, including education/measures:    Therapeutic Exercise:  Cues were given for technique, safety, recruitment, and rationale. Cues were verbal and/or tactile.   2 x 10 ea  Ankle pumps, ankle circles, Heel slides, SLR, hip abd/add: ranging from PROM to AROM due to muscle weakness  Passive stretching to Bilateral feet and ankles    Assessment / Impression:      Patient's tolerance of treatment:  Fair +  Barriers to improvement:  Length of stay; O2 dependency; baseline deficits  Plan for Next Session:    Cont w/ est POC; would greatly benefit from further skilled subacute therapy services to promote return to PLOF, family is declining SNF at this time ; would benefit from home health physical therapy, home health nsg if plan is for home    Cont to progress as able, check with nursing on OOB status    Time in:  1442  Time out:  1510  Timed treatment minutes:  28  Total treatment time:  28    Previously filed items:  Social/Functional History  Lives With: Alone  Type of Home: [facility] [employer]: One level  Home Access: Ramped entrance (railing on BL sides)  Bathroom Shower/Tub: Shower chair without back (dtr spongebathes)  Bathroom Toilet: Handicap height  Bathroom Equipment: Grab bars around toilet  Home Equipment: Crutches,Wheelchair-manual,Grab bars  Receives Help From: Family  ADL Assistance: Needs assistance (dtr assists w/ bathing, dressing, ; dtr comes M/W/F)  Homemaking Assistance: Needs assistance (dtr does shopping; pt does not cook, has meals on wheels; son does outside [place]; grand dtrs clean inside)  Homemaking Responsibilities: No (family performs most; she assists w/ what she can, mostly done seated)  Ambulation Assistance: Independent (mod ind using crutches; only short distances)  Transfer Assistance: Independent  Active : No (family)  Occupation: Retired,On disability  Additional Comments: plays cards, crochette, puzzle books, socializes w/ family; sleeps in adjustable bed w/ trapeze over for improved pt mobility  Short term goals  Time Frame for Short term goals: 1 week  Short term goal 1: pt will complete bed mobility at min A  Short term goal 2: pt will complete transfers at [facility] term goal 3: pt will ambulate 15 ft using crutches at [facility] term goal 4: pt will demonstrate near gross >3/5 BL LE strength       Electronically signed by:    Shekhar Kenny, VIVIANA  3/5/2022, 3:58 PM

## 2022-03-05 NOTE — PROGRESS NOTES
Patient sitting on the side of bed, insisting on sitting up, she is eating lunch and oxygen saturation is 85%

## 2022-03-05 NOTE — PLAN OF CARE
Problem: Falls - Risk of:  Goal: Will remain free from falls  Description: Will remain free from falls  3/5/2022 0952 by Bakari Rader RN  Outcome: Ongoing  3/4/2022 2036 by Machelle Angel RN  Outcome: Ongoing  Goal: Absence of physical injury  Description: Absence of physical injury  3/5/2022 0952 by Bakari Rader RN  Outcome: Ongoing  3/4/2022 2036 by Machelle Angel RN  Outcome: Ongoing     Problem: Airway Clearance - Ineffective  Goal: Achieve or maintain patent airway  3/5/2022 0952 by Bakari Rader RN  Outcome: Ongoing  3/4/2022 2036 by Machelle Angel RN  Outcome: Ongoing     Problem: Gas Exchange - Impaired  Goal: Absence of hypoxia  3/5/2022 0952 by Bakari Rader RN  Outcome: Ongoing  3/4/2022 2036 by Machelle Angel RN  Outcome: Ongoing  Goal: Promote optimal lung function  3/5/2022 0952 by Bakari Rader RN  Outcome: Ongoing  3/4/2022 2036 by Machelle Angel RN  Outcome: Ongoing     Problem: Breathing Pattern - Ineffective  Goal: Ability to achieve and maintain a regular respiratory rate  3/5/2022 0952 by Bakari Rader RN  Outcome: Ongoing  3/4/2022 2036 by Machelle Angel RN  Outcome: Ongoing     Problem:  Body Temperature -  Risk of, Imbalanced  Goal: Ability to maintain a body temperature within defined limits  3/5/2022 0952 by Bakari Rader RN  Outcome: Ongoing  3/4/2022 2036 by Machelle Angel RN  Outcome: Ongoing  Goal: Will regain or maintain usual level of consciousness  3/5/2022 0952 by Bakari Rader RN  Outcome: Ongoing  3/4/2022 2036 by Machelle Angel RN  Outcome: Ongoing  Goal: Complications related to the disease process, condition or treatment will be avoided or minimized  3/5/2022 0952 by Bakari Rader RN  Outcome: Ongoing  3/4/2022 2036 by Machelle Angel RN  Outcome: Ongoing     Problem: Nutrition Deficits  Goal: Optimize nutritional status  3/5/2022 0952 by Bakari Rader RN  Outcome: Ongoing  3/4/2022 2036 by Machelle nAgel RN  Outcome: Ongoing     Problem: Risk for Fluid Volume Deficit  Goal: Maintain normal heart rhythm  3/5/2022 0952 by Yimi Qureshi RN  Outcome: Ongoing  3/4/2022 2036 by Christina Palacios RN  Outcome: Ongoing  Goal: Maintain absence of muscle cramping  3/5/2022 0952 by Yimi Qureshi RN  Outcome: Ongoing  3/4/2022 2036 by Christina Palacios RN  Outcome: Ongoing  Goal: Maintain normal serum potassium, sodium, calcium, phosphorus, and pH  3/5/2022 0952 by Yimi Qureshi RN  Outcome: Ongoing  3/4/2022 2036 by Christina Palacios RN  Outcome: Ongoing     Problem: Loneliness or Risk for Loneliness  Goal: Demonstrate positive use of time alone when socialization is not possible  3/5/2022 0952 by Yimi Qureshi RN  Outcome: Ongoing  3/4/2022 2036 by Christina Palacios RN  Outcome: Ongoing     Problem: Fatigue  Goal: Verbalize increase energy and improved vitality  3/5/2022 0952 by Yimi Qureshi RN  Outcome: Ongoing  3/4/2022 2036 by Christina Palacios RN  Outcome: Ongoing     Problem: Patient Education: Go to Patient Education Activity  Goal: Patient/Family Education  3/5/2022 4963 by Yimi Qureshi RN  Outcome: Ongoing  3/4/2022 2036 by Christina Palacios RN  Outcome: Ongoing     Problem: Pain:  Description: Pain management should include both nonpharmacologic and pharmacologic interventions.   Goal: Pain level will decrease  Description: Pain level will decrease  3/5/2022 0952 by Yimi Qureshi RN  Outcome: Ongoing  3/4/2022 2036 by Christina Palacios RN  Outcome: Ongoing  Goal: Control of acute pain  Description: Control of acute pain  3/5/2022 0952 by Yimi Qureshi RN  Outcome: Ongoing  3/4/2022 2036 by Christina Palacios RN  Outcome: Ongoing  Goal: Control of chronic pain  Description: Control of chronic pain  3/5/2022 0952 by Yimi Qureshi RN  Outcome: Ongoing  3/4/2022 2036 by Christina Palacios RN  Outcome: Ongoing     Problem: Skin Integrity:  Goal: Will show no infection signs and symptoms  Description: Will show no infection signs and symptoms  3/5/2022 0952 by Yimi Qureshi RN  Outcome: Ongoing  3/4/2022 2036 by Jaleesa Lorenzana Quyen Jolly RN  Outcome: Ongoing  Goal: Absence of new skin breakdown  Description: Absence of new skin breakdown  3/5/2022 0952 by Zina Irwin RN  Outcome: Ongoing  3/4/2022 2036 by Amanda Arauz RN  Outcome: Ongoing     Problem: Nutrition  Goal: Optimal nutrition therapy  3/5/2022 0952 by Zina Irwin RN  Outcome: Ongoing  3/4/2022 2036 by Amanda Arauz RN  Outcome: Ongoing

## 2022-03-05 NOTE — PROGRESS NOTES
Hospitalist Progress Note      Name:  Devang Bazzi /Age/Sex: 1945  (68 y.o. female)   MRN & CSN:  [id number] & [id number] Admission Date/Time: 2022  4:46 PM   Location:  [address] [place] [address] PCP: No primary care provider on file. Hospital Day: 34  Discharge barrier/Reason for continued hospitalization: Persistently hypoxemia requiring high flow. Assessment and Plan:   Devang Bazzi is a 68 y.o.  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 The squad reported O2 sats of 74% on room air. On arrival to the ED, patient ruled in for COVID-19. Hospital course has been prolonged due to continued high oxygen requirements. 1.  COVID-19 sepsis/pneumonia, POA: Had a short period of improvement yesterday but O2 requirements exponentially increased  Repeat chest CT 3/2/2022, persistent bilateral airspace disease  Continue aggressive pulmonary toileting, incentive spirometer. Continue IV Solu-Medrol, every 6  Completed course of baricitinib. 2.  COVID-19 acute hypoxic respiratory failure: Now improving. Down to 20 L on Vapotherm from 30 L. Continue high-dose steroids as above. Repeat trial of diuretic, seem to have helped yesterday    3. Low vitamin B12 levels: Continue replacement. 4.  Hypertension: Continue low-dose Lopressor. Losartan currently on hold. 5.  Chronic pain: Continue MS Contin twice daily, Percocet every 6 as needed  6. Class II obesity: Not a candidate for counseling due to advanced age  9. Insomnia: Improved with trazodone  8. Goals of care: DNR. Diet ADULT ORAL NUTRITION SUPPLEMENT; Breakfast, Lunch, Dinner; Low Calorie/High Protein Oral Supplement  ADULT DIET;  Regular   DVT Prophylaxis [x] Lovenox, []  Heparin, [] SCDs, [] Ambulation   GI Prophylaxis [] PPI,  [] H2 Blocker,  [] Carafate,  [x] Diet/Tube Feeds   Code Status DNR-CCA   MDM [] Low, [] Moderate,[x]  High  >50% of encounter time spent in counseling/coordination of care     History of Present Illness:     Chief Complaint: <principal problem not specified>  Hernán Pham is a 68 y.o.  female  who presents with nausea, vomiting, fatigue of 1 week duration. Was found to be hypoxic on arrival of the squad    3/1/2022: Patient is seen and examined, bedside RN reports improvement in exertional hypoxia. She only desaturated to the low 80s today as opposed to the low 70s yesterday. Patient is happy with improvement as she wants to be able to be independent when she goes back home. 3/2/2022, patient is seen and examined, has no new complaints. She questions whether there is anything that can be done to suck out the infiltrates and or inflammation from her lungs to make her breathe better. I explained that and is not technically possible and we are using steroids to reduce inflammation. I did let her know that we will try to get a repeat chest CT today. No family member at bedside. 3/3/2022: Patient is seen and examined, does not want bedrest.  Wants to be able to get out of bed and sit in a chair. 3/4/2022: Patient is seen and examined, wants to be able to sit in a chair today. I okayed this. She is hopeful for discharge home with her current oxygen requirement, this weekend. I did let her know that we will take 1 day at a time. More likely sometime next week. 3/5/2022: Patient is seen and examined, laying in bed. Events overnight noted. Increasing O2 requirement. She reports that she not only use a bedside commode yesterday but also walked to the chair with her crutches. It appears that she overexerted herself yesterday. I discussed with Florentin Jade bedside RN to maintain bedrest today. Encouraged her to self proning as tolerated.     Ten point ROS reviewed, negative, unless as noted above    Objective:     No intake or output data in the 24 hours ending 03/05/22 1432     Vitals:   Vitals:    03/05/22 0723 03/05/22 0809 03/05/22 0812 03/05/22 1119   BP:   (!) 141/70    Pulse:   77    Resp: 22 23 22   Temp:  97.9 °F (36.6 °C)     TempSrc:  Oral     SpO2: 92%  92% (!) 89%   Weight:       Height:            Physical Exam:   GEN: Awake female, alert and oriented x3 in no apparent distress. Appears given age. HEENT: Normal  RESP: Diminished BS bilaterally. Symmetric chest movement while on HFNC   CVS: RRR, S1, S2  GI/: Abdomen is soft, nontender, no organomegaly. Bowel sounds normal, rectal exam deferred. No CVA tenderness. MSK: No gross joint deformities. No tenderness  SKIN: Normal coloration, warm, dry. Bilateral lower extremity stasis dermatitis. NEURO: Cranial nerves appear grossly intact, normal speech, no lateralizing weakness.   PSYCH:  Affect appropriate normal    Medications:   Medications:    methylPREDNISolone  40 mg IntraVENous Q6H    vitamin B-12  1,000 mcg Oral Daily    traZODone  25 mg Oral Nightly    lubiprostone  8 mcg Oral BID WC    metoprolol tartrate  12.5 mg Oral BID    polyethylene glycol  17 g Oral Daily    enoxaparin  30 mg SubCUTAneous BID    guaiFENesin  600 mg Oral BID    fluticasone  1 spray Each Nostril Daily    morphine  30 mg Oral 2 times per day    cetirizine  10 mg Oral Daily    [Held by provider] losartan  50 mg Oral Daily    sodium chloride flush  5-40 mL IntraVENous 2 times per day    sennosides-docusate sodium  1 tablet Oral BID    famotidine  20 mg Oral BID    Vitamin D  2,000 Units Oral Daily      Infusions:    sodium chloride 25 mL (02/09/22 0917)     PRN Meds: sodium chloride flush, 10 mL, PRN  hydrOXYzine, 25 mg, TID PRN  sodium chloride, 1 spray, Q4H PRN  albuterol sulfate HFA, 2 puff, Q4H PRN  benzocaine-menthol, 1 lozenge, Q2H PRN  sodium chloride flush, 5-40 mL, PRN  sodium chloride, 25 mL, PRN  ondansetron, 4 mg, Q6H PRN  acetaminophen, 650 mg, Q6H PRN Or  acetaminophen, 650 mg, Q6H PRN  oxyCODONE-acetaminophen, 1 tablet, Q6H PRN          Electronically signed by Carey Montaño MD on 3/5/2022 at 2:32 PM

## 2022-03-05 NOTE — PROGRESS NOTES
Pulmonary and Critical Care  Progress Note    Subjective: The patient is requiring more O2. On Vapotherm 90%. Shortness of breath is unchanged. Chest pain none  Addressing respiratory complaints Patient is negative for  hemoptysis and cyanosis  CONSTITUTIONAL:  negative for fevers and chills      Past Medical History:     has a past medical history of Chronic lower back pain, Hx of spinal fusion, Hypertension, MRSA (methicillin resistant staph aureus) culture positive, and Venous disease. has a past surgical history that includes fracture surgery and back surgery. reports that she has never smoked. She has never used smokeless tobacco. She reports that she does not drink alcohol and does not use drugs. Family history:  family history is not on file. No Known Allergies  Social History:    Reviewed; no changes    Objective:   PHYSICAL EXAM:        VITALS:  BP (!) 141/70   Pulse 77   Temp 97.9 °F (36.6 °C) (Oral)   Resp 22   Ht 5' 2.01\" (1.575 m)   Wt 186 lb 8.2 oz (84.6 kg)   SpO2 (!) 89%   BMI 34.10 kg/m²     24HR INTAKE/OUTPUT:    No intake or output data in the 24 hours ending 03/05/22 1255    CONSTITUTIONAL:  Awake. LUNGS:  decreased breath sounds, basilar crackles. CARDIOVASCULAR:  normal S1 and S2 and negative JVD  ABD:Abdomen soft, non-tender. BS normal. No masses,  No organomegaly  NEURO:Alert. DATA:    CBC:  Recent Labs     03/05/22  0108   WBC 7.8   RBC 4.75   HGB 11.9*   HCT 40.1   *   MCV 84.4   MCH 25.1*   MCHC 29.7*   RDW 17.4*   SEGSPCT 83.0*   BANDSPCT 4*      BMP:  Recent Labs     03/05/22  0108      K 3.9   CL 99   CO2 27   BUN 26*   CREATININE 0.5*   CALCIUM 8.7   GLUCOSE 228*      ABG:  No results for input(s): PH, PO2ART, EKZ2OIU, HCO3, BEART, O2SAT in the last 72 hours.   Lab Results   Component Value Date    PROBNP 411.8 (H) 03/01/2022    PROBNP 286.5 02/23/2022    PROBNP 289.8 02/22/2022     No results found for: [facility]    Radiology Review:  Pertinent images / reports were reviewed as a part of this visit. Assessment:     Patient Active Problem List   Diagnosis    WD-Venous stasis dermatitis of both lower extremities    WD-Lymphedema of both lower extremities    WD-Venous stasis ulcer of right calf with fat layer exposed with varicose veins (HCC)    WD-Venous stasis ulcer of left calf with fat layer exposed with varicose veins (HCC)    COVID-19    Hypoxia    SVT (supraventricular tachycardia) (HCC)    Adjustment disorder with anxious mood       Plan:   1. Inc. activity. 2. Wean FiO2.   Suzette Britton MD  3/5/2022  12:55 PM

## 2022-03-05 NOTE — PROGRESS NOTES
Pt oxygen level after using BSC decreased, hard to recover, called respiratory to come bedside and evaluate.

## 2022-03-06 PROCEDURE — 6370000000 HC RX 637 (ALT 250 FOR IP): Performed by: INTERNAL MEDICINE

## 2022-03-06 PROCEDURE — 6370000000 HC RX 637 (ALT 250 FOR IP): Performed by: STUDENT IN AN ORGANIZED HEALTH CARE EDUCATION/TRAINING PROGRAM

## 2022-03-06 PROCEDURE — 94640 AIRWAY INHALATION TREATMENT: CPT

## 2022-03-06 PROCEDURE — 2140000000 HC CCU INTERMEDIATE R&B

## 2022-03-06 PROCEDURE — 6360000002 HC RX W HCPCS: Performed by: STUDENT IN AN ORGANIZED HEALTH CARE EDUCATION/TRAINING PROGRAM

## 2022-03-06 PROCEDURE — 6360000002 HC RX W HCPCS: Performed by: INTERNAL MEDICINE

## 2022-03-06 PROCEDURE — 94761 N-INVAS EAR/PLS OXIMETRY MLT: CPT

## 2022-03-06 PROCEDURE — 2580000003 HC RX 258: Performed by: NURSE PRACTITIONER

## 2022-03-06 PROCEDURE — 6370000000 HC RX 637 (ALT 250 FOR IP): Performed by: HOSPITALIST

## 2022-03-06 PROCEDURE — 6370000000 HC RX 637 (ALT 250 FOR IP): Performed by: NURSE PRACTITIONER

## 2022-03-06 PROCEDURE — 97530 THERAPEUTIC ACTIVITIES: CPT

## 2022-03-06 PROCEDURE — 2700000000 HC OXYGEN THERAPY PER DAY

## 2022-03-06 RX ORDER — ALBUTEROL SULFATE 90 UG/1
2 AEROSOL, METERED RESPIRATORY (INHALATION) 4 TIMES DAILY
Status: DISCONTINUED | OUTPATIENT
Start: 2022-03-06 | End: 2022-03-12

## 2022-03-06 RX ADMIN — SODIUM CHLORIDE, PRESERVATIVE FREE 10 ML: 5 INJECTION INTRAVENOUS at 22:03

## 2022-03-06 RX ADMIN — LUBIPROSTONE 8 MCG: 8 CAPSULE, GELATIN COATED ORAL at 09:15

## 2022-03-06 RX ADMIN — SODIUM CHLORIDE, PRESERVATIVE FREE 10 ML: 5 INJECTION INTRAVENOUS at 09:17

## 2022-03-06 RX ADMIN — POLYETHYLENE GLYCOL (3350) 17 G: 17 POWDER, FOR SOLUTION ORAL at 08:56

## 2022-03-06 RX ADMIN — METHYLPREDNISOLONE SODIUM SUCCINATE 40 MG: 40 INJECTION, POWDER, LYOPHILIZED, FOR SOLUTION INTRAMUSCULAR; INTRAVENOUS at 13:53

## 2022-03-06 RX ADMIN — METOPROLOL 12.5 MG: 25 TABLET ORAL at 09:15

## 2022-03-06 RX ADMIN — ENOXAPARIN SODIUM 30 MG: 100 INJECTION SUBCUTANEOUS at 09:16

## 2022-03-06 RX ADMIN — SENNOSIDES AND DOCUSATE SODIUM 1 TABLET: 50; 8.6 TABLET ORAL at 09:16

## 2022-03-06 RX ADMIN — MORPHINE SULFATE 30 MG: 15 TABLET, FILM COATED, EXTENDED RELEASE ORAL at 22:02

## 2022-03-06 RX ADMIN — TRAZODONE HYDROCHLORIDE 25 MG: 50 TABLET ORAL at 22:02

## 2022-03-06 RX ADMIN — FAMOTIDINE 20 MG: 20 TABLET ORAL at 22:02

## 2022-03-06 RX ADMIN — LUBIPROSTONE 8 MCG: 8 CAPSULE, GELATIN COATED ORAL at 16:47

## 2022-03-06 RX ADMIN — MORPHINE SULFATE 30 MG: 15 TABLET, FILM COATED, EXTENDED RELEASE ORAL at 09:15

## 2022-03-06 RX ADMIN — ENOXAPARIN SODIUM 30 MG: 100 INJECTION SUBCUTANEOUS at 22:04

## 2022-03-06 RX ADMIN — GUAIFENESIN 600 MG: 600 TABLET, EXTENDED RELEASE ORAL at 22:03

## 2022-03-06 RX ADMIN — SENNOSIDES AND DOCUSATE SODIUM 1 TABLET: 50; 8.6 TABLET ORAL at 22:02

## 2022-03-06 RX ADMIN — METOPROLOL 12.5 MG: 25 TABLET ORAL at 22:02

## 2022-03-06 RX ADMIN — FLUTICASONE PROPIONATE 1 SPRAY: 50 SPRAY, METERED NASAL at 09:16

## 2022-03-06 RX ADMIN — METHYLPREDNISOLONE SODIUM SUCCINATE 40 MG: 40 INJECTION, POWDER, LYOPHILIZED, FOR SOLUTION INTRAMUSCULAR; INTRAVENOUS at 01:11

## 2022-03-06 RX ADMIN — OXYCODONE AND ACETAMINOPHEN 1 TABLET: 5; 325 TABLET ORAL at 04:03

## 2022-03-06 RX ADMIN — GUAIFENESIN 600 MG: 600 TABLET, EXTENDED RELEASE ORAL at 09:16

## 2022-03-06 RX ADMIN — OXYCODONE AND ACETAMINOPHEN 1 TABLET: 5; 325 TABLET ORAL at 16:47

## 2022-03-06 RX ADMIN — FAMOTIDINE 20 MG: 20 TABLET ORAL at 09:16

## 2022-03-06 RX ADMIN — ALBUTEROL SULFATE 2 PUFF: 90 AEROSOL, METERED RESPIRATORY (INHALATION) at 20:12

## 2022-03-06 RX ADMIN — CETIRIZINE HYDROCHLORIDE 10 MG: 10 TABLET, FILM COATED ORAL at 06:49

## 2022-03-06 RX ADMIN — Medication 2000 UNITS: at 09:16

## 2022-03-06 RX ADMIN — METHYLPREDNISOLONE SODIUM SUCCINATE 40 MG: 40 INJECTION, POWDER, LYOPHILIZED, FOR SOLUTION INTRAMUSCULAR; INTRAVENOUS at 06:49

## 2022-03-06 RX ADMIN — METHYLPREDNISOLONE SODIUM SUCCINATE 40 MG: 40 INJECTION, POWDER, LYOPHILIZED, FOR SOLUTION INTRAMUSCULAR; INTRAVENOUS at 18:29

## 2022-03-06 RX ADMIN — CYANOCOBALAMIN TAB 1000 MCG 1000 MCG: 1000 TAB at 09:15

## 2022-03-06 ASSESSMENT — PAIN SCALES - GENERAL
PAINLEVEL_OUTOF10: 8

## 2022-03-06 NOTE — PROGRESS NOTES
Physical Therapy     Physical Therapy Treatment Note  Name: Estefania Schmitt MRN: [id number] :             1945   Date:  3/6/2022   Admission Date: 2022 Room:  [address] [place] [address]-A     2nd session for OOB to [facility] then back to bed    Restrictions/Precautions:  Restrictions/Precautions  Restrictions/Precautions: Fall Risk,General Precautions,Contact Precautions      Subjective:  Patient states:  I think I have to go to the bathroom  Pain:    Location, Type, Intensity (0/10 to 10/10):  denies  Objective:    Observation:  Supine, awake, alert, agreeable w/ therapy. Impulsive, directs therapy in how she wants to perform activity. .. Tele, pulse ox, BP cuff, purwick, Vapotherm (30 L at 85% FiO2), bed alarm in place. Treatment, including education/measures:  Therapeutic Activity Training:   Therapeutic activity training was instructed today. Cues were given for safety, sequence, UE/LE placement, awareness, and balance. Activities performed today included bed mobility training, sup-sit, sit-stand, SPT. Sit <-> stand: min A for completion and steady from recliner  Stand Pivot: min A for steady ; from recliner to [facility], then BSC to bed  Inc time and effort for all OOB mobility; 2-3 min seated rest breaks x 3 t/o session to attend to SOB ; rebounding more quickly  Dependent for pericare and clothing mgmt  Sit <-> supine: mod A for LE mgmt  Scooting: max A; pt assisting w/ BL LE's  Positioned for comfort and pressure relief; call light in reach, all needs met, gait belt for OOB, tele, pulse ox, bed alarm in place     Assessment / Impression: Tolerating well, up to [facility] for success BM and back to bed. Cont to demonstrate impulsiveness and lack of insight. Needs assist from skilled care to help maintain safety.                  Patient's tolerance of treatment:  fair  Barriers to improvement:  Length of stay; O2 dependence  Plan for Next Session:    Cont w/ est POC   Progress OOB, functional strength, endurance activities as pt tolerance allows     Time in:  1305  Time out:  1335  Timed treatment minutes:  30  Total treatment time:  30 (TA x 2)     Previously filed items:  Social/Functional History  Lives With: Alone  Type of Home: House  Home Layout: One level  Home Access: Ramped entrance (railing on BL sides)  Bathroom Shower/Tub: Shower chair without back (dtr spongebathes)  Bathroom Toilet: Handicap height  Bathroom Equipment: Grab bars around toilet  Home Equipment: Crutches,Wheelchair-manual,Grab bars  Receives Help From: Family  ADL Assistance: Needs assistance (dtr assists w/ bathing, dressing, ; dtr comes M/W/F)  Homemaking Assistance: Needs assistance (dtr does shopping; pt does not cook, has meals on wheels; son does outside [place]; grand dtrs clean inside)  Homemaking Responsibilities: No (family performs most; she assists w/ what she can, mostly done seated)  Ambulation Assistance: Independent (mod ind using crutches; only short distances)  Transfer Assistance: Independent  Active : No (family)  Occupation: Retired,On disability  Additional Comments: plays cards, crochette, puzzle books, socializes w/ family; sleeps in adjustable bed w/ trapeze over for improved pt mobility  Short term goals  Time Frame for Short term goals: 1 week  Short term goal 1: pt will complete bed mobility at min A  Short term goal 2: pt will complete transfers at [facility] term goal 3: pt will ambulate 15 ft using crutches at [facility] term goal 4: pt will demonstrate near gross >3/5 BL LE strength     Electronically signed by:    Les Slater PT, DPT  3/6/2022, 14:00 PM

## 2022-03-06 NOTE — PROGRESS NOTES
Hospitalist Progress Note      Name:  Shekhar Qiu /Age/Sex: 1945  (68 y.o. female)   MRN & CSN:  [id number] & [id number] Admission Date/Time: 2022  4:46 PM   Location:  [id number] PCP: No primary care provider on file. Hospital Day: 30  Discharge barrier/Reason for continued hospitalization: Persistently hypoxemia requiring high flow. Assessment and Plan:   Shekhar Qiu is a 68 y.o.  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 The squad reported O2 sats of 74% on room air. On arrival to the ED, patient ruled in for COVID-19. Hospital course has been prolonged due to continued high oxygen requirements. 1.  COVID-19 sepsis/pneumonia, POA: Had a short period of improvement yesterday but O2 requirements exponentially increased  Repeat chest CT 3/2/2022, persistent bilateral airspace disease  Continue aggressive pulmonary toileting, incentive spirometer. Continue IV Solu-Medrol, every 6  Completed course of baricitinib. 2.  COVID-19 acute hypoxic respiratory failure: Now improving. Down to 20 L on Vapotherm from 30 L. Continue high-dose steroids as above. Repeat trial of diuretic, seem to have helped yesterday    3. Low vitamin B12 levels: Continue replacement. 4.  Hypertension: Continue low-dose Lopressor. Losartan currently on hold. 5.  Chronic pain: Continue MS Contin twice daily, Percocet every 6 as needed  6. Class II obesity: Not a candidate for counseling due to advanced age  9. Insomnia: Improved with trazodone  8. Goals of care: DNR. Patient wants to be more ambulatory despite high oxygen requirement. She understands that this may lead to cardiopulmonary arrest. In keeping with her wishes, she would not want to be resuscitated. Electronic chart updated.  Bedside RN notified. Diet ADULT ORAL NUTRITION SUPPLEMENT; Breakfast, Lunch, Dinner; Low Calorie/High Protein Oral Supplement  ADULT DIET; Regular   DVT Prophylaxis [x] Lovenox, []  Heparin, [] SCDs, [] Ambulation   GI Prophylaxis [] PPI,  [] H2 Blocker,  [] Carafate,  [x] Diet/Tube Feeds   Code Status DNR-CC   MDM [] Low, [] Moderate,[x]  High  >50% of encounter time spent in counseling/coordination of care     History of Present Illness:     Chief Complaint: <principal problem not specified>  Pippa Alaniz is a 68 y.o.  female  who presents with nausea, vomiting, fatigue of 1 week duration. Was found to be hypoxic on arrival of the squad    3/1/2022: Patient is seen and examined, bedside RN reports improvement in exertional hypoxia. She only desaturated to the low 80s today as opposed to the low 70s yesterday. Patient is happy with improvement as she wants to be able to be independent when she goes back home. 3/2/2022, patient is seen and examined, has no new complaints. She questions whether there is anything that can be done to suck out the infiltrates and or inflammation from her lungs to make her breathe better. I explained that and is not technically possible and we are using steroids to reduce inflammation. I did let her know that we will try to get a repeat chest CT today. No family member at bedside. 3/3/2022: Patient is seen and examined, does not want bedrest.  Wants to be able to get out of bed and sit in a chair. 3/4/2022: Patient is seen and examined, wants to be able to sit in a chair today. I okayed this. She is hopeful for discharge home with her current oxygen requirement, this weekend. I did let her know that we will take 1 day at a time. More likely sometime next week. 3/5/2022: Patient is seen and examined, laying in bed. Events overnight noted. Increasing O2 requirement.   She reports that she not only use a bedside commode yesterday but also walked to the chair with her crutches. It appears that she overexerted herself yesterday. I discussed with Pavan Pérez bedside RN to maintain bedrest today. Encouraged her to self proning as tolerated. 3/6/2022: Patient is seen and examined, she is in good spirits. She definitely wants to get out of bed today. She does not want to use the bedpan and tells me it bothers her hips. She would like to use the bedside commode. She tells me that she knows when she is exerting herself too much and when her oxygen is dropping. She also does not want us to escalate care in the event of a cardiorespiratory arrest. Her CODE STATUS will be updated to DNR CC at this time. Ten point ROS reviewed, negative, unless as noted above    Objective: Intake/Output Summary (Last 24 hours) at 3/6/2022 1547  Last data filed at 3/6/2022 0534  Gross per 24 hour   Intake 10 ml   Output 1100 ml   Net -1090 ml        Vitals:   Vitals:    03/06/22 0800 03/06/22 0806 03/06/22 0915 03/06/22 1532   BP:   (!) 142/61    Pulse:   74    Resp: 25 21  20   Temp: 97.6 °F (36.4 °C)      TempSrc: Oral      SpO2: 92% 92%  94%   Weight:       Height:            Physical Exam:   GEN: Awake female, alert and oriented x3 in no apparent distress. Appears given age. HEENT: Normal  RESP: Diminished BS bilaterally. Symmetric chest movement while on HFNC   CVS: RRR, S1, S2  GI/: Abdomen is soft, nontender, no organomegaly. Bowel sounds normal, rectal exam deferred. No CVA tenderness. MSK: No gross joint deformities. No tenderness  SKIN: Normal coloration, warm, dry. Bilateral lower extremity stasis dermatitis. NEURO: Cranial nerves appear grossly intact, normal speech, no lateralizing weakness.   PSYCH:  Affect appropriate normal    Medications:   Medications:    albuterol sulfate HFA  2 puff Inhalation 4x daily    methylPREDNISolone  40 mg IntraVENous Q6H    vitamin B-12  1,000 mcg Oral Daily    traZODone  25 mg Oral Nightly    lubiprostone  8 mcg Oral BID WC    metoprolol tartrate  12.5 mg Oral BID    polyethylene glycol  17 g Oral Daily    enoxaparin  30 mg SubCUTAneous BID    guaiFENesin  600 mg Oral BID    fluticasone  1 spray Each Nostril Daily    morphine  30 mg Oral 2 times per day    cetirizine  10 mg Oral Daily    [Held by provider] losartan  50 mg Oral Daily    sodium chloride flush  5-40 mL IntraVENous 2 times per day    sennosides-docusate sodium  1 tablet Oral BID    famotidine  20 mg Oral BID    Vitamin D  2,000 Units Oral Daily      Infusions:    sodium chloride 25 mL (02/09/22 0917)     PRN Meds: sodium chloride flush, 10 mL, PRN  hydrOXYzine, 25 mg, TID PRN  sodium chloride, 1 spray, Q4H PRN  albuterol sulfate HFA, 2 puff, Q4H PRN  benzocaine-menthol, 1 lozenge, Q2H PRN  sodium chloride flush, 5-40 mL, PRN  sodium chloride, 25 mL, PRN  ondansetron, 4 mg, Q6H PRN  acetaminophen, 650 mg, Q6H PRN   Or  acetaminophen, 650 mg, Q6H PRN  oxyCODONE-acetaminophen, 1 tablet, Q6H PRN          Electronically signed by Giancarlo Rivas MD on 3/6/2022 at 3:47 PM

## 2022-03-06 NOTE — PROGRESS NOTES
Pulmonary and Critical Care  Progress Note    Subjective: The patient is unchanged, comfortable in bed. On 80% HFNC. Shortness of breath is unchanged. Chest pain none  Addressing respiratory complaints Patient is negative for  hemoptysis and cyanosis  CONSTITUTIONAL:  negative for fevers and chills      Past Medical History:     has a past medical history of Chronic lower back pain, Hx of spinal fusion, Hypertension, MRSA (methicillin resistant staph aureus) culture positive, and Venous disease. has a past surgical history that includes fracture surgery and back surgery. reports that she has never smoked. She has never used smokeless tobacco. She reports that she does not drink alcohol and does not use drugs. Family history:  family history is not on file. No Known Allergies  Social History:    Reviewed; no changes    Objective:   PHYSICAL EXAM:        VITALS:  BP (!) 142/61   Pulse 74   Temp 97.6 °F (36.4 °C) (Oral)   Resp 25   Ht 5' 2.01\" (1.575 m)   Wt 186 lb 8.2 oz (84.6 kg)   SpO2 92%   BMI 34.10 kg/m²     24HR INTAKE/OUTPUT:      Intake/Output Summary (Last 24 hours) at 3/6/2022 1149  Last data filed at 3/6/2022 0534  Gross per 24 hour   Intake 10 ml   Output 1100 ml   Net -1090 ml       CONSTITUTIONAL:  Awake. LUNGS:  decreased breath sounds, basilar crackles. CARDIOVASCULAR:  normal S1 and S2 and negative JVD  ABD:Abdomen soft, non-tender. BS normal. No masses,  No organomegaly  NEURO:Alert. DATA:    CBC:  Recent Labs     03/05/22  0108   WBC 7.8   RBC 4.75   HGB 11.9*   HCT 40.1   *   MCV 84.4   MCH 25.1*   MCHC 29.7*   RDW 17.4*   SEGSPCT 83.0*   BANDSPCT 4*      BMP:  Recent Labs     03/05/22  0108      K 3.9   CL 99   CO2 27   BUN 26*   CREATININE 0.5*   CALCIUM 8.7   GLUCOSE 228*      ABG:  No results for input(s): PH, PO2ART, RCO6IFQ, HCO3, BEART, O2SAT in the last 72 hours.   Lab Results   Component Value Date    PROBNP 411.8 (H) 03/01/2022    PROBNP 286.5 02/23/2022 PROBNP 289.8 02/22/2022     No results found for: [facility]    Radiology Review:  Pertinent images / reports were reviewed as a part of this visit. Assessment:     Patient Active Problem List   Diagnosis    WD-Venous stasis dermatitis of both lower extremities    WD-Lymphedema of both lower extremities    WD-Venous stasis ulcer of right calf with fat layer exposed with varicose veins (HCC)    WD-Venous stasis ulcer of left calf with fat layer exposed with varicose veins (HCC)    COVID-19    Hypoxia    SVT (supraventricular tachycardia) (HCC)    Adjustment disorder with anxious mood       Plan:   1. Overall the patient is unchanged. 2. [place] [address] Activity. 4. Discussed with the RT.    Poli Ledesma MD  3/6/2022  11:49 AM

## 2022-03-06 NOTE — PROGRESS NOTES
Physical Therapy    Physical Therapy Treatment Note  Name: Bo Garvin MRN: [id number] :   1945   Date:  3/6/2022   Admission Date: 2022 Room:  [address] [place] [address]   Restrictions/Precautions:  Restrictions/Precautions  Restrictions/Precautions: Fall Risk,General Precautions,Contact Precautions    Communication with other providers:  RN clears pt for therapy participation  Subjective:  Patient states:  I was surprised how weak my legs were the other day when I had to get back up from the chair. Pain:   Location, Type, Intensity (0/10 to 10/10):  denies  Objective:    Observation:  Supine, awake, alert, agreeable w/ therapy. Feels more weak than usual. Tele, pulse ox, BP cuff, purwick, Vapotherm (30 L at 85% FiO2), bed alarm in place. Treatment, including education/measures:  Therapeutic Activity Training:   Therapeutic activity training was instructed today. Cues were given for safety, sequence, UE/LE placement, awareness, and balance. Activities performed today included bed mobility training, sup-sit, sit-stand, SPT. Supine<->sit: mod A for initiation and completion  Seated balance: good ; positioned to sit at EOB for lunch, on return from nsg handoff pt wants to be seated in recliner  Sit <-> stand:min A for completion and steady   Stand step: using BL crutches, at [facility]; impulsive and dec ability attending to cues requiring reset and further education to improve pt safety  Stand<->sit: min A for control  Positioned for comfort and pressure relief; call light inreach, all needs met, gait belt for OOB, tele, pulse ox, bed alarm in place    Assessment / Impression: Tolerating well, requesting to sit in recliner for lunch; tray in front of pt. Cont w/ impaired strength, endurance, functional mobility, impulsivity, and high O2 needs. Would greatly benefit from skilled placement to promote return to more ind PLOF prior to return home.      Patient's tolerance of treatment:  fair  Barriers to improvement:

## 2022-03-06 NOTE — PLAN OF CARE
Problem: Falls - Risk of:  Goal: Will remain free from falls  Description: Will remain free from falls  Outcome: Ongoing  Goal: Absence of physical injury  Description: Absence of physical injury  Outcome: Ongoing     Problem: Airway Clearance - Ineffective  Goal: Achieve or maintain patent airway  Outcome: Ongoing     Problem: Gas Exchange - Impaired  Goal: Absence of hypoxia  Outcome: Ongoing  Goal: Promote optimal lung function  Outcome: Ongoing     Problem: Breathing Pattern - Ineffective  Goal: Ability to achieve and maintain a regular respiratory rate  Outcome: Ongoing     Problem: Body Temperature -  Risk of, Imbalanced  Goal: Ability to maintain a body temperature within defined limits  Outcome: Ongoing  Goal: Will regain or maintain usual level of consciousness  Outcome: Ongoing  Goal: Complications related to the disease process, condition or treatment will be avoided or minimized  Outcome: Ongoing     Problem: Nutrition Deficits  Goal: Optimize nutritional status  Outcome: Ongoing     Problem: Risk for Fluid Volume Deficit  Goal: Maintain normal heart rhythm  Outcome: Ongoing  Goal: Maintain absence of muscle cramping  Outcome: Ongoing  Goal: Maintain normal serum potassium, sodium, calcium, phosphorus, and pH  Outcome: Ongoing     Problem: Loneliness or Risk for Loneliness  Goal: Demonstrate positive use of time alone when socialization is not possible  Outcome: Ongoing     Problem: Fatigue  Goal: Verbalize increase energy and improved vitality  Outcome: Ongoing     Problem: Patient Education: Go to Patient Education Activity  Goal: Patient/Family Education  Outcome: Ongoing     Problem: Pain:  Description: Pain management should include both nonpharmacologic and pharmacologic interventions.   Goal: Pain level will decrease  Description: Pain level will decrease  Outcome: Ongoing  Goal: Control of acute pain  Description: Control of acute pain  Outcome: Ongoing  Goal: Control of chronic pain  Description: Control of chronic pain  Outcome: Ongoing     Problem: Skin Integrity:  Goal: Will show no infection signs and symptoms  Description: Will show no infection signs and symptoms  Outcome: Ongoing  Goal: Absence of new skin breakdown  Description: Absence of new skin breakdown  Outcome: Ongoing     Problem: Nutrition  Goal: Optimal nutrition therapy  Outcome: Ongoing

## 2022-03-07 LAB
ALBUMIN SERPL-MCNC: 3.2 GM/DL (ref 3.4–5)
ALP BLD-CCNC: 58 IU/L (ref 40–128)
ALT SERPL-CCNC: 41 U/L (ref 10–40)
ANION GAP SERPL CALCULATED.3IONS-SCNC: 13 MMOL/L (ref 4–16)
AST SERPL-CCNC: 17 IU/L (ref 15–37)
BILIRUB SERPL-MCNC: 0.2 MG/DL (ref 0–1)
BUN BLDV-MCNC: 26 MG/DL (ref 6–23)
CALCIUM SERPL-MCNC: 8.7 MG/DL (ref 8.3–10.6)
CHLORIDE BLD-SCNC: 100 MMOL/L (ref 99–110)
CO2: 25 MMOL/L (ref 21–32)
CREAT SERPL-MCNC: 0.5 MG/DL (ref 0.6–1.1)
DIFFERENTIAL TYPE: ABNORMAL
GFR AFRICAN AMERICAN: >60 ML/MIN/1.73M2
GFR NON-AFRICAN AMERICAN: >60 ML/MIN/1.73M2
GLUCOSE BLD-MCNC: 233 MG/DL (ref 70–99)
HCT VFR BLD CALC: 40 % (ref 37–47)
HEMOGLOBIN: 11.9 GM/DL (ref 12.5–16)
LYMPHOCYTES ABSOLUTE: 0.6 K/CU MM
LYMPHOCYTES RELATIVE PERCENT: 8 % (ref 24–44)
MAGNESIUM: 2 MG/DL (ref 1.8–2.4)
MCH RBC QN AUTO: 25.1 PG (ref 27–31)
MCHC RBC AUTO-ENTMCNC: 29.8 % (ref 32–36)
MCV RBC AUTO: 84.2 FL (ref 78–100)
MONOCYTES ABSOLUTE: 0.3 K/CU MM
MONOCYTES RELATIVE PERCENT: 4 % (ref 0–4)
PDW BLD-RTO: 18.1 % (ref 11.7–14.9)
PHOSPHORUS: 3.1 MG/DL (ref 2.5–4.9)
PLATELET # BLD: 108 K/CU MM (ref 140–440)
PMV BLD AUTO: 10.5 FL (ref 7.5–11.1)
POTASSIUM SERPL-SCNC: 4.2 MMOL/L (ref 3.5–5.1)
RBC # BLD: 4.75 M/CU MM (ref 4.2–5.4)
SEGMENTED NEUTROPHILS ABSOLUTE COUNT: 7 K/CU MM
SEGMENTED NEUTROPHILS RELATIVE PERCENT: 88 % (ref 36–66)
SODIUM BLD-SCNC: 138 MMOL/L (ref 135–145)
TOTAL PROTEIN: 5.3 GM/DL (ref 6.4–8.2)
WBC # BLD: 7.9 K/CU MM (ref 4–10.5)

## 2022-03-07 PROCEDURE — 6370000000 HC RX 637 (ALT 250 FOR IP): Performed by: HOSPITALIST

## 2022-03-07 PROCEDURE — 6370000000 HC RX 637 (ALT 250 FOR IP): Performed by: NURSE PRACTITIONER

## 2022-03-07 PROCEDURE — 84100 ASSAY OF PHOSPHORUS: CPT

## 2022-03-07 PROCEDURE — 6370000000 HC RX 637 (ALT 250 FOR IP): Performed by: STUDENT IN AN ORGANIZED HEALTH CARE EDUCATION/TRAINING PROGRAM

## 2022-03-07 PROCEDURE — 85007 BL SMEAR W/DIFF WBC COUNT: CPT

## 2022-03-07 PROCEDURE — 2700000000 HC OXYGEN THERAPY PER DAY

## 2022-03-07 PROCEDURE — 94150 VITAL CAPACITY TEST: CPT

## 2022-03-07 PROCEDURE — 6370000000 HC RX 637 (ALT 250 FOR IP): Performed by: INTERNAL MEDICINE

## 2022-03-07 PROCEDURE — 2140000000 HC CCU INTERMEDIATE R&B

## 2022-03-07 PROCEDURE — 94761 N-INVAS EAR/PLS OXIMETRY MLT: CPT

## 2022-03-07 PROCEDURE — 80053 COMPREHEN METABOLIC PANEL: CPT

## 2022-03-07 PROCEDURE — 6360000002 HC RX W HCPCS: Performed by: STUDENT IN AN ORGANIZED HEALTH CARE EDUCATION/TRAINING PROGRAM

## 2022-03-07 PROCEDURE — 94640 AIRWAY INHALATION TREATMENT: CPT

## 2022-03-07 PROCEDURE — 2580000003 HC RX 258: Performed by: NURSE PRACTITIONER

## 2022-03-07 PROCEDURE — 6360000002 HC RX W HCPCS: Performed by: INTERNAL MEDICINE

## 2022-03-07 PROCEDURE — 83735 ASSAY OF MAGNESIUM: CPT

## 2022-03-07 PROCEDURE — 36415 COLL VENOUS BLD VENIPUNCTURE: CPT

## 2022-03-07 PROCEDURE — 94664 DEMO&/EVAL PT USE INHALER: CPT

## 2022-03-07 PROCEDURE — 85027 COMPLETE CBC AUTOMATED: CPT

## 2022-03-07 RX ORDER — GUAIFENESIN 100 MG/5ML
200 SOLUTION ORAL EVERY 4 HOURS
Status: DISCONTINUED | OUTPATIENT
Start: 2022-03-07 | End: 2022-03-17 | Stop reason: HOSPADM

## 2022-03-07 RX ADMIN — GUAIFENESIN 200 MG: 200 SOLUTION ORAL at 14:36

## 2022-03-07 RX ADMIN — OXYCODONE AND ACETAMINOPHEN 1 TABLET: 5; 325 TABLET ORAL at 14:36

## 2022-03-07 RX ADMIN — ENOXAPARIN SODIUM 30 MG: 100 INJECTION SUBCUTANEOUS at 08:44

## 2022-03-07 RX ADMIN — ALBUTEROL SULFATE 2 PUFF: 90 AEROSOL, METERED RESPIRATORY (INHALATION) at 15:31

## 2022-03-07 RX ADMIN — ALBUTEROL SULFATE 2 PUFF: 90 AEROSOL, METERED RESPIRATORY (INHALATION) at 19:17

## 2022-03-07 RX ADMIN — Medication 2000 UNITS: at 08:44

## 2022-03-07 RX ADMIN — FLUTICASONE PROPIONATE 1 SPRAY: 50 SPRAY, METERED NASAL at 08:45

## 2022-03-07 RX ADMIN — FAMOTIDINE 20 MG: 20 TABLET ORAL at 20:42

## 2022-03-07 RX ADMIN — GUAIFENESIN 200 MG: 200 SOLUTION ORAL at 22:00

## 2022-03-07 RX ADMIN — FAMOTIDINE 20 MG: 20 TABLET ORAL at 08:44

## 2022-03-07 RX ADMIN — GUAIFENESIN 200 MG: 200 SOLUTION ORAL at 18:22

## 2022-03-07 RX ADMIN — METOPROLOL 12.5 MG: 25 TABLET ORAL at 08:44

## 2022-03-07 RX ADMIN — OXYCODONE AND ACETAMINOPHEN 1 TABLET: 5; 325 TABLET ORAL at 03:49

## 2022-03-07 RX ADMIN — CETIRIZINE HYDROCHLORIDE 10 MG: 10 TABLET, FILM COATED ORAL at 08:44

## 2022-03-07 RX ADMIN — ALBUTEROL SULFATE 2 PUFF: 90 AEROSOL, METERED RESPIRATORY (INHALATION) at 11:41

## 2022-03-07 RX ADMIN — GUAIFENESIN 600 MG: 600 TABLET, EXTENDED RELEASE ORAL at 08:44

## 2022-03-07 RX ADMIN — MORPHINE SULFATE 30 MG: 15 TABLET, FILM COATED, EXTENDED RELEASE ORAL at 20:43

## 2022-03-07 RX ADMIN — LUBIPROSTONE 8 MCG: 8 CAPSULE, GELATIN COATED ORAL at 08:44

## 2022-03-07 RX ADMIN — SODIUM CHLORIDE, PRESERVATIVE FREE 10 ML: 5 INJECTION INTRAVENOUS at 08:45

## 2022-03-07 RX ADMIN — SENNOSIDES AND DOCUSATE SODIUM 1 TABLET: 50; 8.6 TABLET ORAL at 22:55

## 2022-03-07 RX ADMIN — MORPHINE SULFATE 30 MG: 15 TABLET, FILM COATED, EXTENDED RELEASE ORAL at 08:44

## 2022-03-07 RX ADMIN — TRAZODONE HYDROCHLORIDE 25 MG: 50 TABLET ORAL at 20:42

## 2022-03-07 RX ADMIN — SODIUM CHLORIDE, PRESERVATIVE FREE 10 ML: 5 INJECTION INTRAVENOUS at 20:44

## 2022-03-07 RX ADMIN — ALBUTEROL SULFATE 2 PUFF: 90 AEROSOL, METERED RESPIRATORY (INHALATION) at 07:39

## 2022-03-07 RX ADMIN — POLYETHYLENE GLYCOL (3350) 17 G: 17 POWDER, FOR SOLUTION ORAL at 08:44

## 2022-03-07 RX ADMIN — ENOXAPARIN SODIUM 30 MG: 100 INJECTION SUBCUTANEOUS at 20:42

## 2022-03-07 RX ADMIN — METHYLPREDNISOLONE SODIUM SUCCINATE 40 MG: 40 INJECTION, POWDER, LYOPHILIZED, FOR SOLUTION INTRAMUSCULAR; INTRAVENOUS at 12:14

## 2022-03-07 RX ADMIN — LUBIPROSTONE 8 MCG: 8 CAPSULE, GELATIN COATED ORAL at 18:22

## 2022-03-07 RX ADMIN — METHYLPREDNISOLONE SODIUM SUCCINATE 40 MG: 40 INJECTION, POWDER, LYOPHILIZED, FOR SOLUTION INTRAMUSCULAR; INTRAVENOUS at 18:22

## 2022-03-07 RX ADMIN — CYANOCOBALAMIN TAB 1000 MCG 1000 MCG: 1000 TAB at 08:43

## 2022-03-07 RX ADMIN — METOPROLOL 12.5 MG: 25 TABLET ORAL at 20:43

## 2022-03-07 ASSESSMENT — PAIN SCALES - GENERAL
PAINLEVEL_OUTOF10: 6
PAINLEVEL_OUTOF10: 8
PAINLEVEL_OUTOF10: 7
PAINLEVEL_OUTOF10: 0

## 2022-03-07 NOTE — PROGRESS NOTES
Pulmonary and Critical Care  Progress Note    Subjective: The patient is feeling better, comfortable in bed. On Vapotherm 70%. Shortness of breath is better. Chest pain none  Addressing respiratory complaints Patient is negative for  hemoptysis and cyanosis  CONSTITUTIONAL:  negative for fevers and chills      Past Medical History:     has a past medical history of Chronic lower back pain, Hx of spinal fusion, Hypertension, MRSA (methicillin resistant staph aureus) culture positive, and Venous disease. has a past surgical history that includes fracture surgery and back surgery. reports that she has never smoked. She has never used smokeless tobacco. She reports that she does not drink alcohol and does not use drugs. Family history:  family history is not on file. No Known Allergies  Social History:    Reviewed; no changes    Objective:   PHYSICAL EXAM:        VITALS:  BP (!) 149/68   Pulse 86   Temp 98 °F (36.7 °C) (Oral)   Resp 21   Ht 5' 2.01\" (1.575 m)   Wt 186 lb 8.2 oz (84.6 kg)   SpO2 90%   BMI 34.10 kg/m²     24HR INTAKE/OUTPUT:      Intake/Output Summary (Last 24 hours) at 3/7/2022 1135  Last data filed at 3/7/2022 0650  Gross per 24 hour   Intake 10 ml   Output 1700 ml   Net -1690 ml       CONSTITUTIONAL:  Awake. LUNGS:  decreased breath sounds, basilar crackles. CARDIOVASCULAR:  normal S1 and S2 and negative JVD  ABD:Abdomen soft, non-tender. BS normal. No masses,  No organomegaly  NEURO:Alert.   DATA:    CBC:  Recent Labs     03/05/22 0108 03/07/22 0208   WBC 7.8 7.9   RBC 4.75 4.75   HGB 11.9* 11.9*   HCT 40.1 40.0   * 108*   MCV 84.4 84.2   MCH 25.1* 25.1*   MCHC 29.7* 29.8*   RDW 17.4* 18.1*   SEGSPCT 83.0* 88.0*   BANDSPCT 4*  --       BMP:  Recent Labs     03/05/22 0108 03/07/22 0208    138   K 3.9 4.2   CL 99 100   CO2 27 25   BUN 26* 26*   CREATININE 0.5* 0.5*   CALCIUM 8.7 8.7   GLUCOSE 228* 233*      ABG:  No results for input(s): PH, PO2ART, LSC2YII, HCO3, BEART, O2SAT in the last 72 hours. Lab Results   Component Value Date    PROBNP 411.8 (H) 03/01/2022    PROBNP 286.5 02/23/2022    PROBNP 289.8 02/22/2022     No results found for: [facility]    Radiology Review:  Pertinent images / reports were reviewed as a part of this visit. Assessment:     Patient Active Problem List   Diagnosis    WD-Venous stasis dermatitis of both lower extremities    WD-Lymphedema of both lower extremities    WD-Venous stasis ulcer of right calf with fat layer exposed with varicose veins (HCC)    WD-Venous stasis ulcer of left calf with fat layer exposed with varicose veins (HCC)    COVID-19    Hypoxia    SVT (supraventricular tachycardia) (HCC)    Adjustment disorder with anxious mood       Plan:   1. Overall the patient is better. 2. [place] [address] Activity.   David Jean MD  3/7/2022  11:35 AM

## 2022-03-07 NOTE — PROGRESS NOTES
Hospitalist Progress Note      Name:  Al Case /Age/Sex: 1945  (68 y.o. female)   MRN & CSN:  [id number] & [id number] Admission Date/Time: 2022  4:46 PM   Location:  [address] [place] [address] PCP: No primary care provider on file. Hospital Day: 32  Discharge barrier/Reason for continued hospitalization: Persistently hypoxemia requiring high flow. Assessment and Plan:   Al Case is a 68 y.o.  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 The squad reported O2 sats of 74% on room air. On arrival to the ED, patient ruled in for COVID-19. Hospital course has been prolonged due to continued high oxygen requirements. 1.  COVID-19 sepsis/pneumonia, POA: Had a short period of improvement yesterday but O2 requirements exponentially increased  Repeat chest CT 3/2/2022, persistent bilateral airspace disease  Continue aggressive pulmonary toileting, incentive spirometer. Continue IV Solu-Medrol, every 6  Completed course of baricitinib. 2.  COVID-19 acute hypoxic respiratory failure: Now improving. Continue to wean as tolerated. Down to 50% FiO2 today. Continue high-dose steroids as above. 3. Low vitamin B12 levels: Continue replacement. 4.  Hypertension: Continue low-dose Lopressor. Losartan currently on hold. 5.  Chronic pain: Continue MS Contin twice daily, Percocet every 6 as needed  6. Class II obesity: Not a candidate for counseling due to advanced age  9. Insomnia: Improved with trazodone  8. Goals of care: DNR. See progress note 3/6/2022    Diet ADULT ORAL NUTRITION SUPPLEMENT; Breakfast, Lunch, Dinner; Low Calorie/High Protein Oral Supplement  ADULT DIET;  Regular   DVT Prophylaxis [x] Lovenox, []  Heparin, [] SCDs, [] Ambulation   GI Prophylaxis [] PPI,  [] H2 Blocker,  [] Carafate,  [x] Diet/Tube Feeds   Code Status DNR-CC   MDM [] Low, [x] Moderate,[]  High       History of Present Illness:     Chief Complaint: <principal problem not specified>  Jerad Cleaning is a 68 y.o.  female  who presents with nausea, vomiting, fatigue of 1 week duration. Was found to be hypoxic on arrival of the squad    3/1/2022: Patient is seen and examined, bedside RN reports improvement in exertional hypoxia. She only desaturated to the low 80s today as opposed to the low 70s yesterday. Patient is happy with improvement as she wants to be able to be independent when she goes back home. 3/2/2022, patient is seen and examined, has no new complaints. She questions whether there is anything that can be done to suck out the infiltrates and or inflammation from her lungs to make her breathe better. I explained that and is not technically possible and we are using steroids to reduce inflammation. I did let her know that we will try to get a repeat chest CT today. No family member at bedside. 3/3/2022: Patient is seen and examined, does not want bedrest.  Wants to be able to get out of bed and sit in a chair. 3/4/2022: Patient is seen and examined, wants to be able to sit in a chair today. I okayed this. She is hopeful for discharge home with her current oxygen requirement, this weekend. I did let her know that we will take 1 day at a time. More likely sometime next week. 3/5/2022: Patient is seen and examined, laying in bed. Events overnight noted. Increasing O2 requirement. She reports that she not only use a bedside commode yesterday but also walked to the chair with her crutches. It appears that she overexerted herself yesterday. I discussed with Oliver Brar bedside RN to maintain bedrest today. Encouraged her to self proning as tolerated. 3/6/2022: Patient is seen and examined, she is in good spirits. She definitely wants to get out of bed today.  She does not want to use the bedpan and tells me it bothers her hips. She would like to use the bedside commode. She tells me that she knows when she is exerting herself too much and when her oxygen is dropping. She also does not want us to escalate care in the event of a cardiorespiratory arrest. Her CODE STATUS will be updated to DNR CC at this time. 3/7/2022: Patient is seen and examined, has no new complaints. She is now more realistic. She knows that she might be in here for another week    Ten point ROS reviewed, negative, unless as noted above    Objective: Intake/Output Summary (Last 24 hours) at 3/7/2022 1534  Last data filed at 3/7/2022 0650  Gross per 24 hour   Intake 10 ml   Output 1700 ml   Net -1690 ml        Vitals:   Vitals:    03/07/22 0841 03/07/22 1130 03/07/22 1143 03/07/22 1353   BP: (!) 142/59 (!) 149/68  123/89   Pulse: 76 86  72   Resp: 20 21  20   Temp: 98.1 °F (36.7 °C) 98 °F (36.7 °C)  98 °F (36.7 °C)   TempSrc: Oral Oral  Oral   SpO2: 97% 90% 91% 94%   Weight:       Height:            Physical Exam:   GEN: Awake female, alert and oriented x3 in no apparent distress. Appears given age. HEENT: Normal  RESP: Diminished BS bilaterally. Symmetric chest movement while on HFNC   CVS: RRR, S1, S2  GI/: Abdomen is soft, nontender, no organomegaly. Bowel sounds normal, rectal exam deferred. No CVA tenderness. MSK: No gross joint deformities. No tenderness  SKIN: Normal coloration, warm, dry. Bilateral lower extremity stasis dermatitis. NEURO: Cranial nerves appear grossly intact, normal speech, no lateralizing weakness.   PSYCH:  Affect appropriate normal    Medications:   Medications:    guaiFENesin  200 mg Oral Q4H    albuterol sulfate HFA  2 puff Inhalation 4x daily    methylPREDNISolone  40 mg IntraVENous Q6H    vitamin B-12  1,000 mcg Oral Daily    traZODone  25 mg Oral Nightly    lubiprostone  8 mcg Oral BID WC    metoprolol tartrate  12.5 mg Oral BID    polyethylene glycol  17 g Oral Daily    enoxaparin  30 mg SubCUTAneous BID    fluticasone  1 spray Each Nostril Daily    morphine  30 mg Oral 2 times per day    cetirizine  10 mg Oral Daily    [Held by provider] losartan  50 mg Oral Daily    sodium chloride flush  5-40 mL IntraVENous 2 times per day    sennosides-docusate sodium  1 tablet Oral BID    famotidine  20 mg Oral BID    Vitamin D  2,000 Units Oral Daily      Infusions:    sodium chloride 25 mL (02/09/22 0917)     PRN Meds: sodium chloride flush, 10 mL, PRN  hydrOXYzine, 25 mg, TID PRN  sodium chloride, 1 spray, Q4H PRN  albuterol sulfate HFA, 2 puff, Q4H PRN  benzocaine-menthol, 1 lozenge, Q2H PRN  sodium chloride flush, 5-40 mL, PRN  sodium chloride, 25 mL, PRN  ondansetron, 4 mg, Q6H PRN  acetaminophen, 650 mg, Q6H PRN   Or  acetaminophen, 650 mg, Q6H PRN  oxyCODONE-acetaminophen, 1 tablet, Q6H PRN          Electronically signed by Lea Ascencio MD on 3/7/2022 at 3:34 PM

## 2022-03-07 NOTE — PLAN OF CARE
Problem: Falls - Risk of:  Goal: Will remain free from falls  Description: Will remain free from falls  Outcome: Ongoing  Goal: Absence of physical injury  Description: Absence of physical injury  Outcome: Ongoing     Problem: Airway Clearance - Ineffective  Goal: Achieve or maintain patent airway  Outcome: Ongoing     Problem: Gas Exchange - Impaired  Goal: Absence of hypoxia  Outcome: Ongoing  Goal: Promote optimal lung function  Outcome: Ongoing     Problem: Breathing Pattern - Ineffective  Goal: Ability to achieve and maintain a regular respiratory rate  Outcome: Ongoing     Problem:  Body Temperature -  Risk of, Imbalanced  Goal: Ability to maintain a body temperature within defined limits  Outcome: Ongoing  Goal: Will regain or maintain usual level of consciousness  Outcome: Ongoing  Goal: Complications related to the disease process, condition or treatment will be avoided or minimized  Outcome: Ongoing     Problem: Nutrition Deficits  Goal: Optimize nutritional status  Outcome: Ongoing     Problem: Risk for Fluid Volume Deficit  Goal: Maintain normal heart rhythm  Outcome: Ongoing  Goal: Maintain absence of muscle cramping  Outcome: Ongoing  Goal: Maintain normal serum potassium, sodium, calcium, phosphorus, and pH  Outcome: Ongoing     Problem: Loneliness or Risk for Loneliness  Goal: Demonstrate positive use of time alone when socialization is not possible  Outcome: Ongoing     Problem: Patient Education: Go to Patient Education Activity  Goal: Patient/Family Education  Outcome: Ongoing     Problem: Fatigue  Goal: Verbalize increase energy and improved vitality  Outcome: Ongoing     Problem: Pain:  Goal: Pain level will decrease  Description: Pain level will decrease  Outcome: Ongoing  Goal: Control of acute pain  Description: Control of acute pain  Outcome: Ongoing  Goal: Control of chronic pain  Description: Control of chronic pain  Outcome: Ongoing    Electronically signed by Elizabeth Briceno RN on 3/7/22 at 1:26 AM EST

## 2022-03-07 NOTE — PROGRESS NOTES
Patient instructed and educated on Leadhit. Patient able to do 1000 ml. Vital capacity. Patient's goal [id number].  Electronically signed by Jonh Marroquin RCP on 3/7/2022 at 7:44 AM

## 2022-03-08 LAB
ANION GAP SERPL CALCULATED.3IONS-SCNC: 13 MMOL/L (ref 4–16)
ANISOCYTOSIS: ABNORMAL
BANDED NEUTROPHILS ABSOLUTE COUNT: 0.44 K/CU MM
BANDED NEUTROPHILS RELATIVE PERCENT: 7 % (ref 5–11)
BUN BLDV-MCNC: 21 MG/DL (ref 6–23)
CALCIUM SERPL-MCNC: 8.3 MG/DL (ref 8.3–10.6)
CHLORIDE BLD-SCNC: 98 MMOL/L (ref 99–110)
CO2: 26 MMOL/L (ref 21–32)
CREAT SERPL-MCNC: 0.5 MG/DL (ref 0.6–1.1)
DIFFERENTIAL TYPE: ABNORMAL
DOHLE BODIES: PRESENT
GFR AFRICAN AMERICAN: >60 ML/MIN/1.73M2
GFR NON-AFRICAN AMERICAN: >60 ML/MIN/1.73M2
GLUCOSE BLD-MCNC: 213 MG/DL (ref 70–99)
HCT VFR BLD CALC: 38.5 % (ref 37–47)
HEMOGLOBIN: 11.6 GM/DL (ref 12.5–16)
LYMPHOCYTES ABSOLUTE: 0.4 K/CU MM
LYMPHOCYTES RELATIVE PERCENT: 7 % (ref 24–44)
MCH RBC QN AUTO: 25.8 PG (ref 27–31)
MCHC RBC AUTO-ENTMCNC: 30.1 % (ref 32–36)
MCV RBC AUTO: 85.6 FL (ref 78–100)
METAMYELOCYTES ABSOLUTE COUNT: 0.13 K/CU MM
METAMYELOCYTES PERCENT: 2 %
MONOCYTES ABSOLUTE: 0.5 K/CU MM
MONOCYTES RELATIVE PERCENT: 8 % (ref 0–4)
PDW BLD-RTO: 18.5 % (ref 11.7–14.9)
PLATELET # BLD: 99 K/CU MM (ref 140–440)
PMV BLD AUTO: 12.3 FL (ref 7.5–11.1)
POTASSIUM SERPL-SCNC: 4.3 MMOL/L (ref 3.5–5.1)
RBC # BLD: 4.5 M/CU MM (ref 4.2–5.4)
SEGMENTED NEUTROPHILS ABSOLUTE COUNT: 4.8 K/CU MM
SEGMENTED NEUTROPHILS RELATIVE PERCENT: 76 % (ref 36–66)
SODIUM BLD-SCNC: 137 MMOL/L (ref 135–145)
TOXIC GRANULATION: PRESENT
WBC # BLD: 6.3 K/CU MM (ref 4–10.5)

## 2022-03-08 PROCEDURE — 6360000002 HC RX W HCPCS: Performed by: INTERNAL MEDICINE

## 2022-03-08 PROCEDURE — 6370000000 HC RX 637 (ALT 250 FOR IP): Performed by: NURSE PRACTITIONER

## 2022-03-08 PROCEDURE — 89220 SPUTUM SPECIMEN COLLECTION: CPT

## 2022-03-08 PROCEDURE — 94640 AIRWAY INHALATION TREATMENT: CPT

## 2022-03-08 PROCEDURE — 2580000003 HC RX 258: Performed by: NURSE PRACTITIONER

## 2022-03-08 PROCEDURE — 80048 BASIC METABOLIC PNL TOTAL CA: CPT

## 2022-03-08 PROCEDURE — 94150 VITAL CAPACITY TEST: CPT

## 2022-03-08 PROCEDURE — 2700000000 HC OXYGEN THERAPY PER DAY

## 2022-03-08 PROCEDURE — 85007 BL SMEAR W/DIFF WBC COUNT: CPT

## 2022-03-08 PROCEDURE — 85027 COMPLETE CBC AUTOMATED: CPT

## 2022-03-08 PROCEDURE — 94761 N-INVAS EAR/PLS OXIMETRY MLT: CPT

## 2022-03-08 PROCEDURE — 97530 THERAPEUTIC ACTIVITIES: CPT

## 2022-03-08 PROCEDURE — 6370000000 HC RX 637 (ALT 250 FOR IP): Performed by: INTERNAL MEDICINE

## 2022-03-08 PROCEDURE — 2140000000 HC CCU INTERMEDIATE R&B

## 2022-03-08 PROCEDURE — 6370000000 HC RX 637 (ALT 250 FOR IP): Performed by: HOSPITALIST

## 2022-03-08 PROCEDURE — 6360000002 HC RX W HCPCS: Performed by: STUDENT IN AN ORGANIZED HEALTH CARE EDUCATION/TRAINING PROGRAM

## 2022-03-08 PROCEDURE — 36415 COLL VENOUS BLD VENIPUNCTURE: CPT

## 2022-03-08 RX ADMIN — CYANOCOBALAMIN TAB 1000 MCG 1000 MCG: 1000 TAB at 08:12

## 2022-03-08 RX ADMIN — METOPROLOL 12.5 MG: 25 TABLET ORAL at 08:11

## 2022-03-08 RX ADMIN — METOPROLOL 12.5 MG: 25 TABLET ORAL at 20:11

## 2022-03-08 RX ADMIN — METHYLPREDNISOLONE SODIUM SUCCINATE 40 MG: 40 INJECTION, POWDER, LYOPHILIZED, FOR SOLUTION INTRAMUSCULAR; INTRAVENOUS at 23:26

## 2022-03-08 RX ADMIN — CETIRIZINE HYDROCHLORIDE 10 MG: 10 TABLET, FILM COATED ORAL at 05:44

## 2022-03-08 RX ADMIN — OXYCODONE AND ACETAMINOPHEN 1 TABLET: 5; 325 TABLET ORAL at 00:45

## 2022-03-08 RX ADMIN — Medication 2000 UNITS: at 08:12

## 2022-03-08 RX ADMIN — ENOXAPARIN SODIUM 30 MG: 100 INJECTION SUBCUTANEOUS at 08:11

## 2022-03-08 RX ADMIN — OXYCODONE AND ACETAMINOPHEN 1 TABLET: 5; 325 TABLET ORAL at 06:43

## 2022-03-08 RX ADMIN — FLUTICASONE PROPIONATE 1 SPRAY: 50 SPRAY, METERED NASAL at 08:12

## 2022-03-08 RX ADMIN — FAMOTIDINE 20 MG: 20 TABLET ORAL at 08:11

## 2022-03-08 RX ADMIN — METHYLPREDNISOLONE SODIUM SUCCINATE 40 MG: 40 INJECTION, POWDER, LYOPHILIZED, FOR SOLUTION INTRAMUSCULAR; INTRAVENOUS at 12:23

## 2022-03-08 RX ADMIN — GUAIFENESIN 200 MG: 200 SOLUTION ORAL at 14:17

## 2022-03-08 RX ADMIN — METHYLPREDNISOLONE SODIUM SUCCINATE 40 MG: 40 INJECTION, POWDER, LYOPHILIZED, FOR SOLUTION INTRAMUSCULAR; INTRAVENOUS at 17:37

## 2022-03-08 RX ADMIN — GUAIFENESIN 200 MG: 200 SOLUTION ORAL at 05:44

## 2022-03-08 RX ADMIN — ALBUTEROL SULFATE 2 PUFF: 90 AEROSOL, METERED RESPIRATORY (INHALATION) at 08:03

## 2022-03-08 RX ADMIN — MORPHINE SULFATE 30 MG: 15 TABLET, FILM COATED, EXTENDED RELEASE ORAL at 20:12

## 2022-03-08 RX ADMIN — METHYLPREDNISOLONE SODIUM SUCCINATE 40 MG: 40 INJECTION, POWDER, LYOPHILIZED, FOR SOLUTION INTRAMUSCULAR; INTRAVENOUS at 05:44

## 2022-03-08 RX ADMIN — METHYLPREDNISOLONE SODIUM SUCCINATE 40 MG: 40 INJECTION, POWDER, LYOPHILIZED, FOR SOLUTION INTRAMUSCULAR; INTRAVENOUS at 00:43

## 2022-03-08 RX ADMIN — LUBIPROSTONE 8 MCG: 8 CAPSULE, GELATIN COATED ORAL at 08:11

## 2022-03-08 RX ADMIN — TRAZODONE HYDROCHLORIDE 25 MG: 50 TABLET ORAL at 20:11

## 2022-03-08 RX ADMIN — MORPHINE SULFATE 30 MG: 15 TABLET, FILM COATED, EXTENDED RELEASE ORAL at 08:11

## 2022-03-08 RX ADMIN — GUAIFENESIN 200 MG: 200 SOLUTION ORAL at 17:37

## 2022-03-08 RX ADMIN — GUAIFENESIN 200 MG: 200 SOLUTION ORAL at 09:28

## 2022-03-08 RX ADMIN — ALBUTEROL SULFATE 2 PUFF: 90 AEROSOL, METERED RESPIRATORY (INHALATION) at 11:33

## 2022-03-08 RX ADMIN — GUAIFENESIN 200 MG: 200 SOLUTION ORAL at 02:02

## 2022-03-08 RX ADMIN — ALBUTEROL SULFATE 2 PUFF: 90 AEROSOL, METERED RESPIRATORY (INHALATION) at 15:30

## 2022-03-08 RX ADMIN — LUBIPROSTONE 8 MCG: 8 CAPSULE, GELATIN COATED ORAL at 17:37

## 2022-03-08 RX ADMIN — ALBUTEROL SULFATE 2 PUFF: 90 AEROSOL, METERED RESPIRATORY (INHALATION) at 20:53

## 2022-03-08 RX ADMIN — SENNOSIDES AND DOCUSATE SODIUM 1 TABLET: 50; 8.6 TABLET ORAL at 20:11

## 2022-03-08 RX ADMIN — SENNOSIDES AND DOCUSATE SODIUM 1 TABLET: 50; 8.6 TABLET ORAL at 08:11

## 2022-03-08 RX ADMIN — POLYETHYLENE GLYCOL (3350) 17 G: 17 POWDER, FOR SOLUTION ORAL at 08:11

## 2022-03-08 RX ADMIN — SODIUM CHLORIDE, PRESERVATIVE FREE 10 ML: 5 INJECTION INTRAVENOUS at 20:12

## 2022-03-08 RX ADMIN — ENOXAPARIN SODIUM 30 MG: 100 INJECTION SUBCUTANEOUS at 20:12

## 2022-03-08 RX ADMIN — FAMOTIDINE 20 MG: 20 TABLET ORAL at 20:12

## 2022-03-08 RX ADMIN — SODIUM CHLORIDE, PRESERVATIVE FREE 10 ML: 5 INJECTION INTRAVENOUS at 08:11

## 2022-03-08 ASSESSMENT — PAIN DESCRIPTION - FREQUENCY
FREQUENCY: CONTINUOUS

## 2022-03-08 ASSESSMENT — PAIN SCALES - GENERAL
PAINLEVEL_OUTOF10: 8
PAINLEVEL_OUTOF10: 3
PAINLEVEL_OUTOF10: 8
PAINLEVEL_OUTOF10: 6
PAINLEVEL_OUTOF10: 8
PAINLEVEL_OUTOF10: 8
PAINLEVEL_OUTOF10: 0
PAINLEVEL_OUTOF10: 8

## 2022-03-08 ASSESSMENT — PAIN - FUNCTIONAL ASSESSMENT
PAIN_FUNCTIONAL_ASSESSMENT: PREVENTS OR INTERFERES SOME ACTIVE ACTIVITIES AND ADLS

## 2022-03-08 ASSESSMENT — PAIN DESCRIPTION - DESCRIPTORS
DESCRIPTORS: ACHING
DESCRIPTORS: ACHING;DISCOMFORT
DESCRIPTORS: ACHING

## 2022-03-08 ASSESSMENT — PAIN DESCRIPTION - PAIN TYPE
TYPE: ACUTE PAIN
TYPE: ACUTE PAIN
TYPE: ACUTE PAIN;CHRONIC PAIN

## 2022-03-08 ASSESSMENT — PAIN DESCRIPTION - PROGRESSION
CLINICAL_PROGRESSION: NOT CHANGED

## 2022-03-08 ASSESSMENT — PAIN DESCRIPTION - LOCATION
LOCATION: BACK

## 2022-03-08 ASSESSMENT — PAIN SCALES - WONG BAKER: WONGBAKER_NUMERICALRESPONSE: 8

## 2022-03-08 ASSESSMENT — PAIN DESCRIPTION - ONSET
ONSET: ON-GOING

## 2022-03-08 ASSESSMENT — PAIN DESCRIPTION - ORIENTATION
ORIENTATION: MID

## 2022-03-08 NOTE — PROGRESS NOTES
Physical Therapy    Physical Therapy Treatment Note  Name: Kostas Parrish MRN: [id number] :   1945   Date:  3/8/2022   Admission Date: 2022 Room:  [address] [place] [address]   Restrictions/Precautions:  Restrictions/Precautions  Restrictions/Precautions: Fall Risk,General Precautions,Contact Precautions       Communication with other providers:  Per chart review pt appropriate for tx session. Subjective:  Patient states:  Agreeable to tx session; \"Can we get to the chair? \"  Pain:   Location, Type, Intensity (0/10 to 10/10):  No C/O    Objective:    Observation:  Pt in bed upon arrival.    Treatment, including education/measures:  Transfers:  Supine to sit :Fito   Scooting :Fito to EOB  Sit to stand :Fito from EOB  Stand to sit :CGA With VC's for eccentric control. [address]     Safety  Patient left safely in the chair, with call light/phone in reach with alarm applied. Gait belt used for transfers. Returned at 1600 to assist pt back to bed  Transfers:  Sit to supine :modA for LE negotiation   Scooting :maxA x2 up in bed  Sit to stand :Fito from chair   Stand to sit :CGA With VC's for eccentric control. SPT:Fito with 2nd person for line management     Safety  Patient left safely in the bed, with call light/phone in reach with alarm applied. Gait belt used for transfers.     Assessment / Impression:       Patient's tolerance of treatment:  good   Adverse Reaction: none  Significant change in status and impact:  none  Barriers to improvement:  Strength, endurance    Plan for Next Session:    Will cont to work towards pt's goals per her tolerance    1st Time in:  1418  1st Time out:  1441  1st Timed treatment minutes:  23    2nd Time in:  1600  2nd Time out:  1615  2nd Timed treatment minutes:  15    Total treatment time:  45    Previously filed items:  Social/Functional History  Lives With: Alone  Type of Home: House  Home Layout: One level  Home Access: Ramped entrance (railing on BL sides)  Bathroom Shower/Tub: Shower chair without back (dtr spongebathes)  Bathroom Toilet: Handicap height  Bathroom Equipment: Grab bars around toilet  Home Equipment: Crutches,Wheelchair-manual,Grab bars  Receives Help From: Family  ADL Assistance: Needs assistance (dtr assists w/ bathing, dressing, ; dtr comes M/W/F)  Homemaking Assistance: Needs assistance (dtr does shopping; pt does not cook, has meals on wheels; son does outside [place]; grand dtrs clean inside)  Homemaking Responsibilities: No (family performs most; she assists w/ what she can, mostly done seated)  Ambulation Assistance: Independent (mod ind using crutches; only short distances)  Transfer Assistance: Independent  Active : No (family)  Occupation: Retired,On disability  Additional Comments: plays cards, crochette, puzzle books, socializes w/ family; sleeps in adjustable bed w/ trapeze over for improved pt mobility  Short term goals  Time Frame for Short term goals: 1 week  Short term goal 1: pt will complete bed mobility at min A  Short term goal 2: pt will complete transfers at [facility] term goal 3: pt will ambulate 15 ft using crutches at [facility] term goal 4: pt will demonstrate near gross >3/5 BL LE strength       Electronically signed by:    Rhea Snider PTA  3/8/2022, 3:19 PM

## 2022-03-08 NOTE — PROGRESS NOTES
Pulmonary and Critical Care  Progress Note    Subjective: The patient is feeling better, comfortable in bed. On Vapotherm 45%. Shortness of breath is better. Chest pain none  Addressing respiratory complaints Patient is negative for  hemoptysis and cyanosis  CONSTITUTIONAL:  negative for fevers and chills      Past Medical History:     has a past medical history of Chronic lower back pain, Hx of spinal fusion, Hypertension, MRSA (methicillin resistant staph aureus) culture positive, and Venous disease. has a past surgical history that includes fracture surgery and back surgery. reports that she has never smoked. She has never used smokeless tobacco. She reports that she does not drink alcohol and does not use drugs. Family history:  family history is not on file. No Known Allergies  Social History:    Reviewed; no changes    Objective:   PHYSICAL EXAM:        VITALS:  /64   Pulse 107   Temp 98.3 °F (36.8 °C) (Oral)   Resp 25   Ht 5' 2.01\" (1.575 m)   Wt 186 lb 8.2 oz (84.6 kg)   SpO2 93%   BMI 34.10 kg/m²     24HR INTAKE/OUTPUT:      Intake/Output Summary (Last 24 hours) at 3/8/2022 1051  Last data filed at 3/8/2022 0401  Gross per 24 hour   Intake    Output 1200 ml   Net -1200 ml       CONSTITUTIONAL:  Awake. LUNGS:  decreased breath sounds, basilar crackles. CARDIOVASCULAR:  normal S1 and S2 and negative JVD  ABD:Abdomen soft, non-tender. BS normal. No masses,  No organomegaly  NEURO:Alert.   DATA:    CBC:  Recent Labs     03/07/22  0208 03/08/22  0258   WBC 7.9 6.3   RBC 4.75 4.50   HGB 11.9* 11.6*   HCT 40.0 38.5   * 99*   MCV 84.2 85.6   MCH 25.1* 25.8*   MCHC 29.8* 30.1*   RDW 18.1* 18.5*   SEGSPCT 88.0* 76.0*   BANDSPCT  --  7      BMP:  Recent Labs     03/07/22  0208 03/08/22  0258    137   K 4.2 4.3    98*   CO2 25 26   BUN 26* 21   CREATININE 0.5* 0.5*   CALCIUM 8.7 8.3   GLUCOSE 233* 213*      ABG:  No results for input(s): PH, PO2ART, VYD8HFP, HCO3, BEART, O2SAT in the last 72 hours. Lab Results   Component Value Date    PROBNP 411.8 (H) 03/01/2022    PROBNP 286.5 02/23/2022    PROBNP 289.8 02/22/2022     No results found for: [facility]    Radiology Review:  Pertinent images / reports were reviewed as a part of this visit. Assessment:     Patient Active Problem List   Diagnosis    WD-Venous stasis dermatitis of both lower extremities    WD-Lymphedema of both lower extremities    WD-Venous stasis ulcer of right calf with fat layer exposed with varicose veins (HCC)    WD-Venous stasis ulcer of left calf with fat layer exposed with varicose veins (HCC)    COVID-19    Hypoxia    SVT (supraventricular tachycardia) (HCC)    Adjustment disorder with anxious mood       Plan:   1. Overall the patient is better. 2. [address] Activity.   Denisse Cloud MD  3/8/2022  10:51 AM

## 2022-03-08 NOTE — RT PROTOCOL NOTE
RT Inhaler-Nebulizer Bronchodilator Protocol Note    There is a bronchodilator order in the chart from a provider indicating to follow the RT Bronchodilator Protocol and there is an Initiate RT Inhaler-Nebulizer Bronchodilator Protocol order as well (see protocol at bottom of note). CXR Findings:  No results found. The findings from the last RT Protocol Assessment were as follows:   History Pulmonary Disease: None or smoker <15 pack years  Respiratory Pattern: Mild dyspnea at rest, irregular pattern, or RR 21-25 bpm  Breath Sounds: Slightly diminished and/or crackles  Cough: Weak, productive  Indication for Bronchodilator Therapy:    Bronchodilator Assessment Score: 8    Aerosolized bronchodilator medication orders have been revised according to the RT Inhaler-Nebulizer Bronchodilator Protocol below. Respiratory Therapist to perform RT Therapy Protocol Assessment initially then follow the protocol. Repeat RT Therapy Protocol Assessment PRN for score 0-3 or on second treatment, BID, and PRN for scores above 3. No Indications  adjust the frequency to every 6 hours PRN wheezing or bronchospasm, if no treatments needed after 48 hours then discontinue using Per Protocol order mode. If indication present, adjust the RT bronchodilator orders based on the Bronchodilator Assessment Score as indicated below. Use Inhaler orders unless patient has one or more of the following: on home nebulizer, not able to hold breath for 10 seconds, is not alert and oriented, cannot activate and use MDI correctly, or respiratory rate 25 breaths per minute or more, then use the equivalent nebulizer order(s) with same Frequency and PRN reasons based on the score. If a patient is on this medication at home then do not decrease Frequency below that used at home.     0-3  enter or revise RT bronchodilator order(s) to equivalent RT Bronchodilator order with Frequency of every 4 hours PRN for wheezing or increased work of breathing using Per Protocol order mode. 4-6  enter or revise RT Bronchodilator order(s) to two equivalent RT bronchodilator orders with one order with BID Frequency and one order with Frequency of every 4 hours PRN wheezing or increased work of breathing using Per Protocol order mode. 7-10  enter or revise RT Bronchodilator order(s) to two equivalent RT bronchodilator orders with one order with TID Frequency and one order with Frequency of every 4 hours PRN wheezing or increased work of breathing using Per Protocol order mode. 11-13  enter or revise RT Bronchodilator order(s) to one equivalent RT bronchodilator order with QID Frequency and an Albuterol order with Frequency of every 4 hours PRN wheezing or increased work of breathing using Per Protocol order mode. Greater than 13  enter or revise RT Bronchodilator order(s) to one equivalent RT bronchodilator order with every 4 hours Frequency and an Albuterol order with Frequency of every 2 hours PRN wheezing or increased work of breathing using Per Protocol order mode. RT to enter RT Home Evaluation for COPD & MDI Assessment order using Per Protocol order mode.     Electronically signed by Meghna Alexander RCP on 3/8/2022 at 2:02 PM

## 2022-03-08 NOTE — PROGRESS NOTES
Hospitalist Progress Note      Name:  Kaila Clark /Age/Sex: 1945  (68 y.o. female)   MRN & CSN:  [id number] & [id number] Admission Date/Time: 2022  4:46 PM   Location:  [address] [place] [address] PCP: No primary care provider on file. Hospital Day: 28  Discharge barrier/Reason for continued hospitalization: Persistently hypoxemia requiring high flow. Assessment and Plan:   Kaila Clark is a 68 y.o.  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 The squad reported O2 sats of 74% on room air. On arrival to the ED, patient ruled in for COVID-19. Hospital course has been prolonged due to continued high oxygen requirements. 1.  COVID-19 sepsis/pneumonia  Completed baricitinib  Methylprednisolone 40 mg IV every 6 hourscontinue      2. COVID-19 acute hypoxic respiratory failure: Now improving. Continue to wean oxygen    3. Low vitamin B12 levels: Continue replacement. 4.  Hypertension: Continue low-dose Lopressor. Losartan currently on hold. 5.  Chronic pain: Continue MS Contin twice daily, Percocet every 6 as needed  6. Class II obesity: Not a candidate for counseling due to advanced age  9. Insomnia: Improved with trazodone  8. Goals of care: DNR. See progress note 3/6/2022    Constipationcontinue lubiprostone    Diet ADULT ORAL NUTRITION SUPPLEMENT; Breakfast, Lunch, Dinner; Low Calorie/High Protein Oral Supplement  ADULT DIET;  Regular   DVT Prophylaxis [x] Lovenox, []  Heparin, [] SCDs, [] Ambulation   GI Prophylaxis [] PPI,  [] H2 Blocker,  [] Carafate,  [x] Diet/Tube Feeds   Code Status DNR-CC   MDM [] Low, [x] Moderate,[]  High       History of Present Illness:     Chief Complaint: <principal problem not specified>  Kaila Clark is a 68 y.o.  female  who presents with nausea, vomiting, fatigue of 1 week duration. Was found to be hypoxic on arrival of the squad      3/8/2022  -She was on Vapotherm, with FiO2 way down to 45% today, which is much better than before  -She recalls seeing me from last week. She stated \"I am hard of hearing, I remember? \"            Prior note by colleague:     3/6/2022: Patient is seen and examined, she is in good spirits. She definitely wants to get out of bed today. She does not want to use the bedpan and tells me it bothers her hips. She would like to use the bedside commode. She tells me that she knows when she is exerting herself too much and when her oxygen is dropping. She also does not want us to escalate care in the event of a cardiorespiratory arrest. Her CODE STATUS will be updated to DNR CC at this time        Objective: Intake/Output Summary (Last 24 hours) at 3/8/2022 1701  Last data filed at 3/8/2022 0401  Gross per 24 hour   Intake    Output 1200 ml   Net -1200 ml        Vitals:   Vitals:    03/08/22 0803 03/08/22 1012 03/08/22 1131 03/08/22 1530   BP:  138/64     Pulse:  107     Resp: 28 25 26 24   Temp:  98.3 °F (36.8 °C)     TempSrc:  Oral     SpO2: 92% 93% 91% 90%   Weight:       Height:            Physical Exam:     Physical Exam  Constitutional:       Appearance: She is not diaphoretic. HENT:      Nose: No rhinorrhea. Eyes:      General:         Right eye: No discharge. Left eye: No discharge. Pulmonary:      Effort: Pulmonary effort is normal.   Neurological:      Mental Status: She is alert. Cranial Nerves: No cranial nerve deficit.          Medications:   Medications:    guaiFENesin  200 mg Oral Q4H    albuterol sulfate HFA  2 puff Inhalation 4x daily    methylPREDNISolone  40 mg IntraVENous Q6H    vitamin B-12  1,000 mcg Oral Daily    traZODone  25 mg Oral Nightly    lubiprostone  8 mcg Oral BID WC    metoprolol tartrate  12.5 mg Oral BID    polyethylene glycol  17 g Oral Daily    enoxaparin  30 mg SubCUTAneous BID    fluticasone  1 spray Each Nostril Daily    morphine  30 mg Oral 2 times per day    cetirizine  10 mg Oral Daily    [Held by provider] losartan  50 mg Oral Daily    sodium chloride flush  5-40 mL IntraVENous 2 times per day    sennosides-docusate sodium  1 tablet Oral BID    famotidine  20 mg Oral BID    Vitamin D  2,000 Units Oral Daily      Infusions:    sodium chloride 25 mL (02/09/22 0917)     PRN Meds: sodium chloride flush, 10 mL, PRN  hydrOXYzine, 25 mg, TID PRN  sodium chloride, 1 spray, Q4H PRN  albuterol sulfate HFA, 2 puff, Q4H PRN  benzocaine-menthol, 1 lozenge, Q2H PRN  sodium chloride flush, 5-40 mL, PRN  sodium chloride, 25 mL, PRN  ondansetron, 4 mg, Q6H PRN  acetaminophen, 650 mg, Q6H PRN   Or  acetaminophen, 650 mg, Q6H PRN  oxyCODONE-acetaminophen, 1 tablet, Q6H PRN          Electronically signed by Samra Neumann MD on 3/8/2022 at 5:01 PM

## 2022-03-08 NOTE — PROGRESS NOTES
Comprehensive Nutrition Assessment    Type and Reason for Visit:  Reassess    Nutrition Recommendations/Plan:   · Consider checking A1C and need for Carb Control Diet    Nutrition Assessment:  Pt on vapotherm 45%. Continues with adequate po intake, ample meal orders and intake per Dietary Ambassador. Will continue to follow as moderate nutrition risk. Malnutrition Assessment:  Malnutrition Status: At risk for malnutrition    Context:  Acute Illness       Estimated Daily Nutrient Needs:  Energy (kcal):  6613-5346 ([address] w/ stress factor 1.1-1.3); Weight Used for Energy Requirements:  Current     Protein (g):  50-60 (1.0-1.2 g/kg); Weight Used for Protein Requirements:  Ideal        Fluid (ml/day):  1500; Method Used for Fluid Requirements:  1 ml/kcal      Nutrition Related Findings:  Glu 213      Wounds:  None       Current Nutrition Therapies:    ADULT ORAL NUTRITION SUPPLEMENT; Breakfast, Lunch, Dinner; Low Calorie/High Protein Oral Supplement  ADULT DIET; Regular    Anthropometric Measures:  · Height: 5' 2.01\" (157.5 cm)  · Current Body Weight: 186 lb 8.2 oz (84.6 kg)   · Admission Body Weight: 186 lb 8.2 oz (84.6 kg)    · Usual Body Weight:  (no weight hx available)     · Ideal Body Weight: 110 lbs; % Ideal Body Weight 129.5 %   · BMI: 34.1  · BMI Categories: Obese Class 1 (BMI 30.0-34. 9)       Nutrition Diagnosis:   · Predicted inadequate energy intake related to acute injury/trauma,impaired respiratory function as evidenced by  (varied po intakes during stay)    Nutrition Interventions:   Food and/or Nutrient Delivery:  Continue Current Diet,Continue Oral Nutrition Supplement  Nutrition Education/Counseling:  No recommendation at this time   Coordination of Nutrition Care:  Continue to monitor while inpatient    Goals:  Pt will meet greater than 50-75% of meals and supplements       Nutrition Monitoring and Evaluation:   Behavioral-Environmental Outcomes:  None Identified   Food/Nutrient Intake Outcomes:  Supplement Intake,Food and Nutrient Intake  Physical Signs/Symptoms Outcomes:  Biochemical Data,Weight,GI Status,Hemodynamic Status,Fluid Status or Edema     Discharge Planning:     Too soon to determine     Electronically signed by Graham Montemayor RD, DENISA on 3/8/22 at 1:39 PM EST    Contact: [phone]

## 2022-03-08 NOTE — PLAN OF CARE
Nutrition Problem #1: Predicted inadequate energy intake  Intervention: Food and/or Nutrient Delivery: Continue Current Diet,Continue Oral Nutrition Supplement  Nutritional Goals: Pt will meet greater than 50-75% of meals and supplements

## 2022-03-09 PROCEDURE — 6370000000 HC RX 637 (ALT 250 FOR IP): Performed by: NURSE PRACTITIONER

## 2022-03-09 PROCEDURE — 6370000000 HC RX 637 (ALT 250 FOR IP): Performed by: INTERNAL MEDICINE

## 2022-03-09 PROCEDURE — 94640 AIRWAY INHALATION TREATMENT: CPT

## 2022-03-09 PROCEDURE — 6370000000 HC RX 637 (ALT 250 FOR IP): Performed by: HOSPITALIST

## 2022-03-09 PROCEDURE — 94761 N-INVAS EAR/PLS OXIMETRY MLT: CPT

## 2022-03-09 PROCEDURE — 6360000002 HC RX W HCPCS: Performed by: INTERNAL MEDICINE

## 2022-03-09 PROCEDURE — 2140000000 HC CCU INTERMEDIATE R&B

## 2022-03-09 PROCEDURE — 2700000000 HC OXYGEN THERAPY PER DAY

## 2022-03-09 PROCEDURE — 2580000003 HC RX 258: Performed by: NURSE PRACTITIONER

## 2022-03-09 PROCEDURE — 6360000002 HC RX W HCPCS: Performed by: STUDENT IN AN ORGANIZED HEALTH CARE EDUCATION/TRAINING PROGRAM

## 2022-03-09 RX ADMIN — SODIUM CHLORIDE, PRESERVATIVE FREE 10 ML: 5 INJECTION INTRAVENOUS at 20:28

## 2022-03-09 RX ADMIN — SENNOSIDES AND DOCUSATE SODIUM 1 TABLET: 50; 8.6 TABLET ORAL at 20:28

## 2022-03-09 RX ADMIN — ALBUTEROL SULFATE 2 PUFF: 90 AEROSOL, METERED RESPIRATORY (INHALATION) at 15:16

## 2022-03-09 RX ADMIN — LUBIPROSTONE 8 MCG: 8 CAPSULE, GELATIN COATED ORAL at 08:51

## 2022-03-09 RX ADMIN — POLYETHYLENE GLYCOL (3350) 17 G: 17 POWDER, FOR SOLUTION ORAL at 08:50

## 2022-03-09 RX ADMIN — METHYLPREDNISOLONE SODIUM SUCCINATE 40 MG: 40 INJECTION, POWDER, LYOPHILIZED, FOR SOLUTION INTRAMUSCULAR; INTRAVENOUS at 17:52

## 2022-03-09 RX ADMIN — LUBIPROSTONE 8 MCG: 8 CAPSULE, GELATIN COATED ORAL at 17:52

## 2022-03-09 RX ADMIN — MORPHINE SULFATE 30 MG: 15 TABLET, FILM COATED, EXTENDED RELEASE ORAL at 20:27

## 2022-03-09 RX ADMIN — OXYCODONE AND ACETAMINOPHEN 1 TABLET: 5; 325 TABLET ORAL at 02:10

## 2022-03-09 RX ADMIN — MORPHINE SULFATE 30 MG: 15 TABLET, FILM COATED, EXTENDED RELEASE ORAL at 08:50

## 2022-03-09 RX ADMIN — METOPROLOL 12.5 MG: 25 TABLET ORAL at 08:51

## 2022-03-09 RX ADMIN — TRAZODONE HYDROCHLORIDE 25 MG: 50 TABLET ORAL at 20:28

## 2022-03-09 RX ADMIN — ENOXAPARIN SODIUM 30 MG: 100 INJECTION SUBCUTANEOUS at 20:28

## 2022-03-09 RX ADMIN — FLUTICASONE PROPIONATE 1 SPRAY: 50 SPRAY, METERED NASAL at 08:52

## 2022-03-09 RX ADMIN — SODIUM CHLORIDE, PRESERVATIVE FREE 10 ML: 5 INJECTION INTRAVENOUS at 08:52

## 2022-03-09 RX ADMIN — METHYLPREDNISOLONE SODIUM SUCCINATE 40 MG: 40 INJECTION, POWDER, LYOPHILIZED, FOR SOLUTION INTRAMUSCULAR; INTRAVENOUS at 05:24

## 2022-03-09 RX ADMIN — METHYLPREDNISOLONE SODIUM SUCCINATE 40 MG: 40 INJECTION, POWDER, LYOPHILIZED, FOR SOLUTION INTRAMUSCULAR; INTRAVENOUS at 11:19

## 2022-03-09 RX ADMIN — GUAIFENESIN 200 MG: 200 SOLUTION ORAL at 08:51

## 2022-03-09 RX ADMIN — ALBUTEROL SULFATE 2 PUFF: 90 AEROSOL, METERED RESPIRATORY (INHALATION) at 11:06

## 2022-03-09 RX ADMIN — FAMOTIDINE 20 MG: 20 TABLET ORAL at 08:51

## 2022-03-09 RX ADMIN — CETIRIZINE HYDROCHLORIDE 10 MG: 10 TABLET, FILM COATED ORAL at 05:24

## 2022-03-09 RX ADMIN — ALBUTEROL SULFATE 2 PUFF: 90 AEROSOL, METERED RESPIRATORY (INHALATION) at 07:29

## 2022-03-09 RX ADMIN — METOPROLOL 12.5 MG: 25 TABLET ORAL at 20:28

## 2022-03-09 RX ADMIN — ENOXAPARIN SODIUM 30 MG: 100 INJECTION SUBCUTANEOUS at 08:51

## 2022-03-09 RX ADMIN — CYANOCOBALAMIN TAB 1000 MCG 1000 MCG: 1000 TAB at 08:51

## 2022-03-09 RX ADMIN — ALBUTEROL SULFATE 2 PUFF: 90 AEROSOL, METERED RESPIRATORY (INHALATION) at 19:22

## 2022-03-09 RX ADMIN — FAMOTIDINE 20 MG: 20 TABLET ORAL at 20:28

## 2022-03-09 RX ADMIN — SENNOSIDES AND DOCUSATE SODIUM 1 TABLET: 50; 8.6 TABLET ORAL at 11:18

## 2022-03-09 ASSESSMENT — PAIN SCALES - GENERAL
PAINLEVEL_OUTOF10: 2
PAINLEVEL_OUTOF10: 8
PAINLEVEL_OUTOF10: 8
PAINLEVEL_OUTOF10: 0
PAINLEVEL_OUTOF10: 8
PAINLEVEL_OUTOF10: 8
PAINLEVEL_OUTOF10: 0

## 2022-03-09 ASSESSMENT — PAIN DESCRIPTION - PAIN TYPE: TYPE: ACUTE PAIN

## 2022-03-09 ASSESSMENT — PAIN DESCRIPTION - ORIENTATION: ORIENTATION: MID

## 2022-03-09 ASSESSMENT — PAIN DESCRIPTION - DESCRIPTORS: DESCRIPTORS: ACHING

## 2022-03-09 ASSESSMENT — PAIN - FUNCTIONAL ASSESSMENT: PAIN_FUNCTIONAL_ASSESSMENT: PREVENTS OR INTERFERES SOME ACTIVE ACTIVITIES AND ADLS

## 2022-03-09 ASSESSMENT — PAIN DESCRIPTION - ONSET: ONSET: ON-GOING

## 2022-03-09 ASSESSMENT — PAIN DESCRIPTION - LOCATION: LOCATION: BACK

## 2022-03-09 ASSESSMENT — PAIN DESCRIPTION - FREQUENCY: FREQUENCY: CONTINUOUS

## 2022-03-09 ASSESSMENT — PAIN DESCRIPTION - PROGRESSION: CLINICAL_PROGRESSION: NOT CHANGED

## 2022-03-09 NOTE — PROGRESS NOTES
Pulmonary and Critical Care  Progress Note    Subjective: The patient is comfortable in bed. On Vapotherm 50%. Shortness of breath is better. Chest pain none  Addressing respiratory complaints Patient is negative for  hemoptysis and cyanosis  CONSTITUTIONAL:  negative for fevers and chills      Past Medical History:     has a past medical history of Chronic lower back pain, Hx of spinal fusion, Hypertension, MRSA (methicillin resistant staph aureus) culture positive, and Venous disease. has a past surgical history that includes fracture surgery and back surgery. reports that she has never smoked. She has never used smokeless tobacco. She reports that she does not drink alcohol and does not use drugs. Family history:  family history is not on file. No Known Allergies  Social History:    Reviewed; no changes    Objective:   PHYSICAL EXAM:        VITALS:  BP (!) 146/60   Pulse 82   Temp 97.6 °F (36.4 °C) (Oral)   Resp 14   Ht 5' 2.01\" (1.575 m)   Wt 190 lb (86.2 kg)   SpO2 90%   BMI 34.74 kg/m²     24HR INTAKE/OUTPUT:      Intake/Output Summary (Last 24 hours) at 3/9/2022 1733  Last data filed at 3/9/2022 0508  Gross per 24 hour   Intake 10 ml   Output 1200 ml   Net -1190 ml       CONSTITUTIONAL:  Awake. LUNGS:  decreased breath sounds, basilar crackles. CARDIOVASCULAR:  normal S1 and S2 and negative JVD  ABD:Abdomen soft, non-tender. BS normal. No masses,  No organomegaly  NEURO:Alert.   DATA:    CBC:  Recent Labs     03/07/22  0208 03/08/22  0258   WBC 7.9 6.3   RBC 4.75 4.50   HGB 11.9* 11.6*   HCT 40.0 38.5   * 99*   MCV 84.2 85.6   MCH 25.1* 25.8*   MCHC 29.8* 30.1*   RDW 18.1* 18.5*   SEGSPCT 88.0* 76.0*   BANDSPCT  --  7      BMP:  Recent Labs     03/07/22  0208 03/08/22  0258    137   K 4.2 4.3    98*   CO2 25 26   BUN 26* 21   CREATININE 0.5* 0.5*   CALCIUM 8.7 8.3   GLUCOSE 233* 213*      ABG:  No results for input(s): PH, PO2ART, GXP3PKK, HCO3, BEART, O2SAT in the last 72 hours. Lab Results   Component Value Date    PROBNP 411.8 (H) 03/01/2022    PROBNP 286.5 02/23/2022    PROBNP 289.8 02/22/2022     No results found for: [facility]    Radiology Review:  Pertinent images / reports were reviewed as a part of this visit. Assessment:     Patient Active Problem List   Diagnosis    WD-Venous stasis dermatitis of both lower extremities    WD-Lymphedema of both lower extremities    WD-Venous stasis ulcer of right calf with fat layer exposed with varicose veins (HCC)    WD-Venous stasis ulcer of left calf with fat layer exposed with varicose veins (HCC)    COVID-19    Hypoxia    SVT (supraventricular tachycardia) (HCC)    Adjustment disorder with anxious mood       Plan:   1. Overall the patient is better. 2. [address] Activity.   Vamshi Figueroa MD  3/9/2022  5:33 PM

## 2022-03-09 NOTE — PLAN OF CARE
Problem: Falls - Risk of:  Goal: Will remain free from falls  Description: Will remain free from falls  Outcome: Ongoing  Goal: Absence of physical injury  Description: Absence of physical injury  Outcome: Ongoing     Problem: Airway Clearance - Ineffective  Goal: Achieve or maintain patent airway  Outcome: Ongoing     Problem: Gas Exchange - Impaired  Goal: Absence of hypoxia  Outcome: Ongoing  Goal: Promote optimal lung function  Outcome: Ongoing     Problem: Breathing Pattern - Ineffective  Goal: Ability to achieve and maintain a regular respiratory rate  Outcome: Ongoing     Problem:  Body Temperature -  Risk of, Imbalanced  Goal: Ability to maintain a body temperature within defined limits  Outcome: Ongoing  Goal: Will regain or maintain usual level of consciousness  Outcome: Ongoing  Goal: Complications related to the disease process, condition or treatment will be avoided or minimized  Outcome: Ongoing     Problem: Nutrition Deficits  Goal: Optimize nutritional status  Outcome: Ongoing     Problem: Risk for Fluid Volume Deficit  Goal: Maintain normal heart rhythm  Outcome: Ongoing  Goal: Maintain absence of muscle cramping  Outcome: Ongoing  Goal: Maintain normal serum potassium, sodium, calcium, phosphorus, and pH  Outcome: Ongoing     Problem: Loneliness or Risk for Loneliness  Goal: Demonstrate positive use of time alone when socialization is not possible  Outcome: Ongoing     Problem: Fatigue  Goal: Verbalize increase energy and improved vitality  Outcome: Ongoing     Problem: Patient Education: Go to Patient Education Activity  Goal: Patient/Family Education  Outcome: Ongoing     Problem: Pain:  Goal: Pain level will decrease  Description: Pain level will decrease  Outcome: Ongoing  Goal: Control of acute pain  Description: Control of acute pain  Outcome: Ongoing  Goal: Control of chronic pain  Description: Control of chronic pain  Outcome: Ongoing     Problem: Skin Integrity:  Goal: Will show no infection signs and symptoms  Description: Will show no infection signs and symptoms  Outcome: Ongoing  Goal: Absence of new skin breakdown  Description: Absence of new skin breakdown  Outcome: Ongoing     Problem: Nutrition  Goal: Optimal nutrition therapy  Outcome: Ongoing

## 2022-03-09 NOTE — CARE COORDINATION
CM into see to continue with discharge planning. Pt is from home alone. Family wants to get stronger but would like to go home. Per previous CM conversations family will be staying with pt when she is discharged. She is very supported by family. Pt is sitting up in chair HHF 25 lmin and FiO2 of 65. CM team will continue to follow pt for discharge planning. [place] [id number] CM collaborated with Dr Ariela Romero regarding poss Ltach.

## 2022-03-10 LAB — PROCALCITONIN: 0.12

## 2022-03-10 PROCEDURE — 6360000002 HC RX W HCPCS: Performed by: STUDENT IN AN ORGANIZED HEALTH CARE EDUCATION/TRAINING PROGRAM

## 2022-03-10 PROCEDURE — 36415 COLL VENOUS BLD VENIPUNCTURE: CPT

## 2022-03-10 PROCEDURE — 6370000000 HC RX 637 (ALT 250 FOR IP): Performed by: INTERNAL MEDICINE

## 2022-03-10 PROCEDURE — 97530 THERAPEUTIC ACTIVITIES: CPT

## 2022-03-10 PROCEDURE — 94761 N-INVAS EAR/PLS OXIMETRY MLT: CPT

## 2022-03-10 PROCEDURE — 6360000002 HC RX W HCPCS: Performed by: INTERNAL MEDICINE

## 2022-03-10 PROCEDURE — 94640 AIRWAY INHALATION TREATMENT: CPT

## 2022-03-10 PROCEDURE — 2580000003 HC RX 258: Performed by: NURSE PRACTITIONER

## 2022-03-10 PROCEDURE — 6370000000 HC RX 637 (ALT 250 FOR IP): Performed by: NURSE PRACTITIONER

## 2022-03-10 PROCEDURE — 2700000000 HC OXYGEN THERAPY PER DAY

## 2022-03-10 PROCEDURE — 6370000000 HC RX 637 (ALT 250 FOR IP): Performed by: HOSPITALIST

## 2022-03-10 PROCEDURE — 2140000000 HC CCU INTERMEDIATE R&B

## 2022-03-10 PROCEDURE — 84145 PROCALCITONIN (PCT): CPT

## 2022-03-10 RX ORDER — METHYLPREDNISOLONE SODIUM SUCCINATE 40 MG/ML
40 INJECTION, POWDER, LYOPHILIZED, FOR SOLUTION INTRAMUSCULAR; INTRAVENOUS EVERY 8 HOURS
Status: DISCONTINUED | OUTPATIENT
Start: 2022-03-10 | End: 2022-03-15

## 2022-03-10 RX ADMIN — METHYLPREDNISOLONE SODIUM SUCCINATE 40 MG: 40 INJECTION, POWDER, LYOPHILIZED, FOR SOLUTION INTRAMUSCULAR; INTRAVENOUS at 22:37

## 2022-03-10 RX ADMIN — ALBUTEROL SULFATE 2 PUFF: 90 AEROSOL, METERED RESPIRATORY (INHALATION) at 15:10

## 2022-03-10 RX ADMIN — METHYLPREDNISOLONE SODIUM SUCCINATE 40 MG: 40 INJECTION, POWDER, LYOPHILIZED, FOR SOLUTION INTRAMUSCULAR; INTRAVENOUS at 13:10

## 2022-03-10 RX ADMIN — ALBUTEROL SULFATE 2 PUFF: 90 AEROSOL, METERED RESPIRATORY (INHALATION) at 07:27

## 2022-03-10 RX ADMIN — METHYLPREDNISOLONE SODIUM SUCCINATE 40 MG: 40 INJECTION, POWDER, LYOPHILIZED, FOR SOLUTION INTRAMUSCULAR; INTRAVENOUS at 00:13

## 2022-03-10 RX ADMIN — OXYCODONE AND ACETAMINOPHEN 1 TABLET: 5; 325 TABLET ORAL at 01:35

## 2022-03-10 RX ADMIN — METHYLPREDNISOLONE SODIUM SUCCINATE 40 MG: 40 INJECTION, POWDER, LYOPHILIZED, FOR SOLUTION INTRAMUSCULAR; INTRAVENOUS at 05:20

## 2022-03-10 RX ADMIN — LUBIPROSTONE 8 MCG: 8 CAPSULE, GELATIN COATED ORAL at 08:34

## 2022-03-10 RX ADMIN — ENOXAPARIN SODIUM 30 MG: 100 INJECTION SUBCUTANEOUS at 08:34

## 2022-03-10 RX ADMIN — FAMOTIDINE 20 MG: 20 TABLET ORAL at 08:34

## 2022-03-10 RX ADMIN — Medication 2000 UNITS: at 08:34

## 2022-03-10 RX ADMIN — CETIRIZINE HYDROCHLORIDE 10 MG: 10 TABLET, FILM COATED ORAL at 05:20

## 2022-03-10 RX ADMIN — FAMOTIDINE 20 MG: 20 TABLET ORAL at 20:32

## 2022-03-10 RX ADMIN — MORPHINE SULFATE 30 MG: 15 TABLET, FILM COATED, EXTENDED RELEASE ORAL at 20:30

## 2022-03-10 RX ADMIN — METOPROLOL 12.5 MG: 25 TABLET ORAL at 08:34

## 2022-03-10 RX ADMIN — CYANOCOBALAMIN TAB 1000 MCG 1000 MCG: 1000 TAB at 08:34

## 2022-03-10 RX ADMIN — SENNOSIDES AND DOCUSATE SODIUM 1 TABLET: 50; 8.6 TABLET ORAL at 20:30

## 2022-03-10 RX ADMIN — SODIUM CHLORIDE, PRESERVATIVE FREE 10 ML: 5 INJECTION INTRAVENOUS at 08:35

## 2022-03-10 RX ADMIN — TRAZODONE HYDROCHLORIDE 25 MG: 50 TABLET ORAL at 20:30

## 2022-03-10 RX ADMIN — ALBUTEROL SULFATE 2 PUFF: 90 AEROSOL, METERED RESPIRATORY (INHALATION) at 20:50

## 2022-03-10 RX ADMIN — SODIUM CHLORIDE, PRESERVATIVE FREE 10 ML: 5 INJECTION INTRAVENOUS at 20:31

## 2022-03-10 RX ADMIN — POLYETHYLENE GLYCOL (3350) 17 G: 17 POWDER, FOR SOLUTION ORAL at 08:34

## 2022-03-10 RX ADMIN — SENNOSIDES AND DOCUSATE SODIUM 1 TABLET: 50; 8.6 TABLET ORAL at 08:34

## 2022-03-10 RX ADMIN — LUBIPROSTONE 8 MCG: 8 CAPSULE, GELATIN COATED ORAL at 16:07

## 2022-03-10 RX ADMIN — FLUTICASONE PROPIONATE 1 SPRAY: 50 SPRAY, METERED NASAL at 08:35

## 2022-03-10 RX ADMIN — MORPHINE SULFATE 30 MG: 15 TABLET, FILM COATED, EXTENDED RELEASE ORAL at 08:34

## 2022-03-10 RX ADMIN — ALBUTEROL SULFATE 2 PUFF: 90 AEROSOL, METERED RESPIRATORY (INHALATION) at 11:06

## 2022-03-10 RX ADMIN — METOPROLOL 12.5 MG: 25 TABLET ORAL at 20:31

## 2022-03-10 RX ADMIN — ENOXAPARIN SODIUM 30 MG: 100 INJECTION SUBCUTANEOUS at 20:31

## 2022-03-10 RX ADMIN — OXYCODONE AND ACETAMINOPHEN 1 TABLET: 5; 325 TABLET ORAL at 16:07

## 2022-03-10 ASSESSMENT — PAIN SCALES - GENERAL
PAINLEVEL_OUTOF10: 0
PAINLEVEL_OUTOF10: 6
PAINLEVEL_OUTOF10: 8
PAINLEVEL_OUTOF10: 5
PAINLEVEL_OUTOF10: 0
PAINLEVEL_OUTOF10: 5
PAINLEVEL_OUTOF10: 0
PAINLEVEL_OUTOF10: 2
PAINLEVEL_OUTOF10: 6

## 2022-03-10 ASSESSMENT — PAIN DESCRIPTION - DESCRIPTORS
DESCRIPTORS: ACHING
DESCRIPTORS: ACHING
DESCRIPTORS: ACHING;CONSTANT;DISCOMFORT

## 2022-03-10 ASSESSMENT — PAIN DESCRIPTION - ORIENTATION
ORIENTATION: MID
ORIENTATION: MID;LOWER
ORIENTATION: MID

## 2022-03-10 ASSESSMENT — PAIN DESCRIPTION - LOCATION
LOCATION: BACK

## 2022-03-10 ASSESSMENT — PAIN SCALES - WONG BAKER: WONGBAKER_NUMERICALRESPONSE: 6

## 2022-03-10 ASSESSMENT — PAIN DESCRIPTION - PAIN TYPE
TYPE: ACUTE PAIN

## 2022-03-10 ASSESSMENT — PAIN DESCRIPTION - ONSET
ONSET: ON-GOING

## 2022-03-10 ASSESSMENT — PAIN DESCRIPTION - PROGRESSION: CLINICAL_PROGRESSION: NOT CHANGED

## 2022-03-10 ASSESSMENT — PAIN DESCRIPTION - FREQUENCY
FREQUENCY: CONTINUOUS

## 2022-03-10 NOTE — PROGRESS NOTES
Hospitalist Progress Note      Name:  Rickey Rodriguez /Age/Sex: 1945  (68 y.o. female)   MRN & CSN:  [id number] & [id number] Admission Date/Time: 2022  4:46 PM   Location:  [address] [place] [address] PCP: No primary care provider on file. Hospital Day: 35  Discharge barrier/Reason for continued hospitalization: Persistently hypoxemia requiring high flow. Assessment and Plan:   Rickey Rodriguez is a 68 y.o.  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 The squad reported O2 sats of 74% on room air. On arrival to the ED, patient ruled in for COVID-19. Hospital course has been prolonged due to continued high oxygen requirements. 1.  COVID-19 sepsis/pneumonia  Completed baricitinib  Methylprednisolone 40 mg IV every 6 hourscontinue      2. COVID-19 acute hypoxic respiratory failure: Now improving. Continue to wean oxygen - on Vapotherm back up to 65 % today. 3. Low vitamin B12 levels: Continue replacement. 4.  Hypertension: Continue low-dose Lopressor. Losartan currently on hold. 5.  Chronic pain: Continue MS Contin twice daily, Percocet every 6 as needed  6. Class II obesity: Not a candidate for counseling due to advanced age  9. Insomnia: Improved with trazodone  8. Goals of care: DNR. See progress note 3/6/2022    Constipationcontinue lubiprostone    Diet ADULT ORAL NUTRITION SUPPLEMENT; Breakfast, Lunch, Dinner; Low Calorie/High Protein Oral Supplement  ADULT DIET;  Regular   DVT Prophylaxis [x] Lovenox, []  Heparin, [] SCDs, [] Ambulation   GI Prophylaxis [] PPI,  [] H2 Blocker,  [] Carafate,  [x] Diet/Tube Feeds   Code Status DNR-CC   MDM [] Low, [x] Moderate,[]  High       History of Present Illness:     Chief Complaint: <principal problem not specified>  Rickey Rodriguez is a 68 y.o.  female  who presents with nausea, vomiting, fatigue of 1 week duration. Was found to be hypoxic on arrival of the squad    3/9/2022  Discussed with sister today  Patient feels about the same. Continues with her usual smile. 3/8/2022  -She was on Vapotherm, with FiO2 way down to 45% today, which is much better than before  -She recalls seeing me from last week. She stated \"I am hard of hearing, I remember? \"            Prior note by colleague:     3/6/2022: Patient is seen and examined, she is in good spirits. She definitely wants to get out of bed today. She does not want to use the bedpan and tells me it bothers her hips. She would like to use the bedside commode. She tells me that she knows when she is exerting herself too much and when her oxygen is dropping. She also does not want us to escalate care in the event of a cardiorespiratory arrest. Her CODE STATUS will be updated to DNR CC at this time        Objective: Intake/Output Summary (Last 24 hours) at 3/9/2022 2107  Last data filed at 3/9/2022 0508  Gross per 24 hour   Intake    Output 1200 ml   Net -1200 ml        Vitals:   Vitals:    03/09/22 0830 03/09/22 1107 03/09/22 1517 03/09/22 2030   BP: (!) 146/60   (!) 146/63   Pulse: 82   85   Resp: 21 24 14 14   Temp: 97.6 °F (36.4 °C)   98 °F (36.7 °C)   TempSrc: Oral   Oral   SpO2: 90% 95% 90% 92%   Weight:       Height:            Physical Exam:     Physical Exam  Constitutional:       Appearance: She is not diaphoretic. HENT:      Nose: No rhinorrhea. Eyes:      General:         Right eye: No discharge. Left eye: No discharge. Pulmonary:      Effort: Pulmonary effort is normal.   Neurological:      Mental Status: She is alert. Cranial Nerves: No cranial nerve deficit.          Medications:   Medications:    guaiFENesin  200 mg Oral Q4H    albuterol sulfate HFA  2 puff Inhalation 4x daily    methylPREDNISolone  40 mg IntraVENous Q6H    vitamin B-12  1,000 mcg Oral Daily    traZODone 25 mg Oral Nightly    lubiprostone  8 mcg Oral BID WC    metoprolol tartrate  12.5 mg Oral BID    polyethylene glycol  17 g Oral Daily    enoxaparin  30 mg SubCUTAneous BID    fluticasone  1 spray Each Nostril Daily    morphine  30 mg Oral 2 times per day    cetirizine  10 mg Oral Daily    [Held by provider] losartan  50 mg Oral Daily    sodium chloride flush  5-40 mL IntraVENous 2 times per day    sennosides-docusate sodium  1 tablet Oral BID    famotidine  20 mg Oral BID    Vitamin D  2,000 Units Oral Daily      Infusions:    sodium chloride 25 mL (02/09/22 0917)     PRN Meds: sodium chloride flush, 10 mL, PRN  hydrOXYzine, 25 mg, TID PRN  sodium chloride, 1 spray, Q4H PRN  albuterol sulfate HFA, 2 puff, Q4H PRN  benzocaine-menthol, 1 lozenge, Q2H PRN  sodium chloride flush, 5-40 mL, PRN  sodium chloride, 25 mL, PRN  ondansetron, 4 mg, Q6H PRN  acetaminophen, 650 mg, Q6H PRN   Or  acetaminophen, 650 mg, Q6H PRN  oxyCODONE-acetaminophen, 1 tablet, Q6H PRN          Electronically signed by Vila Fleischer, MD on 3/9/2022 at 9:07 PM

## 2022-03-10 NOTE — PROGRESS NOTES
Physical Therapy  Name: Paul Landers MRN: [id number] :   1945   Date:  3/10/2022   Admission Date: 2022 Room:  [address] [place] [address]   Restrictions/Precautions:  Restrictions/Precautions  Restrictions/Precautions: Fall Risk,General Precautions,Contact Precautions     FiO2  Communication with other providers:  Law Amato RN notified that patient is tachycardic and poor O2 saturation. RN reports that patient was educated by the doctor of the dangers of OOB with poor vitals, and the patient ultimately decides to be DNR-CC. Subjective:  Patient states:  She is agreeable for therapy today. She requests that she uses a walker and attempts standing more with today's session. She is upset that she has become so weak   Pain:   Location, Type, Intensity (0/10 to 10/10): Does not c/o pain  Objective:    Observation:  Patient is on FiO2, heated, at 60%. O2 saturation 85% resting, HR 85. O2 saturation 80% and HR 103BPM, with supine to sit, 5mins to recover to 85%, increased FiO2 to 65%, five more minutes to recover to 90%. Sit to stand HR 145BPM, O2 Saturation 80%, vitals prompt another 5min seated rest break to recover, only recovers to 85%, FiO2 to 70%. Stand pivot off BSC to recliner, O2 saturation 65% HR 145bpm, FiO2 increased to 75%. Treatment, including education/measures:  Patient fitted for bariatric walker  Educated on HR compensating for low O2 saturation  Transfers with line management of FiO2, Pulse Ox, BP cuff, TeleMonitor    Supine to sit : Mod A to turn to EOB  Scooting :SBA  Sit to stand : Mod A to RW, Min A from [facility] for rectal clean up  Stand to sit :CGA  SPT:Min A for steadying safety with RW    Safety  Patient left safely in the recliner, with call light/phone in reach with alarm applied. Gait belt and mask were used for transfers and gait. Assessment / Impression:   Patient's O2 saturation heavily monitored throughout session.  Patient has poor cardiopulmonary tolerance today but the patient still continues to do OOB activity. No LOB today, but she continues to demo global muscle weakness. Patient's tolerance of treatment:  Poor   Adverse Reaction: tachycardia, Low O2 sat  Significant change in status and impact:  none  Barriers to improvement:  Medical stability  Plan for Next Session:    Will cont to work towards pt's goals per patient tolerance  Time in:  0946  Time out:  1030  Timed treatment minutes:  44  Total treatment time:  40    Previously filed items:  Social/Functional History  Lives With: Alone  Type of Home: [address] [place], [place] [address] [facility],[address]: One level  Home Access: Ramped entrance (railing on BL sides)  Bathroom Shower/Tub: Shower chair without back (dtr spongebathes)  Bathroom Toilet: Handicap height  Bathroom Equipment: Grab bars around toilet  Home Equipment: Crutches,Wheelchair-manual,Grab bars  Receives Help From: Family  ADL Assistance: Needs assistance (dtr assists w/ bathing, dressing, ; dtr comes M/W/F)  Homemaking Assistance: Needs assistance (dtr does shopping; pt does not cook, has meals on wheels; son does outside [place]; grand dtrs clean inside)  Homemaking Responsibilities: No (family performs most; she assists w/ what she can, mostly done seated)  Ambulation Assistance: Independent (mod ind using crutches; only short distances)  Transfer Assistance: Independent  Active : No (family)  Occupation: Retired,On disability  Additional Comments: plays cards, crochette, puzzle books, socializes w/ family; sleeps in adjustable bed w/ trapeze over for improved pt mobility  Short term goals  Time Frame for Short term goals: 1 week  Short term goal 1: pt will complete bed mobility at min A  Short term goal 2: pt will complete transfers at [facility] term goal 3: pt will ambulate 15 ft using crutches at [facility] term goal 4: pt will demonstrate near gross >3/5 BL LE strength       Electronically signed by:     Sridhar Riggins PTA  3/10/2022, 11:02 AM

## 2022-03-10 NOTE — CARE COORDINATION
CM continues to follow for discharge planning. Pt remains on HHF 25 lmin FiO2 at 60.  [address]

## 2022-03-10 NOTE — PROGRESS NOTES
Pulmonary and Critical Care  Progress Note    Subjective: The patient is feeling better, comfortable in bed. On Vapotherm. Having PT. Shortness of breath is unchanged. Chest pain none  Addressing respiratory complaints Patient is negative for  hemoptysis and cyanosis  CONSTITUTIONAL:  negative for fevers and chills      Past Medical History:     has a past medical history of Chronic lower back pain, Hx of spinal fusion, Hypertension, MRSA (methicillin resistant staph aureus) culture positive, and Venous disease. has a past surgical history that includes fracture surgery and back surgery. reports that she has never smoked. She has never used smokeless tobacco. She reports that she does not drink alcohol and does not use drugs. Family history:  family history is not on file. No Known Allergies  Social History:    Reviewed; no changes    Objective:   PHYSICAL EXAM:        VITALS:  BP (!) 134/58   Pulse 68   Temp 98.1 °F (36.7 °C) (Oral)   Resp 24   Ht 5' 2.01\" (1.575 m)   Wt 200 lb 1.6 oz (90.8 kg)   SpO2 93%   BMI 36.59 kg/m²     24HR INTAKE/OUTPUT:      Intake/Output Summary (Last 24 hours) at 3/10/2022 1113  Last data filed at 3/10/2022 0112  Gross per 24 hour   Intake    Output 1500 ml   Net -1500 ml       CONSTITUTIONAL:  Awake. LUNGS:  decreased breath sounds, basilar crackles. CARDIOVASCULAR:  normal S1 and S2 and negative JVD  ABD:Abdomen soft, non-tender. BS normal. No masses,  No organomegaly  NEURO:Alert. DATA:    CBC:  Recent Labs     03/08/22  0258   WBC 6.3   RBC 4.50   HGB 11.6*   HCT 38.5   PLT 99*   MCV 85.6   MCH 25.8*   MCHC 30.1*   RDW 18.5*   SEGSPCT 76.0*   BANDSPCT 7      BMP:  Recent Labs     03/08/22  0258      K 4.3   CL 98*   CO2 26   BUN 21   CREATININE 0.5*   CALCIUM 8.3   GLUCOSE 213*      ABG:  No results for input(s): PH, PO2ART, VGC8BGR, HCO3, BEART, O2SAT in the last 72 hours.   Lab Results   Component Value Date    PROBNP 411.8 (H) 03/01/2022    PROBNP 286.5 02/23/2022    PROBNP 289.8 02/22/2022     No results found for: [facility]    Radiology Review:  Pertinent images / reports were reviewed as a part of this visit. Assessment:     Patient Active Problem List   Diagnosis    WD-Venous stasis dermatitis of both lower extremities    WD-Lymphedema of both lower extremities    WD-Venous stasis ulcer of right calf with fat layer exposed with varicose veins (HCC)    WD-Venous stasis ulcer of left calf with fat layer exposed with varicose veins (HCC)    COVID-19    Hypoxia    SVT (supraventricular tachycardia) (HCC)    Adjustment disorder with anxious mood       Plan:   1. Overall the patient is better. 2. [place] Activity.   4. Check labs in am.  Zainab Maher MD  3/10/2022  11:13 AM

## 2022-03-10 NOTE — PLAN OF CARE
Problem: Falls - Risk of:  Goal: Will remain free from falls  Description: Will remain free from falls  3/10/2022 0006 by Tata Alford RN  Outcome: Ongoing  3/9/2022 1043 by Dina Tillman  Outcome: Ongoing  3/9/2022 1008 by Cortez Miller RN  Outcome: Ongoing  Goal: Absence of physical injury  Description: Absence of physical injury  3/10/2022 0006 by Tata Alford RN  Outcome: Ongoing  3/9/2022 1043 by Dina Tillman  Outcome: Ongoing  3/9/2022 1008 by Cortez Miller RN  Outcome: Ongoing     Problem: Airway Clearance - Ineffective  Goal: Achieve or maintain patent airway  3/10/2022 0006 by Tata Alford RN  Outcome: Ongoing  3/9/2022 1043 by Dina Tillman  Outcome: Ongoing  3/9/2022 1008 by Cortez Miller RN  Outcome: Ongoing     Problem: Gas Exchange - Impaired  Goal: Absence of hypoxia  3/10/2022 0006 by Tata Aflord RN  Outcome: Ongoing  3/9/2022 1043 by Dina Tillman  Outcome: Ongoing  3/9/2022 1008 by Cortez Miller RN  Outcome: Ongoing  Goal: Promote optimal lung function  3/10/2022 0006 by Tata Alford RN  Outcome: Ongoing  3/9/2022 1043 by Dina Tillman  Outcome: Ongoing  3/9/2022 1008 by Cortez Miller RN  Outcome: Ongoing

## 2022-03-11 LAB
ALBUMIN SERPL-MCNC: 3.1 GM/DL (ref 3.4–5)
ALP BLD-CCNC: 54 IU/L (ref 40–128)
ALT SERPL-CCNC: 56 U/L (ref 10–40)
ANION GAP SERPL CALCULATED.3IONS-SCNC: 13 MMOL/L (ref 4–16)
AST SERPL-CCNC: 23 IU/L (ref 15–37)
BASOPHILS ABSOLUTE: 0 K/CU MM
BASOPHILS RELATIVE PERCENT: 0.1 % (ref 0–1)
BILIRUB SERPL-MCNC: 0.3 MG/DL (ref 0–1)
BUN BLDV-MCNC: 22 MG/DL (ref 6–23)
CALCIUM SERPL-MCNC: 8.2 MG/DL (ref 8.3–10.6)
CHLORIDE BLD-SCNC: 99 MMOL/L (ref 99–110)
CO2: 26 MMOL/L (ref 21–32)
CREAT SERPL-MCNC: 0.5 MG/DL (ref 0.6–1.1)
DIFFERENTIAL TYPE: ABNORMAL
EOSINOPHILS ABSOLUTE: 0 K/CU MM
EOSINOPHILS RELATIVE PERCENT: 0.3 % (ref 0–3)
ESTIMATED AVERAGE GLUCOSE: 143 MG/DL
GFR AFRICAN AMERICAN: >60 ML/MIN/1.73M2
GFR NON-AFRICAN AMERICAN: >60 ML/MIN/1.73M2
GLUCOSE BLD-MCNC: 207 MG/DL (ref 70–99)
GLUCOSE BLD-MCNC: 295 MG/DL (ref 70–99)
HBA1C MFR BLD: 6.6 % (ref 4.2–6.3)
HCT VFR BLD CALC: 39.9 % (ref 37–47)
HEMOGLOBIN: 11.5 GM/DL (ref 12.5–16)
IMMATURE NEUTROPHIL %: 2.9 % (ref 0–0.43)
LYMPHOCYTES ABSOLUTE: 0.6 K/CU MM
LYMPHOCYTES RELATIVE PERCENT: 8 % (ref 24–44)
MCH RBC QN AUTO: 25 PG (ref 27–31)
MCHC RBC AUTO-ENTMCNC: 28.8 % (ref 32–36)
MCV RBC AUTO: 86.7 FL (ref 78–100)
MONOCYTES ABSOLUTE: 0.4 K/CU MM
MONOCYTES RELATIVE PERCENT: 5.9 % (ref 0–4)
NUCLEATED RBC %: 0 %
PDW BLD-RTO: 19.5 % (ref 11.7–14.9)
PLATELET # BLD: 97 K/CU MM (ref 140–440)
PMV BLD AUTO: 10.7 FL (ref 7.5–11.1)
POTASSIUM SERPL-SCNC: 4.3 MMOL/L (ref 3.5–5.1)
RBC # BLD: 4.6 M/CU MM (ref 4.2–5.4)
SEGMENTED NEUTROPHILS ABSOLUTE COUNT: 5.9 K/CU MM
SEGMENTED NEUTROPHILS RELATIVE PERCENT: 82.8 % (ref 36–66)
SODIUM BLD-SCNC: 138 MMOL/L (ref 135–145)
TOTAL IMMATURE NEUTOROPHIL: 0.21 K/CU MM
TOTAL NUCLEATED RBC: 0 K/CU MM
TOTAL PROTEIN: 5.1 GM/DL (ref 6.4–8.2)
WBC # BLD: 7.1 K/CU MM (ref 4–10.5)

## 2022-03-11 PROCEDURE — 80048 BASIC METABOLIC PNL TOTAL CA: CPT

## 2022-03-11 PROCEDURE — 6370000000 HC RX 637 (ALT 250 FOR IP): Performed by: STUDENT IN AN ORGANIZED HEALTH CARE EDUCATION/TRAINING PROGRAM

## 2022-03-11 PROCEDURE — 6370000000 HC RX 637 (ALT 250 FOR IP): Performed by: NURSE PRACTITIONER

## 2022-03-11 PROCEDURE — 82962 GLUCOSE BLOOD TEST: CPT

## 2022-03-11 PROCEDURE — 85025 COMPLETE CBC W/AUTO DIFF WBC: CPT

## 2022-03-11 PROCEDURE — 2700000000 HC OXYGEN THERAPY PER DAY

## 2022-03-11 PROCEDURE — 2140000000 HC CCU INTERMEDIATE R&B

## 2022-03-11 PROCEDURE — 97530 THERAPEUTIC ACTIVITIES: CPT

## 2022-03-11 PROCEDURE — 6370000000 HC RX 637 (ALT 250 FOR IP): Performed by: INTERNAL MEDICINE

## 2022-03-11 PROCEDURE — 6360000002 HC RX W HCPCS: Performed by: INTERNAL MEDICINE

## 2022-03-11 PROCEDURE — 6370000000 HC RX 637 (ALT 250 FOR IP): Performed by: HOSPITALIST

## 2022-03-11 PROCEDURE — 36415 COLL VENOUS BLD VENIPUNCTURE: CPT

## 2022-03-11 PROCEDURE — 97110 THERAPEUTIC EXERCISES: CPT

## 2022-03-11 PROCEDURE — 80053 COMPREHEN METABOLIC PANEL: CPT

## 2022-03-11 PROCEDURE — 6360000002 HC RX W HCPCS: Performed by: STUDENT IN AN ORGANIZED HEALTH CARE EDUCATION/TRAINING PROGRAM

## 2022-03-11 PROCEDURE — 94640 AIRWAY INHALATION TREATMENT: CPT

## 2022-03-11 PROCEDURE — 2580000003 HC RX 258: Performed by: NURSE PRACTITIONER

## 2022-03-11 PROCEDURE — 94761 N-INVAS EAR/PLS OXIMETRY MLT: CPT

## 2022-03-11 PROCEDURE — 83036 HEMOGLOBIN GLYCOSYLATED A1C: CPT

## 2022-03-11 RX ADMIN — SENNOSIDES AND DOCUSATE SODIUM 1 TABLET: 50; 8.6 TABLET ORAL at 08:42

## 2022-03-11 RX ADMIN — HYDROXYZINE HYDROCHLORIDE 25 MG: 25 TABLET, FILM COATED ORAL at 21:11

## 2022-03-11 RX ADMIN — METOPROLOL 12.5 MG: 25 TABLET ORAL at 21:10

## 2022-03-11 RX ADMIN — Medication 2000 UNITS: at 08:41

## 2022-03-11 RX ADMIN — MORPHINE SULFATE 30 MG: 15 TABLET, FILM COATED, EXTENDED RELEASE ORAL at 08:42

## 2022-03-11 RX ADMIN — ALBUTEROL SULFATE 2 PUFF: 90 AEROSOL, METERED RESPIRATORY (INHALATION) at 19:41

## 2022-03-11 RX ADMIN — TRAZODONE HYDROCHLORIDE 25 MG: 50 TABLET ORAL at 21:10

## 2022-03-11 RX ADMIN — SODIUM CHLORIDE, PRESERVATIVE FREE 10 ML: 5 INJECTION INTRAVENOUS at 08:43

## 2022-03-11 RX ADMIN — ALBUTEROL SULFATE 2 PUFF: 90 AEROSOL, METERED RESPIRATORY (INHALATION) at 08:10

## 2022-03-11 RX ADMIN — ALBUTEROL SULFATE 2 PUFF: 90 AEROSOL, METERED RESPIRATORY (INHALATION) at 11:42

## 2022-03-11 RX ADMIN — LUBIPROSTONE 8 MCG: 8 CAPSULE, GELATIN COATED ORAL at 08:41

## 2022-03-11 RX ADMIN — POLYETHYLENE GLYCOL (3350) 17 G: 17 POWDER, FOR SOLUTION ORAL at 08:42

## 2022-03-11 RX ADMIN — METHYLPREDNISOLONE SODIUM SUCCINATE 40 MG: 40 INJECTION, POWDER, LYOPHILIZED, FOR SOLUTION INTRAMUSCULAR; INTRAVENOUS at 21:11

## 2022-03-11 RX ADMIN — SENNOSIDES AND DOCUSATE SODIUM 1 TABLET: 50; 8.6 TABLET ORAL at 21:10

## 2022-03-11 RX ADMIN — METHYLPREDNISOLONE SODIUM SUCCINATE 40 MG: 40 INJECTION, POWDER, LYOPHILIZED, FOR SOLUTION INTRAMUSCULAR; INTRAVENOUS at 06:53

## 2022-03-11 RX ADMIN — ENOXAPARIN SODIUM 30 MG: 100 INJECTION SUBCUTANEOUS at 08:42

## 2022-03-11 RX ADMIN — FLUTICASONE PROPIONATE 1 SPRAY: 50 SPRAY, METERED NASAL at 08:43

## 2022-03-11 RX ADMIN — CETIRIZINE HYDROCHLORIDE 10 MG: 10 TABLET, FILM COATED ORAL at 08:42

## 2022-03-11 RX ADMIN — SODIUM CHLORIDE, PRESERVATIVE FREE 10 ML: 5 INJECTION INTRAVENOUS at 21:11

## 2022-03-11 RX ADMIN — METHYLPREDNISOLONE SODIUM SUCCINATE 40 MG: 40 INJECTION, POWDER, LYOPHILIZED, FOR SOLUTION INTRAMUSCULAR; INTRAVENOUS at 13:30

## 2022-03-11 RX ADMIN — METOPROLOL 12.5 MG: 25 TABLET ORAL at 08:40

## 2022-03-11 RX ADMIN — FAMOTIDINE 20 MG: 20 TABLET ORAL at 21:10

## 2022-03-11 RX ADMIN — HYDROXYZINE HYDROCHLORIDE 25 MG: 25 TABLET, FILM COATED ORAL at 08:41

## 2022-03-11 RX ADMIN — OXYCODONE AND ACETAMINOPHEN 1 TABLET: 5; 325 TABLET ORAL at 04:22

## 2022-03-11 RX ADMIN — CYANOCOBALAMIN TAB 1000 MCG 1000 MCG: 1000 TAB at 08:42

## 2022-03-11 RX ADMIN — MORPHINE SULFATE 30 MG: 15 TABLET, FILM COATED, EXTENDED RELEASE ORAL at 21:10

## 2022-03-11 RX ADMIN — FAMOTIDINE 20 MG: 20 TABLET ORAL at 08:41

## 2022-03-11 ASSESSMENT — PAIN DESCRIPTION - PROGRESSION
CLINICAL_PROGRESSION: GRADUALLY WORSENING
CLINICAL_PROGRESSION: GRADUALLY WORSENING

## 2022-03-11 ASSESSMENT — PAIN DESCRIPTION - LOCATION
LOCATION: BACK
LOCATION: BACK

## 2022-03-11 ASSESSMENT — PAIN DESCRIPTION - ONSET
ONSET: ON-GOING
ONSET: ON-GOING

## 2022-03-11 ASSESSMENT — PAIN SCALES - GENERAL
PAINLEVEL_OUTOF10: 0
PAINLEVEL_OUTOF10: 6
PAINLEVEL_OUTOF10: 7
PAINLEVEL_OUTOF10: 0
PAINLEVEL_OUTOF10: 0
PAINLEVEL_OUTOF10: 6

## 2022-03-11 ASSESSMENT — PAIN DESCRIPTION - ORIENTATION
ORIENTATION: LOWER;MID
ORIENTATION: LOWER;MID

## 2022-03-11 ASSESSMENT — PAIN DESCRIPTION - FREQUENCY
FREQUENCY: CONTINUOUS
FREQUENCY: CONTINUOUS

## 2022-03-11 ASSESSMENT — PAIN DESCRIPTION - PAIN TYPE
TYPE: ACUTE PAIN
TYPE: ACUTE PAIN

## 2022-03-11 ASSESSMENT — PAIN DESCRIPTION - DESCRIPTORS
DESCRIPTORS: ACHING;CONSTANT;DISCOMFORT
DESCRIPTORS: ACHING;CONSTANT;DISCOMFORT

## 2022-03-11 ASSESSMENT — PAIN SCALES - WONG BAKER
WONGBAKER_NUMERICALRESPONSE: 0
WONGBAKER_NUMERICALRESPONSE: 0

## 2022-03-11 NOTE — PROGRESS NOTES
Speaks with daughter, Abner Queen, with patient permission. Educated on plan. Agreeable. All questions and concerns addressed. Physician at bedside, updates patient. PT is chair in position of comfort, education and reinforcement of purse lip breahting and muscle strengthening exercises given, emotional support given, pt accepting and thanks staff for care, denies complaints.

## 2022-03-11 NOTE — PROGRESS NOTES
Pulmonary and Critical Care  Progress Note    Subjective: The patient is comfortable in bed. On Vapotherm 60%. Shortness of breath is better. Chest pain none  Addressing respiratory complaints Patient is negative for  hemoptysis and cyanosis  CONSTITUTIONAL:  negative for fevers and chills      Past Medical History:     has a past medical history of Chronic lower back pain, Hx of spinal fusion, Hypertension, MRSA (methicillin resistant staph aureus) culture positive, and Venous disease. has a past surgical history that includes fracture surgery and back surgery. reports that she has never smoked. She has never used smokeless tobacco. She reports that she does not drink alcohol and does not use drugs. Family history:  family history is not on file. No Known Allergies  Social History:    Reviewed; no changes    Objective:   PHYSICAL EXAM:        VITALS:  BP (!) 132/57   Pulse 82   Temp 98.4 °F (36.9 °C) (Oral)   Resp 24   Ht 5' 2.01\" (1.575 m)   Wt 203 lb 12.8 oz (92.4 kg)   SpO2 93%   BMI 37.27 kg/m²     24HR INTAKE/OUTPUT:      Intake/Output Summary (Last 24 hours) at 3/11/2022 1223  Last data filed at 3/11/2022 0800  Gross per 24 hour   Intake 250 ml   Output 1400 ml   Net -1150 ml       CONSTITUTIONAL:  Awake. LUNGS:  decreased breath sounds, basilar crackles. CARDIOVASCULAR:  normal S1 and S2 and negative JVD  ABD:Abdomen soft, non-tender. BS normal. No masses,  No organomegaly  NEURO:Alert. DATA:    CBC:  Recent Labs     03/11/22  0305   WBC 7.1   RBC 4.60   HGB 11.5*   HCT 39.9   PLT 97*   MCV 86.7   MCH 25.0*   MCHC 28.8*   RDW 19.5*   SEGSPCT 82.8*      BMP:  Recent Labs     03/11/22  0305      K 4.3   CL 99   CO2 26   BUN 22   CREATININE 0.5*   CALCIUM 8.2*   GLUCOSE 295*      ABG:  No results for input(s): PH, PO2ART, PNH5PKE, HCO3, BEART, O2SAT in the last 72 hours.   Lab Results   Component Value Date    PROBNP 411.8 (H) 03/01/2022    PROBNP 286.5 02/23/2022    PROBNP 289.8

## 2022-03-11 NOTE — CONSULTS
Endocrinology   Consult Note      Dear Doctor Marychuy Tillman    Thank You for the Consult     Pt. Was Admitted for : Patient was admitted on 2/5/2022 over a month ago. Admitted she was Covid positive COVID with Acute Pneumonia no out of Covid unit    Reason for Consult: Better control of blood glucose      History Obtained From:  [facility]       HISTORY OF PRESENT ILLNESS:                The patient is a 68 y.o. female with significant past medical history of chronic low back pain hypertension venous disease back surgery. To hospital on 2/5/2022 low back pain positive for Covid pneumonia. Past the quarantine and treatment as stated was transferred to out of Covid unit to medical floor. Has been on high-dose prednisone. She has been running high blood glucose over 200. She is not known to be diabetic in the past I was  consulted for better control of blood glucose. ROS:   Pt's ROS done in detail. Abnormal ROS are noted in Medical and Surgical History Section below: Other Medical History:        Diagnosis Date    Chronic lower back pain     Hx of spinal fusion     Hypertension     MRSA (methicillin resistant staph aureus) culture positive 09/13/2021    Wound    Venous disease      Surgical History:        Procedure Laterality Date    BACK SURGERY      FRACTURE SURGERY         Allergies:  Patient has no known allergies. Family History:   History reviewed. No pertinent family history. REVIEW OF SYSTEMS:  Review of System Done as noted above     PHYSICAL EXAM:      Vitals:    BP (!) 132/57   Pulse 82   Temp 98.4 °F (36.9 °C) (Oral)   Resp 24   Ht 5' 2.01\" (1.575 m)   Wt 203 lb 12.8 oz (92.4 kg)   SpO2 93%   BMI 37.27 kg/m²     CONSTITUTIONAL:  awake, alert, cooperative, appears stated age   EYES:  vision intact Fundoscopic Exam not performed   ENT:Normal  NECK:  Supple, No JVD.    Thyroid Exam:Normal   LUNGS:  Has Vesicular Breath Sounds,   CARDIOVASCULAR:  Normal apical impulse, regular rate and rhythm, normal S1 and S2, no S3 or S4, and has no  murmur   ABDOMEN:  No scars, normal bowel sounds, soft, non-distended, non-tender, no masses palpated, no hepatolienomegaly  Musculoskeletal: Normal  Extremities: Normal, peripheral pulses normal, , has no edema   NEUROLOGIC:  Awake, alert, oriented to name, place and time. Cranial nerves II-XII are grossly intact. Except has very hard of hearing motor is  intact. Sensory is intact. ,  and gait is normal.    DATA:    CBC:   Recent Labs     03/11/22  0305   WBC 7.1   HGB 11.5*   PLT 97*    CMP:  Recent Labs     03/11/22  0305      K 4.3   CL 99   CO2 26   BUN 22   CREATININE 0.5*   CALCIUM 8.2*   PROT 5.1*   LABALBU 3.1*   BILITOT 0.3   ALKPHOS 54   AST 23   ALT 56*     Lipids: No results found for: CHOL, HDL, TRIG  Glucose: No results for input(s): POCGLU in the last 72 hours. Hemoglobin A1C: No results found for: LABA1C  Free T4: No results found for: T4FREE  Free T3: No results found for: FT3  TSH High Sensitivity:   Lab Results   Component Value Date    TSHHS 0.436 02/28/2022       CTA PULMONARY W CONTRAST   Final Result   1. Motion degraded exam.  No evidence for central pulmonary embolism. 2.  Patchy bilateral airspace disease again demonstrated, consistent with   history of COVID infection. Overall these appear less confluent since the   prior chest CT exam.         XR CHEST PORTABLE   Final Result   Bilateral airspace disease, not significantly changed from prior exam.         XR CHEST PORTABLE   Final Result   Stable bilateral pulmonary opacities consistent with COVID pneumonia. Follow   up to resolution is suggested. XR CHEST PORTABLE   Final Result   Patchy bilateral airspace opacities compatible with COVID pneumonia overall   improved from 02/05/2022. CT CHEST PULMONARY EMBOLISM W CONTRAST   Final Result   1. No evidence of pulmonary embolism.       2.  Patchy bilateral consolidative ground-glass opacities are noted, for which multifocal inflammatory process or infection including COVID pneumonia   should be considered. VL DUP LOWER EXTREMITY VENOUS BILATERAL   Final Result   No evidence of DVT in either lower extremity. XR CHEST PORTABLE   Final Result   Bilateral airspace opacities, right greater than left, concerning for   extensive multifocal pneumonia                Scheduled Medicines   Medications:    methylPREDNISolone  40 mg IntraVENous Q8H    guaiFENesin  200 mg Oral Q4H    albuterol sulfate HFA  2 puff Inhalation 4x daily    vitamin B-12  1,000 mcg Oral Daily    traZODone  25 mg Oral Nightly    lubiprostone  8 mcg Oral BID WC    metoprolol tartrate  12.5 mg Oral BID    polyethylene glycol  17 g Oral Daily    enoxaparin  30 mg SubCUTAneous BID    fluticasone  1 spray Each Nostril Daily    morphine  30 mg Oral 2 times per day    cetirizine  10 mg Oral Daily    [Held by provider] losartan  50 mg Oral Daily    sodium chloride flush  5-40 mL IntraVENous 2 times per day    sennosides-docusate sodium  1 tablet Oral BID    famotidine  20 mg Oral BID    Vitamin D  2,000 Units Oral Daily      Infusions:    sodium chloride 25 mL (02/09/22 0917)         IMPRESSION    Patient Active Problem List   Diagnosis    WD-Venous stasis dermatitis of both lower extremities    WD-Lymphedema of both lower extremities    WD-Venous stasis ulcer of right calf with fat layer exposed with varicose veins (HCC)    WD-Venous stasis ulcer of left calf with fat layer exposed with varicose veins (HCC)    COVID-19    Hypoxia    SVT (supraventricular tachycardia) (HCC)    Adjustment disorder with anxious mood         RECOMMENDATIONS:      1. Reviewed POC blood glucose . Labs and X ray results   2. Reviewed Home and Current Medicines   3. Will start on correction bolus Humalog insulin       Will follow with you  Again thank you for sharing pt's care with me.      Truly yours,       Manjit Dempsey MD

## 2022-03-11 NOTE — PROGRESS NOTES
Pt is alert and oreinted, cooperative with care. Pt physician updates plan of care to plaement acceptance at [facility] but waiting on bed available next week. Pt updated and agreeable to this, but would like to speak with her son before any transfer occurs. Pt desiring to ambulate and do more physical therapy today as she feels very weak after her admission, educated and encouraged to perform leg muscle exercising. PT agreeable to this. Pt has all needs met, call light in reach, thanks staff for care. Denies complaints.

## 2022-03-11 NOTE — PROGRESS NOTES
Physical Therapy    Physical Therapy Treatment Note  Name: Katherin Rendon MRN: [id number] :   1945   Date:  3/11/2022   Admission Date: 2022 Room:  [address] [place] [address]   Restrictions/Precautions:  Restrictions/Precautions  Restrictions/Precautions: Fall Risk,General Precautions,Contact Precautions       Communication with other providers:  Per chart review pt appropriate for tx session     Subjective:  Patient states:  Agreeable to tx session; pt very pleasant and willing to participate with PT. Pt daughter Nikki Harrington in room for duration of tx session. Pt daughter has a very clear idea of what should be done with pt during tx session and does not agree with majority of this therapist tx plan. This therapist gives lengthy rational and education to pt daughter; pt daughter Nikki Harrington partially agrees with plan for tx. Pt daughter Nikki Harrington interjects at multiple points throughout tx session to condescendingly question this therapist and suggests ideas that would be unsafe for pt to perform at this time. Daughter Nikki Harrington appears to have decreased insight into pt current medical situation. Pain:   Location, Type, Intensity (0/10 to 10/10): Denies     Objective:    Observation:  Pt in chair upon arrival.    Treatment, including education/measures:  Transfers:  Sit to supine :maxA for LE negotiation   Scooting :modA   Sit to stand :maxA from chair; modA from EOB x3 trials. Pt daughter Nikki Harrington insistent that pt can do more and that this therapist is hindering pt progress. This therapist has pt attempt to complete STS from EOB with CGA pt unable to achieve full stand and pt O2 sats drop to ~70%. Pt daughter states \"Well just let her sit there and she'll recover. \" Explained to pt daughter that it would be unsafe to let pt continue to sit EOB due to sat drop; pt daughter unreceptive but pt request to lay supine as she is fatigued. Pt O2 recovers quickly once supine to ~89%. Stand to sit :modA for eccentric control.   SPT: Initial trial unsuccessful with modA; second trial with maxA successful. Pt requires max time during rest breaks to recover O2 sats    Max time for educating pt daughter on rational of PT. Safety  Patient left safely in the bed, with call light/phone in reach with alarm applied. Gait belt used for transfers.     Assessment / Impression:       Patient's tolerance of treatment:  good   Adverse Reaction: O2 drop  Significant change in status and impact:  none  Barriers to improvement:  O2 drop, strength, weakness     Plan for Next Session:    Will cont to work towards pt's goals per her tolerance    Time in:  1452  Time out:  1600  Timed treatment minutes:  68  Total treatment time:  76    Previously filed items:  Social/Functional History  Lives With: Alone  Type of Home: House  Home Layout: One level  Home Access: Ramped entrance (railing on BL sides)  Bathroom Shower/Tub: Shower chair without back (dtr spongebathes)  Bathroom Toilet: Handicap height  Bathroom Equipment: Grab bars around toilet  Home Equipment: Crutches,Wheelchair-manual,Grab bars  Receives Help From: Family  ADL Assistance: Needs assistance (dtr assists w/ bathing, dressing, ; dtr comes M/W/F)  Homemaking Assistance: Needs assistance (dtr does shopping; pt does not cook, has meals on wheels; son does outside [place]; grand dtrs clean inside)  Homemaking Responsibilities: No (family performs most; she assists w/ what she can, mostly done seated)  Ambulation Assistance: Independent (mod ind using crutches; only short distances)  Transfer Assistance: Independent  Active : No (family)  Occupation: Retired,On disability  Additional Comments: plays cards, crochette, puzzle books, socializes w/ family; sleeps in adjustable bed w/ trapeze over for improved pt mobility  Short term goals  Time Frame for Short term goals: 1 week  Short term goal 1: pt will complete bed mobility at min A  Short term goal 2: pt will complete transfers at [facility] term goal 3: pt will ambulate 15 ft using crutches at [facility]  Short term goal 4: pt will demonstrate near gross >3/5 BL LE strength       Electronically signed by:    Aaron De Jesus PTA  3/11/2022, 4:05 PM

## 2022-03-11 NOTE — PLAN OF CARE
Problem: Falls - Risk of:  Goal: Will remain free from falls  Description: Will remain free from falls  3/11/2022 1152 by Shavonne Frances RN  Outcome: Met This Shift  3/11/2022 0047 by Molina Chun LPN  Outcome: Ongoing  Goal: Absence of physical injury  Description: Absence of physical injury  3/11/2022 1152 by Shavonne Frances RN  Outcome: Met This Shift  3/11/2022 0047 by Molina Chun LPN  Outcome: Ongoing     Problem: Airway Clearance - Ineffective  Goal: Achieve or maintain patent airway  3/11/2022 1152 by Shavonne Frances RN  Outcome: Met This Shift  3/11/2022 0047 by Molina Chun LPN  Outcome: Ongoing     Problem: Gas Exchange - Impaired  Goal: Absence of hypoxia  3/11/2022 1152 by Shavonne Frances RN  Outcome: Met This Shift  3/11/2022 0047 by Molina Chun LPN  Outcome: Ongoing  Goal: Promote optimal lung function  3/11/2022 1152 by Shavonne Frances RN  Outcome: Met This Shift  3/11/2022 0047 by Molina Chun LPN  Outcome: Ongoing     Problem: Breathing Pattern - Ineffective  Goal: Ability to achieve and maintain a regular respiratory rate  3/11/2022 1152 by Shavonne Frances RN  Outcome: Met This Shift  3/11/2022 0047 by Molina Chun LPN  Outcome: Ongoing     Problem:  Body Temperature -  Risk of, Imbalanced  Goal: Ability to maintain a body temperature within defined limits  3/11/2022 1152 by Shavonne Frances RN  Outcome: Met This Shift  3/11/2022 0047 by Molina Chun LPN  Outcome: Ongoing  Goal: Will regain or maintain usual level of consciousness  3/11/2022 1152 by Shavonne Frances RN  Outcome: Met This Shift  3/11/2022 0047 by Molina Chun LPN  Outcome: Ongoing  Goal: Complications related to the disease process, condition or treatment will be avoided or minimized  3/11/2022 1152 by Shavonne Frances RN  Outcome: Met This Shift  3/11/2022 0047 by Molina Chun LPN  Outcome: Ongoing     Problem: Nutrition Deficits  Goal: Optimize nutritional status  3/11/2022 1152 by Delicia Watts RN  Outcome: Met This Shift  3/11/2022 0047 by Osmany Valentine LPN  Outcome: Ongoing     Problem: Risk for Fluid Volume Deficit  Goal: Maintain normal heart rhythm  3/11/2022 1152 by Delicia Watts RN  Outcome: Met This Shift  3/11/2022 0047 by Osmany Valentine LPN  Outcome: Ongoing  Goal: Maintain absence of muscle cramping  3/11/2022 1152 by Delicia Watts RN  Outcome: Met This Shift  3/11/2022 0047 by Osmany Valentine LPN  Outcome: Ongoing  Goal: Maintain normal serum potassium, sodium, calcium, phosphorus, and pH  3/11/2022 1152 by Delicia Watts RN  Outcome: Met This Shift  3/11/2022 0047 by Osmany Valentine LPN  Outcome: Ongoing     Problem: Loneliness or Risk for Loneliness  Goal: Demonstrate positive use of time alone when socialization is not possible  3/11/2022 1152 by Delicia Watts RN  Outcome: Met This Shift  3/11/2022 0047 by Osmany Valentine LPN  Outcome: Ongoing     Problem: Fatigue  Goal: Verbalize increase energy and improved vitality  3/11/2022 1152 by Delicia Watts RN  Outcome: Met This Shift  3/11/2022 0047 by Osmany Valentine LPN  Outcome: Ongoing     Problem: Patient Education: Go to Patient Education Activity  Goal: Patient/Family Education  3/11/2022 1152 by Delicia Watts RN  Outcome: Met This Shift  3/11/2022 0047 by Osmany Valentine LPN  Outcome: Ongoing     Problem: Pain:  Goal: Pain level will decrease  Description: Pain level will decrease  3/11/2022 1152 by Delicia Watts RN  Outcome: Met This Shift  3/11/2022 0047 by Osmany Valentine LPN  Outcome: Ongoing  Goal: Control of acute pain  Description: Control of acute pain  3/11/2022 1152 by Delicia Watts RN  Outcome: Met This Shift  3/11/2022 0047 by Osmany Valentine LPN  Outcome: Ongoing  Goal: Control of chronic pain  Description: Control of chronic pain  3/11/2022 1152 by Delicia Watts RN  Outcome: Met This Shift  3/11/2022 0047 by Osmany Valentine LPN  Outcome: Ongoing Problem: Skin Integrity:  Goal: Will show no infection signs and symptoms  Description: Will show no infection signs and symptoms  3/11/2022 1152 by Ermias Peña RN  Outcome: Met This Shift  3/11/2022 0047 by Fleurette Runner, LPN  Outcome: Ongoing  Goal: Absence of new skin breakdown  Description: Absence of new skin breakdown  3/11/2022 1152 by Ermias Peña RN  Outcome: Met This Shift  3/11/2022 0047 by Fleurette Runner, LPN  Outcome: Ongoing     Problem: Nutrition  Goal: Optimal nutrition therapy  3/11/2022 1152 by Ermias Peña RN  Outcome: Met This Shift  3/11/2022 0047 by Fleurette Runner, LPN  Outcome: Ongoing

## 2022-03-11 NOTE — CARE COORDINATION
Notified by Dr José Jones that he has spoken with patient's son Shannan Solorio regarding LTACH . Phoned and spoke with Shannan Solorio regarding LTACH and provided names of the 3 LTACH's in the area for him : [facility], [facility] and [facility]  . HANSA answered questions as able regarding LTACH and offered to have Mayur in Admissions at [address] call Shannan Solorio to discuss further and he was agreeable. Phoned and spoke with Mayur at [address]. Patient does meed LTACH criteria and he stated that he would have an open bed next week if patient and family decided on LTACH and chose [facility]. Mayur stated that he will give Shannan Solorio a call .  Case Management to follow

## 2022-03-11 NOTE — PROGRESS NOTES
Hospitalist Progress Note      Name:  Valerie Jensen /Age/Sex: 1945  (68 y.o. female)   MRN & CSN:  [id number] & [id number] Admission Date/Time: 2022  4:46 PM   Location:  [address] [place] [address] PCP: No primary care provider on file. Hospital Day: 29  Discharge barrier/Reason for continued hospitalization: Persistently hypoxemia requiring high flow. Assessment and Plan:   Valerie Jensen is a 68 y.o.  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 The squad reported O2 sats of 74% on room air. On arrival to the ED, patient ruled in for COVID-19. Hospital course has been prolonged due to continued high oxygen requirements. 1.  COVID-19 sepsis/pneumonia  Completed baricitinib  Methylprednisolone 40 mg IV every 6 hourscontinue      2. COVID-19 acute hypoxic respiratory failure: Now improving. Continue to wean oxygen -on Vapotherm with FiO2 75% today    3. Low vitamin B12 levels: Continue replacement. 4.  Hypertension: Continue low-dose Lopressor. Losartan currently on hold. 5.  Chronic pain: Continue MS Contin twice daily, Percocet every 6 as needed  6. Class II obesity: Not a candidate for counseling due to advanced age  9. Insomnia: Improved with trazodone  8. Goals of care: DNR. See progress note 3/6/2022    Constipationcontinue lubiprostone    Diet ADULT ORAL NUTRITION SUPPLEMENT; Breakfast, Lunch, Dinner; Low Calorie/High Protein Oral Supplement  ADULT DIET;  Regular   DVT Prophylaxis [x] Lovenox, []  Heparin, [] SCDs, [] Ambulation   GI Prophylaxis [] PPI,  [] H2 Blocker,  [] Carafate,  [x] Diet/Tube Feeds   Code Status DNR-CC   MDM [] Low, [x] Moderate,[]  High       History of Present Illness:     Chief Complaint: <principal problem not specified>  Valerie Jensen is a 68 y.o.  female  who presents with nausea, vomiting, fatigue of 1 week duration. Was found to be hypoxic on arrival of the squad      3/10/2022  Discussed with the patient and her sister in the room    Discussed with the  discussed at the interdisciplinary team meeting    Discussed with the [facility] representative Antwan Vasquez has been accepted there, but no bed until next week      3/9/2022  Discussed with sister today  Patient feels about the same. Continues with her usual smile. 3/8/2022  -She was on Vapotherm, with FiO2 way down to 45% today, which is much better than before  -She recalls seeing me from last week. She stated \"I am hard of hearing, I remember? \"            Prior note by colleague:     3/6/2022: Patient is seen and examined, she is in good spirits. She definitely wants to get out of bed today. She does not want to use the bedpan and tells me it bothers her hips. She would like to use the bedside commode. She tells me that she knows when she is exerting herself too much and when her oxygen is dropping. She also does not want us to escalate care in the event of a cardiorespiratory arrest. Her CODE STATUS will be updated to DNR CC at this time        Objective: Intake/Output Summary (Last 24 hours) at 3/10/2022 2133  Last data filed at 3/10/2022 2030  Gross per 24 hour   Intake 10 ml   Output 1500 ml   Net -1490 ml        Vitals:   Vitals:    03/10/22 1106 03/10/22 1510 03/10/22 2000 03/10/22 2050   BP:   137/67    Pulse:   81    Resp: 24      Temp:   98.4 °F (36.9 °C)    TempSrc:       SpO2: 93% 92% 96% 93%   Weight:       Height:            Physical Exam:     Physical Exam  Constitutional:       Appearance: She is not diaphoretic. HENT:      Nose: No rhinorrhea. Eyes:      General:         Right eye: No discharge. Left eye: No discharge. Pulmonary:      Effort: Pulmonary effort is normal.   Neurological:      Mental Status: She is alert. Cranial Nerves: No cranial nerve deficit. Medications:   Medications:    methylPREDNISolone  40 mg IntraVENous Q8H    guaiFENesin  200 mg Oral Q4H    albuterol sulfate HFA  2 puff Inhalation 4x daily    vitamin B-12  1,000 mcg Oral Daily    traZODone  25 mg Oral Nightly    lubiprostone  8 mcg Oral BID WC    metoprolol tartrate  12.5 mg Oral BID    polyethylene glycol  17 g Oral Daily    enoxaparin  30 mg SubCUTAneous BID    fluticasone  1 spray Each Nostril Daily    morphine  30 mg Oral 2 times per day    cetirizine  10 mg Oral Daily    [Held by provider] losartan  50 mg Oral Daily    sodium chloride flush  5-40 mL IntraVENous 2 times per day    sennosides-docusate sodium  1 tablet Oral BID    famotidine  20 mg Oral BID    Vitamin D  2,000 Units Oral Daily      Infusions:    sodium chloride 25 mL (02/09/22 0917)     PRN Meds: sodium chloride flush, 10 mL, PRN  hydrOXYzine, 25 mg, TID PRN  sodium chloride, 1 spray, Q4H PRN  albuterol sulfate HFA, 2 puff, Q4H PRN  benzocaine-menthol, 1 lozenge, Q2H PRN  sodium chloride flush, 5-40 mL, PRN  sodium chloride, 25 mL, PRN  ondansetron, 4 mg, Q6H PRN  acetaminophen, 650 mg, Q6H PRN   Or  acetaminophen, 650 mg, Q6H PRN  oxyCODONE-acetaminophen, 1 tablet, Q6H PRN          Electronically signed by Skip Brown MD on 3/10/2022 at 9:33 PM

## 2022-03-12 LAB
GLUCOSE BLD-MCNC: 164 MG/DL (ref 70–99)
GLUCOSE BLD-MCNC: 169 MG/DL (ref 70–99)
GLUCOSE BLD-MCNC: 181 MG/DL (ref 70–99)
GLUCOSE BLD-MCNC: 182 MG/DL (ref 70–99)

## 2022-03-12 PROCEDURE — 6360000002 HC RX W HCPCS: Performed by: STUDENT IN AN ORGANIZED HEALTH CARE EDUCATION/TRAINING PROGRAM

## 2022-03-12 PROCEDURE — 6370000000 HC RX 637 (ALT 250 FOR IP): Performed by: INTERNAL MEDICINE

## 2022-03-12 PROCEDURE — 94640 AIRWAY INHALATION TREATMENT: CPT

## 2022-03-12 PROCEDURE — 94761 N-INVAS EAR/PLS OXIMETRY MLT: CPT

## 2022-03-12 PROCEDURE — 6370000000 HC RX 637 (ALT 250 FOR IP): Performed by: HOSPITALIST

## 2022-03-12 PROCEDURE — 82962 GLUCOSE BLOOD TEST: CPT

## 2022-03-12 PROCEDURE — 2140000000 HC CCU INTERMEDIATE R&B

## 2022-03-12 PROCEDURE — 6360000002 HC RX W HCPCS: Performed by: INTERNAL MEDICINE

## 2022-03-12 PROCEDURE — 2700000000 HC OXYGEN THERAPY PER DAY

## 2022-03-12 PROCEDURE — 97530 THERAPEUTIC ACTIVITIES: CPT

## 2022-03-12 PROCEDURE — 6370000000 HC RX 637 (ALT 250 FOR IP): Performed by: NURSE PRACTITIONER

## 2022-03-12 PROCEDURE — 2580000003 HC RX 258: Performed by: NURSE PRACTITIONER

## 2022-03-12 PROCEDURE — 97535 SELF CARE MNGMENT TRAINING: CPT

## 2022-03-12 RX ORDER — ALBUTEROL SULFATE 90 UG/1
2 AEROSOL, METERED RESPIRATORY (INHALATION) 2 TIMES DAILY
Status: DISCONTINUED | OUTPATIENT
Start: 2022-03-12 | End: 2022-03-17 | Stop reason: HOSPADM

## 2022-03-12 RX ADMIN — CETIRIZINE HYDROCHLORIDE 10 MG: 10 TABLET, FILM COATED ORAL at 08:27

## 2022-03-12 RX ADMIN — METHYLPREDNISOLONE SODIUM SUCCINATE 40 MG: 40 INJECTION, POWDER, LYOPHILIZED, FOR SOLUTION INTRAMUSCULAR; INTRAVENOUS at 14:14

## 2022-03-12 RX ADMIN — SODIUM CHLORIDE, PRESERVATIVE FREE 10 ML: 5 INJECTION INTRAVENOUS at 08:42

## 2022-03-12 RX ADMIN — GUAIFENESIN 200 MG: 200 SOLUTION ORAL at 08:28

## 2022-03-12 RX ADMIN — OXYCODONE AND ACETAMINOPHEN 1 TABLET: 5; 325 TABLET ORAL at 03:41

## 2022-03-12 RX ADMIN — FAMOTIDINE 20 MG: 20 TABLET ORAL at 21:02

## 2022-03-12 RX ADMIN — FLUTICASONE PROPIONATE 1 SPRAY: 50 SPRAY, METERED NASAL at 08:42

## 2022-03-12 RX ADMIN — METHYLPREDNISOLONE SODIUM SUCCINATE 40 MG: 40 INJECTION, POWDER, LYOPHILIZED, FOR SOLUTION INTRAMUSCULAR; INTRAVENOUS at 06:38

## 2022-03-12 RX ADMIN — LUBIPROSTONE 8 MCG: 8 CAPSULE, GELATIN COATED ORAL at 17:09

## 2022-03-12 RX ADMIN — CYANOCOBALAMIN TAB 1000 MCG 1000 MCG: 1000 TAB at 08:43

## 2022-03-12 RX ADMIN — INSULIN LISPRO 1 UNITS: 100 INJECTION, SOLUTION INTRAVENOUS; SUBCUTANEOUS at 21:10

## 2022-03-12 RX ADMIN — Medication 2000 UNITS: at 08:27

## 2022-03-12 RX ADMIN — OXYCODONE AND ACETAMINOPHEN 1 TABLET: 5; 325 TABLET ORAL at 08:27

## 2022-03-12 RX ADMIN — SODIUM CHLORIDE, PRESERVATIVE FREE 10 ML: 5 INJECTION INTRAVENOUS at 21:03

## 2022-03-12 RX ADMIN — POLYETHYLENE GLYCOL (3350) 17 G: 17 POWDER, FOR SOLUTION ORAL at 08:26

## 2022-03-12 RX ADMIN — TRAZODONE HYDROCHLORIDE 25 MG: 50 TABLET ORAL at 21:01

## 2022-03-12 RX ADMIN — ENOXAPARIN SODIUM 30 MG: 100 INJECTION SUBCUTANEOUS at 08:28

## 2022-03-12 RX ADMIN — METOPROLOL 12.5 MG: 25 TABLET ORAL at 21:02

## 2022-03-12 RX ADMIN — MORPHINE SULFATE 30 MG: 15 TABLET, FILM COATED, EXTENDED RELEASE ORAL at 21:00

## 2022-03-12 RX ADMIN — METOPROLOL 12.5 MG: 25 TABLET ORAL at 08:27

## 2022-03-12 RX ADMIN — SENNOSIDES AND DOCUSATE SODIUM 1 TABLET: 50; 8.6 TABLET ORAL at 08:28

## 2022-03-12 RX ADMIN — SENNOSIDES AND DOCUSATE SODIUM 1 TABLET: 50; 8.6 TABLET ORAL at 21:02

## 2022-03-12 RX ADMIN — ALBUTEROL SULFATE 2 PUFF: 90 AEROSOL, METERED RESPIRATORY (INHALATION) at 07:26

## 2022-03-12 RX ADMIN — FAMOTIDINE 20 MG: 20 TABLET ORAL at 08:26

## 2022-03-12 RX ADMIN — MORPHINE SULFATE 30 MG: 15 TABLET, FILM COATED, EXTENDED RELEASE ORAL at 08:27

## 2022-03-12 RX ADMIN — METHYLPREDNISOLONE SODIUM SUCCINATE 40 MG: 40 INJECTION, POWDER, LYOPHILIZED, FOR SOLUTION INTRAMUSCULAR; INTRAVENOUS at 21:01

## 2022-03-12 RX ADMIN — LUBIPROSTONE 8 MCG: 8 CAPSULE, GELATIN COATED ORAL at 08:26

## 2022-03-12 ASSESSMENT — PAIN SCALES - GENERAL
PAINLEVEL_OUTOF10: 0
PAINLEVEL_OUTOF10: 7
PAINLEVEL_OUTOF10: 3
PAINLEVEL_OUTOF10: 7
PAINLEVEL_OUTOF10: 0
PAINLEVEL_OUTOF10: 8
PAINLEVEL_OUTOF10: 6

## 2022-03-12 ASSESSMENT — PAIN SCALES - WONG BAKER: WONGBAKER_NUMERICALRESPONSE: 0

## 2022-03-12 NOTE — PROGRESS NOTES
Pulmonary and Critical Care  Progress Note    Subjective: The patient is comfortable in bed. On Vapotherm 55%. Shortness of breath is better. Chest pain none  Addressing respiratory complaints Patient is negative for  hemoptysis and cyanosis  CONSTITUTIONAL:  negative for fevers and chills      Past Medical History:     has a past medical history of Chronic lower back pain, Hx of spinal fusion, Hypertension, MRSA (methicillin resistant staph aureus) culture positive, and Venous disease. has a past surgical history that includes fracture surgery and back surgery. reports that she has never smoked. She has never used smokeless tobacco. She reports that she does not drink alcohol and does not use drugs. Family history:  family history is not on file. No Known Allergies  Social History:    Reviewed; no changes    Objective:   PHYSICAL EXAM:        VITALS:  BP (!) 131/58   Pulse 83   Temp 98.1 °F (36.7 °C) (Oral)   Resp 18   Ht 5' 2.01\" (1.575 m)   Wt 199 lb 12.8 oz (90.6 kg)   SpO2 94%   BMI 36.53 kg/m²     24HR INTAKE/OUTPUT:      Intake/Output Summary (Last 24 hours) at 3/12/2022 1116  Last data filed at 3/12/2022 0545  Gross per 24 hour   Intake 610 ml   Output 2700 ml   Net -2090 ml       CONSTITUTIONAL:  Awake. LUNGS:  decreased breath sounds, basilar crackles. CARDIOVASCULAR:  normal S1 and S2 and negative JVD  ABD:Abdomen soft, non-tender. BS normal. No masses,  No organomegaly  NEURO:Alert. DATA:    CBC:  Recent Labs     03/11/22  0305   WBC 7.1   RBC 4.60   HGB 11.5*   HCT 39.9   PLT 97*   MCV 86.7   MCH 25.0*   MCHC 28.8*   RDW 19.5*   SEGSPCT 82.8*      BMP:  Recent Labs     03/11/22  0305      K 4.3   CL 99   CO2 26   BUN 22   CREATININE 0.5*   CALCIUM 8.2*   GLUCOSE 295*      ABG:  No results for input(s): PH, PO2ART, HBE6OPJ, HCO3, BEART, O2SAT in the last 72 hours.   Lab Results   Component Value Date    PROBNP 411.8 (H) 03/01/2022    PROBNP 286.5 02/23/2022    PROBNP 289.8 02/22/2022     No results found for: [facility]    Radiology Review:  Pertinent images / reports were reviewed as a part of this visit. Assessment:     Patient Active Problem List   Diagnosis    WD-Venous stasis dermatitis of both lower extremities    WD-Lymphedema of both lower extremities    WD-Venous stasis ulcer of right calf with fat layer exposed with varicose veins (HCC)    WD-Venous stasis ulcer of left calf with fat layer exposed with varicose veins (HCC)    COVID-19    Hypoxia    SVT (supraventricular tachycardia) (HCC)    Adjustment disorder with anxious mood       Plan:   1. Overall the patient is better. 2. [place] [address] Activity.   4. Repeat CXR in am.  Sara Kincaid MD  3/12/2022  11:16 AM

## 2022-03-12 NOTE — RT PROTOCOL NOTE
RT Inhaler-Nebulizer Bronchodilator Protocol Note    There is a bronchodilator order in the chart from a provider indicating to follow the RT Bronchodilator Protocol and there is an Initiate RT Inhaler-Nebulizer Bronchodilator Protocol order as well (see protocol at bottom of note). CXR Findings:  No results found. The findings from the last RT Protocol Assessment were as follows:   History Pulmonary Disease: None or smoker <15 pack years  Respiratory Pattern: Dyspnea on exertion or RR 21-25 bpm  Breath Sounds: Slightly diminished and/or crackles  Cough: Weak, productive  Indication for Bronchodilator Therapy:    Bronchodilator Assessment Score: 6    Aerosolized bronchodilator medication orders have been revised according to the RT Inhaler-Nebulizer Bronchodilator Protocol below. Respiratory Therapist to perform RT Therapy Protocol Assessment initially then follow the protocol. Repeat RT Therapy Protocol Assessment PRN for score 0-3 or on second treatment, BID, and PRN for scores above 3. No Indications  adjust the frequency to every 6 hours PRN wheezing or bronchospasm, if no treatments needed after 48 hours then discontinue using Per Protocol order mode. If indication present, adjust the RT bronchodilator orders based on the Bronchodilator Assessment Score as indicated below. Use Inhaler orders unless patient has one or more of the following: on home nebulizer, not able to hold breath for 10 seconds, is not alert and oriented, cannot activate and use MDI correctly, or respiratory rate 25 breaths per minute or more, then use the equivalent nebulizer order(s) with same Frequency and PRN reasons based on the score. If a patient is on this medication at home then do not decrease Frequency below that used at home.     0-3  enter or revise RT bronchodilator order(s) to equivalent RT Bronchodilator order with Frequency of every 4 hours PRN for wheezing or increased work of breathing using Per Protocol order mode. 4-6  enter or revise RT Bronchodilator order(s) to two equivalent RT bronchodilator orders with one order with BID Frequency and one order with Frequency of every 4 hours PRN wheezing or increased work of breathing using Per Protocol order mode. 7-10  enter or revise RT Bronchodilator order(s) to two equivalent RT bronchodilator orders with one order with TID Frequency and one order with Frequency of every 4 hours PRN wheezing or increased work of breathing using Per Protocol order mode. 11-13  enter or revise RT Bronchodilator order(s) to one equivalent RT bronchodilator order with QID Frequency and an Albuterol order with Frequency of every 4 hours PRN wheezing or increased work of breathing using Per Protocol order mode. Greater than 13  enter or revise RT Bronchodilator order(s) to one equivalent RT bronchodilator order with every 4 hours Frequency and an Albuterol order with Frequency of every 2 hours PRN wheezing or increased work of breathing using Per Protocol order mode. RT to enter RT Home Evaluation for COPD & MDI Assessment order using Per Protocol order mode.     Electronically signed by Joss Sexton RCP on 3/12/2022 at 11:16 AM

## 2022-03-12 NOTE — PROGRESS NOTES
Hospitalist Progress Note      Name:  Valerie Jensen /Age/Sex: 1945  (68 y.o. female)   MRN & CSN:  [id number] & [id number] Admission Date/Time: 2022  4:46 PM   Location:  [address] [place] [address] PCP: No primary care provider on file. Hospital Day: 28  Discharge barrier/Reason for continued hospitalization: Persistently hypoxemia requiring high flow. Assessment and Plan:   Valerie Jensen is a 68 y.o.  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 The squad reported O2 sats of 74% on room air. On arrival to the ED, patient ruled in for COVID-19. Hospital course has been prolonged due to continued high oxygen requirements. 1.  COVID-19 sepsis/pneumonia  Completed baricitinib  Methylprednisolone 40 mg IV every 8 hourscontinue      2. COVID-19 acute hypoxic respiratory failure: Now improving. Vapotherm and improved to 60% today    3. Low vitamin B12 levels: Continue replacement. 4.  Hypertension: Continue low-dose Lopressor. Losartan currently on hold. 5.  Chronic pain: Continue MS Contin twice daily, Percocet every 6 as needed  6. Class II obesity: Not a candidate for counseling due to advanced age  9. Insomnia: Improved with trazodone  8. Goals of care: DNR. See progress note 3/6/2022    Constipationcontinue lubiprostone    Diet ADULT ORAL NUTRITION SUPPLEMENT; Breakfast, Lunch, Dinner; Low Calorie/High Protein Oral Supplement  ADULT DIET; Regular   DVT Prophylaxis [x] Lovenox, []  Heparin, [] SCDs, [] Ambulation   GI Prophylaxis [] PPI,  [] H2 Blocker,  [] Carafate,  [x] Diet/Tube Feeds   Code Status DNR-CC   MDM [] Low, [x] Moderate,[]  High       History of Present Illness:     Chief Complaint: Valerie Jensen is a 68 y.o.  female  who presents with nausea, vomiting, fatigue of 1 week duration.   Was found to be hypoxic on arrival of the squad    3/11  Was sitting up in the chair. Was requiring less oxygen today. Discussed with her son Chelsey Crespo today about the LTACH referral.    3/10/2022  Discussed with the patient and her sister in the room    Discussed with the  discussed at the interdisciplinary team meeting    Discussed with the [facility], [facility] representative Shanice Rojas has been accepted there, but no bed until next week      3/9/2022  Discussed with sister today  Patient feels about the same. Continues with her usual smile. 3/8/2022  -She was on Vapotherm, with FiO2 way down to 45% today, which is much better than before  -She recalls seeing me from last week. She stated \"I am hard of hearing, I remember? \"            Prior note by colleague:     3/6/2022: Patient is seen and examined, she is in good spirits. She definitely wants to get out of bed today. She does not want to use the bedpan and tells me it bothers her hips. She would like to use the bedside commode. She tells me that she knows when she is exerting herself too much and when her oxygen is dropping. She also does not want us to escalate care in the event of a cardiorespiratory arrest. Her CODE STATUS will be updated to DNR CC at this time        Objective: Intake/Output Summary (Last 24 hours) at 3/11/2022 2148  Last data filed at 3/11/2022 2110  Gross per 24 hour   Intake 850 ml   Output 3350 ml   Net -2500 ml        Vitals:   Vitals:    03/11/22 1600 03/11/22 1943 03/11/22 2000 03/11/22 2110   BP: 137/70  130/64 132/65   Pulse: 87  77 90   Resp: 24 28 18    Temp: 98.5 °F (36.9 °C)  98.6 °F (37 °C)    TempSrc: Oral  Oral    SpO2: 91% 94% 93%    Weight:       Height:            Physical Exam:     Physical Exam  Constitutional:       Appearance: She is not diaphoretic. HENT:      Nose: No rhinorrhea. Eyes:      General:         Right eye: No discharge. Left eye: No discharge.    Pulmonary:      Effort: Pulmonary effort is normal.   Neurological:      Mental Status: She is alert. Cranial Nerves: No cranial nerve deficit.          Medications:   Medications:    [START ON 3/12/2022] insulin lispro  0-6 Units SubCUTAneous TID WC    insulin lispro  0-3 Units SubCUTAneous Nightly    methylPREDNISolone  40 mg IntraVENous Q8H    guaiFENesin  200 mg Oral Q4H    albuterol sulfate HFA  2 puff Inhalation 4x daily    vitamin B-12  1,000 mcg Oral Daily    traZODone  25 mg Oral Nightly    lubiprostone  8 mcg Oral BID     metoprolol tartrate  12.5 mg Oral BID    polyethylene glycol  17 g Oral Daily    enoxaparin  30 mg SubCUTAneous BID    fluticasone  1 spray Each Nostril Daily    morphine  30 mg Oral 2 times per day    cetirizine  10 mg Oral Daily    [Held by provider] losartan  50 mg Oral Daily    sodium chloride flush  5-40 mL IntraVENous 2 times per day    sennosides-docusate sodium  1 tablet Oral BID    famotidine  20 mg Oral BID    Vitamin D  2,000 Units Oral Daily      Infusions:    sodium chloride 25 mL (02/09/22 0917)     PRN Meds: sodium chloride flush, 10 mL, PRN  hydrOXYzine, 25 mg, TID PRN  sodium chloride, 1 spray, Q4H PRN  albuterol sulfate HFA, 2 puff, Q4H PRN  benzocaine-menthol, 1 lozenge, Q2H PRN  sodium chloride flush, 5-40 mL, PRN  sodium chloride, 25 mL, PRN  ondansetron, 4 mg, Q6H PRN  acetaminophen, 650 mg, Q6H PRN   Or  acetaminophen, 650 mg, Q6H PRN  oxyCODONE-acetaminophen, 1 tablet, Q6H PRN          Electronically signed by Anurag Gupta MD on 3/11/2022 at 9:48 PM

## 2022-03-12 NOTE — PROGRESS NOTES
Occupational Therapy Treatment Note      Name: Glenn Martinez MRN: [id number] :   1945   Date:  3/12/2022   Admission Date: 2022 Room:  [address] [place] [address]       Restrictions/Precautions:  Restrictions/Precautions  Restrictions/Precautions: Fall Risk,General Precautions,Contact Precautions     Communication with other providers:  Per chart review and Nurse patient is appropriate for therapeutic intervention. Subjective:  Patient states:  Agreeable to OT therapy. Pain: R side 4/10 (location, type, intensity)    Objective:    Observation:  Tramaine roberts's position upon arrival.  Objective Measures:  Alert and oriented    Treatment, including education:  Self Care Training:   Cues were given for safety, sequence, UE/LE placement, visual cues, and balance. Activities performed today included dressing, toileting, hand hygiene, and grooming. Engaged with setup on EOB with UB bathing, personal hygiene and grooming task. Req increase time secondary to O2 levels drop low 80s. Req 2-3 mins of recovery time with any ax for ~1 min. Therapeutic Activity Training:   Therapeutic activity training was instructed today. Cues were given for safety, sequence, UE/LE placement, awareness, and balance. Bed mobility:Max A for BLE and Min A for trunk control utilizing bed features supine>sit, sit>supine Max for BLE and scooting. Completed 2 reps to improve IND     Sit/stand transfers:CGA utilizing RW x5 reps with emphasis on upright posture and standing tolerance. Tolerated 1-2 mins with static standing balance. Attempted to perform standing in place marches however unable to lift BLE off ground. O2 level dropts to upper 70s with standing and req 3-4 mins of sitting recovery to 88-90 O2 levels. Req mod verbals for breathing techs through out tx.      Activities performed today included bed mobility training, transfers, functional mobility  to increase strength, activity tolerance to facilitate IND c ADL tasks, func transfers / mobility with G safety awareness carryover      Assessment / Impression:    Patient's tolerance of treatment: good  Adverse Reaction: O2 levels drop   Significant change in status and impact:  none  Barriers to improvement: activity tolerance, strength, O2 levels    Safety  Patient educated on role of OT , benefits of OT and rationale for therapeutic intervention. Patient safely in bed + alarm set at end of session, with call light/phone in reach, and nursing aware. Gait belt was used for func transfers / mobility.     Plan for Next Session:    Continue OT POC    Time in:  855  Time out:  937   Timed treatment minutes:  42  Total treatment time:  42      Electronically signed by:    SATURNINO Santamaria,   3/12/2022, 9:15 AM

## 2022-03-12 NOTE — PROGRESS NOTES
Progress Note( Dr. Maritza Costello)  3/12/2022  Subjective:   Admit Date: 2/5/2022  PCP: No primary care provider on file. Admitted For :Patient was admitted on 2/5/2022 over a month ago. Admitted she was Covid positive COVID with Acute Pneumonia no out of Covid unit    Consulted For: Better control of blood glucose    Interval History: Feels better very hard of hearing    Denies any chest pains,   Denies SOB . Denies nausea or vomiting. No new bowel or bladder symptoms. Intake/Output Summary (Last 24 hours) at 3/12/2022 1536  Last data filed at 3/12/2022 0545  Gross per 24 hour   Intake 370 ml   Output 2700 ml   Net -2330 ml       DATA    CBC:   Recent Labs     03/11/22  0305   WBC 7.1   HGB 11.5*   PLT 97*    CMP:  Recent Labs     03/11/22  0305      K 4.3   CL 99   CO2 26   BUN 22   CREATININE 0.5*   CALCIUM 8.2*   PROT 5.1*   LABALBU 3.1*   BILITOT 0.3   ALKPHOS 54   AST 23   ALT 56*     Lipids: No results found for: CHOL, HDL, TRIG  Glucose:  Recent Labs     03/11/22  2116 03/12/22  0626 03/12/22  1054   POCGLU 207* 182* 169*     CyqjwjfwjwI9L:  Lab Results   Component Value Date    LABA1C 6.6 03/11/2022     High Sensitivity TSH:   Lab Results   Component Value Date    TSHHS 0.436 02/28/2022     Free T3: No results found for: FT3  Free T4:No results found for: T4FREE    CTA PULMONARY W CONTRAST   Final Result   1. Motion degraded exam.  No evidence for central pulmonary embolism. 2.  Patchy bilateral airspace disease again demonstrated, consistent with   history of COVID infection. Overall these appear less confluent since the   prior chest CT exam.         XR CHEST PORTABLE   Final Result   Bilateral airspace disease, not significantly changed from prior exam.         XR CHEST PORTABLE   Final Result   Stable bilateral pulmonary opacities consistent with COVID pneumonia. Follow   up to resolution is suggested.          XR CHEST PORTABLE   Final Result   Patchy bilateral airspace opacities compatible with COVID pneumonia overall   improved from 02/05/2022. CT CHEST PULMONARY EMBOLISM W CONTRAST   Final Result   1. No evidence of pulmonary embolism. 2.  Patchy bilateral consolidative ground-glass opacities are noted, for   which multifocal inflammatory process or infection including COVID pneumonia   should be considered. VL DUP LOWER EXTREMITY VENOUS BILATERAL   Final Result   No evidence of DVT in either lower extremity.          XR CHEST PORTABLE   Final Result   Bilateral airspace opacities, right greater than left, concerning for   extensive multifocal pneumonia         XR CHEST PORTABLE    (Results Pending)        Scheduled Medicines   Medications:    albuterol sulfate HFA  2 puff Inhalation BID    insulin lispro  0-6 Units SubCUTAneous TID WC    insulin lispro  0-3 Units SubCUTAneous Nightly    methylPREDNISolone  40 mg IntraVENous Q8H    guaiFENesin  200 mg Oral Q4H    vitamin B-12  1,000 mcg Oral Daily    traZODone  25 mg Oral Nightly    lubiprostone  8 mcg Oral BID WC    metoprolol tartrate  12.5 mg Oral BID    polyethylene glycol  17 g Oral Daily    enoxaparin  30 mg SubCUTAneous BID    fluticasone  1 spray Each Nostril Daily    morphine  30 mg Oral 2 times per day    cetirizine  10 mg Oral Daily    [Held by provider] losartan  50 mg Oral Daily    sodium chloride flush  5-40 mL IntraVENous 2 times per day    sennosides-docusate sodium  1 tablet Oral BID    famotidine  20 mg Oral BID    Vitamin D  2,000 Units Oral Daily      Infusions:    sodium chloride 25 mL (02/09/22 0917)         Objective:   Vitals: BP (!) 156/75   Pulse 86   Temp 97.1 °F (36.2 °C) (Axillary)   Resp 20   Ht 5' 2.01\" (1.575 m)   Wt 199 lb 12.8 oz (90.6 kg)   SpO2 94%   BMI 36.53 kg/m²   General appearance: alert and cooperative with exam  Neck: no JVD or bruit  Thyroid : Normal lobes   Lungs: Has Vesicular Breath sounds   Heart:  regular rate and rhythm  Abdomen: soft, non-tender; bowel sounds normal; no masses,  no organomegaly  Musculoskeletal: Normal  Extremities: extremities normal, , no edema  Neurologic:  Awake, alert, oriented to name, place and time. Cranial nerves II-XII are grossly intact. Motor is  intact. Sensory is intact. ,  and gait is normal.    Assessment:     Patient Active Problem List:     WD-Venous stasis dermatitis of both lower extremities     WD-Lymphedema of both lower extremities     WD-Venous stasis ulcer of right calf with fat layer exposed with varicose veins (HCC)     WD-Venous stasis ulcer of left calf with fat layer exposed with varicose veins (HCC)     COVID-19     Hypoxia     SVT (supraventricular tachycardia) (HCC)     Adjustment disorder with anxious mood      Plan:     1. Reviewed POC blood glucose . Labs and X ray results   2. Reviewed Current Medicines   3. On Correction bolus Humalog/ Basal Lantus Insulin regime  4. Monitor Blood glucose frequently   5. Modified  the dose of Insulin/ other medicines as needed   6. Will follow     .      Debra Yanez MD, MD

## 2022-03-12 NOTE — PLAN OF CARE
Problem: Falls - Risk of:  Goal: Will remain free from falls  Description: Will remain free from falls  3/12/2022 0043 by Fleurette Runner, LPN  Outcome: Ongoing  3/11/2022 1152 by Ermias Peña RN  Outcome: Met This Shift  Goal: Absence of physical injury  Description: Absence of physical injury  3/12/2022 0043 by Fleurette Runner, LPN  Outcome: Ongoing  3/11/2022 1152 by Ermias Peña RN  Outcome: Met This Shift     Problem: Airway Clearance - Ineffective  Goal: Achieve or maintain patent airway  3/12/2022 0043 by Fleurette Runner, LPN  Outcome: Ongoing  3/11/2022 1152 by Ermias Peña RN  Outcome: Met This Shift     Problem: Gas Exchange - Impaired  Goal: Absence of hypoxia  3/12/2022 0043 by Fleurette Runner, LPN  Outcome: Ongoing  3/11/2022 1152 by Ermias Peña RN  Outcome: Met This Shift  Goal: Promote optimal lung function  3/12/2022 0043 by Fleurette Runner, LPN  Outcome: Ongoing  3/11/2022 1152 by Ermias Peña RN  Outcome: Met This Shift     Problem: Breathing Pattern - Ineffective  Goal: Ability to achieve and maintain a regular respiratory rate  3/12/2022 0043 by Fleurette Runner, LPN  Outcome: Ongoing  3/11/2022 1152 by Ermias Peña RN  Outcome: Met This Shift     Problem:  Body Temperature -  Risk of, Imbalanced  Goal: Ability to maintain a body temperature within defined limits  3/12/2022 0043 by Fleurette Runner, LPN  Outcome: Ongoing  3/11/2022 1152 by Ermias Peña RN  Outcome: Met This Shift  Goal: Will regain or maintain usual level of consciousness  3/12/2022 0043 by Fleurette Runner, LPN  Outcome: Ongoing  3/11/2022 1152 by Ermias Peña RN  Outcome: Met This Shift  Goal: Complications related to the disease process, condition or treatment will be avoided or minimized  3/12/2022 0043 by Fleurette Runner, LPN  Outcome: Ongoing  3/11/2022 1152 by Ermias Peña RN  Outcome: Met This Shift     Problem: Nutrition Deficits  Goal: Optimize nutritional status  3/12/2022 1860 by Fleurette Runner, LPN  Outcome: Ongoing  3/11/2022 1152 by Ermias Peña RN  Outcome: Met This Shift     Problem: Risk for Fluid Volume Deficit  Goal: Maintain normal heart rhythm  3/12/2022 0043 by Fleurette Runner, LPN  Outcome: Ongoing  3/11/2022 1152 by Ermias Peña RN  Outcome: Met This Shift  Goal: Maintain absence of muscle cramping  3/12/2022 0043 by Fleurette Runner, LPN  Outcome: Ongoing  3/11/2022 1152 by Ermias Peña RN  Outcome: Met This Shift  Goal: Maintain normal serum potassium, sodium, calcium, phosphorus, and pH  3/12/2022 0043 by Fleurette Runner, LPN  Outcome: Ongoing  3/11/2022 1152 by Ermias Peña RN  Outcome: Met This Shift     Problem: Loneliness or Risk for Loneliness  Goal: Demonstrate positive use of time alone when socialization is not possible  3/12/2022 0043 by Fleurette Runner, LPN  Outcome: Ongoing  3/11/2022 1152 by Ermias Peña RN  Outcome: Met This Shift     Problem: Fatigue  Goal: Verbalize increase energy and improved vitality  3/12/2022 0043 by Fleurette Runner, LPN  Outcome: Ongoing  3/11/2022 1152 by Ermias Peña RN  Outcome: Met This Shift     Problem: Patient Education: Go to Patient Education Activity  Goal: Patient/Family Education  3/12/2022 0043 by Fleurette Runner, LPN  Outcome: Ongoing  3/11/2022 1152 by Ermias Peña RN  Outcome: Met This Shift     Problem: Pain:  Goal: Pain level will decrease  Description: Pain level will decrease  3/12/2022 0043 by Fleurette Runner, LPN  Outcome: Ongoing  3/11/2022 1152 by Ermias Peña RN  Outcome: Met This Shift  Goal: Control of acute pain  Description: Control of acute pain  3/12/2022 0043 by Fleurette Runner, LPN  Outcome: Ongoing  3/11/2022 1152 by Ermias Peña RN  Outcome: Met This Shift  Goal: Control of chronic pain  Description: Control of chronic pain  3/12/2022 0043 by Fleurette Runner, LPN  Outcome: Ongoing  3/11/2022 1152 by Ermias Peña, RN  Outcome: Met This Shift Problem: Skin Integrity:  Goal: Will show no infection signs and symptoms  Description: Will show no infection signs and symptoms  3/12/2022 0043 by Elmira Tinoco LPN  Outcome: Ongoing  3/11/2022 1152 by Jass Allred RN  Outcome: Met This Shift  Goal: Absence of new skin breakdown  Description: Absence of new skin breakdown  3/12/2022 0043 by Elmira Tinoco LPN  Outcome: Ongoing  3/11/2022 1152 by Jass Allred RN  Outcome: Met This Shift     Problem: Nutrition  Goal: Optimal nutrition therapy  3/12/2022 0043 by Elmira Tinoco LPN  Outcome: Ongoing  3/11/2022 1152 by Jass Allred RN  Outcome: Met This Shift

## 2022-03-13 ENCOUNTER — APPOINTMENT (OUTPATIENT)
Dept: GENERAL RADIOLOGY | Age: 77
DRG: 871 | End: 2022-03-13
Payer: MEDICARE

## 2022-03-13 LAB
GLUCOSE BLD-MCNC: 227 MG/DL (ref 70–99)
GLUCOSE BLD-MCNC: 227 MG/DL (ref 70–99)
GLUCOSE BLD-MCNC: 229 MG/DL (ref 70–99)
GLUCOSE BLD-MCNC: 282 MG/DL (ref 70–99)

## 2022-03-13 PROCEDURE — 6370000000 HC RX 637 (ALT 250 FOR IP): Performed by: INTERNAL MEDICINE

## 2022-03-13 PROCEDURE — 2580000003 HC RX 258: Performed by: NURSE PRACTITIONER

## 2022-03-13 PROCEDURE — 6370000000 HC RX 637 (ALT 250 FOR IP): Performed by: NURSE PRACTITIONER

## 2022-03-13 PROCEDURE — 71045 X-RAY EXAM CHEST 1 VIEW: CPT

## 2022-03-13 PROCEDURE — 6370000000 HC RX 637 (ALT 250 FOR IP): Performed by: HOSPITALIST

## 2022-03-13 PROCEDURE — 82962 GLUCOSE BLOOD TEST: CPT

## 2022-03-13 PROCEDURE — 6360000002 HC RX W HCPCS: Performed by: STUDENT IN AN ORGANIZED HEALTH CARE EDUCATION/TRAINING PROGRAM

## 2022-03-13 PROCEDURE — 2700000000 HC OXYGEN THERAPY PER DAY

## 2022-03-13 PROCEDURE — 6360000002 HC RX W HCPCS: Performed by: INTERNAL MEDICINE

## 2022-03-13 PROCEDURE — 94761 N-INVAS EAR/PLS OXIMETRY MLT: CPT

## 2022-03-13 PROCEDURE — 94640 AIRWAY INHALATION TREATMENT: CPT

## 2022-03-13 PROCEDURE — 2140000000 HC CCU INTERMEDIATE R&B

## 2022-03-13 RX ADMIN — SODIUM CHLORIDE, PRESERVATIVE FREE 10 ML: 5 INJECTION INTRAVENOUS at 08:13

## 2022-03-13 RX ADMIN — CYANOCOBALAMIN TAB 1000 MCG 1000 MCG: 1000 TAB at 08:12

## 2022-03-13 RX ADMIN — MORPHINE SULFATE 30 MG: 15 TABLET, FILM COATED, EXTENDED RELEASE ORAL at 23:15

## 2022-03-13 RX ADMIN — FAMOTIDINE 20 MG: 20 TABLET ORAL at 23:22

## 2022-03-13 RX ADMIN — CETIRIZINE HYDROCHLORIDE 10 MG: 10 TABLET, FILM COATED ORAL at 08:11

## 2022-03-13 RX ADMIN — METOPROLOL 12.5 MG: 25 TABLET ORAL at 08:11

## 2022-03-13 RX ADMIN — OXYCODONE AND ACETAMINOPHEN 1 TABLET: 5; 325 TABLET ORAL at 03:29

## 2022-03-13 RX ADMIN — POLYETHYLENE GLYCOL (3350) 17 G: 17 POWDER, FOR SOLUTION ORAL at 08:12

## 2022-03-13 RX ADMIN — SENNOSIDES AND DOCUSATE SODIUM 1 TABLET: 50; 8.6 TABLET ORAL at 23:16

## 2022-03-13 RX ADMIN — FAMOTIDINE 20 MG: 20 TABLET ORAL at 08:11

## 2022-03-13 RX ADMIN — ALBUTEROL SULFATE 2 PUFF: 90 AEROSOL, METERED RESPIRATORY (INHALATION) at 20:41

## 2022-03-13 RX ADMIN — TRAZODONE HYDROCHLORIDE 25 MG: 50 TABLET ORAL at 23:19

## 2022-03-13 RX ADMIN — LUBIPROSTONE 8 MCG: 8 CAPSULE, GELATIN COATED ORAL at 16:58

## 2022-03-13 RX ADMIN — LUBIPROSTONE 8 MCG: 8 CAPSULE, GELATIN COATED ORAL at 08:11

## 2022-03-13 RX ADMIN — FLUTICASONE PROPIONATE 1 SPRAY: 50 SPRAY, METERED NASAL at 08:12

## 2022-03-13 RX ADMIN — METHYLPREDNISOLONE SODIUM SUCCINATE 40 MG: 40 INJECTION, POWDER, LYOPHILIZED, FOR SOLUTION INTRAMUSCULAR; INTRAVENOUS at 04:49

## 2022-03-13 RX ADMIN — ALBUTEROL SULFATE 2 PUFF: 90 AEROSOL, METERED RESPIRATORY (INHALATION) at 00:28

## 2022-03-13 RX ADMIN — Medication 2000 UNITS: at 08:11

## 2022-03-13 RX ADMIN — MORPHINE SULFATE 30 MG: 15 TABLET, FILM COATED, EXTENDED RELEASE ORAL at 08:11

## 2022-03-13 RX ADMIN — ENOXAPARIN SODIUM 30 MG: 100 INJECTION SUBCUTANEOUS at 08:11

## 2022-03-13 RX ADMIN — SODIUM CHLORIDE, PRESERVATIVE FREE 10 ML: 5 INJECTION INTRAVENOUS at 23:15

## 2022-03-13 RX ADMIN — METHYLPREDNISOLONE SODIUM SUCCINATE 40 MG: 40 INJECTION, POWDER, LYOPHILIZED, FOR SOLUTION INTRAMUSCULAR; INTRAVENOUS at 11:44

## 2022-03-13 RX ADMIN — METHYLPREDNISOLONE SODIUM SUCCINATE 40 MG: 40 INJECTION, POWDER, LYOPHILIZED, FOR SOLUTION INTRAMUSCULAR; INTRAVENOUS at 23:16

## 2022-03-13 RX ADMIN — ALBUTEROL SULFATE 2 PUFF: 90 AEROSOL, METERED RESPIRATORY (INHALATION) at 07:21

## 2022-03-13 RX ADMIN — SENNOSIDES AND DOCUSATE SODIUM 1 TABLET: 50; 8.6 TABLET ORAL at 08:10

## 2022-03-13 RX ADMIN — METOPROLOL 12.5 MG: 25 TABLET ORAL at 23:17

## 2022-03-13 ASSESSMENT — PAIN SCALES - GENERAL
PAINLEVEL_OUTOF10: 9
PAINLEVEL_OUTOF10: 9

## 2022-03-13 NOTE — PROGRESS NOTES
Hospitalist Progress Note      Name:  Hernán Pham /Age/Sex: 1945  (68 y.o. female)   MRN & CSN:  [id number] & [id number] Admission Date/Time: 2022  4:46 PM   Location:  [address] [place] [address] PCP: No primary care provider on file. Hospital Day: 39  Discharge barrier/Reason for continued hospitalization: Persistently hypoxemia requiring high flow. Assessment and Plan:   Hernán Pham is a 68 y.o.  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 The squad reported O2 sats of 74% on room air. On arrival to the ED, patient ruled in for COVID-19. Hospital course has been prolonged due to continued high oxygen requirements. 1.  COVID-19 sepsis/pneumonia  Completed baricitinib  Methylprednisolone 40 mg IV every 8 hours      2. COVID-19 acute hypoxic respiratory failure: Now improving. VapothermFiO2 55% today when I saw her  Updated her son Ashly Turner 3/12  Also called her daughter Andrzej Carpenter 3/12    3. Low vitamin B12 levels: Continue replacement. 4.  Hypertension: Continue low-dose Lopressor. Losartan currently on hold. 5.  Chronic pain: Continue MS Contin twice daily, Percocet every 6 as needed  6. Class II obesity: Not a candidate for counseling due to advanced age  9. Insomnia: Improved with trazodone  8. Goals of care: DNR. See progress note 3/6/2022    Constipationcontinue lubiprostone    Diet ADULT ORAL NUTRITION SUPPLEMENT; Breakfast, Lunch, Dinner; Low Calorie/High Protein Oral Supplement  ADULT DIET;  Regular   DVT Prophylaxis [x] Lovenox, []  Heparin, [] SCDs, [] Ambulation   GI Prophylaxis [] PPI,  [] H2 Blocker,  [] Carafate,  [x] Diet/Tube Feeds   Code Status DNR-CC   MDM [] Low, [x] Moderate,[]  High       History of Present Illness:     Chief Complaint: Hernán Pham is a 68 y.o.  female  who presents with nausea, vomiting, fatigue of 1 week duration. Was found to be hypoxic on arrival of the squad    3/12  Sitting up in the chair. In good spirits. 3/10/2022  Discussed with the patient and her sister in the room    Discussed with the  discussed at the interdisciplinary team meeting    Discussed with the [facility], [facility] representative Kandy Nixon has been accepted there, but no bed until next week      3/9/2022  Discussed with sister today  Patient feels about the same. Continues with her usual smile. 3/8/2022  -She was on Vapotherm, with FiO2 way down to 45% today, which is much better than before  -She recalls seeing me from last week. She stated \"I am hard of hearing, I remember? \"            Prior note by colleague:     3/6/2022: Patient is seen and examined, she is in good spirits. She definitely wants to get out of bed today. She does not want to use the bedpan and tells me it bothers her hips. She would like to use the bedside commode. She tells me that she knows when she is exerting herself too much and when her oxygen is dropping. She also does not want us to escalate care in the event of a cardiorespiratory arrest. Her CODE STATUS will be updated to DNR CC at this time        Objective: Intake/Output Summary (Last 24 hours) at 3/12/2022 2140  Last data filed at 3/12/2022 0545  Gross per 24 hour   Intake    Output 750 ml   Net -750 ml        Vitals:   Vitals:    03/12/22 0800 03/12/22 1315 03/12/22 1558 03/12/22 2015   BP: (!) 131/58 (!) 156/75  129/68   Pulse: 83 86 70 74   Resp: 18 20 24 21   Temp: 98.1 °F (36.7 °C) 97.1 °F (36.2 °C)  98.9 °F (37.2 °C)   TempSrc: Oral Axillary  Oral   SpO2:   93% 93%   Weight:       Height:            Physical Exam:     Physical Exam  Constitutional:       Appearance: She is not diaphoretic. HENT:      Nose: No rhinorrhea. Eyes:      General:         Right eye: No discharge. Left eye: No discharge.    Pulmonary:      Effort: Pulmonary effort is normal.   Neurological:      Mental Status: She is alert. Cranial Nerves: No cranial nerve deficit.          Medications:   Medications:    albuterol sulfate HFA  2 puff Inhalation BID    insulin lispro  0-6 Units SubCUTAneous TID WC    insulin lispro  0-3 Units SubCUTAneous Nightly    methylPREDNISolone  40 mg IntraVENous Q8H    guaiFENesin  200 mg Oral Q4H    vitamin B-12  1,000 mcg Oral Daily    traZODone  25 mg Oral Nightly    lubiprostone  8 mcg Oral BID WC    metoprolol tartrate  12.5 mg Oral BID    polyethylene glycol  17 g Oral Daily    enoxaparin  30 mg SubCUTAneous BID    fluticasone  1 spray Each Nostril Daily    morphine  30 mg Oral 2 times per day    cetirizine  10 mg Oral Daily    [Held by provider] losartan  50 mg Oral Daily    sodium chloride flush  5-40 mL IntraVENous 2 times per day    sennosides-docusate sodium  1 tablet Oral BID    famotidine  20 mg Oral BID    Vitamin D  2,000 Units Oral Daily      Infusions:    sodium chloride 25 mL (02/09/22 0917)     PRN Meds: sodium chloride flush, 10 mL, PRN  hydrOXYzine, 25 mg, TID PRN  sodium chloride, 1 spray, Q4H PRN  albuterol sulfate HFA, 2 puff, Q4H PRN  benzocaine-menthol, 1 lozenge, Q2H PRN  sodium chloride flush, 5-40 mL, PRN  sodium chloride, 25 mL, PRN  ondansetron, 4 mg, Q6H PRN  acetaminophen, 650 mg, Q6H PRN   Or  acetaminophen, 650 mg, Q6H PRN  oxyCODONE-acetaminophen, 1 tablet, Q6H PRN          Electronically signed by Delfina Betancourt MD on 3/12/2022 at 9:40 PM

## 2022-03-13 NOTE — PROGRESS NOTES
Hospitalist Progress Note      Name:  Lissett Rascon /Age/Sex: 1945  (68 y.o. female)   MRN & CSN:  [id number] & [id number] Admission Date/Time: 2022  4:46 PM   Location:  [address] [place] [address] PCP: No primary care provider on file. Hospital Day: 37  Discharge barrier/Reason for continued hospitalization: Persistently hypoxemia requiring high flow. Assessment and Plan:   Lissett Rascon is a 68 y.o.  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 The squad reported O2 sats of 74% on room air. On arrival to the ED, patient ruled in for COVID-19. Hospital course has been prolonged due to continued high oxygen requirements. 1.  COVID-19 sepsis/pneumonia  Completed baricitinib  Continue methylprednisolone 40 mg IV every 4 8 to 8 hours      2. COVID-19 acute hypoxic respiratory failure: Now improving. Vapothermon FiO2 55%  Updated her son Torri Sadler 3/12    3. Low vitamin B12 levels: Continue vitamin B12 1000 MCG daily    4. Hypertension: Continue low-dose Lopressor. Losartan currently on hold. 5.  Chronic pain: Continue MS Contin twice daily, Percocet every 6 as needed  6. Class II obesity: Not a candidate for counseling due to advanced age  9. Insomnia: Improved with trazodone  8. Goals of care: DNR. See progress note 3/6/2022    Constipationcontinue lubiprostone    Diet ADULT ORAL NUTRITION SUPPLEMENT; Breakfast, Lunch, Dinner; Low Calorie/High Protein Oral Supplement  ADULT DIET;  Regular   DVT Prophylaxis [x] Lovenox, []  Heparin, [] SCDs, [] Ambulation   GI Prophylaxis [] PPI,  [] H2 Blocker,  [] Carafate,  [x] Diet/Tube Feeds   Code Status DNR-CC   MDM [] Low, [x] Moderate,[]  High       History of Present Illness:     Chief Complaint: Lissett Rascon is a 68 y.o.  female  who presents with nausea, vomiting, fatigue of 1 week duration. Was found to be hypoxic on arrival of the squad    3/13  Blood sugar up to 282 due to steroids  She was resting comfortably    3/10/2022  Discussed with the patient and her sister in the room    Discussed with the [facility] representative Abiel Shrestha has been accepted there, but no bed until next week      3/9/2022  Discussed with sister today  Patient feels about the same. Continues with her usual smile. 3/8/2022  -She was on Vapotherm, with FiO2 way down to 45% today, which is much better than before  -She recalls seeing me from last week. She stated \"I am hard of hearing, I remember? \"            Prior note by colleague:     3/6/2022: Patient is seen and examined, she is in good spirits. She definitely wants to get out of bed today. She does not want to use the bedpan and tells me it bothers her hips. She would like to use the bedside commode. She tells me that she knows when she is exerting herself too much and when her oxygen is dropping. She also does not want us to escalate care in the event of a cardiorespiratory arrest. Her CODE STATUS will be updated to DNR CC at this time        Objective:     No intake or output data in the 24 hours ending 03/13/22 1309     Vitals:   Vitals:    03/13/22 0722 03/13/22 0800 03/13/22 1149 03/13/22 1200   BP:    118/80   Pulse:       Resp:   26    Temp:  98.6 °F (37 °C)  98.1 °F (36.7 °C)   TempSrc:  Oral  Oral   SpO2: 91% 93% 92% 92%   Weight:       Height:            Physical Exam:     Physical Exam  Constitutional:       Appearance: She is not diaphoretic. HENT:      Nose: No rhinorrhea. Eyes:      General:         Right eye: No discharge. Left eye: No discharge. Pulmonary:      Effort: Pulmonary effort is normal.   Neurological:      General: No focal deficit present. Cranial Nerves: No cranial nerve deficit.          Medications:   Medications:    albuterol sulfate HFA  2 puff Inhalation BID    insulin lispro  0-6 Units

## 2022-03-13 NOTE — PROGRESS NOTES
03/13/22 0722   Oxygen Therapy/Pulse Ox   O2 Therapy Oxygen humidified   $Oxygen $Daily Charge   O2 Device Heated high flow cannula   O2 Flow Rate (L/min) 25 L/min   FiO2  55 %   SpO2 91 %   Pulse Oximeter Device Mode Continuous   Pulse Oximeter Device Location Finger   $Pulse Oximeter $Spot check (multiple/continuous)

## 2022-03-14 LAB
ANION GAP SERPL CALCULATED.3IONS-SCNC: 13 MMOL/L (ref 4–16)
BANDED NEUTROPHILS ABSOLUTE COUNT: 0.51 K/CU MM
BANDED NEUTROPHILS RELATIVE PERCENT: 8 % (ref 5–11)
BUN BLDV-MCNC: 20 MG/DL (ref 6–23)
CALCIUM SERPL-MCNC: 8 MG/DL (ref 8.3–10.6)
CHLORIDE BLD-SCNC: 99 MMOL/L (ref 99–110)
CO2: 27 MMOL/L (ref 21–32)
CREAT SERPL-MCNC: 0.4 MG/DL (ref 0.6–1.1)
DIFFERENTIAL TYPE: ABNORMAL
EOSINOPHILS ABSOLUTE: 0.1 K/CU MM
EOSINOPHILS RELATIVE PERCENT: 1 % (ref 0–3)
GFR AFRICAN AMERICAN: >60 ML/MIN/1.73M2
GFR NON-AFRICAN AMERICAN: >60 ML/MIN/1.73M2
GLUCOSE BLD-MCNC: 225 MG/DL (ref 70–99)
GLUCOSE BLD-MCNC: 225 MG/DL (ref 70–99)
GLUCOSE BLD-MCNC: 229 MG/DL (ref 70–99)
GLUCOSE BLD-MCNC: 300 MG/DL (ref 70–99)
HCT VFR BLD CALC: 40.2 % (ref 37–47)
HEMOGLOBIN: 11.9 GM/DL (ref 12.5–16)
LYMPHOCYTES ABSOLUTE: 0.3 K/CU MM
LYMPHOCYTES RELATIVE PERCENT: 5 % (ref 24–44)
MCH RBC QN AUTO: 25.6 PG (ref 27–31)
MCHC RBC AUTO-ENTMCNC: 29.6 % (ref 32–36)
MCV RBC AUTO: 86.6 FL (ref 78–100)
METAMYELOCYTES ABSOLUTE COUNT: 0.06 K/CU MM
METAMYELOCYTES PERCENT: 1 %
MONOCYTES ABSOLUTE: 0.1 K/CU MM
MONOCYTES RELATIVE PERCENT: 1 % (ref 0–4)
PDW BLD-RTO: 20.2 % (ref 11.7–14.9)
PLATELET # BLD: 111 K/CU MM (ref 140–440)
POTASSIUM SERPL-SCNC: 4.3 MMOL/L (ref 3.5–5.1)
PROCALCITONIN: 0.06
RBC # BLD: 4.64 M/CU MM (ref 4.2–5.4)
SEGMENTED NEUTROPHILS ABSOLUTE COUNT: 5.3 K/CU MM
SEGMENTED NEUTROPHILS RELATIVE PERCENT: 84 % (ref 36–66)
SODIUM BLD-SCNC: 139 MMOL/L (ref 135–145)
WBC # BLD: 6.4 K/CU MM (ref 4–10.5)

## 2022-03-14 PROCEDURE — 6360000002 HC RX W HCPCS: Performed by: INTERNAL MEDICINE

## 2022-03-14 PROCEDURE — 84145 PROCALCITONIN (PCT): CPT

## 2022-03-14 PROCEDURE — 6370000000 HC RX 637 (ALT 250 FOR IP): Performed by: STUDENT IN AN ORGANIZED HEALTH CARE EDUCATION/TRAINING PROGRAM

## 2022-03-14 PROCEDURE — 6370000000 HC RX 637 (ALT 250 FOR IP): Performed by: NURSE PRACTITIONER

## 2022-03-14 PROCEDURE — 85027 COMPLETE CBC AUTOMATED: CPT

## 2022-03-14 PROCEDURE — 6370000000 HC RX 637 (ALT 250 FOR IP): Performed by: HOSPITALIST

## 2022-03-14 PROCEDURE — 6370000000 HC RX 637 (ALT 250 FOR IP): Performed by: INTERNAL MEDICINE

## 2022-03-14 PROCEDURE — 99233 SBSQ HOSP IP/OBS HIGH 50: CPT | Performed by: NURSE PRACTITIONER

## 2022-03-14 PROCEDURE — 94640 AIRWAY INHALATION TREATMENT: CPT

## 2022-03-14 PROCEDURE — 2140000000 HC CCU INTERMEDIATE R&B

## 2022-03-14 PROCEDURE — 6360000002 HC RX W HCPCS: Performed by: STUDENT IN AN ORGANIZED HEALTH CARE EDUCATION/TRAINING PROGRAM

## 2022-03-14 PROCEDURE — 94150 VITAL CAPACITY TEST: CPT

## 2022-03-14 PROCEDURE — 85007 BL SMEAR W/DIFF WBC COUNT: CPT

## 2022-03-14 PROCEDURE — 2580000003 HC RX 258: Performed by: NURSE PRACTITIONER

## 2022-03-14 PROCEDURE — 82962 GLUCOSE BLOOD TEST: CPT

## 2022-03-14 PROCEDURE — 2700000000 HC OXYGEN THERAPY PER DAY

## 2022-03-14 PROCEDURE — 80048 BASIC METABOLIC PNL TOTAL CA: CPT

## 2022-03-14 PROCEDURE — 94761 N-INVAS EAR/PLS OXIMETRY MLT: CPT

## 2022-03-14 PROCEDURE — 36415 COLL VENOUS BLD VENIPUNCTURE: CPT

## 2022-03-14 RX ORDER — QUETIAPINE FUMARATE 25 MG/1
25 TABLET, FILM COATED ORAL NIGHTLY
Status: DISCONTINUED | OUTPATIENT
Start: 2022-03-14 | End: 2022-03-17 | Stop reason: HOSPADM

## 2022-03-14 RX ORDER — QUETIAPINE FUMARATE 25 MG/1
12.5 TABLET, FILM COATED ORAL DAILY
Status: DISCONTINUED | OUTPATIENT
Start: 2022-03-15 | End: 2022-03-17 | Stop reason: HOSPADM

## 2022-03-14 RX ORDER — LORAZEPAM 0.5 MG/1
0.5 TABLET ORAL ONCE
Status: DISCONTINUED | OUTPATIENT
Start: 2022-03-14 | End: 2022-03-16

## 2022-03-14 RX ORDER — QUETIAPINE FUMARATE 25 MG/1
12.5 TABLET, FILM COATED ORAL EVERY 24 HOURS
Status: DISCONTINUED | OUTPATIENT
Start: 2022-03-14 | End: 2022-03-17 | Stop reason: HOSPADM

## 2022-03-14 RX ADMIN — ALBUTEROL SULFATE 2 PUFF: 90 AEROSOL, METERED RESPIRATORY (INHALATION) at 07:19

## 2022-03-14 RX ADMIN — METHYLPREDNISOLONE SODIUM SUCCINATE 40 MG: 40 INJECTION, POWDER, LYOPHILIZED, FOR SOLUTION INTRAMUSCULAR; INTRAVENOUS at 21:15

## 2022-03-14 RX ADMIN — ENOXAPARIN SODIUM 30 MG: 100 INJECTION SUBCUTANEOUS at 08:21

## 2022-03-14 RX ADMIN — OXYCODONE AND ACETAMINOPHEN 1 TABLET: 5; 325 TABLET ORAL at 16:22

## 2022-03-14 RX ADMIN — CYANOCOBALAMIN TAB 1000 MCG 1000 MCG: 1000 TAB at 08:28

## 2022-03-14 RX ADMIN — SENNOSIDES AND DOCUSATE SODIUM 1 TABLET: 50; 8.6 TABLET ORAL at 21:16

## 2022-03-14 RX ADMIN — SODIUM CHLORIDE, PRESERVATIVE FREE 10 ML: 5 INJECTION INTRAVENOUS at 08:28

## 2022-03-14 RX ADMIN — POLYETHYLENE GLYCOL (3350) 17 G: 17 POWDER, FOR SOLUTION ORAL at 08:21

## 2022-03-14 RX ADMIN — METHYLPREDNISOLONE SODIUM SUCCINATE 40 MG: 40 INJECTION, POWDER, LYOPHILIZED, FOR SOLUTION INTRAMUSCULAR; INTRAVENOUS at 13:25

## 2022-03-14 RX ADMIN — LUBIPROSTONE 8 MCG: 8 CAPSULE, GELATIN COATED ORAL at 16:22

## 2022-03-14 RX ADMIN — ALBUTEROL SULFATE 2 PUFF: 90 AEROSOL, METERED RESPIRATORY (INHALATION) at 20:25

## 2022-03-14 RX ADMIN — QUETIAPINE FUMARATE 12.5 MG: 25 TABLET ORAL at 16:22

## 2022-03-14 RX ADMIN — METHYLPREDNISOLONE SODIUM SUCCINATE 40 MG: 40 INJECTION, POWDER, LYOPHILIZED, FOR SOLUTION INTRAMUSCULAR; INTRAVENOUS at 05:17

## 2022-03-14 RX ADMIN — Medication 2000 UNITS: at 08:20

## 2022-03-14 RX ADMIN — MORPHINE SULFATE 30 MG: 15 TABLET, FILM COATED, EXTENDED RELEASE ORAL at 08:20

## 2022-03-14 RX ADMIN — HYDROXYZINE HYDROCHLORIDE 25 MG: 25 TABLET, FILM COATED ORAL at 13:25

## 2022-03-14 RX ADMIN — FAMOTIDINE 20 MG: 20 TABLET ORAL at 21:18

## 2022-03-14 RX ADMIN — ENOXAPARIN SODIUM 30 MG: 100 INJECTION SUBCUTANEOUS at 21:17

## 2022-03-14 RX ADMIN — ALBUTEROL SULFATE 2 PUFF: 90 AEROSOL, METERED RESPIRATORY (INHALATION) at 11:52

## 2022-03-14 RX ADMIN — SODIUM CHLORIDE, PRESERVATIVE FREE 10 ML: 5 INJECTION INTRAVENOUS at 21:19

## 2022-03-14 RX ADMIN — OXYCODONE AND ACETAMINOPHEN 1 TABLET: 5; 325 TABLET ORAL at 00:41

## 2022-03-14 RX ADMIN — FAMOTIDINE 20 MG: 20 TABLET ORAL at 08:27

## 2022-03-14 RX ADMIN — METOPROLOL 12.5 MG: 25 TABLET ORAL at 08:20

## 2022-03-14 RX ADMIN — MORPHINE SULFATE 30 MG: 15 TABLET, FILM COATED, EXTENDED RELEASE ORAL at 21:15

## 2022-03-14 RX ADMIN — LUBIPROSTONE 8 MCG: 8 CAPSULE, GELATIN COATED ORAL at 08:20

## 2022-03-14 RX ADMIN — CETIRIZINE HYDROCHLORIDE 10 MG: 10 TABLET, FILM COATED ORAL at 08:20

## 2022-03-14 RX ADMIN — METOPROLOL 12.5 MG: 25 TABLET ORAL at 21:16

## 2022-03-14 RX ADMIN — OXYCODONE AND ACETAMINOPHEN 1 TABLET: 5; 325 TABLET ORAL at 06:36

## 2022-03-14 RX ADMIN — QUETIAPINE FUMARATE 25 MG: 25 TABLET ORAL at 21:17

## 2022-03-14 RX ADMIN — SENNOSIDES AND DOCUSATE SODIUM 1 TABLET: 50; 8.6 TABLET ORAL at 08:20

## 2022-03-14 ASSESSMENT — PAIN DESCRIPTION - DESCRIPTORS
DESCRIPTORS: ACHING;CONSTANT
DESCRIPTORS: ACHING;CONSTANT

## 2022-03-14 ASSESSMENT — PAIN SCALES - GENERAL
PAINLEVEL_OUTOF10: 8
PAINLEVEL_OUTOF10: 7
PAINLEVEL_OUTOF10: 7
PAINLEVEL_OUTOF10: 8
PAINLEVEL_OUTOF10: 8
PAINLEVEL_OUTOF10: 9
PAINLEVEL_OUTOF10: 8
PAINLEVEL_OUTOF10: 5

## 2022-03-14 ASSESSMENT — PAIN DESCRIPTION - PROGRESSION
CLINICAL_PROGRESSION: GRADUALLY WORSENING
CLINICAL_PROGRESSION: NOT CHANGED
CLINICAL_PROGRESSION: GRADUALLY WORSENING

## 2022-03-14 ASSESSMENT — PAIN SCALES - WONG BAKER
WONGBAKER_NUMERICALRESPONSE: 0

## 2022-03-14 ASSESSMENT — PAIN DESCRIPTION - FREQUENCY
FREQUENCY: CONTINUOUS
FREQUENCY: CONTINUOUS

## 2022-03-14 ASSESSMENT — PAIN DESCRIPTION - LOCATION
LOCATION: BACK;HIP
LOCATION: BACK;HIP
LOCATION: BACK

## 2022-03-14 ASSESSMENT — PAIN DESCRIPTION - ONSET: ONSET: ON-GOING

## 2022-03-14 ASSESSMENT — PAIN DESCRIPTION - PAIN TYPE
TYPE: CHRONIC PAIN
TYPE: ACUTE PAIN
TYPE: CHRONIC PAIN

## 2022-03-14 ASSESSMENT — PAIN DESCRIPTION - ORIENTATION
ORIENTATION: LOWER;MID
ORIENTATION: LOWER;MID

## 2022-03-14 NOTE — PROGRESS NOTES
Psychiatric Progress Note    Lissett Rascon  [id number]  03/14/22    CHIEF COMPLAINT: Oh, I thought you were my sister    HPI: Lissett Rascon  is a 68 y.o.  female with past medical history of congenital hip problems status post multiple total hip replacements,, hypertension, who was admitted on February 5, 2020 when she presented with nausea, vomiting, fatigue, was found to be hypoxic, and was diagnosed with Covid. Psychiatry consulted by Dr Juliann Morel for \"Patient was having anxiety today about not being able to get out of bed. Patient very ill. Please assist with managing anxiety. \" Atarax no longer effective and patient requesting \"something stronger. \"    Met with patient at bedside. She is requesting something stronger as she \"gets shaky when I stand up and I am afraid I won't be able to breathe. \" She also reports not sleeping at night more then 15 minutes at a time. She continues to deny SI/HI. Denies audio and visual hallucinations. She denies depression but reports anxiety is present all day especially when getting up to walk. She requested valium but explained that as she is trying to get up and walk and due to age not recommended currently.        No Known Allergies    Medications Prior to Admission: oxyCODONE-acetaminophen (PERCOCET)  MG per tablet, Take 1 tablet by mouth 2 times daily. cetirizine (ZYRTEC) 10 MG tablet, Take 1 tablet by mouth Daily  diclofenac (CATAFLAM) 50 MG tablet, Take 1 tablet by mouth 2 times daily  morphine (MS CONTIN) 30 MG extended release tablet, Take 1 tablet by mouth 2 times daily. losartan (COZAAR) 50 MG tablet, Take 50 mg by mouth daily  oxyCODONE (ROXICODONE) 5 MG immediate release tablet, Take 5 mg by mouth 2 times daily as needed for Pain. clobetasol (TEMOVATE) 0.05 % ointment, Apply topically 2 times weekly to lower legs with compression wraps.     Past Medical History:   Diagnosis Date    Chronic lower back pain     Hx of spinal fusion     Hypertension     MRSA (methicillin resistant staph aureus) culture positive 09/13/2021    Wound    Venous disease         Patient Active Problem List   Diagnosis    WD-Venous stasis dermatitis of both lower extremities    WD-Lymphedema of both lower extremities    WD-Venous stasis ulcer of right calf with fat layer exposed with varicose veins (HCC)    WD-Venous stasis ulcer of left calf with fat layer exposed with varicose veins (HCC)    COVID-19    Hypoxia    SVT (supraventricular tachycardia) (HCC)    Adjustment disorder with anxious mood       Review of Systems    OBJECTIVE  Vital Signs:  Vitals:    03/14/22 1314   BP: (!) 167/68   Pulse: 76   Resp: 26   Temp: 98.3 °F (36.8 °C)   SpO2: 91%       Labs:  Recent Results (from the past 48 hour(s))   POCT Glucose    Collection Time: 03/12/22  4:20 PM   Result Value Ref Range    POC Glucose 181 (H) 70 - 99 MG/DL   POCT Glucose    Collection Time: 03/12/22  9:01 PM   Result Value Ref Range    POC Glucose 164 (H) 70 - 99 MG/DL   POCT Glucose    Collection Time: 03/13/22  6:53 AM   Result Value Ref Range    POC Glucose 227 (H) 70 - 99 MG/DL   POCT Glucose    Collection Time: 03/13/22 12:04 PM   Result Value Ref Range    POC Glucose 227 (H) 70 - 99 MG/DL   POCT Glucose    Collection Time: 03/13/22  4:57 PM   Result Value Ref Range    POC Glucose 282 (H) 70 - 99 MG/DL   POCT Glucose    Collection Time: 03/13/22 11:22 PM   Result Value Ref Range    POC Glucose 229 (H) 70 - 99 MG/DL   Basic Metabolic Panel w/ Reflex to MG    Collection Time: 03/14/22  7:15 AM   Result Value Ref Range    Sodium 139 135 - 145 MMOL/L    Potassium 4.3 3.5 - 5.1 MMOL/L    Chloride 99 99 - 110 mMol/L    CO2 27 21 - 32 MMOL/L    Anion Gap 13 4 - 16    BUN 20 6 - 23 MG/DL    CREATININE 0.4 (L) 0.6 - 1.1 MG/DL    Glucose 229 (H) 70 - 99 MG/DL    Calcium 8.0 (L) 8.3 - 10.6 MG/DL    GFR Non-African American >60 >60 mL/min/1.73m2    GFR African American >60 >60 mL/min/1.73m2   CBC auto differential    Collection Time: 03/14/22  7:15 AM   Result Value Ref Range    WBC 6.4 4.0 - 10.5 K/CU MM    RBC 4.64 4.2 - 5.4 M/CU MM    Hemoglobin 11.9 (L) 12.5 - 16.0 GM/DL    Hematocrit 40.2 37 - 47 %    MCV 86.6 78 - 100 FL    MCH 25.6 (L) 27 - 31 PG    MCHC 29.6 (L) 32.0 - 36.0 %    RDW 20.2 (H) 11.7 - 14.9 %    Platelets 208 (L) 069 - 440 K/CU MM    Metamyelocytes Relative 1 (H) 0.0 %    Bands Relative 8 5 - 11 %    Segs Relative 84.0 (H) 36 - 66 %    Eosinophils % 1.0 0 - 3 %    Lymphocytes % 5.0 (L) 24 - 44 %    Monocytes % 1.0 0 - 4 %    Metamyelocytes Absolute 0.06 K/CU MM    Bands Absolute 0.51 K/CU MM    Segs Absolute 5.3 K/CU MM    Eosinophils Absolute 0.1 K/CU MM    Lymphocytes Absolute 0.3 K/CU MM    Monocytes Absolute 0.1 K/CU MM    Differential Type MANUAL DIFFERENTIAL    Procalcitonin    Collection Time: 03/14/22  7:15 AM   Result Value Ref Range    Procalcitonin 0.062    POCT Glucose    Collection Time: 03/14/22  8:13 AM   Result Value Ref Range    POC Glucose 300 (H) 70 - 99 MG/DL   POCT Glucose    Collection Time: 03/14/22 11:44 AM   Result Value Ref Range    POC Glucose 225 (H) 70 - 99 MG/DL       PSYCHIATRIC ASSESSMENT / MENTAL STATUS EXAM:   Vitals: Blood pressure (!) 167/68, pulse 76, temperature 98.3 °F (36.8 °C), temperature source Oral, resp. rate 26, height 5' 2.01\" (1.575 m), weight 199 lb 12.8 oz (90.6 kg), SpO2 91 %. CONSTITUTIONAL:    Appearance: appears stated age. alert and oriented to person, place, time & situation. no acute distress. Adequate grooming and hygeine. Good eye contact. No prominent physical abnormalities. Attitude: Manner is cooperative and pleasant  Motor: No psychomotor agitation, retardation or abnormal movements noted  Speech: Clearly articulated; normal rate, volume, tone & amount.   Language: intact understanding and production  Mood: anxious due to breathing  Affect: euthymic, full range, non-labile, congruent with mood and content of speech  Thought Production: Spontaneous. Thought Form: Coherent, linear, logical & goal-directed. No tangentiality or circumstantiality. No flight of ideas or loosening of associations. Thought Content/Perceptions: No ROGER, no AVH, no delusion  Insight:good  Judgment good  Memory: Immediate, recent, and remote appear intact, though not formally tested. Attention: maintained throughout interview  Fund of knowledge: Average  Gait/Balance: AIDE         IMPRESSION:   Adjustment disorder anxiety         PLAN:  1. Recommend seroquel 12.5 at 0900 and 1500; then seroquel 25 mg po at bedtime to address anxiety  2. Please wean seroquel prior to patient being dc. 3. Dc trazodone - can resume at discharge when seroquel is weaned  4. Will hold atarax. Can resume at dc. 5. Psychiatry will follow but please call if required sooner  6.  Thank you for this consult    Discussed with Dr Mauri Posadas regarding recommendations    Electronically signed by SANDER Al CNP on 3/14/2022 at 2:40 PM

## 2022-03-14 NOTE — PROGRESS NOTES
Comprehensive Nutrition Assessment    Type and Reason for Visit:  Reassess    Nutrition Recommendations/Plan:   · Consider Carb Control 4 Diet  · Continue current oral nutrition supplement    Nutrition Assessment:  Pt continues to feed self and eat well, consuming greater than half of meals on Regular Diet. No wt loss over stay. Awaiting bed at [facility]. Will continue to follow as moderate nutrition risk. Malnutrition Assessment:  Malnutrition Status: At risk for malnutrition  Context:  Acute Illness       Estimated Daily Nutrient Needs:  Energy (kcal):  0630-3110 ([address] w/ stress factor 1.1-1.3); Weight Used for Energy Requirements:  Current     Protein (g):  50-60 (1.0-1.2 g/kg); Weight Used for Protein Requirements:  Ideal        Fluid (ml/day):  1500; Method Used for Fluid Requirements:  1 ml/kcal      Nutrition Related Findings:  Glu 225-300, A1C 6.6      Wounds:  None       Current Nutrition Therapies:    ADULT ORAL NUTRITION SUPPLEMENT; Breakfast, Lunch, Dinner; Low Calorie/High Protein Oral Supplement  ADULT DIET;  Regular    Anthropometric Measures:  · Height: 5' 2.01\" (157.5 cm)  · Current Body Weight: 199 lb 12.8 oz (90.6 kg)   · Admission Body Weight: 186 lb 8.2 oz (84.6 kg)    · Usual Body Weight:  (no weight hx available)     · Ideal Body Weight: 110 lbs; % Ideal Body Weight 129.5 %   · BMI: 36.5  · BMI Categories: Obese Class 2 (BMI 35.0 -39.9)       Nutrition Diagnosis:   · Predicted inadequate energy intake related to acute injury/trauma,impaired respiratory function as evidenced by  (varied po intakes during stay)    Nutrition Interventions:   Food and/or Nutrient Delivery:  Continue Current Diet,Continue Oral Nutrition Supplement  Nutrition Education/Counseling:  No recommendation at this time   Coordination of Nutrition Care:  Continue to monitor while inpatient    Goals:  Pt will meet greater than 50-75% of meals and supplements       Nutrition Monitoring and Evaluation: Behavioral-Environmental Outcomes:  None Identified   Food/Nutrient Intake Outcomes:  Supplement Intake,Food and Nutrient Intake  Physical Signs/Symptoms Outcomes:  Biochemical Data,Weight,GI Status,Hemodynamic Status,Fluid Status or Edema     Discharge Planning:    No discharge needs at this time     Electronically signed by Raphael Murdock RD, LD on 3/14/22 at 1:04 PM EDT    Contact: [phone]

## 2022-03-14 NOTE — PROGRESS NOTES
Pt now refusing all acu checks and insulin coverage. Pt educated. Hospitalist and Dr. Sesar Zafar notified.   Elsy Perez RN   4:34 PM  3/14/2022

## 2022-03-14 NOTE — CONSULTS
Palliative Care consult for Other: Patient worried about going to [facility], declining right now, want to stay here this week and have therapy here (therapy may not be available). 68 Y. O. female with PMH congenital hip disease with over 8 hip replacements, chronic back pain s/p lamenectomy/ fusions, osteoarthristis and HTN. Patient presented to the ED via EMS for SOB, fever, and a cough. EMS has placed her on 15L NRB for SPO2 of 74% on room air. She tested positive for COVID. She was admitted for management of respiratory failure secondary to COVID Pneumonia. She has had a prolonged hospital course d/t increased demand. From reviewing the notes it looks like she was transitioned from HFNC to vapotherm 3/2/22 30L with 100% FIO2. She has fluctuated with her O2 requirements during her stay but has been unable to be weaned off completely. Code status was changed to [facility] 3/6/22. The plan is to discharge to [facility]. Potential D/C 3/16/22 per HANSA Arreguin's note. ASSESSMENT:  Patient is sitting upright in chair on 5L NC. She was weaned from [address] and 50% FIO2 vapotherm since this am. She denies SOB at rest but does have SOB after talking for a long period. Her saturations maintained in the low 90's with no signs of respiratory distress during my visit. She complains of 8/10 pain in her back. I have asked her nurse Denise Navarro to give her prn percocet to her. Patient does have anxiety but wants to wait for her one time dose of ativan for this evening. The psychiatrist was just in to see her and adjusted her medications. I have explained to patient that she cannot stay at the hospital for 1-2 more weeks like she has requested. I educated her about skilled therapy and not needing to be in the hospital and being ready for the next level of care. She is agreeable to go to [place] in a couple of days if still warranted. She would rather go to a local nursing home for skilled therapy if she continues to improve her oxygenation. He ultimate goal is to return home. She was not previously dependent on oxygen. She required assistance with bathing and grocery shopping from her daughters. She was able to get around the house independently and perform other ADL's. She has 4 children Reena Kaur, and Ashley Lerner. She has documents at home to support Torri Sadler as medical POA if she is unable to speak for herself. She expresses that all her children are close and very helpful to her. I have updated  regarding patient's decreased O2 demand and he was very pleased.  I will check in with her tomorrow to see if she is able to continue to tolerate the NC.

## 2022-03-14 NOTE — CARE COORDINATION
Chart reviewed. Noted that pt is approved for [facility] and is waiting for a bed. This CM called [facility] to check on bed status. He states that they should have a bed on 03/16 or sooner. He will notify CM as soon as they have an available bed. Pt does NOT require a pre-cert. D/c plan is [facility] when they have a bed. [facility] to notify CM when they have a bed. PT ON VAPOTHERM  30L AT 50% FiO2 today.   TE

## 2022-03-15 LAB
GLUCOSE BLD-MCNC: 190 MG/DL (ref 70–99)
GLUCOSE BLD-MCNC: 236 MG/DL (ref 70–99)

## 2022-03-15 PROCEDURE — 2580000003 HC RX 258: Performed by: NURSE PRACTITIONER

## 2022-03-15 PROCEDURE — 2140000000 HC CCU INTERMEDIATE R&B

## 2022-03-15 PROCEDURE — 82962 GLUCOSE BLOOD TEST: CPT

## 2022-03-15 PROCEDURE — 6370000000 HC RX 637 (ALT 250 FOR IP): Performed by: HOSPITALIST

## 2022-03-15 PROCEDURE — 1200000000 HC SEMI PRIVATE

## 2022-03-15 PROCEDURE — 6370000000 HC RX 637 (ALT 250 FOR IP): Performed by: NURSE PRACTITIONER

## 2022-03-15 PROCEDURE — 6360000002 HC RX W HCPCS: Performed by: INTERNAL MEDICINE

## 2022-03-15 PROCEDURE — 6360000002 HC RX W HCPCS: Performed by: STUDENT IN AN ORGANIZED HEALTH CARE EDUCATION/TRAINING PROGRAM

## 2022-03-15 PROCEDURE — 97110 THERAPEUTIC EXERCISES: CPT

## 2022-03-15 PROCEDURE — 6370000000 HC RX 637 (ALT 250 FOR IP): Performed by: INTERNAL MEDICINE

## 2022-03-15 PROCEDURE — 94640 AIRWAY INHALATION TREATMENT: CPT

## 2022-03-15 PROCEDURE — 97535 SELF CARE MNGMENT TRAINING: CPT

## 2022-03-15 PROCEDURE — 94761 N-INVAS EAR/PLS OXIMETRY MLT: CPT

## 2022-03-15 PROCEDURE — 97530 THERAPEUTIC ACTIVITIES: CPT

## 2022-03-15 PROCEDURE — 2700000000 HC OXYGEN THERAPY PER DAY

## 2022-03-15 RX ORDER — NICOTINE POLACRILEX 4 MG
15 LOZENGE BUCCAL PRN
Status: DISCONTINUED | OUTPATIENT
Start: 2022-03-15 | End: 2022-03-17 | Stop reason: HOSPADM

## 2022-03-15 RX ORDER — METHYLPREDNISOLONE SODIUM SUCCINATE 40 MG/ML
40 INJECTION, POWDER, LYOPHILIZED, FOR SOLUTION INTRAMUSCULAR; INTRAVENOUS EVERY 12 HOURS
Status: DISCONTINUED | OUTPATIENT
Start: 2022-03-16 | End: 2022-03-16

## 2022-03-15 RX ORDER — DEXTROSE MONOHYDRATE 50 MG/ML
100 INJECTION, SOLUTION INTRAVENOUS PRN
Status: DISCONTINUED | OUTPATIENT
Start: 2022-03-15 | End: 2022-03-17 | Stop reason: HOSPADM

## 2022-03-15 RX ORDER — DEXTROSE MONOHYDRATE 25 G/50ML
12.5 INJECTION, SOLUTION INTRAVENOUS PRN
Status: DISCONTINUED | OUTPATIENT
Start: 2022-03-15 | End: 2022-03-15 | Stop reason: CLARIF

## 2022-03-15 RX ADMIN — METOPROLOL 12.5 MG: 25 TABLET ORAL at 22:28

## 2022-03-15 RX ADMIN — METOPROLOL 12.5 MG: 25 TABLET ORAL at 09:55

## 2022-03-15 RX ADMIN — OXYCODONE AND ACETAMINOPHEN 1 TABLET: 5; 325 TABLET ORAL at 15:33

## 2022-03-15 RX ADMIN — SODIUM CHLORIDE, PRESERVATIVE FREE 10 ML: 5 INJECTION INTRAVENOUS at 22:29

## 2022-03-15 RX ADMIN — SENNOSIDES AND DOCUSATE SODIUM 1 TABLET: 50; 8.6 TABLET ORAL at 22:28

## 2022-03-15 RX ADMIN — POLYETHYLENE GLYCOL (3350) 17 G: 17 POWDER, FOR SOLUTION ORAL at 09:57

## 2022-03-15 RX ADMIN — FAMOTIDINE 20 MG: 20 TABLET ORAL at 22:29

## 2022-03-15 RX ADMIN — QUETIAPINE FUMARATE 12.5 MG: 25 TABLET ORAL at 09:56

## 2022-03-15 RX ADMIN — MORPHINE SULFATE 30 MG: 15 TABLET, FILM COATED, EXTENDED RELEASE ORAL at 09:55

## 2022-03-15 RX ADMIN — OXYCODONE AND ACETAMINOPHEN 1 TABLET: 5; 325 TABLET ORAL at 02:03

## 2022-03-15 RX ADMIN — SODIUM CHLORIDE, PRESERVATIVE FREE 10 ML: 5 INJECTION INTRAVENOUS at 09:57

## 2022-03-15 RX ADMIN — CYANOCOBALAMIN TAB 1000 MCG 1000 MCG: 1000 TAB at 10:34

## 2022-03-15 RX ADMIN — CETIRIZINE HYDROCHLORIDE 10 MG: 10 TABLET, FILM COATED ORAL at 09:55

## 2022-03-15 RX ADMIN — SENNOSIDES AND DOCUSATE SODIUM 1 TABLET: 50; 8.6 TABLET ORAL at 09:56

## 2022-03-15 RX ADMIN — QUETIAPINE FUMARATE 25 MG: 25 TABLET ORAL at 22:28

## 2022-03-15 RX ADMIN — METHYLPREDNISOLONE SODIUM SUCCINATE 40 MG: 40 INJECTION, POWDER, LYOPHILIZED, FOR SOLUTION INTRAMUSCULAR; INTRAVENOUS at 06:09

## 2022-03-15 RX ADMIN — LUBIPROSTONE 8 MCG: 8 CAPSULE, GELATIN COATED ORAL at 17:02

## 2022-03-15 RX ADMIN — METHYLPREDNISOLONE SODIUM SUCCINATE 40 MG: 40 INJECTION, POWDER, LYOPHILIZED, FOR SOLUTION INTRAMUSCULAR; INTRAVENOUS at 14:32

## 2022-03-15 RX ADMIN — MORPHINE SULFATE 30 MG: 15 TABLET, FILM COATED, EXTENDED RELEASE ORAL at 22:28

## 2022-03-15 RX ADMIN — FAMOTIDINE 20 MG: 20 TABLET ORAL at 10:36

## 2022-03-15 RX ADMIN — Medication 2000 UNITS: at 09:55

## 2022-03-15 RX ADMIN — ENOXAPARIN SODIUM 30 MG: 100 INJECTION SUBCUTANEOUS at 22:27

## 2022-03-15 RX ADMIN — ENOXAPARIN SODIUM 30 MG: 100 INJECTION SUBCUTANEOUS at 09:54

## 2022-03-15 ASSESSMENT — PAIN SCALES - GENERAL
PAINLEVEL_OUTOF10: 3
PAINLEVEL_OUTOF10: 5
PAINLEVEL_OUTOF10: 7
PAINLEVEL_OUTOF10: 6
PAINLEVEL_OUTOF10: 7
PAINLEVEL_OUTOF10: 9
PAINLEVEL_OUTOF10: 8

## 2022-03-15 ASSESSMENT — PAIN DESCRIPTION - PAIN TYPE
TYPE: CHRONIC PAIN
TYPE: CHRONIC PAIN

## 2022-03-15 ASSESSMENT — PAIN DESCRIPTION - LOCATION
LOCATION: BACK;HIP
LOCATION: BACK;HIP

## 2022-03-15 ASSESSMENT — PAIN SCALES - WONG BAKER
WONGBAKER_NUMERICALRESPONSE: 0

## 2022-03-15 ASSESSMENT — PAIN DESCRIPTION - PROGRESSION
CLINICAL_PROGRESSION: NOT CHANGED

## 2022-03-15 ASSESSMENT — PAIN DESCRIPTION - ORIENTATION
ORIENTATION: LOWER;MID
ORIENTATION: LOWER;MID

## 2022-03-15 ASSESSMENT — PAIN DESCRIPTION - FREQUENCY: FREQUENCY: CONTINUOUS

## 2022-03-15 ASSESSMENT — PAIN DESCRIPTION - ONSET: ONSET: ON-GOING

## 2022-03-15 ASSESSMENT — PAIN - FUNCTIONAL ASSESSMENT: PAIN_FUNCTIONAL_ASSESSMENT: ACTIVITIES ARE NOT PREVENTED

## 2022-03-15 ASSESSMENT — PAIN DESCRIPTION - DESCRIPTORS: DESCRIPTORS: ACHING;CONSTANT

## 2022-03-15 NOTE — CARE COORDINATION
Verbal order received from Dr Devang Calhoun to put in the order to transfer pt to a Med Surg floor without telemetry. Transfer order placed.   TE

## 2022-03-15 NOTE — PROGRESS NOTES
Progress Note( Dr. Alberto Kern)  3/14/2022  Subjective:   Admit Date: 2/5/2022  PCP: No primary care provider on file. Admitted For :Patient was admitted on 2/5/2022 over a month ago. Admitted she was Covid positive COVID with Acute Pneumonia no out of Covid unit    Consulted For: Better control of blood glucose    Interval History: Feels better very hard of hearing    Denies any chest pains,   Denies SOB . Denies nausea or vomiting. No new bowel or bladder symptoms. Intake/Output Summary (Last 24 hours) at 3/14/2022 2317  Last data filed at 3/14/2022 1814  Gross per 24 hour   Intake 600 ml   Output 2200 ml   Net -1600 ml       DATA    CBC:   Recent Labs     03/14/22  0715   WBC 6.4   HGB 11.9*   *    CMP:  Recent Labs     03/14/22  0715      K 4.3   CL 99   CO2 27   BUN 20   CREATININE 0.4*   CALCIUM 8.0*     Lipids: No results found for: CHOL, HDL, TRIG  Glucose:  Recent Labs     03/14/22  0813 03/14/22  1144 03/14/22  1614   POCGLU 300* 225* 225*     RhmlzoazcmC0Y:  Lab Results   Component Value Date    LABA1C 6.6 03/11/2022     High Sensitivity TSH:   Lab Results   Component Value Date    TSHHS 0.436 02/28/2022     Free T3: No results found for: FT3  Free T4:No results found for: T4FREE    XR CHEST PORTABLE   Final Result   Patchy airspace opacities bilaterally, may be related to mild pulmonary edema   versus pneumonia. CTA PULMONARY W CONTRAST   Final Result   1. Motion degraded exam.  No evidence for central pulmonary embolism. 2.  Patchy bilateral airspace disease again demonstrated, consistent with   history of COVID infection. Overall these appear less confluent since the   prior chest CT exam.         XR CHEST PORTABLE   Final Result   Bilateral airspace disease, not significantly changed from prior exam.         XR CHEST PORTABLE   Final Result   Stable bilateral pulmonary opacities consistent with COVID pneumonia. Follow   up to resolution is suggested.          XR CHEST PORTABLE   Final Result   Patchy bilateral airspace opacities compatible with COVID pneumonia overall   improved from 02/05/2022. CT CHEST PULMONARY EMBOLISM W CONTRAST   Final Result   1. No evidence of pulmonary embolism. 2.  Patchy bilateral consolidative ground-glass opacities are noted, for   which multifocal inflammatory process or infection including COVID pneumonia   should be considered. VL DUP LOWER EXTREMITY VENOUS BILATERAL   Final Result   No evidence of DVT in either lower extremity.          XR CHEST PORTABLE   Final Result   Bilateral airspace opacities, right greater than left, concerning for   extensive multifocal pneumonia              Scheduled Medicines   Medications:    insulin lispro  0-12 Units SubCUTAneous TID WC    insulin lispro  0-6 Units SubCUTAneous Nightly    LORazepam  0.5 mg Oral Once    QUEtiapine  25 mg Oral Nightly    [START ON 3/15/2022] QUEtiapine  12.5 mg Oral Daily    QUEtiapine  12.5 mg Oral Q24H    albuterol sulfate HFA  2 puff Inhalation BID    methylPREDNISolone  40 mg IntraVENous Q8H    guaiFENesin  200 mg Oral Q4H    vitamin B-12  1,000 mcg Oral Daily    lubiprostone  8 mcg Oral BID     metoprolol tartrate  12.5 mg Oral BID    polyethylene glycol  17 g Oral Daily    enoxaparin  30 mg SubCUTAneous BID    fluticasone  1 spray Each Nostril Daily    morphine  30 mg Oral 2 times per day    cetirizine  10 mg Oral Daily    [Held by provider] losartan  50 mg Oral Daily    sodium chloride flush  5-40 mL IntraVENous 2 times per day    sennosides-docusate sodium  1 tablet Oral BID    famotidine  20 mg Oral BID    Vitamin D  2,000 Units Oral Daily      Infusions:    sodium chloride 25 mL (02/09/22 0917)         Objective:   Vitals: /87   Pulse 99   Temp 98 °F (36.7 °C) (Oral)   Resp 20   Ht 5' 2.01\" (1.575 m)   Wt 199 lb 12.8 oz (90.6 kg)   SpO2 (!) 89%   BMI 36.53 kg/m²   General appearance: alert and cooperative with

## 2022-03-15 NOTE — PLAN OF CARE
Problem: Falls - Risk of:  Goal: Absence of physical injury  Description: Absence of physical injury  Outcome: Met This Shift     Problem: Gas Exchange - Impaired  Goal: Promote optimal lung function  Outcome: Met This Shift     Problem: Breathing Pattern - Ineffective  Goal: Ability to achieve and maintain a regular respiratory rate  Outcome: Met This Shift

## 2022-03-15 NOTE — CARE COORDINATION
Pt is off of Vapotherm and  is on 5L O2 NC. Pt no longer qualifies for an LTAC. Notified pt's son and discussed the d/c plan with him. He states that they are agreeable to a SNF if needed. He wants to know what the PT/OT recommendations are. CM will notify him of the PT/OT recommendations after PT/OT notes are in. SNF list sent to his e-mail per his request.  PT/OT ordered and requested via [facility]. Notified [facility].   TE

## 2022-03-15 NOTE — PROGRESS NOTES
Patient is alert sitting up in chair on 7L NC. She denies SOB and does not appear to be in distress. Her saturation is 9$% currently. She stated she had some of the best sleep she has had in awhile last night. She inquired about having ativan as needed. I have spoken with Dana Castillo and he would like to defer ativan order at this time. Patient was started on Seroquel 12.5 mg BID and 25 MG at HS yesterday per Psych. I did let  know about Psych's note to wean Seroquel prior to discharge. I was going to wean patient's O2 per order however PT is going to work with her so I will defer to her bedside RN.

## 2022-03-15 NOTE — PROGRESS NOTES
I have spoken with Lalo Overton NP regarding possible discharge soon and her recommendation to wean Seroquel. Per Lamarr Hodgkins she is okay with discharging patient on current doses of Seroquel and for the out patient physician to manage. I will notify Dr. Nia Gaviria.

## 2022-03-15 NOTE — PROGRESS NOTES
Physical Therapy    Physical Therapy Treatment Note  Name: Che Kelley MRN: [id number] :   1945   Date:  3/15/2022   Admission Date: 2022 Room:  [address] [place] [address]   Restrictions/Precautions:  Restrictions/Precautions  Restrictions/Precautions: Fall Risk,General Precautions,Contact Precautions       Communication with other providers:  RN approves tx session. Co-tx with Anthony Charles     Subjective:  Patient states:  Agreeable to tx session; \"I just love you girls\"  Pain:   Location, Type, Intensity (0/10 to 10/10): Has pain; does not rate    Objective:    Observation:  Pt in chair upon arrival.  Objective measures: pt O2 SAT's monitored throughout tx session. With activity patient desat to 79-85%. patient rebounds fairly quick with cues for PLB. During last transfer to [facility] patient desat and took 1-2 minutes to recover. Placed patient on 8L for rebound. Replaced o2 on 7L end of session. Patient does better SPT with no walker. Objective Measures:  7L-79-95%, HR-  during activity. Treatment, including education/measures:  Transfers:  Scooting :CGA to chair edge   Sit to stand :modA x2 from chair; completes 5 trials total. Completes 3 trials from chair with RW at [facility] x2 each trial. Completes 2 stands from [facility] with modA x2 without RW due to pt being hesitant/fearful to utilize RW. During stands pt tolerates standing ~1-2min per stand. Stand to sit :modA x2 for eccentric control. SPT:completes chair<>BSC with maxA x2 when utilizing RW. Upon SPT to [facility] pt O2 SAT's drop to 79%; once seated on BSC pt recovers within 1-2 minutes but required O2 raised to 8L. Once recovered pt lowered to 7L O2. Pt requires increased time to recover and mod VC's for PLB throughout tx session. Safety  Patient left safely in the chair, with call light/phone in reach with alarm applied. Gait belt used for transfers.     Assessment / Impression:       Patient's tolerance of treatment:  good   Adverse Reaction: O2 drop  Significant change in status and impact:  none  Barriers to improvement:  O2 SAT's    Plan for Next Session:    Will cont to work towards pt's goals per her tolerance    Time in:  1345  Time out:  1455  Timed treatment minutes:  70  Total treatment time:  79    Previously filed items:  Social/Functional History  Lives With: Alone  Type of Home: House  Home Layout: One level  Home Access: Ramped entrance (railing on BL sides)  Bathroom Shower/Tub: Shower chair without back (dtr spongebathes)  Bathroom Toilet: Handicap height  Bathroom Equipment: Grab bars around toilet  Home Equipment: Crutches,Wheelchair-manual,Grab bars  Receives Help From: Family  ADL Assistance: Needs assistance (dtr assists w/ bathing, dressing, ; dtr comes M/W/F)  Homemaking Assistance: Needs assistance (dtr does shopping; pt does not cook, has meals on wheels; son does outside [place]; grand dtrs clean inside)  Homemaking Responsibilities: No (family performs most; she assists w/ what she can, mostly done seated)  Ambulation Assistance: Independent (mod ind using crutches; only short distances)  Transfer Assistance: Independent  Active : No (family)  Occupation: Retired,On disability  Additional Comments: plays cards, crochette, puzzle books, socializes w/ family; sleeps in adjustable bed w/ trapeze over for improved pt mobility  Short term goals  Time Frame for Short term goals: 1 week  Short term goal 1: pt will complete bed mobility at min A  Short term goal 2: pt will complete transfers at [facility] term goal 3: pt will ambulate 15 ft using crutches at [facility] term goal 4: pt will demonstrate near gross >3/5 BL LE strength       Electronically signed by:    Bindu Aguirre PTA  3/15/2022, 3:13 PM

## 2022-03-15 NOTE — PROGRESS NOTES
Hospitalist Progress Note      Name:  Fifi Dee /Age/Sex: 1945  (68 y.o. female)   MRN & CSN:  [id number] & [id number] Admission Date/Time: 2022  4:46 PM   Location:  [address] [place] [address] PCP: No primary care provider on file. Hospital Day: 45  Discharge barrier/Reason for continued hospitalization: Persistently hypoxemia requiring high flow. Assessment and Plan:   Fifi Dee is a 68 y.o.  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 The squad reported O2 sats of 74% on room air. On arrival to the ED, patient ruled in for COVID-19. Hospital course has been prolonged due to continued high oxygen requirements.  update  -Oxygenation significantly improved today. Off of the Vapotherm. On 5 L nasal cannula. Hopefully this improvement will remain sustained.  -Asked to update son Belkis Bartlett, and I did. He toured [facility] and approves of it. Follow up with the care team in am.  Her son Mauricio Gomez plans to speak with case management in the morning. 1.  COVID-19 sepsis/pneumonia  Completed baricitinib  Continue methylprednisolone 40 mg IV every 4 8 to 8 hours      2. COVID-19 acute hypoxic respiratory failure: Now improving. Vapothermon FiO2 55%  Updated her son Belkis Bartlett 3/12    3. Low vitamin B12 levels: Continue vitamin B12 1000 MCG daily    4. Hypertension: Continue low-dose Lopressor. Losartan currently on hold. 5.  Chronic pain: Continue MS Contin twice daily, Percocet every 6 as needed  6. Class II obesity: Not a candidate for counseling due to advanced age  9. Insomnia: Improved with trazodone  8. Goals of care: DNR. See progress note 3/6/2022    Constipationcontinue lubiprostone    Diet ADULT ORAL NUTRITION SUPPLEMENT; Breakfast, Lunch, Dinner;  Low Calorie/High Protein Oral Supplement  ADULT DIET; Regular   DVT Prophylaxis [x] Lovenox, []  Heparin, [] SCDs, [] Ambulation   GI Prophylaxis [] PPI,  [] H2 Blocker,  [] Carafate,  [x] Diet/Tube Feeds   Code Status DNR-CC   [facility] [] Low, [x] Moderate,[]  High       History of Present Illness:     Chief Complaint: Glenn Martinez is a 68 y.o.  female  who presents with nausea, vomiting, fatigue of 1 week duration. Was found to be hypoxic on arrival of the squad    3/13  Blood sugar up to 282 due to steroids  She was resting comfortably    3/10/2022  Discussed with the patient and her sister in the room    Discussed with the [facility], [facility] representative [facility] has been accepted there, but no bed until next week      3/9/2022  Discussed with sister today  Patient feels about the same. Continues with her usual smile. 3/8/2022  -She was on Vapotherm, with FiO2 way down to 45% today, which is much better than before  -She recalls seeing me from last week. She stated \"I am hard of hearing, I remember? \"            Prior note by colleague:     3/6/2022: Patient is seen and examined, she is in good spirits. She definitely wants to get out of bed today. She does not want to use the bedpan and tells me it bothers her hips. She would like to use the bedside commode. She tells me that she knows when she is exerting herself too much and when her oxygen is dropping. She also does not want us to escalate care in the event of a cardiorespiratory arrest. Her CODE STATUS will be updated to DNR CC at this time        Objective:        Intake/Output Summary (Last 24 hours) at 3/14/2022 2242  Last data filed at 3/14/2022 1814  Gross per 24 hour   Intake 840 ml   Output 2200 ml   Net -1360 ml        Vitals:   Vitals:    03/14/22 1603 03/14/22 1654 03/14/22 2025 03/14/22 2122   BP: (!) 174/87 (!) 174/87  118/87   Pulse: 74 66  99   Resp: 23 21 15 20   Temp:  98.1 °F (36.7 °C)  98 °F (36.7 °C)   TempSrc:  Oral  Oral   SpO2: 93% (!) 87% 93% (!) 89%   Weight:       Height:            Physical Exam:     Physical Exam  Constitutional:       Appearance: She is not diaphoretic. HENT:      Nose: No rhinorrhea. Eyes:      General:         Right eye: No discharge. Left eye: No discharge. Pulmonary:      Effort: Pulmonary effort is normal.   Neurological:      General: No focal deficit present. Cranial Nerves: No cranial nerve deficit.          Medications:   Medications:    insulin lispro  0-12 Units SubCUTAneous TID WC    insulin lispro  0-6 Units SubCUTAneous Nightly    LORazepam  0.5 mg Oral Once    QUEtiapine  25 mg Oral Nightly    [START ON 3/15/2022] QUEtiapine  12.5 mg Oral Daily    QUEtiapine  12.5 mg Oral Q24H    albuterol sulfate HFA  2 puff Inhalation BID    methylPREDNISolone  40 mg IntraVENous Q8H    guaiFENesin  200 mg Oral Q4H    vitamin B-12  1,000 mcg Oral Daily    lubiprostone  8 mcg Oral BID WC    metoprolol tartrate  12.5 mg Oral BID    polyethylene glycol  17 g Oral Daily    enoxaparin  30 mg SubCUTAneous BID    fluticasone  1 spray Each Nostril Daily    morphine  30 mg Oral 2 times per day    cetirizine  10 mg Oral Daily    [Held by provider] losartan  50 mg Oral Daily    sodium chloride flush  5-40 mL IntraVENous 2 times per day    sennosides-docusate sodium  1 tablet Oral BID    famotidine  20 mg Oral BID    Vitamin D  2,000 Units Oral Daily      Infusions:    sodium chloride 25 mL (02/09/22 0917)     PRN Meds: sodium chloride flush, 10 mL, PRN  [Held by provider] hydrOXYzine, 25 mg, TID PRN  sodium chloride, 1 spray, Q4H PRN  albuterol sulfate HFA, 2 puff, Q4H PRN  benzocaine-menthol, 1 lozenge, Q2H PRN  sodium chloride flush, 5-40 mL, PRN  sodium chloride, 25 mL, PRN  ondansetron, 4 mg, Q6H PRN  acetaminophen, 650 mg, Q6H PRN   Or  acetaminophen, 650 mg, Q6H PRN  oxyCODONE-acetaminophen, 1 tablet, Q6H PRN          Electronically signed by Skip Brown MD on 3/14/2022 at 10:42 PM

## 2022-03-15 NOTE — PROGRESS NOTES
Pulmonary and Critical Care  Progress Note    Subjective: The patient has improved, off Vapotherm. On 4L N/C. Shortness of breath is better. Chest pain none  Addressing respiratory complaints Patient is negative for  hemoptysis and cyanosis  CONSTITUTIONAL:  negative for fevers and chills      Past Medical History:     has a past medical history of Chronic lower back pain, Hx of spinal fusion, Hypertension, MRSA (methicillin resistant staph aureus) culture positive, and Venous disease. has a past surgical history that includes fracture surgery and back surgery. reports that she has never smoked. She has never used smokeless tobacco. She reports that she does not drink alcohol and does not use drugs. Family history:  family history is not on file. No Known Allergies  Social History:    Reviewed; no changes    Objective:   PHYSICAL EXAM:        VITALS:  BP (!) 154/77   Pulse 76   Temp 98.2 °F (36.8 °C) (Oral)   Resp 20   Ht 5' 2.01\" (1.575 m)   Wt 199 lb 12.8 oz (90.6 kg)   SpO2 99%   BMI 36.53 kg/m²     24HR INTAKE/OUTPUT:      Intake/Output Summary (Last 24 hours) at 3/15/2022 1059  Last data filed at 3/15/2022 0630  Gross per 24 hour   Intake 360 ml   Output 1700 ml   Net -1340 ml       CONSTITUTIONAL:  Awake. LUNGS:  decreased breath sounds, basilar crackles. CARDIOVASCULAR:  normal S1 and S2 and negative JVD  ABD:Abdomen soft, non-tender. BS normal. No masses,  No organomegaly  NEURO:Alert. DATA:    CBC:  Recent Labs     03/14/22  0715   WBC 6.4   RBC 4.64   HGB 11.9*   HCT 40.2   *   MCV 86.6   MCH 25.6*   MCHC 29.6*   RDW 20.2*   SEGSPCT 84.0*   BANDSPCT 8      BMP:  Recent Labs     03/14/22  0715      K 4.3   CL 99   CO2 27   BUN 20   CREATININE 0.4*   CALCIUM 8.0*   GLUCOSE 229*      ABG:  No results for input(s): PH, PO2ART, OGS9BOX, HCO3, BEART, O2SAT in the last 72 hours.   Lab Results   Component Value Date    PROBNP 411.8 (H) 03/01/2022    PROBNP 286.5 02/23/2022 PROBNP 289.8 02/22/2022     No results found for: [facility]    Radiology Review:  Pertinent images / reports were reviewed as a part of this visit. Assessment:     Patient Active Problem List   Diagnosis    WD-Venous stasis dermatitis of both lower extremities    WD-Lymphedema of both lower extremities    WD-Venous stasis ulcer of right calf with fat layer exposed with varicose veins (HCC)    WD-Venous stasis ulcer of left calf with fat layer exposed with varicose veins (HCC)    COVID-19    Hypoxia    SVT (supraventricular tachycardia) (HCC)    Adjustment disorder with anxious mood       Plan:   1. Overall the patient is better. 2. [place] [address] Activity. 4. D/C soon.   Lico Costello MD  3/15/2022  10:59 AM

## 2022-03-15 NOTE — PROGRESS NOTES
Occupational Therapy  . Occupational Therapy Treatment Note      Name: Alfreda Morejon MRN: [id number] :   1945   Date:  3/15/2022   Admission Date: 2022 Room:  [address] [place] [address]-     Primary Problem:      Restrictions/Precautions:  Restrictions/Precautions  Restrictions/Precautions: Fall Risk,General Precautions,Contact Precautions     *patient performs SPT/transfer better with no FWW at this time. Communication with other providers:  cotx with PTA for safety and assist.     Subjective:  Patient states: You girls make a great team.   Pain: denied but asked nurse for pain meds. (location, type, intensity)    Objective:    Observation:  patient seated upright in recliner. patient currently on 7L o2 and 90%. during activity patient desat to 79-85%. patient rebounds fairly quick with cues for PLB. During last transfer to [facility] patient desat and took 1-2 minutes to recover. Placed patient on 8L for rebound. Replaced o2 on 7L end of session. patient very agreeable and pleasant. Patient does better SPT with no walker. Objective Measures:  7L-79-95%, HR-  during activity. Treatment, including education:    ADL activity training was instructed today. Cues were given for safety, sequence, UE/LE placement, visual cues, and balance. Activities performed today included dressing, toileting    toileting- on BSC, DEP for francisca care in stance. LB dressing- DEP to fix sock    Therapeutic activity training was instructed today. Cues were given for safety, sequence, UE/LE placement, awareness, and balance. Activities performed today included  sit-stand, SPT. patient seated upright on recliner. Patient agreeable to attempt standing to strengthen and to monitor 02 for possible d/c to nursing facility  stand to [address] from recliner x3 trials. Mod x2. Cues for erect posture and hand placement. The first x2 stands patient desat to 84-86%. Quickly recovered with PLB once seated.   patient tends to mouth breath and hold breath during strenuous activity. 3rd stand patient transferred with FWW to [facility] with Max x2. Plus Max A for walker management. Patient \" does not trust the RW\"   patient desat to 79%and 02 increased to 8L for rebound. Patient sat on BSC  plus additional time for rebound. Cues for PLB. Patient stood no AD for KIRBY to perform francisca care. Once patients 02 above 90% patient transferred back to recliner with no AD Mod x2. patient transferred easier with no AD at this time. Patient demo good understanding of using surfaces to push from and not pull on walker. Patient educated on role of OT , benefits of OT and rationale for therapeutic intervention. All therapeutic intervention performed c emphasis on dynamic balance / standing tolerance to increase strength, endurance and activity tolerance for increased [place] c ADL tasks and func transfers / mobility. Patient left safely in bedside chair at end of session, with call light in reach, alarm on and nursing aware. Gait belt was used for func transfers / mobility.           Assessment / Impression:    Patient's tolerance of treatment: good  Adverse Reaction: none  Significant change in status and impact:  none  Barriers to improvement: weakness,       Plan for Next Session:    Continue with OT POC      Time in:  1345  Time out:  1455  Timed treatment minutes:  70  Total treatment time:  70      Electronically signed by:    SATURNINO Moreno COTA/L [id number]    3/15/2022, 3:09 PM

## 2022-03-15 NOTE — PROGRESS NOTES
Hospitalist Progress Note      Name:  Christi Jaquez /Age/Sex: 1945  (68 y.o. female)   MRN & CSN:  [id number] & [id number] Admission Date/Time: 2022  4:46 PM   Location:  [address] [place] [address] PCP: No primary care provider on file. Hospital Day: 44  Discharge barrier/Reason for continued hospitalization: Persistent hypoxemia, down to 5 L today. Assessment and Plan:   Christi Jaquez is a 68 y.o.  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 The squad reported O2 sats of 74% on room air. On arrival to the ED, patient ruled in for COVID-19. Hospital course has been prolonged due to continued high oxygen requirements. 1.  COVID-19 sepsis/pneumonia, POA: Now with significant improvement  Wean steroids, decrease Solu-Medrol to twice daily  Completed baricitinib. 2.  COVID-19 acute hypoxic respiratory failure: Now improving. Continue to wean as tolerated. Down to 50% FiO2 today. Continue high-dose steroids as above. 3. Low vitamin B12 levels: Continue replacement. 4.  Hypertension: Continue low-dose Lopressor. Losartan currently on hold. 5.  Chronic pain: Continue MS Contin twice daily, Percocet every 6 as needed  6. Class II obesity: Not a candidate for counseling due to advanced age  9. Insomnia: Trazodone is switched to Seroquel. [address] [facility] psychiatry team.  8.  Goals of care: DNR. See progress note 3/6/2022  9. Hyperglycemia: Due to steroids  Continue moderate dose insulin correctional scale. 10.  Physical deconditioning: PT/OT recommend SNF. Patient is now agreeable. Diet ADULT ORAL NUTRITION SUPPLEMENT; Breakfast, Lunch, Dinner; Low Calorie/High Protein Oral Supplement  ADULT DIET;  Regular   DVT Prophylaxis [x] Lovenox, []  Heparin, [] SCDs, [] Ambulation   GI Prophylaxis [] PPI,  [] H2 Blocker,  [] Carafate,  [x] Diet/Tube Feeds   Code Status DNR-CC   [facility] [] Low, [x] Moderate,[]  High  >50% of encounter time spent in counseling/coordination of care     History of Present Illness:     Chief Complaint: <principal problem not specified>  Rosalind Small is a 68 y.o.  female  who presents with nausea, vomiting, fatigue of 1 week duration. Was found to be hypoxic on arrival of the squad    3/15/2022: Patient is seen and examined, known to me from prior. Made remarkable improvement in the last 24 hours. Down to 5 L supplemental O2. She is sitting out of bed. She had Seroquel last night which helped her sleep tremendously. Ten point ROS reviewed, negative, unless as noted above    Objective: Intake/Output Summary (Last 24 hours) at 3/15/2022 1608  Last data filed at 3/15/2022 0630  Gross per 24 hour   Intake 240 ml   Output 1700 ml   Net -1460 ml        Vitals:   Vitals:    03/15/22 1055 03/15/22 1346 03/15/22 1352 03/15/22 1538   BP: (!) 154/77      Pulse:  88 86    Resp:  27 19    Temp: 98.2 °F (36.8 °C)   97.7 °F (36.5 °C)   TempSrc: Oral   Oral   SpO2:  94% 94%    Weight:       Height:            Physical Exam:   GEN: Awake female, alert and oriented x3 in no apparent distress. Appears given age. HEENT: Normal  RESP: Diminished BS bilaterally. Symmetric chest movement while on HFNC   CVS: RRR, S1, S2  GI/: Abdomen is soft, nontender, no organomegaly. Bowel sounds normal, rectal exam deferred. No CVA tenderness. MSK: No gross joint deformities. No tenderness  SKIN: Normal coloration, warm, dry. Bilateral lower extremity stasis dermatitis. NEURO: Cranial nerves appear grossly intact, normal speech, no lateralizing weakness.   PSYCH:  Affect appropriate normal    Medications:   Medications:    insulin lispro  0-12 Units SubCUTAneous TID WC    insulin lispro  0-6 Units SubCUTAneous Nightly    LORazepam  0.5 mg Oral Once    QUEtiapine  25 mg Oral Nightly    QUEtiapine  12.5 mg Oral Daily    QUEtiapine  12.5 mg Oral Q24H    albuterol sulfate HFA  2 puff Inhalation BID    methylPREDNISolone  40 mg IntraVENous Q8H    guaiFENesin  200 mg Oral Q4H    vitamin B-12  1,000 mcg Oral Daily    lubiprostone  8 mcg Oral BID WC    metoprolol tartrate  12.5 mg Oral BID    polyethylene glycol  17 g Oral Daily    enoxaparin  30 mg SubCUTAneous BID    fluticasone  1 spray Each Nostril Daily    morphine  30 mg Oral 2 times per day    cetirizine  10 mg Oral Daily    [Held by provider] losartan  50 mg Oral Daily    sodium chloride flush  5-40 mL IntraVENous 2 times per day    sennosides-docusate sodium  1 tablet Oral BID    famotidine  20 mg Oral BID    Vitamin D  2,000 Units Oral Daily      Infusions:    sodium chloride 25 mL (02/09/22 0917)     PRN Meds: sodium chloride flush, 10 mL, PRN  [Held by provider] hydrOXYzine, 25 mg, TID PRN  sodium chloride, 1 spray, Q4H PRN  albuterol sulfate HFA, 2 puff, Q4H PRN  benzocaine-menthol, 1 lozenge, Q2H PRN  sodium chloride flush, 5-40 mL, PRN  sodium chloride, 25 mL, PRN  ondansetron, 4 mg, Q6H PRN  acetaminophen, 650 mg, Q6H PRN   Or  acetaminophen, 650 mg, Q6H PRN  oxyCODONE-acetaminophen, 1 tablet, Q6H PRN          Electronically signed by Mark Urbina MD on 3/15/2022 at 4:08 PM

## 2022-03-16 LAB
GLUCOSE BLD-MCNC: 237 MG/DL (ref 70–99)
GLUCOSE BLD-MCNC: 257 MG/DL (ref 70–99)
GLUCOSE BLD-MCNC: 307 MG/DL (ref 70–99)

## 2022-03-16 PROCEDURE — 6360000002 HC RX W HCPCS: Performed by: STUDENT IN AN ORGANIZED HEALTH CARE EDUCATION/TRAINING PROGRAM

## 2022-03-16 PROCEDURE — 6370000000 HC RX 637 (ALT 250 FOR IP): Performed by: INTERNAL MEDICINE

## 2022-03-16 PROCEDURE — 2700000000 HC OXYGEN THERAPY PER DAY

## 2022-03-16 PROCEDURE — 6370000000 HC RX 637 (ALT 250 FOR IP): Performed by: NURSE PRACTITIONER

## 2022-03-16 PROCEDURE — 2580000003 HC RX 258: Performed by: NURSE PRACTITIONER

## 2022-03-16 PROCEDURE — 6370000000 HC RX 637 (ALT 250 FOR IP): Performed by: HOSPITALIST

## 2022-03-16 PROCEDURE — 94150 VITAL CAPACITY TEST: CPT

## 2022-03-16 PROCEDURE — 82962 GLUCOSE BLOOD TEST: CPT

## 2022-03-16 PROCEDURE — 1200000000 HC SEMI PRIVATE

## 2022-03-16 PROCEDURE — 6360000002 HC RX W HCPCS: Performed by: INTERNAL MEDICINE

## 2022-03-16 PROCEDURE — 94761 N-INVAS EAR/PLS OXIMETRY MLT: CPT

## 2022-03-16 PROCEDURE — 94640 AIRWAY INHALATION TREATMENT: CPT

## 2022-03-16 RX ORDER — PREDNISONE 20 MG/1
40 TABLET ORAL DAILY
Status: DISCONTINUED | OUTPATIENT
Start: 2022-03-17 | End: 2022-03-17 | Stop reason: HOSPADM

## 2022-03-16 RX ORDER — PREDNISONE 10 MG/1
10 TABLET ORAL DAILY
Status: DISCONTINUED | OUTPATIENT
Start: 2022-03-26 | End: 2022-03-17 | Stop reason: HOSPADM

## 2022-03-16 RX ORDER — PREDNISONE 20 MG/1
20 TABLET ORAL DAILY
Status: DISCONTINUED | OUTPATIENT
Start: 2022-03-23 | End: 2022-03-17 | Stop reason: HOSPADM

## 2022-03-16 RX ORDER — CEPHALEXIN 250 MG/1
500 CAPSULE ORAL EVERY 6 HOURS SCHEDULED
Status: DISCONTINUED | OUTPATIENT
Start: 2022-03-16 | End: 2022-03-17 | Stop reason: HOSPADM

## 2022-03-16 RX ADMIN — OXYCODONE AND ACETAMINOPHEN 1 TABLET: 5; 325 TABLET ORAL at 14:44

## 2022-03-16 RX ADMIN — Medication 2000 UNITS: at 09:33

## 2022-03-16 RX ADMIN — METHYLPREDNISOLONE SODIUM SUCCINATE 40 MG: 40 INJECTION, POWDER, FOR SOLUTION INTRAMUSCULAR; INTRAVENOUS at 01:01

## 2022-03-16 RX ADMIN — CYANOCOBALAMIN TAB 1000 MCG 1000 MCG: 1000 TAB at 09:35

## 2022-03-16 RX ADMIN — SENNOSIDES AND DOCUSATE SODIUM 1 TABLET: 50; 8.6 TABLET ORAL at 20:19

## 2022-03-16 RX ADMIN — CEPHALEXIN 500 MG: 250 CAPSULE ORAL at 18:30

## 2022-03-16 RX ADMIN — FAMOTIDINE 20 MG: 20 TABLET ORAL at 09:34

## 2022-03-16 RX ADMIN — MORPHINE SULFATE 30 MG: 15 TABLET, FILM COATED, EXTENDED RELEASE ORAL at 09:33

## 2022-03-16 RX ADMIN — METOPROLOL TARTRATE 25 MG: 25 TABLET, FILM COATED ORAL at 20:18

## 2022-03-16 RX ADMIN — SODIUM CHLORIDE, PRESERVATIVE FREE 10 ML: 5 INJECTION INTRAVENOUS at 20:20

## 2022-03-16 RX ADMIN — OXYCODONE AND ACETAMINOPHEN 1 TABLET: 5; 325 TABLET ORAL at 01:02

## 2022-03-16 RX ADMIN — QUETIAPINE FUMARATE 12.5 MG: 25 TABLET ORAL at 09:34

## 2022-03-16 RX ADMIN — ENOXAPARIN SODIUM 30 MG: 100 INJECTION SUBCUTANEOUS at 20:18

## 2022-03-16 RX ADMIN — METOPROLOL 12.5 MG: 25 TABLET ORAL at 09:34

## 2022-03-16 RX ADMIN — POLYETHYLENE GLYCOL (3350) 17 G: 17 POWDER, FOR SOLUTION ORAL at 09:43

## 2022-03-16 RX ADMIN — METHYLPREDNISOLONE SODIUM SUCCINATE 40 MG: 40 INJECTION, POWDER, FOR SOLUTION INTRAMUSCULAR; INTRAVENOUS at 14:44

## 2022-03-16 RX ADMIN — METOPROLOL TARTRATE 12.5 MG: 25 TABLET, FILM COATED ORAL at 12:33

## 2022-03-16 RX ADMIN — ENOXAPARIN SODIUM 30 MG: 100 INJECTION SUBCUTANEOUS at 09:35

## 2022-03-16 RX ADMIN — FLUTICASONE PROPIONATE 1 SPRAY: 50 SPRAY, METERED NASAL at 09:44

## 2022-03-16 RX ADMIN — CETIRIZINE HYDROCHLORIDE 10 MG: 10 TABLET, FILM COATED ORAL at 09:33

## 2022-03-16 RX ADMIN — ALBUTEROL SULFATE 2 PUFF: 90 AEROSOL, METERED RESPIRATORY (INHALATION) at 07:34

## 2022-03-16 RX ADMIN — CEPHALEXIN 500 MG: 250 CAPSULE ORAL at 12:33

## 2022-03-16 RX ADMIN — FAMOTIDINE 20 MG: 20 TABLET ORAL at 20:19

## 2022-03-16 RX ADMIN — MORPHINE SULFATE 30 MG: 15 TABLET, FILM COATED, EXTENDED RELEASE ORAL at 20:19

## 2022-03-16 RX ADMIN — CEPHALEXIN 500 MG: 250 CAPSULE ORAL at 23:53

## 2022-03-16 RX ADMIN — SODIUM CHLORIDE, PRESERVATIVE FREE 10 ML: 5 INJECTION INTRAVENOUS at 09:44

## 2022-03-16 RX ADMIN — SENNOSIDES AND DOCUSATE SODIUM 1 TABLET: 50; 8.6 TABLET ORAL at 09:34

## 2022-03-16 RX ADMIN — QUETIAPINE FUMARATE 25 MG: 25 TABLET ORAL at 20:18

## 2022-03-16 RX ADMIN — ALBUTEROL SULFATE 2 PUFF: 90 AEROSOL, METERED RESPIRATORY (INHALATION) at 20:34

## 2022-03-16 ASSESSMENT — PAIN DESCRIPTION - PROGRESSION
CLINICAL_PROGRESSION: NOT CHANGED

## 2022-03-16 ASSESSMENT — PAIN DESCRIPTION - PAIN TYPE
TYPE: CHRONIC PAIN
TYPE: CHRONIC PAIN

## 2022-03-16 ASSESSMENT — PAIN DESCRIPTION - LOCATION
LOCATION: BACK;HIP
LOCATION: BACK;HIP

## 2022-03-16 ASSESSMENT — PAIN SCALES - GENERAL
PAINLEVEL_OUTOF10: 8
PAINLEVEL_OUTOF10: 8
PAINLEVEL_OUTOF10: 5
PAINLEVEL_OUTOF10: 8
PAINLEVEL_OUTOF10: 7

## 2022-03-16 ASSESSMENT — PAIN DESCRIPTION - ORIENTATION
ORIENTATION: LOWER;MID
ORIENTATION: LOWER;MID

## 2022-03-16 ASSESSMENT — PAIN DESCRIPTION - DESCRIPTORS
DESCRIPTORS: ACHING;CONSTANT
DESCRIPTORS: ACHING;CONSTANT

## 2022-03-16 ASSESSMENT — PAIN DESCRIPTION - FREQUENCY
FREQUENCY: CONTINUOUS
FREQUENCY: CONTINUOUS

## 2022-03-16 ASSESSMENT — PAIN DESCRIPTION - ONSET
ONSET: ON-GOING
ONSET: ON-GOING

## 2022-03-16 NOTE — PROGRESS NOTES
Pulmonary and Critical Care  Progress Note    Subjective: The patient is sitting in the chair. On 4L N/C. Shortness of breath is better. Chest pain none  Addressing respiratory complaints Patient is negative for  hemoptysis and cyanosis  CONSTITUTIONAL:  negative for fevers and chills      Past Medical History:     has a past medical history of Chronic lower back pain, Hx of spinal fusion, Hypertension, MRSA (methicillin resistant staph aureus) culture positive, and Venous disease. has a past surgical history that includes fracture surgery and back surgery. reports that she has never smoked. She has never used smokeless tobacco. She reports that she does not drink alcohol and does not use drugs. Family history:  family history is not on file. No Known Allergies  Social History:    Reviewed; no changes    Objective:   PHYSICAL EXAM:        VITALS:  BP (!) 145/73   Pulse 71   Temp 97.9 °F (36.6 °C) (Oral)   Resp 22   Ht 5' 2.01\" (1.575 m)   Wt 199 lb 12.8 oz (90.6 kg)   SpO2 91%   BMI 36.53 kg/m²     24HR INTAKE/OUTPUT:    No intake or output data in the 24 hours ending 03/16/22 1003    CONSTITUTIONAL:  Awake. LUNGS:  decreased breath sounds, basilar crackles. CARDIOVASCULAR:  normal S1 and S2 and negative JVD  ABD:Abdomen soft, non-tender. BS normal. No masses,  No organomegaly  NEURO:Alert. DATA:    CBC:  Recent Labs     03/14/22  0715   WBC 6.4   RBC 4.64   HGB 11.9*   HCT 40.2   *   MCV 86.6   MCH 25.6*   MCHC 29.6*   RDW 20.2*   SEGSPCT 84.0*   BANDSPCT 8      BMP:  Recent Labs     03/14/22  0715      K 4.3   CL 99   CO2 27   BUN 20   CREATININE 0.4*   CALCIUM 8.0*   GLUCOSE 229*      ABG:  No results for input(s): PH, PO2ART, CSI7HCW, HCO3, BEART, O2SAT in the last 72 hours.   Lab Results   Component Value Date    PROBNP 411.8 (H) 03/01/2022    PROBNP 286.5 02/23/2022    PROBNP 289.8 02/22/2022     No results found for: [facility]    Radiology Review:  Pertinent images / reports were reviewed as a part of this visit. Assessment:     Patient Active Problem List   Diagnosis    WD-Venous stasis dermatitis of both lower extremities    WD-Lymphedema of both lower extremities    WD-Venous stasis ulcer of right calf with fat layer exposed with varicose veins (HCC)    WD-Venous stasis ulcer of left calf with fat layer exposed with varicose veins (HCC)    COVID-19    Hypoxia    SVT (supraventricular tachycardia) (HCC)    Adjustment disorder with anxious mood       Plan:   1. Overall the patient is better. 2. [place] 6 Activity.   Joslyn Dc MD  3/16/2022  10:03 AM

## 2022-03-16 NOTE — PROGRESS NOTES
Progress Note( Dr. Melanie Delgado)  3/15/2022  Subjective:   Admit Date: 2/5/2022  PCP: No primary care provider on file. Admitted For :Patient was admitted on 2/5/2022 over a month ago. Admitted she was Covid positive COVID with Acute Pneumonia no out of Covid unit    Consulted For: Better control of blood glucose    Interval History: Feels better very hard of hearing  Patient has refused to accept that she is diabetic or hyperglycemic though she has been eating a very high  carbohydrate containing food. Had a long talk with her convince her to allow for nurses to do blood glucose and take insulin if appropriate    Denies any chest pains,   Denies SOB . Denies nausea or vomiting. No new bowel or bladder symptoms. Intake/Output Summary (Last 24 hours) at 3/15/2022 2259  Last data filed at 3/15/2022 0630  Gross per 24 hour   Intake    Output 700 ml   Net -700 ml       DATA    CBC:   Recent Labs     03/14/22  0715   WBC 6.4   HGB 11.9*   *    CMP:  Recent Labs     03/14/22  0715      K 4.3   CL 99   CO2 27   BUN 20   CREATININE 0.4*   CALCIUM 8.0*     Lipids: No results found for: CHOL, HDL, TRIG  Glucose:  Recent Labs     03/14/22  1614 03/15/22  1638 03/15/22  2232   POCGLU 225* 190* 236*     EqsiuseyjuF4W:  Lab Results   Component Value Date    LABA1C 6.6 03/11/2022     High Sensitivity TSH:   Lab Results   Component Value Date    TSHHS 0.436 02/28/2022     Free T3: No results found for: FT3  Free T4:No results found for: T4FREE    XR CHEST PORTABLE   Final Result   Patchy airspace opacities bilaterally, may be related to mild pulmonary edema   versus pneumonia. CTA PULMONARY W CONTRAST   Final Result   1. Motion degraded exam.  No evidence for central pulmonary embolism. 2.  Patchy bilateral airspace disease again demonstrated, consistent with   history of COVID infection.   Overall these appear less confluent since the   prior chest CT exam.         XR CHEST PORTABLE   Final Result   Bilateral airspace disease, not significantly changed from prior exam.         XR CHEST PORTABLE   Final Result   Stable bilateral pulmonary opacities consistent with COVID pneumonia. Follow   up to resolution is suggested. XR CHEST PORTABLE   Final Result   Patchy bilateral airspace opacities compatible with COVID pneumonia overall   improved from 02/05/2022. CT CHEST PULMONARY EMBOLISM W CONTRAST   Final Result   1. No evidence of pulmonary embolism. 2.  Patchy bilateral consolidative ground-glass opacities are noted, for   which multifocal inflammatory process or infection including COVID pneumonia   should be considered. VL DUP LOWER EXTREMITY VENOUS BILATERAL   Final Result   No evidence of DVT in either lower extremity.          XR CHEST PORTABLE   Final Result   Bilateral airspace opacities, right greater than left, concerning for   extensive multifocal pneumonia              Scheduled Medicines   Medications:    [START ON 3/16/2022] methylPREDNISolone  40 mg IntraVENous Q12H    insulin lispro  0-12 Units SubCUTAneous TID WC    insulin lispro  0-6 Units SubCUTAneous Nightly    LORazepam  0.5 mg Oral Once    QUEtiapine  25 mg Oral Nightly    QUEtiapine  12.5 mg Oral Daily    QUEtiapine  12.5 mg Oral Q24H    albuterol sulfate HFA  2 puff Inhalation BID    guaiFENesin  200 mg Oral Q4H    vitamin B-12  1,000 mcg Oral Daily    lubiprostone  8 mcg Oral BID WC    metoprolol tartrate  12.5 mg Oral BID    polyethylene glycol  17 g Oral Daily    enoxaparin  30 mg SubCUTAneous BID    fluticasone  1 spray Each Nostril Daily    morphine  30 mg Oral 2 times per day    cetirizine  10 mg Oral Daily    [Held by provider] losartan  50 mg Oral Daily    sodium chloride flush  5-40 mL IntraVENous 2 times per day    sennosides-docusate sodium  1 tablet Oral BID    famotidine  20 mg Oral BID    Vitamin D  2,000 Units Oral Daily      Infusions:    dextrose      sodium chloride 25 mL (02/09/22 0917)         Objective:   Vitals: BP (!) 154/77   Pulse 86   Temp 97.7 °F (36.5 °C) (Oral)   Resp 20   Ht 5' 2.01\" (1.575 m)   Wt 199 lb 12.8 oz (90.6 kg)   SpO2 93%   BMI 36.53 kg/m²   General appearance: alert and cooperative with exam  Neck: no JVD or bruit  Thyroid : Normal lobes   Lungs: Has Vesicular Breath sounds   Heart:  regular rate and rhythm  Abdomen: soft, non-tender; bowel sounds normal; no masses,  no organomegaly  Musculoskeletal: Normal  Extremities: extremities normal, , no edema  Neurologic:  Awake, alert, oriented to name, place and time. Cranial nerves II-XII are grossly intact. Motor is  intact. Sensory is intact. ,  and gait is normal.    Assessment:     Patient Active Problem List:     WD-Venous stasis dermatitis of both lower extremities     WD-Lymphedema of both lower extremities     WD-Venous stasis ulcer of right calf with fat layer exposed with varicose veins (HCC)     WD-Venous stasis ulcer of left calf with fat layer exposed with varicose veins (HCC)     COVID-19     Hypoxia     SVT (supraventricular tachycardia) (HCC)     Adjustment disorder with anxious mood      Plan:     1. Reviewed POC blood glucose . Labs and X ray results   2. Reviewed Current Medicines   3. On Correction bolus Humalog/ Basal Lantus Insulin regime  4. Monitor Blood glucose frequently   5. Modified  the dose of Insulin/ other medicines as needed   6. Agreed to have her blood glucose done versus take insulin if needed  7. Will follow     .      Caro Covington MD, MD

## 2022-03-16 NOTE — DISCHARGE INSTR - COC
Continuity of Care Form    Patient Name: Lili Castellano   :  1945  MRN:  [id number]    Admit date:  2022  Discharge date:  ***    Code Status Order: DNR-CC   Advance Directives:      Admitting Physician:  Adolph Solano MD  PCP: No primary care provider on file. Discharging Nurse: [facility] Unit/Room#: 3108/3108-A  Discharging Unit Phone Number: ***    Emergency Contact:   Extended Emergency Contact Information  [facility] [address] [address] Phone: [phone]  Mobile Phone: [phone]  Relation: Child  Secondary Emergency Contact: Javier Rehman  [place] Phone: [phone]  Mobile Phone: [phone]  Relation: Child    Past Surgical History:  Past Surgical History:   Procedure Laterality Date    BACK SURGERY      FRACTURE SURGERY         Immunization History: There is no immunization history on file for this patient.     Active Problems:  Patient Active Problem List   Diagnosis Code    WD-Venous stasis dermatitis of both lower extremities I87.2    WD-Lymphedema of both lower extremities I89.0    WD-Venous stasis ulcer of right calf with fat layer exposed with varicose veins (HCC) I83.012, X18.434    WD-Venous stasis ulcer of left calf with fat layer exposed with varicose veins (HCC) I83.022, L97.222    COVID-19 U07.1    Hypoxia R09.02    SVT (supraventricular tachycardia) ([facility]) I47.1    Adjustment disorder with anxious mood F43.22    Chronic prescription opiate use Z79.891       Isolation/Infection:   Isolation            Contact          Patient Infection Status       Infection Onset Added Last Indicated Last Indicated By Review Planned Expiration Resolved Resolved By    MRSA 21 Culture, Wound        Resolved    COVID-19 22 COVID-19, Rapid   22 Xiomy Hernandez RN    Isolation complete    COVID-19 (Rule Out) 22 COVID-19, Rapid (Ordered)   22 Rule-Out Test Resulted Modified Barium Swallow with Video (Video Swallowing Test): {Done Not Done HEXA:902008443}    Treatments at the Time of Hospital Discharge:   Respiratory Treatments: ***  Oxygen Therapy:  {Therapy; copd oxygen:66178}  Ventilator:    {MH CC Vent FP:067240281}    Rehab Therapies: Physical Therapy and Occupational Therapy  Weight Bearing Status/Restrictions: No weight bearing restrictions  Other Medical Equipment (for information only, NOT a DME order):  wheelchair and crutches  Other Treatments: Slow steroid taper  Prognosis: Fair    Condition at Discharge: Stable    Rehab Potential (if transferring to Rehab): Good    Recommended Labs or Other Treatments After Discharge: NA    Physician Certification: I certify the above information and transfer of Che Kelley  is necessary for the continuing treatment of the diagnosis listed and that she requires [facility] for less 30 days.      Update Admission H&P: No change in H&P    PHYSICIAN SIGNATURE:  Electronically signed by Giancarlo Rivas MD on 3/17/22 at 12:10 PM EDT

## 2022-03-16 NOTE — PLAN OF CARE
Problem: Falls - Risk of:  Goal: Will remain free from falls  Description: Will remain free from falls  3/16/2022 1011 by Shyam Harding RN  Outcome: Met This Shift  3/92/4350 4428 by Sharri Aguilar RN  Outcome: Ongoing  Goal: Absence of physical injury  Description: Absence of physical injury  3/16/2022 1011 by Shyam Spencer RN  Outcome: Met This Shift  0/91/4339 0019 by Sharri Aguilar RN  Outcome: Ongoing     Problem: Gas Exchange - Impaired  Goal: Absence of hypoxia  3/16/2022 1011 by Gloria Spencer RN  Outcome: Met This Shift  8/97/8868 4447 by Sharri Aguilar RN  Outcome: Ongoing  Goal: Promote optimal lung function  3/16/2022 1011 by Lindsay Sheehan RN  Outcome: Met This Shift  8/02/0216 7543 by Sharri Aguilar RN  Outcome: Ongoing     Problem: Breathing Pattern - Ineffective  Goal: Ability to achieve and maintain a regular respiratory rate  3/16/2022 1011 by Shyam Harding RN  Outcome: Met This Shift  7/33/8015 2340 by Sharri Aguilar RN  Outcome: Ongoing     Problem:  Body Temperature -  Risk of, Imbalanced  Goal: Ability to maintain a body temperature within defined limits  3/16/2022 1011 by Shyam Harding RN  Outcome: Met This Shift  4/55/5604 5843 by Sharri Aguilar RN  Outcome: Ongoing  Goal: Will regain or maintain usual level of consciousness  3/16/2022 1011 by Shyam Spencer RN  Outcome: Met This Shift  4/11/9534 4882 by Sharri Aguilar RN  Outcome: Ongoing  Goal: Complications related to the disease process, condition or treatment will be avoided or minimized  3/16/2022 1011 by Lindsay Sheehna RN  Outcome: Met This Shift  8/15/4690 4673 by Sharri Aguilar RN  Outcome: Ongoing     Problem: Nutrition Deficits  Goal: Optimize nutritional status  3/16/2022 1011 by Lindsay Sheehan RN  Outcome: Met This Shift  7/59/2723 8985 by Sharri Aguilar RN  Outcome: Ongoing     Problem: Risk for Fluid Volume Deficit  Goal: Maintain normal heart rhythm  3/16/2022 1011 by Dm Cecilia Barrio, RN  Outcome: Met This Shift  3/16/2022 0007 by Huey Brady RN  Outcome: Ongoing  Goal: Maintain absence of muscle cramping  3/16/2022 1011 by Angel Raphael RN  Outcome: Met This Shift  8/42/4096 0296 by Huey Brady RN  Outcome: Ongoing  Goal: Maintain normal serum potassium, sodium, calcium, phosphorus, and pH  3/16/2022 1011 by Shyam Welch RN  Outcome: Met This Shift  9/88/0244 5816 by Huey Brady RN  Outcome: Ongoing     Problem: Loneliness or Risk for Loneliness  Goal: Demonstrate positive use of time alone when socialization is not possible  3/16/2022 1011 by Angel Raphael RN  Outcome: Met This Shift  1/78/7297 6665 by Huey Brady RN  Outcome: Ongoing     Problem: Fatigue  Goal: Verbalize increase energy and improved vitality  3/16/2022 1011 by Angel Raphael RN  Outcome: Met This Shift  0/44/7089 9101 by Huey Brady RN  Outcome: Ongoing     Problem: Patient Education: Go to Patient Education Activity  Goal: Patient/Family Education  3/16/2022 1011 by Angel Raphael RN  Outcome: Met This Shift  5/42/5171 4084 by Huey Brady RN  Outcome: Ongoing     Problem: Pain:  Description: Pain management should include both nonpharmacologic and pharmacologic interventions.   Goal: Pain level will decrease  Description: Pain level will decrease  3/16/2022 1011 by Shyam Welch RN  Outcome: Met This Shift  7/16/4108 4804 by Huey Brady RN  Outcome: Ongoing  Goal: Control of acute pain  Description: Control of acute pain  3/16/2022 1011 by Angel Raphael RN  Outcome: Met This Shift  6/40/1246 6373 by Huey Brady RN  Outcome: Ongoing  Goal: Control of chronic pain  Description: Control of chronic pain  3/16/2022 1011 by Angel Raphael RN  Outcome: Met This Shift  8/92/9955 2298 by Huey Brady RN  Outcome: Ongoing     Problem: Skin Integrity:  Goal: Will show no infection signs and symptoms  Description: Will show no infection signs and symptoms  3/16/2022 1011 by Shyam Harding RN  Outcome: Met This Shift  4/94/3233 4699 by Huey Brady RN  Outcome: Ongoing  Goal: Absence of new skin breakdown  Description: Absence of new skin breakdown  3/16/2022 1011 by Mehran Robertson RN  Outcome: Met This Shift  0/00/3830 5774 by Michelle Dutton RN  Outcome: Ongoing     Problem: Nutrition  Goal: Optimal nutrition therapy  3/16/2022 1011 by Laxmi Irwin RN  Outcome: Met This Shift  6/27/7070 9251 by Michelle Dutton RN  Outcome: Ongoing

## 2022-03-16 NOTE — PLAN OF CARE
Problem: Falls - Risk of:  Goal: Will remain free from falls  Description: Will remain free from falls  Outcome: Ongoing  Goal: Absence of physical injury  Description: Absence of physical injury  9/51/7122 1302 by Randall Blackmon RN  Outcome: Ongoing  3/15/2022 1014 by Corry Bautista RN  Outcome: Met This Shift     Problem: Airway Clearance - Ineffective  Goal: Achieve or maintain patent airway  3/15/2022 1014 by Shyam Harding RN  Outcome: Completed     Problem: Gas Exchange - Impaired  Goal: Absence of hypoxia  9/40/1119 0048 by Randall Blackmon RN  Outcome: Ongoing  3/15/2022 1014 by Corry Bautista RN  Outcome: Ongoing  Goal: Promote optimal lung function  2/75/1572 3185 by Randall Blackmon RN  Outcome: Ongoing  3/15/2022 1014 by Corry Bautista RN  Outcome: Met This Shift     Problem: Breathing Pattern - Ineffective  Goal: Ability to achieve and maintain a regular respiratory rate  5/49/2887 1758 by Randall Blackmon RN  Outcome: Ongoing  3/15/2022 1014 by Corry Bautista RN  Outcome: Met This Shift     Problem:  Body Temperature -  Risk of, Imbalanced  Goal: Ability to maintain a body temperature within defined limits  Outcome: Ongoing  Goal: Will regain or maintain usual level of consciousness  Outcome: Ongoing  Goal: Complications related to the disease process, condition or treatment will be avoided or minimized  Outcome: Ongoing     Problem: Risk for Fluid Volume Deficit  Goal: Maintain normal heart rhythm  Outcome: Ongoing  Goal: Maintain absence of muscle cramping  Outcome: Ongoing  Goal: Maintain normal serum potassium, sodium, calcium, phosphorus, and pH  Outcome: Ongoing     Problem: Nutrition Deficits  Goal: Optimize nutritional status  Outcome: Ongoing     Problem: Loneliness or Risk for Loneliness  Goal: Demonstrate positive use of time alone when socialization is not possible  Outcome: Ongoing     Problem: Fatigue  Goal: Verbalize increase energy and improved vitality  Outcome: Ongoing     Problem: Patient Education: Go to Patient Education Activity  Goal: Patient/Family Education  Outcome: Ongoing     Problem: Pain:  Description: Pain management should include both nonpharmacologic and pharmacologic interventions.   Goal: Pain level will decrease  Description: Pain level will decrease  Outcome: Ongoing  Goal: Control of acute pain  Description: Control of acute pain  Outcome: Ongoing  Goal: Control of chronic pain  Description: Control of chronic pain  Outcome: Ongoing     Problem: Skin Integrity:  Goal: Will show no infection signs and symptoms  Description: Will show no infection signs and symptoms  Outcome: Ongoing  Goal: Absence of new skin breakdown  Description: Absence of new skin breakdown  Outcome: Ongoing     Problem: Nutrition  Goal: Optimal nutrition therapy  Outcome: Ongoing

## 2022-03-16 NOTE — PROGRESS NOTES
Hospitalist Progress Note      Name:  Eduarda Hill /Age/Sex: 1945  (68 y.o. female)   MRN & CSN:  [id number] & [id number] Admission Date/Time: 2022  4:46 PM   Location:  [address] [place] [address] PCP: No primary care provider on file. Hospital Day: 40  Discharge barrier/Reason for continued hospitalization: Persistent hypoxemia, down to 5 L today. Assessment and Plan:   Eduarda Hill is a 68 y.o.  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 The squad reported O2 sats of 74% on room air. On arrival to the ED, patient ruled in for COVID-19. Hospital course has been prolonged due to continued high oxygen requirements. 1.  COVID-19 sepsis/pneumonia, POA: Now with significant improvement  Continue steroid weaning, convert IV Solu-Medrol to p.o. prednisone. Completed baricitinib. 2.  COVID-19 acute hypoxic respiratory failure: Sustained improvement  Remains off high flow and on 5 to 6 L  Switch to oral tapering steroids. Will need a prolonged taper and giving prolonged duration of IV steroids. 3. Low vitamin B12 levels: Continue replacement. 4.  Hypertension: Increased dose of Lopressor due to intermittent tachycardia. Discontinue losartan  5. Chronic pain: Continue MS Contin twice daily, Percocet every 6 as needed  6. Class II obesity: Not a candidate for counseling due to advanced age  9. Insomnia: Trazodone is switched to Seroquel. [address] psychiatry team.  8.  Goals of care: DNR. See progress note 3/6/2022  9. Hyperglycemia: Due to steroids  Continue moderate dose insulin correctional scale. 10.  Physical deconditioning: PT/OT recommend SNF. Patient is now agreeable. Diet ADULT ORAL NUTRITION SUPPLEMENT; Breakfast, Lunch, Dinner;  Low Calorie/High Protein Oral Supplement  ADULT DIET; Regular   DVT Prophylaxis [x] Lovenox, []  Heparin, [] SCDs, [] Ambulation   GI Prophylaxis [] PPI,  [] H2 Blocker,  [] Carafate,  [x] Diet/Tube Feeds   Code Status DNR-CC   MDM [] Low, [x] Moderate,[]  High  >50% of encounter time spent in counseling/coordination of care     History of Present Illness:     Chief Complaint: <principal problem not specified>  Christina He is a 68 y.o.  female  who presents with nausea, vomiting, fatigue of 1 week duration. Was found to be hypoxic on arrival of the squad    3/15/2022: Patient is seen and examined, known to me from prior. Made remarkable improvement in the last 24 hours. Down to 5 L supplemental O2. She is sitting out of bed. She had Seroquel last night which helped her sleep tremendously. 3/16/2022: Complains of exertional dyspnea. Wanting to do exercise with bilateral upper extremity so that she can use them for weak lower extremities. I encouraged her to discuss this with physical and occupational therapist.    Ten point ROS reviewed, negative, unless as noted above    Objective: Intake/Output Summary (Last 24 hours) at 3/16/2022 1554  Last data filed at 3/16/2022 1340  Gross per 24 hour   Intake 240 ml   Output    Net 240 ml        Vitals:   Vitals:    03/16/22 0400 03/16/22 0736 03/16/22 0737 03/16/22 1241   BP: (!) 145/73   (!) 143/64   Pulse: 71   77   Resp: 15 18 22 20   Temp:       TempSrc:       SpO2: (!) 88% 91% 91% 93%   Weight:       Height:            Physical Exam:   GEN: Awake female, alert and oriented x3 in no apparent distress. Appears given age. HEENT: Normal  RESP: Diminished BS bilaterally. Symmetric chest movement while on HFNC   CVS: RRR, S1, S2  GI/: Abdomen is soft, nontender, no organomegaly. Bowel sounds normal, rectal exam deferred. No CVA tenderness. MSK: No gross joint deformities. No tenderness  SKIN: Normal coloration, warm, dry. Bilateral lower extremity stasis dermatitis.   NEURO: Cranial nerves appear grossly intact, normal speech, no lateralizing weakness.   PSYCH:  Affect appropriate normal    Medications:   Medications:    metoprolol tartrate  25 mg Oral BID    cephALEXin  500 mg Oral 4 times per day    methylPREDNISolone  40 mg IntraVENous Q12H    insulin lispro  0-12 Units SubCUTAneous TID WC    insulin lispro  0-6 Units SubCUTAneous Nightly    LORazepam  0.5 mg Oral Once    QUEtiapine  25 mg Oral Nightly    QUEtiapine  12.5 mg Oral Daily    QUEtiapine  12.5 mg Oral Q24H    albuterol sulfate HFA  2 puff Inhalation BID    guaiFENesin  200 mg Oral Q4H    vitamin B-12  1,000 mcg Oral Daily    lubiprostone  8 mcg Oral BID WC    polyethylene glycol  17 g Oral Daily    enoxaparin  30 mg SubCUTAneous BID    fluticasone  1 spray Each Nostril Daily    morphine  30 mg Oral 2 times per day    cetirizine  10 mg Oral Daily    [Held by provider] losartan  50 mg Oral Daily    sodium chloride flush  5-40 mL IntraVENous 2 times per day    sennosides-docusate sodium  1 tablet Oral BID    famotidine  20 mg Oral BID    Vitamin D  2,000 Units Oral Daily      Infusions:    dextrose      sodium chloride 25 mL (02/09/22 0917)     PRN Meds: glucose, 15 g, PRN  glucagon (rDNA), 1 mg, PRN  dextrose, 100 mL/hr, PRN  dextrose bolus (hypoglycemia), 125 mL, PRN   Or  dextrose bolus (hypoglycemia), 250 mL, PRN  sodium chloride flush, 10 mL, PRN  [Held by provider] hydrOXYzine, 25 mg, TID PRN  sodium chloride, 1 spray, Q4H PRN  albuterol sulfate HFA, 2 puff, Q4H PRN  benzocaine-menthol, 1 lozenge, Q2H PRN  sodium chloride flush, 5-40 mL, PRN  sodium chloride, 25 mL, PRN  ondansetron, 4 mg, Q6H PRN  acetaminophen, 650 mg, Q6H PRN   Or  acetaminophen, 650 mg, Q6H PRN  oxyCODONE-acetaminophen, 1 tablet, Q6H PRN          Electronically signed by Bebe Braga MD on 3/16/2022 at 3:54 PM

## 2022-03-16 NOTE — CARE COORDINATION
Called son/Yunier for SNF choice. He states that he [place] an appt at [address] He will notify this CM first thing in the am of SNF choice. CM informed him that we need a SNF choice asap. He voiced understanding. Pt is on 6L O2 today. [facility] can take pt on O2 up to 9L. Pt has Medicare and will not require a pre-cert. D/c plan is SNF. Son to notify CM in am of SNF choice.   TE

## 2022-03-16 NOTE — PROGRESS NOTES
1800 Note pt has red warm swollen right index finger starting at top of finger to first knuckle. Stated it is from a \"blood sugar stick taken last Friday\"  Dr. Shawna Arce  Is aware and ordered pt on keflex 500 mg po Qid. Pt received two doses today. More than once pt expressed to this nurse she wants to \"drain it just stick it with something\"  Each time nurse informed her that if she did that it would cause more harm. This nurse noted while rounding on pt and her daughter in the room she ask her daughter to prick it. Daughter expressed it is un acceptable for this to happen. This nurse apologized to her. Rational given for po antibiotics and not an antibiotic ointment. Band aids and alcohol pads. For comfort.

## 2022-03-17 VITALS
WEIGHT: 199.8 LBS | SYSTOLIC BLOOD PRESSURE: 182 MMHG | BODY MASS INDEX: 36.77 KG/M2 | TEMPERATURE: 98.8 F | HEART RATE: 94 BPM | RESPIRATION RATE: 21 BRPM | HEIGHT: 62 IN | OXYGEN SATURATION: 91 % | DIASTOLIC BLOOD PRESSURE: 69 MMHG

## 2022-03-17 PROBLEM — Z79.891 CHRONIC PRESCRIPTION OPIATE USE: Status: ACTIVE | Noted: 2022-03-17

## 2022-03-17 LAB
ANION GAP SERPL CALCULATED.3IONS-SCNC: 10 MMOL/L (ref 4–16)
ANISOCYTOSIS: ABNORMAL
BANDED NEUTROPHILS ABSOLUTE COUNT: 0.2 K/CU MM
BANDED NEUTROPHILS RELATIVE PERCENT: 3 % (ref 5–11)
BUN BLDV-MCNC: 16 MG/DL (ref 6–23)
CALCIUM SERPL-MCNC: 8.3 MG/DL (ref 8.3–10.6)
CHLORIDE BLD-SCNC: 98 MMOL/L (ref 99–110)
CO2: 31 MMOL/L (ref 21–32)
CREAT SERPL-MCNC: 0.5 MG/DL (ref 0.6–1.1)
DIFFERENTIAL TYPE: ABNORMAL
GFR AFRICAN AMERICAN: >60 ML/MIN/1.73M2
GFR NON-AFRICAN AMERICAN: >60 ML/MIN/1.73M2
GLUCOSE BLD-MCNC: 132 MG/DL (ref 70–99)
GLUCOSE BLD-MCNC: 216 MG/DL (ref 70–99)
GLUCOSE BLD-MCNC: 246 MG/DL (ref 70–99)
HCT VFR BLD CALC: 41.4 % (ref 37–47)
HEMOGLOBIN: 12.3 GM/DL (ref 12.5–16)
LYMPHOCYTES ABSOLUTE: 0.5 K/CU MM
LYMPHOCYTES RELATIVE PERCENT: 8 % (ref 24–44)
MCH RBC QN AUTO: 25.7 PG (ref 27–31)
MCHC RBC AUTO-ENTMCNC: 29.7 % (ref 32–36)
MCV RBC AUTO: 86.4 FL (ref 78–100)
METAMYELOCYTES ABSOLUTE COUNT: 0.07 K/CU MM
METAMYELOCYTES PERCENT: 1 %
MONOCYTES ABSOLUTE: 0.4 K/CU MM
MONOCYTES RELATIVE PERCENT: 6 % (ref 0–4)
PDW BLD-RTO: 20.6 % (ref 11.7–14.9)
PLATELET # BLD: 83 K/CU MM (ref 140–440)
POTASSIUM SERPL-SCNC: 4.2 MMOL/L (ref 3.5–5.1)
RBC # BLD: 4.79 M/CU MM (ref 4.2–5.4)
SARS-COV-2, NAAT: NOT DETECTED
SEGMENTED NEUTROPHILS ABSOLUTE COUNT: 5.6 K/CU MM
SEGMENTED NEUTROPHILS RELATIVE PERCENT: 82 % (ref 36–66)
SODIUM BLD-SCNC: 139 MMOL/L (ref 135–145)
SOURCE: NORMAL
WBC # BLD: 6.8 K/CU MM (ref 4–10.5)

## 2022-03-17 PROCEDURE — 6370000000 HC RX 637 (ALT 250 FOR IP): Performed by: NURSE PRACTITIONER

## 2022-03-17 PROCEDURE — 85007 BL SMEAR W/DIFF WBC COUNT: CPT

## 2022-03-17 PROCEDURE — 36415 COLL VENOUS BLD VENIPUNCTURE: CPT

## 2022-03-17 PROCEDURE — 82962 GLUCOSE BLOOD TEST: CPT

## 2022-03-17 PROCEDURE — 6370000000 HC RX 637 (ALT 250 FOR IP): Performed by: HOSPITALIST

## 2022-03-17 PROCEDURE — 6370000000 HC RX 637 (ALT 250 FOR IP): Performed by: INTERNAL MEDICINE

## 2022-03-17 PROCEDURE — 87635 SARS-COV-2 COVID-19 AMP PRB: CPT

## 2022-03-17 PROCEDURE — 80048 BASIC METABOLIC PNL TOTAL CA: CPT

## 2022-03-17 PROCEDURE — 2700000000 HC OXYGEN THERAPY PER DAY

## 2022-03-17 PROCEDURE — 94640 AIRWAY INHALATION TREATMENT: CPT

## 2022-03-17 PROCEDURE — 85027 COMPLETE CBC AUTOMATED: CPT

## 2022-03-17 PROCEDURE — 94761 N-INVAS EAR/PLS OXIMETRY MLT: CPT

## 2022-03-17 PROCEDURE — 2580000003 HC RX 258: Performed by: NURSE PRACTITIONER

## 2022-03-17 PROCEDURE — 6360000002 HC RX W HCPCS: Performed by: STUDENT IN AN ORGANIZED HEALTH CARE EDUCATION/TRAINING PROGRAM

## 2022-03-17 RX ORDER — CEPHALEXIN 500 MG/1
500 CAPSULE ORAL 4 TIMES DAILY
Qty: 20 CAPSULE | Refills: 0 | Status: SHIPPED | OUTPATIENT
Start: 2022-03-17

## 2022-03-17 RX ORDER — FLUTICASONE PROPIONATE 50 MCG
1 SPRAY, SUSPENSION (ML) NASAL DAILY
Qty: 16 G | Refills: 3 | Status: SHIPPED | OUTPATIENT
Start: 2022-03-18

## 2022-03-17 RX ORDER — MORPHINE SULFATE 30 MG/1
30 TABLET, FILM COATED, EXTENDED RELEASE ORAL 2 TIMES DAILY
Qty: 6 TABLET | Refills: 0 | Status: SHIPPED | OUTPATIENT
Start: 2022-03-17 | End: 2022-03-20

## 2022-03-17 RX ORDER — PREDNISONE 10 MG/1
30 TABLET ORAL DAILY
Qty: 9 TABLET | Refills: 0 | Status: SHIPPED | OUTPATIENT
Start: 2022-03-20 | End: 2022-03-23

## 2022-03-17 RX ORDER — PREDNISONE 20 MG/1
20 TABLET ORAL DAILY
Qty: 3 TABLET | Refills: 0 | Status: SHIPPED | OUTPATIENT
Start: 2022-03-23 | End: 2022-03-26

## 2022-03-17 RX ORDER — CHOLECALCIFEROL (VITAMIN D3) 50 MCG
2000 TABLET ORAL DAILY
Qty: 60 TABLET | Refills: 0 | Status: SHIPPED | OUTPATIENT
Start: 2022-03-18

## 2022-03-17 RX ORDER — LUBIPROSTONE 8 UG/1
8 CAPSULE, GELATIN COATED ORAL 2 TIMES DAILY WITH MEALS
Qty: 30 CAPSULE | Refills: 3 | Status: SHIPPED | OUTPATIENT
Start: 2022-03-17

## 2022-03-17 RX ORDER — QUETIAPINE FUMARATE 25 MG/1
12.5 TABLET, FILM COATED ORAL DAILY
Qty: 60 TABLET | Refills: 3 | Status: SHIPPED | OUTPATIENT
Start: 2022-03-18

## 2022-03-17 RX ORDER — SENNA AND DOCUSATE SODIUM 50; 8.6 MG/1; MG/1
1 TABLET, FILM COATED ORAL 2 TIMES DAILY
Qty: 30 TABLET | Refills: 0 | Status: SHIPPED | OUTPATIENT
Start: 2022-03-17

## 2022-03-17 RX ORDER — PREDNISONE 20 MG/1
40 TABLET ORAL DAILY
Qty: 4 TABLET | Refills: 0 | Status: SHIPPED | OUTPATIENT
Start: 2022-03-18 | End: 2022-03-20

## 2022-03-17 RX ORDER — FAMOTIDINE 20 MG/1
20 TABLET, FILM COATED ORAL 2 TIMES DAILY
Qty: 60 TABLET | Refills: 3 | Status: SHIPPED | OUTPATIENT
Start: 2022-03-17

## 2022-03-17 RX ORDER — OXYCODONE HYDROCHLORIDE AND ACETAMINOPHEN 5; 325 MG/1; MG/1
1 TABLET ORAL EVERY 6 HOURS PRN
Qty: 12 TABLET | Refills: 0 | Status: SHIPPED | OUTPATIENT
Start: 2022-03-17 | End: 2022-03-20

## 2022-03-17 RX ORDER — SULFAMETHOXAZOLE AND TRIMETHOPRIM 800; 160 MG/1; MG/1
1 TABLET ORAL
Qty: 10 TABLET | Refills: 0 | Status: SHIPPED | OUTPATIENT
Start: 2022-03-18 | End: 2022-03-20

## 2022-03-17 RX ORDER — PREDNISONE 10 MG/1
10 TABLET ORAL DAILY
Qty: 3 TABLET | Refills: 0 | Status: SHIPPED | OUTPATIENT
Start: 2022-03-26 | End: 2022-03-29

## 2022-03-17 RX ORDER — POLYETHYLENE GLYCOL 3350 17 G/17G
17 POWDER, FOR SOLUTION ORAL DAILY
Qty: 527 G | Refills: 1 | Status: SHIPPED | OUTPATIENT
Start: 2022-03-18 | End: 2022-04-17

## 2022-03-17 RX ORDER — QUETIAPINE FUMARATE 25 MG/1
25 TABLET, FILM COATED ORAL NIGHTLY
Qty: 60 TABLET | Refills: 3 | Status: SHIPPED | OUTPATIENT
Start: 2022-03-17

## 2022-03-17 RX ORDER — GUAIFENESIN 100 MG/5ML
200 SOLUTION ORAL EVERY 4 HOURS
Qty: 1200 ML | Refills: 0 | Status: SHIPPED | OUTPATIENT
Start: 2022-03-17

## 2022-03-17 RX ADMIN — QUETIAPINE FUMARATE 12.5 MG: 25 TABLET ORAL at 08:09

## 2022-03-17 RX ADMIN — ALBUTEROL SULFATE 2 PUFF: 90 AEROSOL, METERED RESPIRATORY (INHALATION) at 07:13

## 2022-03-17 RX ADMIN — SENNOSIDES AND DOCUSATE SODIUM 1 TABLET: 50; 8.6 TABLET ORAL at 08:09

## 2022-03-17 RX ADMIN — SALINE NASAL SPRAY 1 SPRAY: 1.5 SOLUTION NASAL at 08:11

## 2022-03-17 RX ADMIN — CEPHALEXIN 500 MG: 250 CAPSULE ORAL at 04:10

## 2022-03-17 RX ADMIN — CYANOCOBALAMIN TAB 1000 MCG 1000 MCG: 1000 TAB at 08:11

## 2022-03-17 RX ADMIN — FLUTICASONE PROPIONATE 1 SPRAY: 50 SPRAY, METERED NASAL at 08:12

## 2022-03-17 RX ADMIN — PREDNISONE 40 MG: 20 TABLET ORAL at 08:10

## 2022-03-17 RX ADMIN — CETIRIZINE HYDROCHLORIDE 10 MG: 10 TABLET, FILM COATED ORAL at 08:10

## 2022-03-17 RX ADMIN — POLYETHYLENE GLYCOL (3350) 17 G: 17 POWDER, FOR SOLUTION ORAL at 08:09

## 2022-03-17 RX ADMIN — SODIUM CHLORIDE, PRESERVATIVE FREE 10 ML: 5 INJECTION INTRAVENOUS at 08:12

## 2022-03-17 RX ADMIN — MORPHINE SULFATE 30 MG: 15 TABLET, FILM COATED, EXTENDED RELEASE ORAL at 08:09

## 2022-03-17 RX ADMIN — METOPROLOL TARTRATE 25 MG: 25 TABLET, FILM COATED ORAL at 08:09

## 2022-03-17 RX ADMIN — Medication 2000 UNITS: at 08:10

## 2022-03-17 RX ADMIN — ENOXAPARIN SODIUM 30 MG: 100 INJECTION SUBCUTANEOUS at 08:11

## 2022-03-17 RX ADMIN — FAMOTIDINE 20 MG: 20 TABLET ORAL at 08:09

## 2022-03-17 RX ADMIN — LUBIPROSTONE 8 MCG: 8 CAPSULE, GELATIN COATED ORAL at 08:09

## 2022-03-17 RX ADMIN — OXYCODONE AND ACETAMINOPHEN 1 TABLET: 5; 325 TABLET ORAL at 04:09

## 2022-03-17 RX ADMIN — CEPHALEXIN 500 MG: 250 CAPSULE ORAL at 11:42

## 2022-03-17 ASSESSMENT — PAIN - FUNCTIONAL ASSESSMENT: PAIN_FUNCTIONAL_ASSESSMENT: ACTIVITIES ARE NOT PREVENTED

## 2022-03-17 ASSESSMENT — PAIN DESCRIPTION - ORIENTATION: ORIENTATION: LOWER;MID

## 2022-03-17 ASSESSMENT — PAIN SCALES - GENERAL
PAINLEVEL_OUTOF10: 6
PAINLEVEL_OUTOF10: 7
PAINLEVEL_OUTOF10: 6
PAINLEVEL_OUTOF10: 7

## 2022-03-17 ASSESSMENT — PAIN DESCRIPTION - PAIN TYPE: TYPE: CHRONIC PAIN

## 2022-03-17 ASSESSMENT — PAIN DESCRIPTION - DESCRIPTORS: DESCRIPTORS: ACHING;CONSTANT

## 2022-03-17 ASSESSMENT — PAIN DESCRIPTION - FREQUENCY: FREQUENCY: CONTINUOUS

## 2022-03-17 ASSESSMENT — PAIN DESCRIPTION - LOCATION: LOCATION: BACK;HIP

## 2022-03-17 ASSESSMENT — PAIN DESCRIPTION - PROGRESSION: CLINICAL_PROGRESSION: GRADUALLY WORSENING

## 2022-03-17 ASSESSMENT — PAIN DESCRIPTION - ONSET: ONSET: ON-GOING

## 2022-03-17 NOTE — PROGRESS NOTES
This RN called report to Heidy Beebe RN at [facility], all questions answered, call back number provided for any questions regarding patient.

## 2022-03-17 NOTE — CARE COORDINATION
Approved for [employer] today per [facility]. They are able to accept pt whenever medically ready. Notified Dr Torsten Villar via PS. Pt will need a rapid covid per Naomi/WG even though she was admitted with Covid. Rapid Covid ordered, notified pt's nurse.   TE

## 2022-03-17 NOTE — PROGRESS NOTES
Pulmonary and Critical Care  Progress Note    Subjective: The patient has improved. On 4L N/C. Shortness of breath is better. Chest pain none  Addressing respiratory complaints Patient is negative for  hemoptysis and cyanosis  CONSTITUTIONAL:  negative for fevers and chills      Past Medical History:     has a past medical history of Chronic lower back pain, Hx of spinal fusion, Hypertension, MRSA (methicillin resistant staph aureus) culture positive, and Venous disease. has a past surgical history that includes fracture surgery and back surgery. reports that she has never smoked. She has never used smokeless tobacco. She reports that she does not drink alcohol and does not use drugs. Family history:  family history is not on file. No Known Allergies  Social History:    Reviewed; no changes    Objective:   PHYSICAL EXAM:        VITALS:  BP (!) 141/66   Pulse 94   Temp 98.6 °F (37 °C) (Oral)   Resp 21   Ht 5' 2.01\" (1.575 m)   Wt 199 lb 12.8 oz (90.6 kg)   SpO2 91%   BMI 36.53 kg/m²     24HR INTAKE/OUTPUT:      Intake/Output Summary (Last 24 hours) at 3/17/2022 1009  Last data filed at 3/16/2022 2252  Gross per 24 hour   Intake 240 ml   Output 550 ml   Net -310 ml       CONSTITUTIONAL:  Awake. LUNGS:  decreased breath sounds, basilar crackles. CARDIOVASCULAR:  normal S1 and S2 and negative JVD  ABD:Abdomen soft, non-tender. BS normal. No masses,  No organomegaly  NEURO:Alert. DATA:    CBC:  No results for input(s): WBC, RBC, HGB, HCT, PLT, MCV, MCH, MCHC, RDW, NRBC, SEGSPCT, BANDSPCT in the last 72 hours. BMP:  No results for input(s): NA, K, CL, CO2, BUN, CREATININE, CALCIUM, GLUCOSE in the last 72 hours. ABG:  No results for input(s): PH, PO2ART, XGH9ZQU, HCO3, BEART, O2SAT in the last 72 hours.   Lab Results   Component Value Date    PROBNP 411.8 (H) 03/01/2022    PROBNP 286.5 02/23/2022    PROBNP 289.8 02/22/2022     No results found for: [facility]    Radiology Review:  Pertinent images / reports were reviewed as a part of this visit. Assessment:     Patient Active Problem List   Diagnosis    WD-Venous stasis dermatitis of both lower extremities    WD-Lymphedema of both lower extremities    WD-Venous stasis ulcer of right calf with fat layer exposed with varicose veins (HCC)    WD-Venous stasis ulcer of left calf with fat layer exposed with varicose veins (HCC)    COVID-19    Hypoxia    SVT (supraventricular tachycardia) (HCC)    Adjustment disorder with anxious mood       Plan:   1. Overall the patient is better. 2. [place] [address] Activity.   Marc Renteria MD  3/17/2022  10:09 AM

## 2022-03-17 NOTE — PLAN OF CARE
Problem: Falls - Risk of:  Goal: Will remain free from falls  Description: Will remain free from falls  3/16/2022 2129 by Kaela Mendez RN  Outcome: Ongoing  3/16/2022 2128 by Kaela Mendez RN  Outcome: Ongoing  3/16/2022 1011 by Darby Asencio RN  Outcome: Met This Shift  Goal: Absence of physical injury  Description: Absence of physical injury  3/16/2022 2129 by Kaela Mendez RN  Outcome: Ongoing  3/16/2022 2128 by Kaela Mendez RN  Outcome: Ongoing  3/16/2022 1011 by Darby Asencio RN  Outcome: Met This Shift     Problem: Gas Exchange - Impaired  Goal: Absence of hypoxia  3/16/2022 2129 by Kaela Mendez RN  Outcome: Ongoing  3/16/2022 2128 by Kaela Mendez RN  Outcome: Ongoing  3/16/2022 1011 by Darby Asencio RN  Outcome: Met This Shift  Goal: Promote optimal lung function  3/16/2022 2129 by Kaela Mendez RN  Outcome: Ongoing  3/16/2022 2128 by Kaela Mendez RN  Outcome: Ongoing  3/16/2022 1011 by Darby Asencio RN  Outcome: Met This Shift     Problem: Breathing Pattern - Ineffective  Goal: Ability to achieve and maintain a regular respiratory rate  3/16/2022 2129 by Kaela Mendez RN  Outcome: Ongoing  3/16/2022 2128 by Kaela Mendez RN  Outcome: Ongoing  3/16/2022 1011 by Darby Asencio RN  Outcome: Met This Shift     Problem:  Body Temperature -  Risk of, Imbalanced  Goal: Ability to maintain a body temperature within defined limits  3/16/2022 2129 by Kaela Mendez RN  Outcome: Ongoing  3/16/2022 2128 by Kaela Mendez RN  Outcome: Ongoing  3/16/2022 1011 by Darby Asencio RN  Outcome: Met This Shift  Goal: Will regain or maintain usual level of consciousness  3/16/2022 2129 by Kaela Mendez RN  Outcome: Ongoing  3/16/2022 2128 by Kaela Mendez RN  Outcome: Ongoing  3/16/2022 1011 by Darby Asencio RN  Outcome: Met This Shift  Goal: Complications related to the disease process, condition or treatment will be avoided or minimized  3/16/2022 2129 by Kaela Mendez RN  Outcome: decrease  Description: Pain level will decrease  3/16/2022 2129 by Kaela Mendez RN  Outcome: Ongoing  3/16/2022 2128 by Kaela Mendez RN  Outcome: Ongoing  3/16/2022 1011 by Avis Francisco RN  Outcome: Met This Shift  Goal: Control of acute pain  Description: Control of acute pain  3/16/2022 2129 by Keala Mendez RN  Outcome: Ongoing  3/16/2022 2128 by Kaela Mendez RN  Outcome: Ongoing  3/16/2022 1011 by Avis Francisco RN  Outcome: Met This Shift  Goal: Control of chronic pain  Description: Control of chronic pain  3/16/2022 2129 by Kaela Mendez RN  Outcome: Ongoing  3/16/2022 2128 by Kaela Mendez RN  Outcome: Ongoing  3/16/2022 1011 by Avis Francisco RN  Outcome: Met This Shift     Problem: Skin Integrity:  Goal: Will show no infection signs and symptoms  Description: Will show no infection signs and symptoms  3/16/2022 2129 by Kaela Mendez RN  Outcome: Ongoing  3/16/2022 2128 by Kaela Mendez RN  Outcome: Ongoing  3/16/2022 1011 by Avis Francisco RN  Outcome: Met This Shift  Goal: Absence of new skin breakdown  Description: Absence of new skin breakdown  3/16/2022 2129 by Kaela Mendez RN  Outcome: Ongoing  3/16/2022 2128 by Kaela Mendez RN  Outcome: Ongoing  3/16/2022 1011 by Avis Francisco RN  Outcome: Met This Shift     Problem: Nutrition  Goal: Optimal nutrition therapy  3/16/2022 2129 by Kaela Mendez RN  Outcome: Ongoing  3/16/2022 2128 by Kaela Mendez RN  Outcome: Ongoing  3/16/2022 1011 by Avis Francisco RN  Outcome: Met This Shift

## 2022-03-17 NOTE — PLAN OF CARE
Problem: Falls - Risk of:  Goal: Will remain free from falls  Description: Will remain free from falls  3/16/2022 2128 by Kaela Mendez RN  Outcome: Ongoing  3/16/2022 1011 by Kayy Sparks RN  Outcome: Met This Shift  Goal: Absence of physical injury  Description: Absence of physical injury  3/16/2022 2128 by Kaela Mendez RN  Outcome: Ongoing  3/16/2022 1011 by Kayy Sparks RN  Outcome: Met This Shift     Problem: Gas Exchange - Impaired  Goal: Absence of hypoxia  3/16/2022 2128 by Kaela Mendez RN  Outcome: Ongoing  3/16/2022 1011 by Kayy Sparks RN  Outcome: Met This Shift  Goal: Promote optimal lung function  3/16/2022 2128 by Kaela Mendez RN  Outcome: Ongoing  3/16/2022 1011 by Kayy Sparks RN  Outcome: Met This Shift     Problem: Breathing Pattern - Ineffective  Goal: Ability to achieve and maintain a regular respiratory rate  3/16/2022 2128 by Kaela Mendez RN  Outcome: Ongoing  3/16/2022 1011 by Kayy Sparks RN  Outcome: Met This Shift     Problem:  Body Temperature -  Risk of, Imbalanced  Goal: Ability to maintain a body temperature within defined limits  3/16/2022 2128 by Kaela Mendez RN  Outcome: Ongoing  3/16/2022 1011 by Kayy Sparks RN  Outcome: Met This Shift  Goal: Will regain or maintain usual level of consciousness  3/16/2022 2128 by Kaela Mendez RN  Outcome: Ongoing  3/16/2022 1011 by Kayy Sparks RN  Outcome: Met This Shift  Goal: Complications related to the disease process, condition or treatment will be avoided or minimized  3/16/2022 2128 by Kaela Mendez RN  Outcome: Ongoing  3/16/2022 1011 by Kayy Sparks RN  Outcome: Met This Shift     Problem: Nutrition Deficits  Goal: Optimize nutritional status  3/16/2022 2128 by Kaela Mendez RN  Outcome: Ongoing  3/16/2022 1011 by Kayy Sparks RN  Outcome: Met This Shift     Problem: Risk for Fluid Volume Deficit  Goal: Maintain normal heart rhythm  3/16/2022 2128 by Kaela Mendez RN  Outcome: Ongoing  3/16/2022 1011 by Shyam Barber Douglas RN  Outcome: Met This Shift  Goal: Maintain absence of muscle cramping  3/16/2022 2128 by Kaela Mendez RN  Outcome: Ongoing  3/16/2022 1011 by Shahram Vilalfana RN  Outcome: Met This Shift  Goal: Maintain normal serum potassium, sodium, calcium, phosphorus, and pH  3/16/2022 2128 by Kaela Mendez RN  Outcome: Ongoing  3/16/2022 1011 by Shahram Villafana RN  Outcome: Met This Shift     Problem: Loneliness or Risk for Loneliness  Goal: Demonstrate positive use of time alone when socialization is not possible  3/16/2022 2128 by Kaela Mendez RN  Outcome: Ongoing  3/16/2022 1011 by Shahram Villafana RN  Outcome: Met This Shift     Problem: Fatigue  Goal: Verbalize increase energy and improved vitality  3/16/2022 2128 by Kaela Mendez RN  Outcome: Ongoing  3/16/2022 1011 by Shahram Villafana RN  Outcome: Met This Shift     Problem: Patient Education: Go to Patient Education Activity  Goal: Patient/Family Education  3/16/2022 2128 by Kaela Mendez RN  Outcome: Ongoing  3/16/2022 1011 by Shahram Villafana RN  Outcome: Met This Shift     Problem: Pain:  Goal: Pain level will decrease  Description: Pain level will decrease  3/16/2022 2128 by Kaela Mendez RN  Outcome: Ongoing  3/16/2022 1011 by Shahram Villafana RN  Outcome: Met This Shift  Goal: Control of acute pain  Description: Control of acute pain  3/16/2022 2128 by Kaela Mendez RN  Outcome: Ongoing  3/16/2022 1011 by Shahram Villfaana RN  Outcome: Met This Shift  Goal: Control of chronic pain  Description: Control of chronic pain  3/16/2022 2128 by Kaela Mendez RN  Outcome: Ongoing  3/16/2022 1011 by Shahram Villafana RN  Outcome: Met This Shift     Problem: Skin Integrity:  Goal: Will show no infection signs and symptoms  Description: Will show no infection signs and symptoms  3/16/2022 2128 by Kaela Mendez RN  Outcome: Ongoing  3/16/2022 1011 by Shahram Villafana RN  Outcome: Met This Shift  Goal: Absence of new skin breakdown  Description: Absence of new skin breakdown  3/16/2022 2128 by Kaela Mendez RN  Outcome: Ongoing  3/16/2022 1011 by Gallo Cantu RN  Outcome: Met This Shift     Problem: Nutrition  Goal: Optimal nutrition therapy  3/16/2022 2128 by Kaela Mendez RN  Outcome: Ongoing  3/16/2022 1011 by Gallo Cantu RN  Outcome: Met This Shift

## 2022-03-17 NOTE — CARE COORDINATION
CERTIFICATE OF RETURN TO WORK    March 13, 2019      Re:   Nikos Lee  [address] [facility] [address]                        This is to certify that Nikos Lee was seen on 3/13/2019 accompanied by her father and he may be excused from work on 03/13/2019.    RESTRICTIONS: none.        SIGNATURE:___________________________________________,   3/13/2019      [facility]           [address] [facility] [address]  [phone]   ELECTRONIC HENS COMPLETED AND PLACED IN PACKET.   TE

## 2022-03-17 NOTE — CARE COORDINATION
Son notified CM that he would like for pt to go to [facility]. Referral made to Scarlet Johnson. She will review clinicals and will notify CM asap of decision to accept or not. CM informed her that pt is medically ready for d/c. Pt has Medicare and will not require a pre-cert. Will await decision from [facility]. Tello Arrington.   TE

## 2022-03-17 NOTE — DISCHARGE SUMMARY
Discharge Summary    Name:  Manfred Cerna /Age/Sex: 1945  (68 y.o. female)   MRN & CSN:  [id number] & [id number] Admission Date/Time: 2022  4:46 PM   Attending:  Kathy Quintana MD Discharging Physician: Kathy Quintana MD     Hospital Course: Manfred Cerna is a 68 y.o.  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 The squad reported O2 sats of 74% on room air. On arrival to the ED, patient ruled in for COVID-19 and so was admitted and managed for COVID-19 acute hypoxic respiratory failure, COVID-19 sepsis. She was treated per protocol with baricitinib, high-dose ARDS dexamethasone with no improvement. Hospital course has been prolonged (40-day LOS today) due to persistent high oxygen requirements. She was then transition to Solu-Medrol for several days before she started to have significant improvement. She has weaned down to less than 5 L from high flow 100% FiO2. She will need a prolonged steroid taper at discharge. She will need prophylactic Bactrim for PJP. She did have hyperglycemia due to steroids and she will continue to need low-dose insulin correctional scale while on steroids. A1c 6.6. Finally, during the course of this hospitalization, patient reconsidered her goals of care and she opted for DNR CC. The patient/family expressed appropriate understanding of and agreement with the discharge recommendations, medications, and plan.      Consults this admission:  IP CONSULT TO HOSPITALIST  IP CONSULT TO PHARMACY  IP CONSULT TO PULMONOLOGY  IP CONSULT TO CARDIOLOGY  IP CONSULT TO SPIRITUAL SERVICES  IP CONSULT TO PSYCHIATRY  IP CONSULT TO IV TEAM  IP CONSULT TO CASE MANAGEMENT  IP CONSULT TO PALLIATIVE CARE    Discharge Instruction:   Follow up appointments: Pulmonology  Primary care physician:  within 2 weeks    Diet:  regular diet   Activity: activity as tolerated  Disposition: Discharged to:   []Home, []HHC, [x]SNF, []Acute Rehab, []Hospice   Condition on discharge: Stable    Discharge Medications:        Medication List      START taking these medications    benzocaine-menthol 15-3.6 MG lozenge  Commonly known as: CEPACOL SORE THROAT  Take 1 lozenge by mouth every 2 hours as needed for Sore Throat     cephALEXin 500 MG capsule  Commonly known as: KEFLEX  Take 1 capsule by mouth 4 times daily     cyanocobalamin 1000 MCG tablet  Take 1 tablet by mouth daily  Start taking on: March 18, 2022     famotidine 20 MG tablet  Commonly known as: PEPCID  Take 1 tablet by mouth 2 times daily     fluticasone 50 MCG/ACT nasal spray  Commonly known as: FLONASE  1 spray by Each Nostril route daily  Start taking on: March 18, 2022     guaiFENesin 100 MG/5ML Soln oral solution  Commonly known as: ROBITUSSIN  Take 10 mLs by mouth every 4 hours     insulin lispro 100 UNIT/ML injection vial  Commonly known as: HUMALOG  Inject 0-12 Units into the skin 3 times daily (with meals)     lubiprostone 8 MCG Caps capsule  Commonly known as: AMITIZA  Take 1 capsule by mouth 2 times daily (with meals)     metoprolol tartrate 25 MG tablet  Commonly known as: LOPRESSOR  Take 1 tablet by mouth 2 times daily     oxyCODONE-acetaminophen 5-325 MG per tablet  Commonly known as: PERCOCET  Take 1 tablet by mouth every 6 hours as needed for Pain for up to 3 days.   Replaces: oxyCODONE-acetaminophen  MG per tablet     polyethylene glycol 17 g packet  Commonly known as: GLYCOLAX  Take 17 g by mouth daily  Start taking on: March 18, 2022     * predniSONE 20 MG tablet  Commonly known as: DELTASONE  Take 2 tablets by mouth daily for 2 doses  Start taking on: March 18, 2022     * predniSONE 10 MG tablet  Commonly known as: DELTASONE  Take 3 tablets by mouth daily for 3 doses  Start taking on: March 20, 2022     * predniSONE 20 MG tablet  Commonly known as: DELTASONE  Take 1 tablet by mouth daily for 3 doses  Start taking on: March 23, 2022     * predniSONE 10 MG tablet  Commonly known as: DELTASONE  Take 1 tablet by mouth daily for 3 doses  Start taking on: March 26, 2022     * QUEtiapine 25 MG tablet  Commonly known as: SEROQUEL  Take 1 tablet by mouth nightly     * QUEtiapine 25 MG tablet  Commonly known as: SEROQUEL  Take 0.5 tablets by mouth daily  Start taking on: March 18, 2022     sennosides-docusate sodium 8.6-50 MG tablet  Commonly known as: SENOKOT-S  Take 1 tablet by mouth 2 times daily     vitamin D 50 MCG (2000 UT) Tabs tablet  Commonly known as: CHOLECALCIFEROL  Take 1 tablet by mouth daily  Start taking on: March 18, 2022         * This list has 6 medication(s) that are the same as other medications prescribed for you. Read the directions carefully, and ask your doctor or other care provider to review them with you. CONTINUE taking these medications    cetirizine 10 MG tablet  Commonly known as: ZYRTEC     morphine 30 MG extended release tablet  Commonly known as: MS CONTIN  Take 1 tablet by mouth 2 times daily for 6 doses.         STOP taking these medications    clobetasol 0.05 % ointment  Commonly known as: TEMOVATE     diclofenac 50 MG tablet  Commonly known as: CATAFLAM     losartan 50 MG tablet  Commonly known as: COZAAR     oxyCODONE 5 MG immediate release tablet  Commonly known as: ROXICODONE     oxyCODONE-acetaminophen  MG per tablet  Commonly known as: PERCOCET  Replaced by: oxyCODONE-acetaminophen 5-325 MG per tablet           Where to Get Your Medications      These medications were sent to [address] [place] [address], [address] [facility] [phone] - F [phone]  [address] [place] [id number]    Phone: [phone]   · benzocaine-menthol 15-3.6 MG lozenge  · cephALEXin 500 MG capsule  · cyanocobalamin 1000 MCG tablet  · famotidine 20 MG tablet  · fluticasone 50 MCG/ACT nasal spray  · guaiFENesin 100 MG/5ML Soln oral solution  · insulin lispro 100 UNIT/ML injection vial  · lubiprostone 8 MCG Caps capsule  · metoprolol tartrate 25 MG tablet  · polyethylene glycol 17 g packet  · predniSONE 10 MG tablet  · predniSONE 10 MG tablet  · predniSONE 20 MG tablet  · predniSONE 20 MG tablet  · QUEtiapine 25 MG tablet  · QUEtiapine 25 MG tablet  · sennosides-docusate sodium 8.6-50 MG tablet  · vitamin D 50 MCG (2000 UT) Tabs tablet     You can get these medications from any pharmacy    Bring a paper prescription for each of these medications  · morphine 30 MG extended release tablet  · oxyCODONE-acetaminophen 5-325 MG per tablet          Objective Findings at Discharge:   BP (!) 182/69   Pulse 94   Temp 98.8 °F (37.1 °C) (Oral)   Resp 21   Ht 5' 2.01\" (1.575 m)   Wt 199 lb 12.8 oz (90.6 kg)   SpO2 91%   BMI 36.53 kg/m²            PHYSICAL EXAM  GEN:   Awake female, alert and oriented x3 in no apparent distress. Appears given age. HEENT: Normal  RESP: Diminished BS bilaterally. Symmetric chest movement while on HFNC   CVS: RRR, S1, S2  GI/: Abdomen is soft, nontender, no organomegaly. Bowel sounds normal, rectal exam deferred. No CVA tenderness. MSK:   No gross joint deformities. No tenderness  SKIN:   Normal coloration, warm, dry. Bilateral lower extremity stasis dermatitis. NEURO: Cranial nerves appear grossly intact, normal speech, no lateralizing weakness.   PSYCH:  Affect appropriate normal    BMP/CBC  Recent Labs     03/17/22  1121      K 4.2   CL 98*   CO2 31   BUN 16   CREATININE 0.5*   WBC 6.8   HCT 41.4   PLT 83*       Discharge Time of 38 minutes    Electronically signed by Pop Mao MD on 3/17/2022 at 2:28 PM

## 2022-03-17 NOTE — CARE COORDINATION
Son informed CM that 1400 will be good for transportation. Transport arranged with [facility]. ETA is 1400, pt, pt's nurse, pt's son and Helen/Carmina Caruso notified. AVS faxed and placed in packet. Rapid covid negative today.  TE

## 2022-03-17 NOTE — PROGRESS NOTES
Progress Note( Dr. Maritza Costello)  3/16/2022  Subjective:   Admit Date: 2/5/2022  PCP: No primary care provider on file. Admitted For :Patient was admitted on 2/5/2022 over a month ago. Admitted she was Covid positive COVID with Acute Pneumonia no out of Covid unit    Consulted For: Better control of blood glucose    Interval History: Feels better very hard of hearing  Patient has now allowing to get blood glucose done and willing to take some insulin if needed    Denies any chest pains,   Denies SOB . Denies nausea or vomiting. No new bowel or bladder symptoms. Intake/Output Summary (Last 24 hours) at 3/16/2022 2236  Last data filed at 3/16/2022 1340  Gross per 24 hour   Intake 240 ml   Output    Net 240 ml       DATA    CBC:   Recent Labs     03/14/22  0715   WBC 6.4   HGB 11.9*   *    CMP:  Recent Labs     03/14/22  0715      K 4.3   CL 99   CO2 27   BUN 20   CREATININE 0.4*   CALCIUM 8.0*     Lipids: No results found for: CHOL, HDL, TRIG  Glucose:  Recent Labs     03/16/22  0736 03/16/22  1056 03/16/22  1953   POCGLU 257* 237* 307*     [id number]:  Lab Results   Component Value Date    LABA1C 6.6 03/11/2022     High Sensitivity TSH:   Lab Results   Component Value Date    TSHHS 0.436 02/28/2022     Free T3: No results found for: FT3  Free T4:No results found for: T4FREE    XR CHEST PORTABLE   Final Result   Patchy airspace opacities bilaterally, may be related to mild pulmonary edema   versus pneumonia. CTA PULMONARY W CONTRAST   Final Result   1. Motion degraded exam.  No evidence for central pulmonary embolism. 2.  Patchy bilateral airspace disease again demonstrated, consistent with   history of COVID infection.   Overall these appear less confluent since the   prior chest CT exam.         XR CHEST PORTABLE   Final Result   Bilateral airspace disease, not significantly changed from prior exam.         XR CHEST PORTABLE   Final Result   Stable bilateral pulmonary opacities consistent with COVID pneumonia. Follow   up to resolution is suggested. XR CHEST PORTABLE   Final Result   Patchy bilateral airspace opacities compatible with COVID pneumonia overall   improved from 02/05/2022. CT CHEST PULMONARY EMBOLISM W CONTRAST   Final Result   1. No evidence of pulmonary embolism. 2.  Patchy bilateral consolidative ground-glass opacities are noted, for   which multifocal inflammatory process or infection including COVID pneumonia   should be considered. VL DUP LOWER EXTREMITY VENOUS BILATERAL   Final Result   No evidence of DVT in either lower extremity.          XR CHEST PORTABLE   Final Result   Bilateral airspace opacities, right greater than left, concerning for   extensive multifocal pneumonia              Scheduled Medicines   Medications:    metoprolol tartrate  25 mg Oral BID    cephALEXin  500 mg Oral 4 times per day    [START ON 3/17/2022] predniSONE  40 mg Oral Daily    Followed by   Bonnie Laureano ON 3/20/2022] predniSONE  30 mg Oral Daily    Followed by   Bonnie Laureano ON 3/23/2022] predniSONE  20 mg Oral Daily    Followed by   Bonnie Laureano ON 3/26/2022] predniSONE  10 mg Oral Daily    insulin lispro  0-12 Units SubCUTAneous TID WC    insulin lispro  0-6 Units SubCUTAneous Nightly    QUEtiapine  25 mg Oral Nightly    QUEtiapine  12.5 mg Oral Daily    QUEtiapine  12.5 mg Oral Q24H    albuterol sulfate HFA  2 puff Inhalation BID    guaiFENesin  200 mg Oral Q4H    vitamin B-12  1,000 mcg Oral Daily    lubiprostone  8 mcg Oral BID WC    polyethylene glycol  17 g Oral Daily    enoxaparin  30 mg SubCUTAneous BID    fluticasone  1 spray Each Nostril Daily    morphine  30 mg Oral 2 times per day    cetirizine  10 mg Oral Daily    sodium chloride flush  5-40 mL IntraVENous 2 times per day    sennosides-docusate sodium  1 tablet Oral BID    famotidine  20 mg Oral BID    Vitamin D  2,000 Units Oral Daily      Infusions:    dextrose      sodium chloride 25 mL (02/09/22 0917)         Objective:   Vitals: BP (!) 162/70   Pulse 92   Temp 98.5 °F (36.9 °C) (Oral)   Resp 29   Ht 5' 2.01\" (1.575 m)   Wt 199 lb 12.8 oz (90.6 kg)   SpO2 92%   BMI 36.53 kg/m²   General appearance: alert and cooperative with exam  Neck: no JVD or bruit  Thyroid : Normal lobes   Lungs: Has Vesicular Breath sounds   Heart:  regular rate and rhythm  Abdomen: soft, non-tender; bowel sounds normal; no masses,  no organomegaly  Musculoskeletal: Normal  Extremities: extremities normal, , no edema  Neurologic:  Awake, alert, oriented to name, place and time. Cranial nerves II-XII are grossly intact. Motor is  intact. Sensory is intact. ,  and gait is normal.    Assessment:     Patient Active Problem List:     WD-Venous stasis dermatitis of both lower extremities     WD-Lymphedema of both lower extremities     WD-Venous stasis ulcer of right calf with fat layer exposed with varicose veins (HCC)     WD-Venous stasis ulcer of left calf with fat layer exposed with varicose veins (HCC)     COVID-19     Hypoxia     SVT (supraventricular tachycardia) (HCC)     Adjustment disorder with anxious mood      Plan:     1. Reviewed POC blood glucose . Labs and X ray results   2. Reviewed Current Medicines   3. On Correction bolus Humalog/ Basal Lantus Insulin regime  4. Monitor Blood glucose frequently   5. Modified  the dose of Insulin/ other medicines as needed   6. Agreed to have her blood glucose done versus take insulin if needed  7. Will follow     .      Arnie Lomeli MD, MD

## 2022-03-19 NOTE — PROGRESS NOTES
Progress Note( Dr. Maritza Costello)    Subjective:   Admit Date: 2/5/2022  PCP: No primary care provider on file. Admitted For :Patient was admitted on 2/5/2022 over a month ago. Admitted she was Covid positive COVID with Acute Pneumonia no out of Covid unit    Consulted For: Better control of blood glucose    Interval History: Feels better very hard of hearing  Patient has now allowing to get blood glucose done and willing to take some insulin if needed    Denies any chest pains,   Denies SOB . Denies nausea or vomiting. No new bowel or bladder symptoms. No intake or output data in the 24 hours ending 03/18/22 2000    DATA    CBC:   Recent Labs     03/17/22  1121   WBC 6.8   HGB 12.3*   PLT 83*    CMP:  Recent Labs     03/17/22  1121      K 4.2   CL 98*   CO2 31   BUN 16   CREATININE 0.5*   CALCIUM 8.3     Lipids: No results found for: CHOL, HDL, TRIG  Glucose:  Recent Labs     03/16/22  1953 03/17/22  0624 03/17/22  1131   POCGLU 307* 132* 246*     [id number]:  Lab Results   Component Value Date    LABA1C 6.6 03/11/2022     High Sensitivity TSH:   Lab Results   Component Value Date    TSHHS 0.436 02/28/2022     Free T3: No results found for: FT3  Free T4:No results found for: T4FREE    XR CHEST PORTABLE   Final Result   Patchy airspace opacities bilaterally, may be related to mild pulmonary edema   versus pneumonia. CTA PULMONARY W CONTRAST   Final Result   1. Motion degraded exam.  No evidence for central pulmonary embolism. 2.  Patchy bilateral airspace disease again demonstrated, consistent with   history of COVID infection. Overall these appear less confluent since the   prior chest CT exam.         XR CHEST PORTABLE   Final Result   Bilateral airspace disease, not significantly changed from prior exam.         XR CHEST PORTABLE   Final Result   Stable bilateral pulmonary opacities consistent with COVID pneumonia. Follow   up to resolution is suggested.          XR CHEST PORTABLE Final Result   Patchy bilateral airspace opacities compatible with COVID pneumonia overall   improved from 02/05/2022. CT CHEST PULMONARY EMBOLISM W CONTRAST   Final Result   1. No evidence of pulmonary embolism. 2.  Patchy bilateral consolidative ground-glass opacities are noted, for   which multifocal inflammatory process or infection including COVID pneumonia   should be considered. VL DUP LOWER EXTREMITY VENOUS BILATERAL   Final Result   No evidence of DVT in either lower extremity. XR CHEST PORTABLE   Final Result   Bilateral airspace opacities, right greater than left, concerning for   extensive multifocal pneumonia              Scheduled Medicines   Medications:      Infusions:         Objective:   Vitals: BP (!) 182/69   Pulse 94   Temp 98.8 °F (37.1 °C) (Oral)   Resp 21   Ht 5' 2.01\" (1.575 m)   Wt 199 lb 12.8 oz (90.6 kg)   SpO2 91%   BMI 36.53 kg/m²   General appearance: alert and cooperative with exam  Neck: no JVD or bruit  Thyroid : Normal lobes   Lungs: Has Vesicular Breath sounds   Heart:  regular rate and rhythm  Abdomen: soft, non-tender; bowel sounds normal; no masses,  no organomegaly  Musculoskeletal: Normal  Extremities: extremities normal, , no edema  Neurologic:  Awake, alert, oriented to name, place and time. Cranial nerves II-XII are grossly intact. Motor is  intact. Sensory is intact. ,  and gait is normal.    Assessment:     Patient Active Problem List:     WD-Venous stasis dermatitis of both lower extremities     WD-Lymphedema of both lower extremities     WD-Venous stasis ulcer of right calf with fat layer exposed with varicose veins (HCC)     WD-Venous stasis ulcer of left calf with fat layer exposed with varicose veins (HCC)     COVID-19     Hypoxia     SVT (supraventricular tachycardia) (HCC)     Adjustment disorder with anxious mood      Plan:     1. Reviewed POC blood glucose . Labs and X ray results   2. Reviewed Current Medicines   3.  On Correction bolus Humalog/ Basal Lantus Insulin regime  4. Monitor Blood glucose frequently   5. Modified  the dose of Insulin/ other medicines as needed   6. Agreed to have her blood glucose done versus take insulin if needed  7. Will follow     .      Enrique Olivares MD, MD

## 2022-03-20 ENCOUNTER — HOSPITAL ENCOUNTER (EMERGENCY)
Age: 77
Discharge: NURSING, REHAB OR LONG-TERM CARE | End: 2022-03-20
Attending: STUDENT IN AN ORGANIZED HEALTH CARE EDUCATION/TRAINING PROGRAM
Payer: MEDICARE

## 2022-03-20 ENCOUNTER — APPOINTMENT (OUTPATIENT)
Dept: GENERAL RADIOLOGY | Age: 77
End: 2022-03-20
Payer: MEDICARE

## 2022-03-20 VITALS
BODY MASS INDEX: 36.39 KG/M2 | HEART RATE: 99 BPM | TEMPERATURE: 98.1 F | OXYGEN SATURATION: 95 % | WEIGHT: 199 LBS | DIASTOLIC BLOOD PRESSURE: 79 MMHG | RESPIRATION RATE: 24 BRPM | SYSTOLIC BLOOD PRESSURE: 149 MMHG

## 2022-03-20 DIAGNOSIS — L03.019 FELON OF FINGER: Primary | ICD-10-CM

## 2022-03-20 PROCEDURE — 10060 I&D ABSCESS SIMPLE/SINGLE: CPT

## 2022-03-20 PROCEDURE — 2500000003 HC RX 250 WO HCPCS: Performed by: STUDENT IN AN ORGANIZED HEALTH CARE EDUCATION/TRAINING PROGRAM

## 2022-03-20 PROCEDURE — 6370000000 HC RX 637 (ALT 250 FOR IP): Performed by: STUDENT IN AN ORGANIZED HEALTH CARE EDUCATION/TRAINING PROGRAM

## 2022-03-20 PROCEDURE — 73130 X-RAY EXAM OF HAND: CPT

## 2022-03-20 PROCEDURE — 99284 EMERGENCY DEPT VISIT MOD MDM: CPT

## 2022-03-20 RX ORDER — SULFAMETHOXAZOLE AND TRIMETHOPRIM 800; 160 MG/1; MG/1
1 TABLET ORAL 2 TIMES DAILY
Qty: 20 TABLET | Refills: 0 | Status: SHIPPED | OUTPATIENT
Start: 2022-03-20 | End: 2022-03-30

## 2022-03-20 RX ORDER — DIAPER,BRIEF,INFANT-TODD,DISP
EACH MISCELLANEOUS ONCE
Status: COMPLETED | OUTPATIENT
Start: 2022-03-20 | End: 2022-03-20

## 2022-03-20 RX ORDER — OXYCODONE HYDROCHLORIDE AND ACETAMINOPHEN 5; 325 MG/1; MG/1
1 TABLET ORAL ONCE
Status: COMPLETED | OUTPATIENT
Start: 2022-03-20 | End: 2022-03-20

## 2022-03-20 RX ADMIN — BACITRACIN ZINC: 500 OINTMENT TOPICAL at 20:15

## 2022-03-20 RX ADMIN — OXYCODONE AND ACETAMINOPHEN 1 TABLET: 5; 325 TABLET ORAL at 18:11

## 2022-03-20 RX ADMIN — LIDOCAINE HYDROCHLORIDE 5 ML: 10 INJECTION, SOLUTION EPIDURAL; INFILTRATION; INTRACAUDAL; PERINEURAL at 19:34

## 2022-03-20 ASSESSMENT — PAIN SCALES - GENERAL: PAINLEVEL_OUTOF10: 5

## 2022-03-20 NOTE — CARE COORDINATION
Patient identified as potential readmission. Last admission 2/5-3/17 for COVID 19. Patient here today for wound check of right index finger. While in the emergency department, patient provided with pain medication. Bacitracin applied to right index finger. Patient from [facility]. No acute findings requiring admission identified today. Readmission was avoided and patient was able to be discharged back to [facility].

## 2022-03-20 NOTE — ED PROVIDER NOTES
Emergency Department Encounter    Patient: Dafne Madrid  MRN: [id number]  : 1945  Date of Evaluation: 3/20/2022  ED Provider:  Olamide Rothman MD    Triage Chief Complaint:   Wound Check (right finger )    [place]:  Dafne Madrid is a 68 y.o. female presenting from [facility] for concern for infection of the right index finger. Patient was recently admitted for a prolonged hospitalization and discharged on 3/17/2022. During hospital course patient was found to have Covid with acute hypoxic respiratory failure/sepsis. Patient had a 40-day length of stay hospitalization with persistent high oxygen requirements which discharged home on 5 L nasal cannula. Patient represented today with concern for infection of the right index finger. States she had hyperglycemia during her hospitalization and had frequent glucose checks in the right index finger. States over the past several days she has increasing swelling and erythema the distal aspect of the right finger. Denies fevers or chills or systemic symptoms change from baseline. Denies any other symptoms change from discharge and states her shortness of breath and oxygen requirements are at baseline. ROS - see HPI, below listed is current ROS at time of my eval:  At least 14 systems reviewed, negative other than HPI    Past Medical History:   Diagnosis Date    Chronic lower back pain     Hx of spinal fusion     Hypertension     MRSA (methicillin resistant staph aureus) culture positive 2021    Wound    Venous disease      Past Surgical History:   Procedure Laterality Date    BACK SURGERY      FRACTURE SURGERY       No family history on file. Social History     Socioeconomic History    Marital status:       Spouse name: Not on file    Number of children: Not on file    Years of education: Not on file    Highest education level: Not on file   Occupational History    Not on file   Tobacco Use    Smoking status: Never Smoker    Smokeless tobacco: Never Used   Vaping Use    Vaping Use: Not on file   Substance and Sexual Activity    Alcohol use: Never    Drug use: Never    Sexual activity: Not Currently   Other Topics Concern    Not on file   Social History Narrative    Not on file     Social Determinants of Health     Financial Resource Strain:     Difficulty of Paying Living Expenses: Not on file   Food Insecurity:     Worried About Running Out of Food in the Last Year: Not on file    Madi of Food in the Last Year: Not on file   Transportation Needs:     Lack of Transportation (Medical): Not on file    Lack of Transportation (Non-Medical): Not on file   Physical Activity:     Days of Exercise per Week: Not on file    Minutes of Exercise per Session: Not on file   Stress:     Feeling of Stress : Not on file   Social Connections:     Frequency of Communication with Friends and Family: Not on file    Frequency of Social Gatherings with Friends and Family: Not on file    Attends Zoroastrianism Services: Not on file    Active Member of [address] [place] [address] [employer] or Organizations: Not on file    Attends Club or Organization Meetings: Not on file    Marital Status: Not on file   Intimate Partner Violence:     Fear of Current or Ex-Partner: Not on file    Emotionally Abused: Not on file    Physically Abused: Not on file    Sexually Abused: Not on file   Housing Stability:     Unable to Pay for Housing in the Last Year: Not on file    Number of [place] in the Last Year: Not on file    Unstable Housing in the Last Year: Not on file     No current facility-administered medications for this encounter. Current Outpatient Medications   Medication Sig Dispense Refill    morphine (MS CONTIN) 30 MG extended release tablet Take 1 tablet by mouth 2 times daily for 6 doses. 6 tablet 0    oxyCODONE-acetaminophen (PERCOCET) 5-325 MG per tablet Take 1 tablet by mouth every 6 hours as needed for Pain for up to 3 days.  12 tablet 0    QUEtiapine (SEROQUEL) 25 MG tablet Take 1 tablet by mouth nightly 60 tablet 3    QUEtiapine (SEROQUEL) 25 MG tablet Take 0.5 tablets by mouth daily 60 tablet 3    metoprolol tartrate (LOPRESSOR) 25 MG tablet Take 1 tablet by mouth 2 times daily 60 tablet 3    cephALEXin (KEFLEX) 500 MG capsule Take 1 capsule by mouth 4 times daily 20 capsule 0    predniSONE (DELTASONE) 20 MG tablet Take 2 tablets by mouth daily for 2 doses 4 tablet 0    predniSONE (DELTASONE) 10 MG tablet Take 3 tablets by mouth daily for 3 doses 9 tablet 0    [START ON 3/23/2022] predniSONE (DELTASONE) 20 MG tablet Take 1 tablet by mouth daily for 3 doses 3 tablet 0    [START ON 3/26/2022] predniSONE (DELTASONE) 10 MG tablet Take 1 tablet by mouth daily for 3 doses 3 tablet 0    guaiFENesin (ROBITUSSIN) 100 MG/5ML SOLN oral solution Take 10 mLs by mouth every 4 hours 1200 mL 0    fluticasone (FLONASE) 50 MCG/ACT nasal spray 1 spray by Each Nostril route daily 16 g 3    lubiprostone (AMITIZA) 8 MCG CAPS capsule Take 1 capsule by mouth 2 times daily (with meals) 30 capsule 3    benzocaine-menthol (CEPACOL SORE THROAT) 15-3.6 MG lozenge Take 1 lozenge by mouth every 2 hours as needed for Sore Throat 168 lozenge 0    Vitamin D (CHOLECALCIFEROL) 50 MCG (2000 UT) TABS tablet Take 1 tablet by mouth daily 60 tablet 0    famotidine (PEPCID) 20 MG tablet Take 1 tablet by mouth 2 times daily 60 tablet 3    polyethylene glycol (GLYCOLAX) 17 g packet Take 17 g by mouth daily 527 g 1    sennosides-docusate sodium (SENOKOT-S) 8.6-50 MG tablet Take 1 tablet by mouth 2 times daily 30 tablet 0    vitamin B-12 1000 MCG tablet Take 1 tablet by mouth daily 30 tablet 3    insulin lispro (HUMALOG) 100 UNIT/ML injection vial Inject 0-12 Units into the skin 3 times daily (with meals) 10 mL 0    sulfamethoxazole-trimethoprim (BACTRIM DS) 800-160 MG per tablet Take 1 tablet by mouth three times a week for 10 doses 10 tablet 0    cetirizine (ZYRTEC) 10 MG tablet Take 1 tablet by mouth Daily       No Known Allergies    Nursing Notes Reviewed    Physical Exam:  Triage VS:    ED Triage Vitals   Enc Vitals Group      BP 03/20/22 1715 (!) 144/68      Pulse 03/20/22 1717 108      Resp 03/20/22 1717 18      Temp 03/20/22 1721 98.1 °F (36.7 °C)      Temp Source 03/20/22 1721 Oral      SpO2 03/20/22 1713 (!) 86 %      Weight 03/20/22 1704 199 lb (90.3 kg)      Height --       Head Circumference --       Peak Flow --       Pain Score --       Pain Loc --       Pain Edu? --       Excl. in [address]? --        My pulse ox interpretation is - normal    General appearance:  No acute distress. Skin:  Warm. Dry. Eye:  Extraocular movements intact. Ears, nose, mouth and throat:  Oral mucosa moist   Neck:  Trachea midline. Extremity: Swelling and fluctuance on the palmar surface of the distal right index finger concerning for felon, surrounding erythema, normal FDS and FDP P functioning, normal extensor functioning the digit is not diffusely swollen, there is normal extensor and flexor functioning with no tenderness palpation along the flexor surface other than the distal tip, not concern for flexor tenosynovitis at this time,  bilateral lower extremity stasis. Normal ROM     Heart:  Regular rate and rhythm, normal S1 & S2, no extra heart sounds. Perfusion:  intact  Respiratory: Mildly diminished breath sounds bilaterally which appears similar to discharge. Respirations nonlabored. Stating and well on 5 L nasal cannula  Abdominal:  Normal bowel sounds. Soft. Nontender. Non distended. Back:  No CVA tenderness to palpation     Neurological:  Alert and oriented times 3. No focal neuro deficits. Psychiatric:  Appropriate    I have reviewed and interpreted all of the currently available lab results from this visit (if applicable):  No results found for this visit on 03/20/22.    Radiographs (if obtained):  Radiologist's Report Reviewed:  No results found.      Procedure Note:  Incision & Drainage    Questions were sought and answered and verbal consent was given by patient for the procedure. The area was preppedin a standard bedside fashion. A digital block was preformed with 1 % Lidocaine. A surgical scalpel blade was used to make a incision over the radial aspect of the distal aspect of the index finger with 1cc purulent drainage expressed. The patient tolerated the procedure well without complications. MDM:    49-year-old female presenting with daughters with concern for right index finger infection. History and be seen above. Vitals on presentation reassuring and patient afebrile satting well on home 5 L nasal cannula. Exam concerning for felon of the right index finger. X-ray was obtained showing soft tissue swelling the distal right digit with no subcutaneous gas identified. There is no definitive radiographic evidence of osteomyelitis. There is moderate to severe osteoarthritic changes of the right hand and wrist.  Ultrasound revealed fluid collection within the distal right digit and I&D preformed with good results. Patient is already on Keflex and will add Bactrim. I did discuss with Dr. Mirella Larson and patient does have close follow-up in the clinic. Discussed strict return precautions as well as follow-up with orthopedics and they are agreeable to plan and discharge home. Clinical Impression:  1. Felon of finger          Comment: Please note this report has been produced using speech recognition software and may contain errors related to that system including errors in grammar, punctuation, and spelling, as well as words and phrases that may be inappropriate. Efforts were made to edit the dictations.         Uma Aguirre MD  03/23/22 6294

## 2022-03-21 NOTE — ED NOTES
Discharge instructions were reviewed with pt. Pt denies any further questions. No PIV. Pt A&Ox4. Pt discharged to [facility].       Adam Puente RN  03/20/22 2044

## 2022-08-09 NOTE — PLAN OF CARE
Attempted to return patients call. No answer. Left voicemail.      Problem: Falls - Risk of:  Goal: Will remain free from falls  Description: Will remain free from falls  Outcome: Ongoing  Goal: Absence of physical injury  Description: Absence of physical injury  Outcome: Ongoing     Problem: Airway Clearance - Ineffective  Goal: Achieve or maintain patent airway  Outcome: Ongoing     Problem: Gas Exchange - Impaired  Goal: Absence of hypoxia  Outcome: Ongoing  Goal: Promote optimal lung function  Outcome: Ongoing     Problem: Breathing Pattern - Ineffective  Goal: Ability to achieve and maintain a regular respiratory rate  Outcome: Ongoing     Problem:  Body Temperature -  Risk of, Imbalanced  Goal: Ability to maintain a body temperature within defined limits  Outcome: Ongoing  Goal: Will regain or maintain usual level of consciousness  Outcome: Ongoing  Goal: Complications related to the disease process, condition or treatment will be avoided or minimized  Outcome: Ongoing     Problem: Isolation Precautions - Risk of Spread of Infection  Goal: Prevent transmission of infection  Outcome: Completed     Problem: Nutrition Deficits  Goal: Optimize nutritional status  Outcome: Ongoing     Problem: Risk for Fluid Volume Deficit  Goal: Maintain normal heart rhythm  Outcome: Ongoing  Goal: Maintain absence of muscle cramping  Outcome: Ongoing  Goal: Maintain normal serum potassium, sodium, calcium, phosphorus, and pH  Outcome: Ongoing     Problem: Loneliness or Risk for Loneliness  Goal: Demonstrate positive use of time alone when socialization is not possible  Outcome: Ongoing     Problem: Fatigue  Goal: Verbalize increase energy and improved vitality  Outcome: Ongoing     Problem: Patient Education: Go to Patient Education Activity  Goal: Patient/Family Education  Outcome: Ongoing     Problem: Pain:  Goal: Pain level will decrease  Description: Pain level will decrease  Outcome: Ongoing  Goal: Control of acute pain  Description: Control of acute pain  Outcome: Ongoing  Goal: Control of chronic pain  Description: Control of chronic pain  Outcome: Ongoing     Problem: Skin Integrity:  Goal: Will show no infection signs and symptoms  Description: Will show no infection signs and symptoms  Outcome: Ongoing  Goal: Absence of new skin breakdown  Description: Absence of new skin breakdown  Outcome: Ongoing     Problem: Nutrition  Goal: Optimal nutrition therapy  Outcome: Ongoing